# Patient Record
Sex: FEMALE | Race: WHITE | NOT HISPANIC OR LATINO | Employment: FULL TIME | URBAN - METROPOLITAN AREA
[De-identification: names, ages, dates, MRNs, and addresses within clinical notes are randomized per-mention and may not be internally consistent; named-entity substitution may affect disease eponyms.]

---

## 2017-01-04 ENCOUNTER — OFFICE VISIT (OUTPATIENT)
Dept: SPORTS MEDICINE | Facility: CLINIC | Age: 47
End: 2017-01-04
Payer: COMMERCIAL

## 2017-01-04 ENCOUNTER — HOSPITAL ENCOUNTER (OUTPATIENT)
Dept: RADIOLOGY | Facility: HOSPITAL | Age: 47
Discharge: HOME | End: 2017-01-04
Attending: ORTHOPAEDIC SURGERY
Payer: COMMERCIAL

## 2017-01-04 VITALS
WEIGHT: 178 LBS | BODY MASS INDEX: 29.66 KG/M2 | DIASTOLIC BLOOD PRESSURE: 95 MMHG | SYSTOLIC BLOOD PRESSURE: 141 MMHG | HEIGHT: 65 IN | HEART RATE: 77 BPM

## 2017-01-04 DIAGNOSIS — Z98.890 S/P RIGHT KNEE ARTHROSCOPY: ICD-10-CM

## 2017-01-04 DIAGNOSIS — M25.561 RIGHT KNEE PAIN, UNSPECIFIED CHRONICITY: Primary | ICD-10-CM

## 2017-01-04 DIAGNOSIS — M25.561 RIGHT KNEE PAIN, UNSPECIFIED CHRONICITY: ICD-10-CM

## 2017-01-04 PROCEDURE — 99024 POSTOP FOLLOW-UP VISIT: CPT | Mod: S$GLB,,, | Performed by: PHYSICIAN ASSISTANT

## 2017-01-04 PROCEDURE — 73560 X-RAY EXAM OF KNEE 1 OR 2: CPT | Mod: TC,PO,RT

## 2017-01-04 PROCEDURE — 99999 PR PBB SHADOW E&M-EST. PATIENT-LVL III: CPT | Mod: PBBFAC,,, | Performed by: PHYSICIAN ASSISTANT

## 2017-01-04 PROCEDURE — 73560 X-RAY EXAM OF KNEE 1 OR 2: CPT | Mod: 26,RT,, | Performed by: RADIOLOGY

## 2017-01-04 NOTE — PROGRESS NOTES
Subjective:          Chief Complaint: Carmen Morrow is a 46 y.o. female who had concerns including Post-op Evaluation of the Right Knee and Post-op Evaluation.    HPI   Patient presents to clinic for post op evaluation of right knee. Pain today is 1/10. Denies nausea, vomiting, fever, chills. She is no longer taking pain medication. Doing well. Ambulating full weight bearing. She has been going to PT at [facility].    DATE OF PROCEDURE: 12/22/2016     ATTENDING SURGEON: Surgeon(s) and Role:  * Bess Diaz MD - Primary     Assistants:  MD Shauna Roth PA-C      PREOPERATIVE DIAGNOSIS:  Right  Chondromalacia, (excludes patella) M94.29, Internal derangement knee M23.90 and Synovitis M65.9     POSTOPERATIVE DIAGNOSIS:   Right  Chondromalacia, (excludes patella) M94.29, Internal derangement knee M23.90 and Synovitis M65.9     PROCEDURES(S) PERFORMED:   1. Right  Femoral Subchondroplasty 99562  2. Right  Arthroscopy, debridement/shaving of articular cartilage (chondroplasty) 71415  3. Right  Arthroscopy, knee, synovectomy, limited 70001  4. Right  Amniox Arthrocentesis, 69615    Review of Systems   Constitution: Negative. Negative for chills, fever, weight gain and weight loss.   HENT: Negative for congestion, headaches and sore throat.    Eyes: Negative for blurred vision and double vision.   Cardiovascular: Negative for chest pain, leg swelling and palpitations.   Respiratory: Negative for cough and shortness of breath.    Hematologic/Lymphatic: Does not bruise/bleed easily.   Skin: Negative for itching, poor wound healing and rash.   Musculoskeletal: Positive for joint pain, joint swelling and stiffness. Negative for back pain, muscle weakness and myalgias.   Gastrointestinal: Negative for abdominal pain, constipation, diarrhea, nausea and vomiting.   Genitourinary: Negative.  Negative for frequency and hematuria.   Neurological: Negative for dizziness, numbness,  paresthesias and sensory change.   Psychiatric/Behavioral: Negative for altered mental status and depression. The patient is not nervous/anxious.    Allergic/Immunologic: Negative for hives.       Pain Related Questions  Over the past 3 days, what was your average pain during activity? (I.e. running, jogging, walking, climbing stairs, getting dressed, ect.): 3  Over the past 3 days, what was your highest pain level?: 3  Over the past 3 days, what was your lowest pain level? : 0    Other  How many nights a week are you awakened by your affected body part?: 0  Was the patient's HEIGHT measured or patient reported?: Patient Reported  Was the patient's WEIGHT measured or patient reported?: Patient Reported      Objective:        General: Carmen is well-developed, well-nourished, appears stated age, in no acute distress, alert and oriented to time, place and person.     General    Vitals reviewed.  Constitutional: She is oriented to person, place, and time. She appears well-developed and well-nourished. No distress.   Neck: Normal range of motion.   Cardiovascular: Normal rate and regular rhythm.    Pulmonary/Chest: Effort normal. No respiratory distress.   Neurological: She is alert and oriented to person, place, and time.   Psychiatric: She has a normal mood and affect. Her behavior is normal. Thought content normal.           Right Knee Exam     Inspection   Erythema: absent  Scars: present  Swelling: present  Effusion: present  Deformity: deformity    Tenderness   Right knee tenderness location: over incisions.    Range of Motion   Extension: 0   Right knee flexion: 135.     Other   Sensation: normal    Comments:  3 sutures removed. No signs of infection or necrosis. No purulent drainage. NVI    Muscle Strength   Right Lower Extremity   Hip Abduction: 5/5   Quadriceps:  4/5   Hamstrin/5     RADIOGRAPHS TODAY          Assessment:       Encounter Diagnoses   Name Primary?    Right knee pain, unspecified chronicity  Yes    S/P right knee arthroscopy           Plan:       1. Provided patient with operative note.  2. Removed  3 portal sutures. No signs of infection or necrosis.  3. May shower now without covering incisions.  4. Continue  mg once a day.  5. Continue PT per protocol.  6. RTC in 4 weeks with Dr. Bess Diaz for 6 week post op appt.                      Patient questionnaires may have been collected.

## 2017-01-23 ENCOUNTER — TELEPHONE (OUTPATIENT)
Dept: INFECTIOUS DISEASES | Facility: CLINIC | Age: 47
End: 2017-01-23

## 2017-01-23 DIAGNOSIS — R76.12 POSITIVE QUANTIFERON-TB GOLD TEST: Primary | ICD-10-CM

## 2017-01-25 ENCOUNTER — PATIENT MESSAGE (OUTPATIENT)
Dept: INFECTIOUS DISEASES | Facility: CLINIC | Age: 47
End: 2017-01-25

## 2017-01-25 ENCOUNTER — HOSPITAL ENCOUNTER (OUTPATIENT)
Dept: RADIOLOGY | Facility: HOSPITAL | Age: 47
Discharge: HOME | End: 2017-01-25
Attending: INTERNAL MEDICINE
Payer: COMMERCIAL

## 2017-01-25 DIAGNOSIS — R76.12 POSITIVE QUANTIFERON-TB GOLD TEST: ICD-10-CM

## 2017-01-25 PROCEDURE — 71020 XR CHEST PA AND LATERAL: CPT | Mod: TC

## 2017-01-25 PROCEDURE — 71020 XR CHEST PA AND LATERAL: CPT | Mod: 26,,, | Performed by: RADIOLOGY

## 2017-02-08 ENCOUNTER — OFFICE VISIT (OUTPATIENT)
Dept: INFECTIOUS DISEASES | Facility: CLINIC | Age: 47
End: 2017-02-08
Payer: COMMERCIAL

## 2017-02-08 ENCOUNTER — OFFICE VISIT (OUTPATIENT)
Dept: SPORTS MEDICINE | Facility: CLINIC | Age: 47
End: 2017-02-08
Payer: COMMERCIAL

## 2017-02-08 ENCOUNTER — HOSPITAL ENCOUNTER (OUTPATIENT)
Dept: RADIOLOGY | Facility: HOSPITAL | Age: 47
Discharge: HOME | End: 2017-02-08
Attending: ORTHOPAEDIC SURGERY
Payer: COMMERCIAL

## 2017-02-08 VITALS
WEIGHT: 177.5 LBS | HEIGHT: 65 IN | TEMPERATURE: 98 F | SYSTOLIC BLOOD PRESSURE: 132 MMHG | HEART RATE: 95 BPM | BODY MASS INDEX: 29.57 KG/M2 | DIASTOLIC BLOOD PRESSURE: 88 MMHG

## 2017-02-08 VITALS
DIASTOLIC BLOOD PRESSURE: 85 MMHG | WEIGHT: 177 LBS | SYSTOLIC BLOOD PRESSURE: 136 MMHG | BODY MASS INDEX: 29.49 KG/M2 | HEIGHT: 65 IN | HEART RATE: 82 BPM

## 2017-02-08 DIAGNOSIS — M25.561 RIGHT KNEE PAIN, UNSPECIFIED CHRONICITY: Primary | ICD-10-CM

## 2017-02-08 DIAGNOSIS — M25.561 RIGHT KNEE PAIN, UNSPECIFIED CHRONICITY: ICD-10-CM

## 2017-02-08 DIAGNOSIS — Z22.7 LATENT TUBERCULOSIS BY SKIN TEST: Primary | ICD-10-CM

## 2017-02-08 PROCEDURE — 73560 X-RAY EXAM OF KNEE 1 OR 2: CPT | Mod: 26,RT,, | Performed by: RADIOLOGY

## 2017-02-08 PROCEDURE — 99024 POSTOP FOLLOW-UP VISIT: CPT | Mod: S$GLB,,, | Performed by: ORTHOPAEDIC SURGERY

## 2017-02-08 PROCEDURE — 99999 PR PBB SHADOW E&M-EST. PATIENT-LVL III: CPT | Mod: PBBFAC,,, | Performed by: INTERNAL MEDICINE

## 2017-02-08 PROCEDURE — 73560 X-RAY EXAM OF KNEE 1 OR 2: CPT | Mod: TC,PO,RT

## 2017-02-08 PROCEDURE — 99999 PR PBB SHADOW E&M-EST. PATIENT-LVL III: CPT | Mod: PBBFAC,,, | Performed by: ORTHOPAEDIC SURGERY

## 2017-02-08 PROCEDURE — 99203 OFFICE O/P NEW LOW 30 MIN: CPT | Mod: S$GLB,,, | Performed by: INTERNAL MEDICINE

## 2017-02-08 RX ORDER — ESTAZOLAM 2 MG/1
TABLET ORAL
Refills: 8 | COMMUNITY
Start: 2017-01-24 | End: 2018-07-18

## 2017-02-08 NOTE — PROGRESS NOTES
Subjective:      Patient ID: Carmen Morrow is a 46 y.o. female.    Chief Complaint:Employee Health      History of Present Illness    Positive PPD, Initial Visit  Patient is here for evaluation of positive PPD. 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  There is no history of BCG. She took INH for 6 months and completed it as directed. Current symptoms: none. Patient denies cough, diarrhea, fatigue, fever, night sweats and weight loss. Chest x-ray was done 1/25/17 and result was normal.     Review of Systems   Constitution: Negative for chills, decreased appetite, fever, weakness, malaise/fatigue, night sweats, weight gain and weight loss.   HENT: Positive for headaches. Negative for congestion, ear pain, hearing loss, hoarse voice, sore throat and tinnitus.    Eyes: Negative for blurred vision, redness and visual disturbance.   Cardiovascular: Negative for chest pain, leg swelling and palpitations.   Respiratory: Negative for cough, hemoptysis, shortness of breath and sputum production.    Hematologic/Lymphatic: Negative for adenopathy. Does not bruise/bleed easily.   Skin: Negative for dry skin, itching, rash and suspicious lesions.   Musculoskeletal: Negative for back pain, joint pain, myalgias and neck pain.   Gastrointestinal: Positive for constipation and heartburn. Negative for abdominal pain, diarrhea, nausea and vomiting.   Genitourinary: Negative for dysuria, flank pain, frequency, hematuria, hesitancy and urgency.   Neurological: Negative for dizziness, numbness and paresthesias.   Psychiatric/Behavioral: Negative for depression and memory loss. The patient does not have insomnia and is not nervous/anxious.      Objective:   Physical Exam   Constitutional: She is oriented to person, place, and time. She appears well-developed and well-nourished. No distress.   HENT:   Head: Normocephalic and atraumatic.   Right Ear: External ear normal.   Left Ear:  External ear normal.   Nose: Nose normal.   Mouth/Throat: Oropharynx is clear and moist.   Eyes: Conjunctivae and EOM are normal. Pupils are equal, round, and reactive to light. Right eye exhibits no discharge. Left eye exhibits no discharge. No scleral icterus.   Neck: Normal range of motion. No tracheal deviation present.   Cardiovascular: Normal rate.    Pulmonary/Chest: Effort normal. No stridor. No respiratory distress. She has no wheezes.   Abdominal: Soft. She exhibits no distension.   Musculoskeletal: Normal range of motion. She exhibits no edema.   Neurological: She is alert and oriented to person, place, and time. Coordination normal.   Skin: Skin is warm and dry. No rash noted. She is not diaphoretic. No erythema.   Psychiatric: She has a normal mood and affect. Her behavior is normal. Judgment and thought content normal.   Nursing note and vitals reviewed.    Assessment:       1. 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 She has not had close contact with active TB since. No follow up PPD or quantiferon necessary. For , she should complete questionnaire yearly and has been counseled that if she were to develop signs or symptoms of TB, she would need a CXR.

## 2017-02-08 NOTE — PROGRESS NOTES
Subjective:          Chief Complaint: Carmen Morrow is a 46 y.o. female who had concerns including Post-op Evaluation of the Right Knee.    HPI Comments: Patient is here for a follow up for her right knee. She was doing PT at [facility] but finished. She is doing her home exercise program. She is not currently taking anything for her knee.     DATE OF PROCEDURE: 12/22/2016     ATTENDING SURGEON: Surgeon(s) and Role:  * Bess Diaz MD - Primary     Assistants:  MD Shauna Roth PA-C      PREOPERATIVE DIAGNOSIS:  Right  Chondromalacia, (excludes patella) M94.29, Internal derangement knee M23.90 and Synovitis M65.9     POSTOPERATIVE DIAGNOSIS:   Right  Chondromalacia, (excludes patella) M94.29, Internal derangement knee M23.90 and Synovitis M65.9     PROCEDURES(S) PERFORMED:   1. Right  Femoral Subchondroplasty 01050  2. Right  Arthroscopy, debridement/shaving of articular cartilage (chondroplasty) 37343  3. Right  Arthroscopy, knee, synovectomy, limited 43234  4. Right  Amniox Arthrocentesis, 91319         Review of Systems   Constitution: Negative. Negative for chills, fever, weight gain and weight loss.   HENT: Negative for congestion, headaches and sore throat.    Eyes: Negative for blurred vision and double vision.   Cardiovascular: Negative for chest pain, leg swelling and palpitations.   Respiratory: Negative for cough and shortness of breath.    Hematologic/Lymphatic: Does not bruise/bleed easily.   Skin: Negative for itching, poor wound healing and rash.   Musculoskeletal: Positive for joint pain, joint swelling and stiffness. Negative for back pain, muscle weakness and myalgias.   Gastrointestinal: Negative for abdominal pain, constipation, diarrhea, nausea and vomiting.   Genitourinary: Negative.  Negative for frequency and hematuria.   Neurological: Negative for dizziness, numbness, paresthesias and sensory change.   Psychiatric/Behavioral: Negative for  altered mental status and depression. The patient is not nervous/anxious.    Allergic/Immunologic: Negative for hives.       Pain Related Questions  Over the past 3 days, what was your average pain during activity? (I.e. running, jogging, walking, climbing stairs, getting dressed, ect.): 0  Over the past 3 days, what was your highest pain level?: 0  Over the past 3 days, what was your lowest pain level? : 0    Other  How many nights a week are you awakened by your affected body part?: 0  Was the patient's HEIGHT measured or patient reported?: Patient Reported  Was the patient's WEIGHT measured or patient reported?: Patient Reported      Objective:        General: Carmen is well-developed, well-nourished, appears stated age, in no acute distress, alert and oriented to time, place and person.     General    Vitals reviewed.  Constitutional: She is oriented to person, place, and time. She appears well-developed and well-nourished. No distress.   HENT:   Mouth/Throat: No oropharyngeal exudate.   Eyes: Right eye exhibits no discharge. Left eye exhibits no discharge.   Neck: Normal range of motion.   Cardiovascular: Normal rate and regular rhythm.    Pulmonary/Chest: Effort normal and breath sounds normal. No respiratory distress.   Neurological: She is alert and oriented to person, place, and time. She has normal reflexes. No cranial nerve deficit. Coordination normal.   Psychiatric: She has a normal mood and affect. Her behavior is normal. Judgment and thought content normal.     General Musculoskeletal Exam   Gait: normal       Right Knee Exam     Inspection   Erythema: absent  Scars: present  Swelling: absent  Effusion: effusion  Deformity: deformity  Bruising: absent    Tenderness   Right knee tenderness location: over incisions.    Range of Motion   Extension: 0   Flexion: abnormal Right knee flexion: 135.     Tests   Meniscus   Carina:  Medial - negative Lateral - negative  Ligament Examination Lachman: normal (-1  to 2mm) PCL-Posterior Drawer: normal (0 to 2mm)     MCL - Valgus: normal (0 to 2mm)  LCL - Varus: normalPivot Shift: normal (Equal)Reverse Pivot Shift: normal (Equal)Dial Test at 30 degrees: normal (< 5 degrees)Dial Test at 90 degrees: normal (< 5 degrees)  Posterior Sag Test: negative  Posterolateral Corner: unstable (>15 degrees difference)  Patella   Patellar Apprehension: negative  Passive Patellar Tilt: neutral  Patellar Tracking: normal  Patellar Glide (quadrants): Lateral - 1   Medial - 2  Q-Angle at 90 degrees: normal  Patellar Grind: negative  J-Sign: none    Other   Meniscal Cyst: absent  Popliteal (Baker's) Cyst: absent  Sensation: normal    Left Knee Exam     Inspection   Erythema: absent  Scars: absent  Swelling: absent  Effusion: absent  Deformity: deformity  Bruising: absent    Tenderness   The patient is experiencing no tenderness.         Range of Motion   Extension: 0   Flexion: 140     Tests   Meniscus   Carina:  Medial - negative Lateral - negative  Stability Lachman: normal (-1 to 2mm) PCL-Posterior Drawer: normal (0 to 2mm)  MCL - Valgus: normal (0 to 2mm)  LCL - Varus: normal (0 to 2mm)Pivot Shift: normal (Equal)Reverse Pivot Shift: normal (Equal)Dial Test at 30 degrees: normal (< 5 degrees)Dial Test at 90 degrees: normal (< 5 degrees)  Posterior Sag Test: negative  Posterolateral Corner: unstable (>15 degrees difference)  Patella   Patellar Apprehension: negative  Passive Patellar Tilt: neutral  Patellar Tracking: normal  Patellar Glide (Quadrants): Lateral - 1 Medial - 2  Q-Angle at 90 degrees: normal  Patellar Grind: negative  J-Sign: J sign absent    Other   Meniscal Cyst: absent  Popliteal (Baker's) Cyst: absent  Sensation: normal    Right Hip Exam     Tests   Meek: negative  Left Hip Exam     Tests   Meek: negative          Muscle Strength   Right Lower Extremity   Hip Abduction: 5/5   Quadriceps:  4/5   Hamstrin/5     Reflexes     Left Side  Quadriceps:  2+  Achilles:   2+    Right Side   Quadriceps:  2+  Achilles:  2+    Vascular Exam     Right Pulses  Dorsalis Pedis:      2+  Posterior Tibial:      2+        Left Pulses  Dorsalis Pedis:      2+  Posterior Tibial:      2+          RADIOGRAPHS TODAY  Radiographs ordered and reviewed today in clinic of the right knee demonstrates no fracture, dislocation, swelling or degenerative changes noted with post-operative changes from subchondroplasty.               Assessment:       Encounter Diagnosis   Name Primary?    Right knee pain, unspecified chronicity Yes          Plan:       1. IKDC, SF-12 and KOOS was not filled out today in clinic.     RTC in 6 weeks with Dr. Bess Diaz Patient will fill out IKDC, SF-12 and KOOS and bilateral knee series on return.    2. Continue HEP and progress as tolerated                    Patient questionnaires may have been collected.

## 2017-02-08 NOTE — MR AVS SNAPSHOT
[facility]  [address]  [facility] [address]  Phone: [phone]                  Carmen Morrow   2017 3:45 PM   Office Visit    Description:  Female : 1970   Provider:  Bess Diaz MD   Department:  [facility]           Reason for Visit     Right Knee - Post-op Evaluation           Diagnoses this Visit        Comments    Right knee pain, unspecified chronicity    -  Primary            To Do List           Goals (5 Years of Data)     None      Follow-Up and Disposition     Return in about 6 weeks (around 3/22/2017) for RTC in 6 weeks with Dr. Bess Diaz Patient will fill out IKDC, SF-12 and KOOS and bilateral knee se.      [facility] On [facility]     [facility] On Call Nurse Care Line -  Assistance  Registered nurses in the [facility] On Call Center provide clinical advisement, health education, appointment booking, and other advisory services.  Call for this free service at [phone].             Medications           Message regarding Medications     Verify the changes and/or additions to your medication regime listed below are the same as discussed with your clinician today.  If any of these changes or additions are incorrect, please notify your healthcare provider.             Verify that the below list of medications is an accurate representation of the medications you are currently taking.  If none reported, the list may be blank. If incorrect, please contact your healthcare provider. Carry this list with you in case of emergency.           Current Medications     amitriptyline (ELAVIL) 10 MG tablet 2 pills at dinner time every night    cetirizine (ZYRTEC) 10 MG tablet Take 10 mg by mouth Daily. 1 Tablet Oral Every day    fluticasone (FLONASE) 50 mcg/actuation nasal spray 2 sprays by Each Nare route every evening. 2 Spray, Suspension Nasal Every day    acyclovir (ZOVIRAX) 200 MG capsule Take 1 capsule (200 mg total) by mouth 2 (two) times  "daily.    linaclotide 290 mcg Cap Take 290 mcg by mouth once daily.    MICROGESTIN 1/20, 21, 1-20 mg-mcg per tablet TK 1 T PO QD           Clinical Reference Information           Your Vitals Were     BP Pulse Height Weight Last Period BMI    136/85 82 5' 5" (1.651 m) 80.3 kg (177 lb) 01/19/2017 29.45 kg/m2      Blood Pressure          Most Recent Value    BP  136/85      Allergies as of 2/8/2017     Oxycodone      Immunizations Administered on Date of Encounter - 2/8/2017     None      Orders Placed During Today's Visit     Future Labs/Procedures Expected by Expires    X-Ray Knee 1 or 2 View Right  2/8/2017 2/8/2018      Language Assistance Services     ATTENTION: Language assistance services are available, free of charge. Please call [phone].      ATENCIÓN: Si joanne marshall, tiene a nair disposición servicios gratuitos de asistencia lingüística. Llame al [phone].     CHÚ Ý: N?u b?n nói Ti?ng Vi?t, có các d?ch v? h? tr? ngôn ng? mi?n phí dành cho b?n. G?i s? [phone].         [facility] complies with applicable Federal civil rights laws and does not discriminate on the basis of race, color, national origin, age, disability, or sex.        "

## 2017-03-22 ENCOUNTER — PATIENT MESSAGE (OUTPATIENT)
Dept: SPORTS MEDICINE | Facility: CLINIC | Age: 47
End: 2017-03-22

## 2017-03-29 ENCOUNTER — OFFICE VISIT (OUTPATIENT)
Dept: SPORTS MEDICINE | Facility: CLINIC | Age: 47
End: 2017-03-29
Payer: COMMERCIAL

## 2017-03-29 ENCOUNTER — HOSPITAL ENCOUNTER (OUTPATIENT)
Dept: RADIOLOGY | Facility: HOSPITAL | Age: 47
Discharge: HOME | End: 2017-03-29
Attending: ORTHOPAEDIC SURGERY
Payer: COMMERCIAL

## 2017-03-29 VITALS
HEART RATE: 77 BPM | BODY MASS INDEX: 29.49 KG/M2 | DIASTOLIC BLOOD PRESSURE: 95 MMHG | WEIGHT: 177 LBS | HEIGHT: 65 IN | SYSTOLIC BLOOD PRESSURE: 149 MMHG

## 2017-03-29 DIAGNOSIS — M70.51 PES ANSERINUS BURSITIS OF RIGHT KNEE: ICD-10-CM

## 2017-03-29 DIAGNOSIS — M25.569 KNEE PAIN, UNSPECIFIED CHRONICITY, UNSPECIFIED LATERALITY: Primary | ICD-10-CM

## 2017-03-29 DIAGNOSIS — M25.569 KNEE PAIN, UNSPECIFIED CHRONICITY, UNSPECIFIED LATERALITY: ICD-10-CM

## 2017-03-29 DIAGNOSIS — M22.41 CHONDROMALACIA OF RIGHT PATELLA: ICD-10-CM

## 2017-03-29 DIAGNOSIS — M23.91 INTERNAL DERANGEMENT OF RIGHT KNEE: ICD-10-CM

## 2017-03-29 PROCEDURE — 99999 PR PBB SHADOW E&M-EST. PATIENT-LVL III: CPT | Mod: PBBFAC,,, | Performed by: ORTHOPAEDIC SURGERY

## 2017-03-29 PROCEDURE — 73564 X-RAY EXAM KNEE 4 OR MORE: CPT | Mod: TC,50,PO

## 2017-03-29 PROCEDURE — 73564 X-RAY EXAM KNEE 4 OR MORE: CPT | Mod: 26,50,, | Performed by: RADIOLOGY

## 2017-03-29 PROCEDURE — 99024 POSTOP FOLLOW-UP VISIT: CPT | Mod: S$GLB,,, | Performed by: ORTHOPAEDIC SURGERY

## 2017-03-29 NOTE — MR AVS SNAPSHOT
[facility]  [address]  [facility] [address]  Phone: [phone]                  Carmen Morrow   3/29/2017 8:15 AM   Office Visit    Description:  Female : 1970   Provider:  Bess Diaz MD   Department:  [facility]           Reason for Visit     Right Knee - Post-op Evaluation           Diagnoses this Visit        Comments    Knee pain, unspecified chronicity, unspecified laterality    -  Primary     Chondromalacia of right patella         Internal derangement of right knee         Pes anserinus bursitis of right knee                To Do List           Goals (5 Years of Data)     None      Follow-Up and Disposition     Return in about 3 months (around 2017) for RTC in 3 months with Dr. Bess Diaz Patient will fill out IKDC, SF-12 and KOOS and bilateral knee s.    Follow-up and Disposition History      [facility] Care Line - / Assistance  Registered nurses in the [facility] On Call Center provide clinical advisement, health education, appointment booking, and other advisory services.  Call for this free service at [phone].             Medications           Message regarding Medications     Verify the changes and/or additions to your medication regime listed below are the same as discussed with your clinician today.  If any of these changes or additions are incorrect, please notify your healthcare provider.             Verify that the below list of medications is an accurate representation of the medications you are currently taking.  If none reported, the list may be blank. If incorrect, please contact your healthcare provider. Carry this list with you in case of emergency.           Current Medications     amitriptyline (ELAVIL) 10 MG tablet 2 pills at dinner time every night    cetirizine (ZYRTEC) 10 MG tablet Take 10 mg by mouth Daily. 1 Tablet Oral Every day    fluticasone (FLONASE) 50 mcg/actuation  "nasal spray 2 sprays by Each Nare route every evening. 2 Spray, Suspension Nasal Every day    linaclotide 290 mcg Cap Take 290 mcg by mouth once daily.    MICROGESTIN 1/20, 21, 1-20 mg-mcg per tablet TK 1 T PO QD    acyclovir (ZOVIRAX) 200 MG capsule Take 1 capsule (200 mg total) by mouth 2 (two) times daily.           Clinical Reference Information           Your Vitals Were     BP Pulse Height Weight Last Period BMI    149/95 77 5' 5" (1.651 m) 80.3 kg (177 lb) 03/27/2017 29.45 kg/m2      Blood Pressure          Most Recent Value    BP  (!)  149/95      Allergies as of 3/29/2017     Oxycodone      Immunizations Administered on Date of Encounter - 3/29/2017     None      Orders Placed During Today's Visit     Future Labs/Procedures Expected by Expires    X-ray Knee Ortho Bilateral with Flexion  3/29/2017 3/29/2018      Instructions                   Language Assistance Services     ATTENTION: Language assistance services are available, free of charge. Please call [phone].      ATENCIÓN: Si joanne marshall, tiene a nair disposición servicios gratuitos de asistencia lingüística. Llame al [phone].     ROBERTO Ý: N?u b?n nói Ti?ng Vi?t, có các d?ch v? h? tr? ngôn ng? mi?n phí dành cho b?n. G?i s? [phone].         [facility] complies with applicable Federal civil rights laws and does not discriminate on the basis of race, color, national origin, age, disability, or sex.        "

## 2017-03-29 NOTE — PROGRESS NOTES
Subjective:          Chief Complaint: Carmen Morrow is a 46 y.o. female who had concerns including Post-op Evaluation of the Right Knee.    HPI Comments: Patient is here for a follow up for her right knee.     DATE OF PROCEDURE: 12/22/2016     ATTENDING SURGEON: Surgeon(s) and Role:  * Bess Diaz MD - Primary     Assistants:  MD Shauna Roth PA-C      PREOPERATIVE DIAGNOSIS:  Right  Chondromalacia, (excludes patella) M94.29, Internal derangement knee M23.90 and Synovitis M65.9     POSTOPERATIVE DIAGNOSIS:   Right  Chondromalacia, (excludes patella) M94.29, Internal derangement knee M23.90 and Synovitis M65.9     PROCEDURES(S) PERFORMED:   1. Right  Femoral Subchondroplasty 59141  2. Right  Arthroscopy, debridement/shaving of articular cartilage (chondroplasty) 09195  3. Right  Arthroscopy, knee, synovectomy, limited 25337  4. Right  Amniox Arthrocentesis, 57263         Review of Systems   Constitution: Negative. Negative for chills, fever, weight gain and weight loss.   HENT: Negative for congestion, headaches and sore throat.    Eyes: Negative for blurred vision and double vision.   Cardiovascular: Negative for chest pain, leg swelling and palpitations.   Respiratory: Negative for cough and shortness of breath.    Hematologic/Lymphatic: Does not bruise/bleed easily.   Skin: Negative for itching, poor wound healing and rash.   Musculoskeletal: Positive for joint pain, joint swelling and stiffness. Negative for back pain, muscle weakness and myalgias.   Gastrointestinal: Negative for abdominal pain, constipation, diarrhea, nausea and vomiting.   Genitourinary: Negative.  Negative for frequency and hematuria.   Neurological: Negative for dizziness, numbness, paresthesias and sensory change.   Psychiatric/Behavioral: Negative for altered mental status and depression. The patient is not nervous/anxious.    Allergic/Immunologic: Negative for hives.       Pain Related Questions  Over the  past 3 days, what was your average pain during activity? (I.e. running, jogging, walking, climbing stairs, getting dressed, ect.): 0  Over the past 3 days, what was your highest pain level?: 0  Over the past 3 days, what was your lowest pain level? : 0    Other  How many nights a week are you awakened by your affected body part?: 0  Was the patient's HEIGHT measured or patient reported?: Patient Reported  Was the patient's WEIGHT measured or patient reported?: Measured      Objective:        General: Carmen is well-developed, well-nourished, appears stated age, in no acute distress, alert and oriented to time, place and person.     General    Vitals reviewed.  Constitutional: She is oriented to person, place, and time. She appears well-developed and well-nourished. No distress.   HENT:   Mouth/Throat: No oropharyngeal exudate.   Eyes: Right eye exhibits no discharge. Left eye exhibits no discharge.   Neck: Normal range of motion.   Cardiovascular: Normal rate and regular rhythm.    Pulmonary/Chest: Effort normal and breath sounds normal. No respiratory distress.   Neurological: She is alert and oriented to person, place, and time. She has normal reflexes. No cranial nerve deficit. Coordination normal.   Psychiatric: She has a normal mood and affect. Her behavior is normal. Judgment and thought content normal.     General Musculoskeletal Exam   Gait: normal       Right Knee Exam     Inspection   Erythema: absent  Scars: present  Swelling: absent  Effusion: effusion  Deformity: deformity  Bruising: absent    Tenderness   Right knee tenderness location: over incisions.    Range of Motion   Extension: 0   Flexion: normal Right knee flexion: 135.     Tests   Meniscus   Carina:  Medial - negative Lateral - negative  Ligament Examination Lachman: normal (-1 to 2mm) PCL-Posterior Drawer: normal (0 to 2mm)     MCL - Valgus: normal (0 to 2mm)  LCL - Varus: normalPivot Shift: normal (Equal)Reverse Pivot Shift: normal  (Equal)Dial Test at 30 degrees: normal (< 5 degrees)Dial Test at 90 degrees: normal (< 5 degrees)  Posterior Sag Test: negative  Posterolateral Corner: unstable (>15 degrees difference)  Patella   Patellar Apprehension: negative  Passive Patellar Tilt: neutral  Patellar Tracking: normal  Patellar Glide (quadrants): Lateral - 1   Medial - 2  Q-Angle at 90 degrees: normal  Patellar Grind: negative  J-Sign: none    Other   Meniscal Cyst: absent  Popliteal (Baker's) Cyst: absent  Sensation: normal    Comments:  Mild quadriceps atrophy     Left Knee Exam     Inspection   Erythema: absent  Scars: absent  Swelling: absent  Effusion: absent  Deformity: deformity  Bruising: absent    Tenderness   The patient is experiencing no tenderness.         Range of Motion   Extension: 0   Left knee flexion: 135.     Tests   Meniscus   Carina:  Medial - negative Lateral - negative  Stability Lachman: normal (-1 to 2mm) PCL-Posterior Drawer: normal (0 to 2mm)  MCL - Valgus: normal (0 to 2mm)  LCL - Varus: normal (0 to 2mm)Pivot Shift: normal (Equal)Reverse Pivot Shift: normal (Equal)Dial Test at 30 degrees: normal (< 5 degrees)Dial Test at 90 degrees: normal (< 5 degrees)  Posterior Sag Test: negative  Posterolateral Corner: unstable (>15 degrees difference)  Patella   Patellar Apprehension: negative  Passive Patellar Tilt: neutral  Patellar Tracking: normal  Patellar Glide (Quadrants): Lateral - 1 Medial - 2  Q-Angle at 90 degrees: normal  Patellar Grind: negative  J-Sign: J sign absent    Other   Meniscal Cyst: absent  Popliteal (Baker's) Cyst: absent  Sensation: normal    Right Hip Exam     Tests   Meek: negative  Left Hip Exam     Tests   Meek: negative          Muscle Strength   Right Lower Extremity   Hip Abduction: 5/5   Quadriceps:  4/5   Hamstrin/5 and 5/5     Reflexes     Left Side  Quadriceps:  2+  Achilles:  2+    Right Side   Quadriceps:  2+  Achilles:  2+    Vascular Exam     Right Pulses  Dorsalis Pedis:       2+  Posterior Tibial:      2+        Left Pulses  Dorsalis Pedis:      2+  Posterior Tibial:      2+          RADIOGRAPHS TODAY  Right: No fracture or dislocation.  No joint effusion.  Mild narrowing of medial tibiofemoral cartilage space noted.    Left: No fracture or dislocation.  No joint effusion.  Mild narrowing of medial tibiofemoral cartilage space noted.  Sclerosis seen within the medial femoral condyle consistent with subchondroplasty.  Several mineralized foci noted at the posterior aspect of the joint.              Assessment:       Encounter Diagnoses   Name Primary?    Knee pain, unspecified chronicity, unspecified laterality Yes    Chondromalacia of right patella     Internal derangement of right knee     Pes anserinus bursitis of right knee           Plan:       1. IKDC, SF-12 and KOOS was filled out today in clinic.     RTC in 3 months with Dr. Bess Diaz Patient will fill out IKDC, SF-12 and KOOS and bilateral knee series on return.    2. Continue HEP and progress as tolerated    3. HEP 63374 - Dr. Diaz and Raya Alicea, instructed and demonstrated a Core HEP. The patient then demonstrated understanding of exercises and proper technique. This program was performed for 15 minutes.                   Patient questionnaires may have been collected.

## 2017-06-13 ENCOUNTER — OFFICE VISIT (OUTPATIENT)
Dept: OTOLARYNGOLOGY | Facility: CLINIC | Age: 47
End: 2017-06-13
Payer: COMMERCIAL

## 2017-06-13 VITALS
BODY MASS INDEX: 24.4 KG/M2 | HEART RATE: 94 BPM | WEIGHT: 146.63 LBS | DIASTOLIC BLOOD PRESSURE: 81 MMHG | TEMPERATURE: 99 F | SYSTOLIC BLOOD PRESSURE: 133 MMHG

## 2017-06-13 DIAGNOSIS — J34.89 NASAL OBSTRUCTION: ICD-10-CM

## 2017-06-13 DIAGNOSIS — J32.4 CHRONIC PANSINUSITIS: ICD-10-CM

## 2017-06-13 DIAGNOSIS — R13.10 DYSPHAGIA, UNSPECIFIED TYPE: Primary | ICD-10-CM

## 2017-06-13 DIAGNOSIS — J34.3 HYPERTROPHY OF INFERIOR NASAL TURBINATE: ICD-10-CM

## 2017-06-13 DIAGNOSIS — R09.A2 GLOBUS SENSATION: ICD-10-CM

## 2017-06-13 DIAGNOSIS — J34.2 NASAL SEPTAL DEVIATION: ICD-10-CM

## 2017-06-13 PROCEDURE — 31575 DIAGNOSTIC LARYNGOSCOPY: CPT | Mod: S$GLB,,, | Performed by: OTOLARYNGOLOGY

## 2017-06-13 PROCEDURE — 99999 PR PBB SHADOW E&M-EST. PATIENT-LVL III: CPT | Mod: PBBFAC,,, | Performed by: OTOLARYNGOLOGY

## 2017-06-13 PROCEDURE — 99204 OFFICE O/P NEW MOD 45 MIN: CPT | Mod: 25,S$GLB,, | Performed by: OTOLARYNGOLOGY

## 2017-06-13 RX ORDER — METHYLPREDNISOLONE 4 MG/1
TABLET ORAL
Qty: 1 PACKAGE | Refills: 0 | Status: SHIPPED | OUTPATIENT
Start: 2017-06-13 | End: 2017-07-17 | Stop reason: ALTCHOICE

## 2017-06-13 RX ORDER — AMOXICILLIN AND CLAVULANATE POTASSIUM 875; 125 MG/1; MG/1
1 TABLET, FILM COATED ORAL 2 TIMES DAILY
Qty: 42 TABLET | Refills: 0 | Status: SHIPPED | OUTPATIENT
Start: 2017-06-13 | End: 2017-07-04

## 2017-06-13 RX ORDER — FLUTICASONE PROPIONATE 50 MCG
2 SPRAY, SUSPENSION (ML) NASAL NIGHTLY
Qty: 1 BOTTLE | Refills: 12 | Status: SHIPPED | OUTPATIENT
Start: 2017-06-13 | End: 2019-11-21 | Stop reason: SDUPTHER

## 2017-06-13 RX ORDER — AZELASTINE 1 MG/ML
1 SPRAY, METERED NASAL 2 TIMES DAILY
Qty: 30 ML | Refills: 0 | Status: SHIPPED | OUTPATIENT
Start: 2017-06-13 | End: 2017-07-17 | Stop reason: SDUPTHER

## 2017-06-13 RX ORDER — BENZPHETAMINE HYDROCHLORIDE 50 MG/1
50 TABLET ORAL 2 TIMES DAILY
COMMUNITY
Start: 2017-05-19 | End: 2017-08-03

## 2017-06-13 NOTE — PROGRESS NOTES
CC: trouble swallowing and nasal congestion.     HPI: 46 year old female here for evaluation of dysphagia and nasal congestion/nasal obstruction.  She reports that she has a globus sensation and difficulty with swallowing. She reports all that her swallowing is effortful.  She reports occasional food sticking of solids like crackers/bread. She notes these symptoms started 1 year ago after ACDF levels C4-6 at [place] with Dr. Noguera.  She denies weight loss or fatigue. She denies throat clearing, coughing with swallows, hoarseness or heartburn symptoms.      She reports that she has nasal obstruction with right greater than left for 1.5 years. This has been associated with post nasal drip and intermittent facial pain and pressure. She was recently seen at an Urgent Care clinic and was  prescribed celestone shot and antibiotics of which she does not recall the name. She has been on Flonase intermittently but does not feel like she can get the medication into her right nostril.  She also does not think that the Flonase has helped. She does note that her symptoms where more prominent in the springtime years ago but now seem to be present year round.       Past Medical History:   Diagnosis Date    Brachial neuritis or radiculitis NOS 11/14/2012    Degeneration of cervical intervertebral disc 11/14/2012    GERD (gastroesophageal reflux disease)     SVT (supraventricular tachycardia)      Social History     Social History    Marital status:      Spouse name: N/A    Number of children: N/A    Years of education: N/A     Occupational History    Not on file.     Social History Main Topics    Smoking status: Former Smoker     Quit date: 1/1/2000    Smokeless tobacco: Not on file    Alcohol use No    Drug use: No    Sexual activity: Yes     Partners: Male     Birth control/ protection: None     Other Topics Concern    Not on file     Social History Narrative    No narrative on file     Past  Surgical History:   Procedure Laterality Date    anterior diskectomy fusion  05/20/2016    BREAST RECONSTRUCTION      CSF leak  05/22/2016    Replace and repair fat graft from surgery on 5/20/2016    KNEE ARTHROSCOPY      LAMINECTOMY      SPINAL FUSION       Family History   Problem Relation Age of Onset    Heart disease Mother     Hypertension Mother     Asthma Mother     Heart disease Father     COPD Father     Diabetes Father     Ovarian cancer Neg Hx     Colon cancer Neg Hx     Breast cancer Neg Hx     Cancer Neg Hx            Review of Systems  General: negative for chills, fever or weight loss  Psychological: negative for mood changes or depression  Ophthalmic: negative for blurry vision, photophobia or eye pain  ENT: see HPI  Respiratory: no cough, shortness of breath, or wheezing  Cardiovascular: no chest pain or dyspnea on exertion  Gastrointestinal: no abdominal pain, change in bowel habits, or black/ bloody stools  Musculoskeletal: negative for gait disturbance or muscular weakness  Neurological: no syncope or seizures; no ataxia  Dermatological: negative for puritis,  rash and jaundice  Hematologic/lymphatic: no easy bruising, no new lumps or bumps      Physical Exam:    Vitals:    06/13/17 1543   BP: 133/81   Pulse: 94   Temp: 98.9 °F (37.2 °C)       Constitutional: Well appearing / communicating without difficutly.  NAD.  Eyes: EOM I Bilaterally  Head/Face: Normocephalic.  Negative paranasal sinus pressure/tenderness.  Salivary glands WNL.  House Brackmann I Bilaterally.    Right Ear: Auricle normal appearance. External Auditory Canal within normal limits no lesions or masses,TM w/o masses/lesions/perforations. TM mobility noted.   Left Ear: Auricle normal appearance. External Auditory Canal within normal limits no lesions or masses,TM w/o masses/lesions/perforations. TM mobility noted.  Nose: Nasal septal deviation to the right. Inferior Turbinates edematous and boggy 3+ bilaterally.  No septal perforation. No masses/lesions. External nasal skin appears normal without masses/lesions.  Oral Cavity: Gingiva/lips within normal limits.  Dentition/gingiva healthy appearing. Mucus membranes moist. Floor of mouth soft, no masses palpated. Oral Tongue mobile. Hard Palate appears normal.    Oropharynx: Base of tongue appears normal. No masses/lesions noted. Tonsillar fossa/pharyngeal wall without lesions. Posterior oropharynx WNL.  Soft palate without masses. Midline uvula.   Neck/Lymphatic: No LAD I-VI bilaterally.  No thyromegaly.  No masses noted on exam.    Mirror laryngoscopy/nasopharyngoscopy: Active gag reflex.  Unable to perform.    Neuro/Psychiatric: AOx3.  Normal mood and affect.   Cardiovascular: Normal carotid pulses bilaterally, no increasing jugular venous distention noted at cervical region bilaterally.    Respiratory: Normal respiratory effort, no stridor, no retractions noted.      See separate procedure note for FFL.       Assessment:    ICD-10-CM ICD-9-CM    1. Dysphagia, unspecified type R13.10 787.20 FL Esophagram Complete   2. Chronic pansinusitis J32.4 473.8 CT Medtronic Sinuses without     The primary encounter diagnosis was Dysphagia, unspecified type. A diagnosis of Chronic pansinusitis was also pertinent to this visit.      Plan:  Orders Placed This Encounter   Procedures    FL Esophagram Complete    CT Medtronic Sinuses without     Dysphagia: Normal FFL today.  I will obtain an esophagram to further evaluate.  It is possible that the cervical hardware could be asserting mass effect on the esophagus? Consider GI referral pending results.    Chronic pansinusitis/Nasal obstruction/Nasal septal deviation/inferior turbinate hypertrophy: I have ordered a 3 week course of Augmentin, Astelin, Flonase, and a medrol dose pack. I have recommended a CT scan of the sinuses to assess for intraluminal patency.      Follow up in 3 weeks to assess.     Breana Abbott MD

## 2017-06-19 ENCOUNTER — HOSPITAL ENCOUNTER (OUTPATIENT)
Dept: RADIOLOGY | Facility: HOSPITAL | Age: 47
Discharge: HOME | End: 2017-06-19
Attending: OTOLARYNGOLOGY
Payer: COMMERCIAL

## 2017-06-19 ENCOUNTER — PATIENT MESSAGE (OUTPATIENT)
Dept: OTOLARYNGOLOGY | Facility: CLINIC | Age: 47
End: 2017-06-19

## 2017-06-19 DIAGNOSIS — R13.10 DYSPHAGIA, UNSPECIFIED TYPE: ICD-10-CM

## 2017-06-19 DIAGNOSIS — J32.4 CHRONIC PANSINUSITIS: ICD-10-CM

## 2017-06-19 PROCEDURE — 74220 X-RAY XM ESOPHAGUS 1CNTRST: CPT | Mod: TC

## 2017-06-19 PROCEDURE — 70486 CT MAXILLOFACIAL W/O DYE: CPT | Mod: TC

## 2017-06-19 PROCEDURE — 70486 CT MAXILLOFACIAL W/O DYE: CPT | Mod: 26,,, | Performed by: RADIOLOGY

## 2017-06-19 PROCEDURE — 74220 X-RAY XM ESOPHAGUS 1CNTRST: CPT | Mod: 26,,, | Performed by: RADIOLOGY

## 2017-06-27 ENCOUNTER — PATIENT MESSAGE (OUTPATIENT)
Dept: OTOLARYNGOLOGY | Facility: CLINIC | Age: 47
End: 2017-06-27

## 2017-06-27 RX ORDER — FLUCONAZOLE 150 MG/1
150 TABLET ORAL DAILY
Qty: 1 TABLET | Refills: 0 | Status: SHIPPED | OUTPATIENT
Start: 2017-06-27 | End: 2017-06-28

## 2017-07-17 ENCOUNTER — OFFICE VISIT (OUTPATIENT)
Dept: OTOLARYNGOLOGY | Facility: CLINIC | Age: 47
End: 2017-07-17
Payer: COMMERCIAL

## 2017-07-17 ENCOUNTER — PATIENT MESSAGE (OUTPATIENT)
Dept: OTOLARYNGOLOGY | Facility: CLINIC | Age: 47
End: 2017-07-17

## 2017-07-17 VITALS
HEART RATE: 80 BPM | WEIGHT: 164.88 LBS | BODY MASS INDEX: 27.44 KG/M2 | TEMPERATURE: 99 F | SYSTOLIC BLOOD PRESSURE: 133 MMHG | DIASTOLIC BLOOD PRESSURE: 88 MMHG

## 2017-07-17 DIAGNOSIS — K22.4 ESOPHAGEAL DYSMOTILITY: ICD-10-CM

## 2017-07-17 DIAGNOSIS — J32.4 CHRONIC PANSINUSITIS: Primary | ICD-10-CM

## 2017-07-17 DIAGNOSIS — J34.3 HYPERTROPHY OF INFERIOR NASAL TURBINATE: ICD-10-CM

## 2017-07-17 DIAGNOSIS — R13.10 DYSPHAGIA, UNSPECIFIED TYPE: ICD-10-CM

## 2017-07-17 DIAGNOSIS — J34.2 NASAL SEPTAL DEVIATION: ICD-10-CM

## 2017-07-17 PROCEDURE — 99214 OFFICE O/P EST MOD 30 MIN: CPT | Mod: S$GLB,,, | Performed by: OTOLARYNGOLOGY

## 2017-07-17 PROCEDURE — 99999 PR PBB SHADOW E&M-EST. PATIENT-LVL III: CPT | Mod: PBBFAC,,, | Performed by: OTOLARYNGOLOGY

## 2017-07-17 RX ORDER — AZELASTINE 1 MG/ML
1 SPRAY, METERED NASAL 2 TIMES DAILY
Qty: 30 ML | Refills: 11 | Status: SHIPPED | OUTPATIENT
Start: 2017-07-17 | End: 2019-11-21

## 2017-07-17 RX ORDER — MONTELUKAST SODIUM 10 MG/1
10 TABLET ORAL NIGHTLY
Qty: 30 TABLET | Refills: 12 | Status: SHIPPED | OUTPATIENT
Start: 2017-07-17 | End: 2017-08-16

## 2017-07-17 NOTE — PROGRESS NOTES
CC: trouble swallowing and nasal congestion.     HPI: 46 year old female here for evaluation of dysphagia and nasal congestion/nasal obstruction.  She reports that she has a globus sensation and difficulty with swallowing. She reports all that her swallowing is effortful.  She reports occasional food sticking of solids like crackers/bread. She notes these symptoms started 1 year ago after ACDF levels C4-6 at [place] with Dr. Noguera.  She denies weight loss or fatigue. She denies throat clearing, coughing with swallows, hoarseness or heartburn symptoms.      She reports that she has nasal obstruction with right greater than left for 1.5 years. This has been associated with post nasal drip and intermittent facial pain and pressure. She was recently seen at an Urgent Care clinic and was  prescribed celestone shot and antibiotics of which she does not recall the name. She has been on Flonase intermittently but does not feel like she can get the medication into her right nostril.  She also does not think that the Flonase has helped. She does note that her symptoms where more prominent in the springtime years ago but now seem to be present year round.     Interval HPI: She feels that she has continued nasal congestion and obstruction.  She has completed her Augmentin, and is using Flonase, Astelin, and xyzal.  She states her symptoms are not improved with the medical management.  She denies pressure in the maxillary sinus region. She report that she has post nasal drip.   She reports that the dysphagia is the same and unchanged.       Past Medical History:   Diagnosis Date    Brachial neuritis or radiculitis NOS 11/14/2012    Degeneration of cervical intervertebral disc 11/14/2012    GERD (gastroesophageal reflux disease)     SVT (supraventricular tachycardia)      Social History     Social History    Marital status:      Spouse name: N/A    Number of children: N/A    Years of education: N/A      Occupational History    Not on file.     Social History Main Topics    Smoking status: Former Smoker     Quit date: 1/1/2000    Smokeless tobacco: Not on file    Alcohol use No    Drug use: No    Sexual activity: Yes     Partners: Male     Birth control/ protection: None     Other Topics Concern    Not on file     Social History Narrative    No narrative on file     Past Surgical History:   Procedure Laterality Date    anterior diskectomy fusion  05/20/2016    BREAST RECONSTRUCTION      CSF leak  05/22/2016    Replace and repair fat graft from surgery on 5/20/2016    KNEE ARTHROSCOPY      LAMINECTOMY      SPINAL FUSION       Family History   Problem Relation Age of Onset    Heart disease Mother     Hypertension Mother     Asthma Mother     Heart disease Father     COPD Father     Diabetes Father     Ovarian cancer Neg Hx     Colon cancer Neg Hx     Breast cancer Neg Hx     Cancer Neg Hx            Review of Systems  General: negative for chills, fever or weight loss  Psychological: negative for mood changes or depression  Ophthalmic: negative for blurry vision, photophobia or eye pain  ENT: see HPI  Respiratory: no cough, shortness of breath, or wheezing  Cardiovascular: no chest pain or dyspnea on exertion  Gastrointestinal: no abdominal pain, change in bowel habits, or black/ bloody stools  Musculoskeletal: negative for gait disturbance or muscular weakness  Neurological: no syncope or seizures; no ataxia  Dermatological: negative for puritis,  rash and jaundice  Hematologic/lymphatic: no easy bruising, no new lumps or bumps      Physical Exam:    Vitals:    07/17/17 0805   BP: 133/88   Pulse: 80   Temp: 99.1 °F (37.3 °C)       Constitutional: Well appearing / communicating without difficutly.  NAD.  Eyes: EOM I Bilaterally  Head/Face: Normocephalic.  Negative paranasal sinus pressure/tenderness.  Salivary glands WNL.  House Brackmann I Bilaterally.    Right Ear: Auricle normal  appearance. External Auditory Canal within normal limits no lesions or masses,TM w/o masses/lesions/perforations. TM mobility noted.   Left Ear: Auricle normal appearance. External Auditory Canal within normal limits no lesions or masses,TM w/o masses/lesions/perforations. TM mobility noted.  Nose: Nasal septal deviation to the right. Inferior Turbinates edematous and boggy 3+ bilaterally. No septal perforation. No masses/lesions. External nasal skin appears normal without masses/lesions.  Oral Cavity: Gingiva/lips within normal limits.  Dentition/gingiva healthy appearing. Mucus membranes moist. Floor of mouth soft, no masses palpated. Oral Tongue mobile. Hard Palate appears normal.    Oropharynx: Base of tongue appears normal. No masses/lesions noted. Tonsillar fossa/pharyngeal wall without lesions. Posterior oropharynx WNL.  Soft palate without masses. Midline uvula.   Neck/Lymphatic: No LAD I-VI bilaterally.  No thyromegaly.  No masses noted on exam.    Mirror laryngoscopy/nasopharyngoscopy: Active gag reflex.  Unable to perform.    Neuro/Psychiatric: AOx3.  Normal mood and affect.   Cardiovascular: Normal carotid pulses bilaterally, no increasing jugular venous distention noted at cervical region bilaterally.    Respiratory: Normal respiratory effort, no stridor, no retractions noted.      Barium esophagram 6/19/17: Postsurgical changes of prior C4-C6 ACDF. The esophagus is normal in caliber and course.  Esophageal primary and secondary contractions failed to completely clear the esophagus and tertiary contractions were observed.  Gastroesophageal reflux was not elicited with cough or valsalva.    CT sinus 6/19/17: There is mild patchy mucosal thickening throughout the paranasal sinuses, particularly in the ethmoid air cells bilaterally. This is thin smooth, measures less than 3 mm in thickness. Small amount of layering fluid in the left maxillary sinus as well as the left sphenoid sinus suggesting the  possibility of acute sinusitis. No osseous thickening or sclerosis to specifically indicate chronic sinus disease. Ostiomeatal units are patent. Left-sided tiana bullosa. Minimal leftward nasal septal deviation. Otherwise nasal cavity is unremarkable.      Assessment:    ICD-10-CM ICD-9-CM    1. Chronic pansinusitis J32.4 473.8    2. Nasal septal deviation J34.2 470    3. Hypertrophy of inferior nasal turbinate J34.3 478.0    4. Dysphagia, unspecified type R13.10 787.20    5. Esophageal dysmotility K22.4 530.5      The primary encounter diagnosis was Chronic pansinusitis. Diagnoses of Nasal septal deviation, Hypertrophy of inferior nasal turbinate, Dysphagia, unspecified type, and Esophageal dysmotility were also pertinent to this visit.      Plan:  No orders of the defined types were placed in this encounter.    Dysphagia/Esophageal dysmotility: Referral to GI.    Chronic pansinusitis/Nasal obstruction/inferior turbinate hypertrophy: She would benefit from endoscopic sinus surgery for the treatment of her condition.  This would include the ethmoid and maxillary sinuses and would be bilateral.  Inferior turbinate reduction would be included.  I discussed the risks, benefits and alternatives to surgery with the patient, as well as the expected postoperative course.  I gave her the opportunity to ask questions and I answered all of them.  I provided relevant printed information on her condition for her to review at home.  Same-day discharge is anticipated.  She may have anesthesia triage by telephone.   The surgery will be scheduled in the near future.  She will need to return for a postoperative visit 1 week after surgery.    Breana Abbott MD

## 2017-07-18 NOTE — TELEPHONE ENCOUNTER
Spoke with patient, notified Dr Greene will be out on vacation the week of the 10th. Told patient I will be calling [facility] with in the next few days and try to work on a surgery date and will call her back with the dates. Patient verbalized understanding.

## 2017-07-27 ENCOUNTER — TELEPHONE (OUTPATIENT)
Dept: OTOLARYNGOLOGY | Facility: CLINIC | Age: 47
End: 2017-07-27

## 2017-07-27 DIAGNOSIS — J32.8 OTHER CHRONIC SINUSITIS: Primary | ICD-10-CM

## 2017-07-27 NOTE — TELEPHONE ENCOUNTER
Spoke with patient, discussed scheduling her surgery for 9/1/17 at 7:00 and to be there at 5:00. Also notified patient her pre-admit appointment will be on 8/16/17 at 8:30. Told patient if any more questions or concerns to please call our office. Patient verbalized understanding.

## 2017-08-03 ENCOUNTER — INITIAL CONSULT (OUTPATIENT)
Dept: GASTROENTEROLOGY | Facility: CLINIC | Age: 47
End: 2017-08-03
Payer: COMMERCIAL

## 2017-08-03 VITALS
DIASTOLIC BLOOD PRESSURE: 86 MMHG | SYSTOLIC BLOOD PRESSURE: 152 MMHG | WEIGHT: 163.81 LBS | BODY MASS INDEX: 27.26 KG/M2 | HEART RATE: 77 BPM

## 2017-08-03 DIAGNOSIS — R09.A2 GLOBUS SENSATION: ICD-10-CM

## 2017-08-03 DIAGNOSIS — K22.4 ESOPHAGEAL MOTILITY DISORDER: ICD-10-CM

## 2017-08-03 DIAGNOSIS — K21.9 GASTROESOPHAGEAL REFLUX DISEASE, ESOPHAGITIS PRESENCE NOT SPECIFIED: Primary | ICD-10-CM

## 2017-08-03 PROCEDURE — 3008F BODY MASS INDEX DOCD: CPT | Mod: S$GLB,,, | Performed by: INTERNAL MEDICINE

## 2017-08-03 PROCEDURE — 99204 OFFICE O/P NEW MOD 45 MIN: CPT | Mod: S$GLB,,, | Performed by: INTERNAL MEDICINE

## 2017-08-03 PROCEDURE — 99999 PR PBB SHADOW E&M-EST. PATIENT-LVL II: CPT | Mod: PBBFAC,,, | Performed by: INTERNAL MEDICINE

## 2017-08-03 RX ORDER — ESOMEPRAZOLE MAGNESIUM 40 MG/1
40 CAPSULE, DELAYED RELEASE ORAL
Qty: 30 CAPSULE | Refills: 1 | Status: SHIPPED | OUTPATIENT
Start: 2017-08-03 | End: 2018-09-30 | Stop reason: SDUPTHER

## 2017-08-03 RX ORDER — PHENTERMINE HYDROCHLORIDE 37.5 MG/1
TABLET ORAL
COMMUNITY
Start: 2017-08-02 | End: 2017-10-12

## 2017-08-03 NOTE — PROGRESS NOTES
Subjective:       Patient ID: Carmen Morrow is a 46 y.o. female.    Chief Complaint: Dysphagia    This is a 46-year-old female who presents for evaluation of abnormal imaging and globus sensation.  Her difficulty began about 14 months ago when she had a cervical fusion with anterior approach.  Postoperatively she noted drainage from her anterior incisions and states she was readmitted for repeat surgery for a CSF leak.  Postoperatively she noted some globus sensation described as a discomfort in the sensation of something in the upper throat region.  No particular exacerbating or relieving factors.  Occasionally she will have some time without this sensation, but it is usually present and moderate intensity.  She does find it to be particularly disturbing.  No hematemesis.  On one occasion she did notice some bread gets stuck in the suprasternal region and she had to vomit out.  No history of food impactions.  A remote upper endoscopy was done for reflux disease and Sophia which she states revealed reflux esophagitis.  He was temporarily on Nexium with good relief of these symptoms at that point.  She does have intermittent heartburn is well.  No unintentional weight loss, night sweats.    The following portions of the patient's history were reviewed and updated as appropriate: allergies, current medications, past family history, past medical history, past social history, past surgical history and problem list.    (Portions of this note were dictated using voice recognition software and may contain dictation related errors in spelling/grammar/syntax not found on text review)    HPI  Review of Systems   Constitutional: Negative for chills and fever.   HENT: Positive for trouble swallowing. Negative for tinnitus.    Eyes: Negative for pain and redness.   Respiratory: Negative for cough, choking, chest tightness and shortness of breath.    Cardiovascular: Negative for chest pain and leg swelling.    Gastrointestinal: Negative for abdominal distention, blood in stool, diarrhea and vomiting.   Endocrine: Negative for cold intolerance and heat intolerance.   Genitourinary: Negative for difficulty urinating and hematuria.   Musculoskeletal: Negative for gait problem and joint swelling.   Skin: Negative for color change and pallor.   Allergic/Immunologic: Negative for environmental allergies and food allergies.   Neurological: Negative for dizziness and light-headedness.   Hematological: Negative for adenopathy. Does not bruise/bleed easily.   Psychiatric/Behavioral: Negative for agitation and behavioral problems.       Objective:      Physical Exam   Constitutional: She is oriented to person, place, and time. She appears well-developed and well-nourished. No distress.   HENT:   Head: Normocephalic and atraumatic.   Eyes: Conjunctivae are normal. No scleral icterus.   Neck: Normal range of motion. Neck supple. No tracheal deviation present. No thyromegaly present.   Cardiovascular: Normal rate and regular rhythm.  Exam reveals no gallop and no friction rub.    No murmur heard.  Pulmonary/Chest: Effort normal and breath sounds normal. No respiratory distress. She has no wheezes.   Abdominal: Soft. Bowel sounds are normal. She exhibits no distension. There is no tenderness.   Musculoskeletal:        Right wrist: She exhibits normal range of motion and no tenderness.        Left wrist: She exhibits normal range of motion and no tenderness.   Lymphadenopathy:        Head (right side): No submental and no submandibular adenopathy present.        Head (left side): No submental and no submandibular adenopathy present.   Neurological: She is alert and oriented to person, place, and time.   Skin: Skin is warm and dry. No rash noted. She is not diaphoretic. No erythema.   Psychiatric: She has a normal mood and affect. Her behavior is normal.   Nursing note and vitals reviewed.      Labs: reviewed  Imaging: esophagram  reviewed, dysmotility  .  Assessment:       1. Gastroesophageal reflux disease, esophagitis presence not specified    2. Globus sensation    3. Esophageal motility disorder        Plan:   1. Trial of PPI  2. EGD  3. Discussed possible future manometry

## 2017-08-03 NOTE — PATIENT INSTRUCTIONS
Patient scheduled for  ENDOSCOPY with Dr. Jacques on  August 9. NPO instructions discussed and given to patient.  Lab will call two days prior to procedure date with an arrival time.  May not drive for 24 hours post procedure. Patient verbalized understanding.

## 2017-08-03 NOTE — LETTER
August 3, 2017      Breana Abbott MD  [address]  [address]  [place] [address]           [facility] Gastroenterology  [address] [address]  Phone: [phone]          Patient: Carmen Morrow   MR Number: [id number]   YOB: 1970   Date of Visit: 8/3/2017       Dear Dr. Breana Abbott:    Thank you for referring Carmen Morrow to me for evaluation. Attached you will find relevant portions of my assessment and plan of care.    If you have questions, please do not hesitate to call me. I look forward to following Carmen Morrow along with you.    Sincerely,    Shaneka Jacques MD    Enclosure  CC:  No Recipients    If you would like to receive this communication electronically, please contact [email or web address] or [phone] to request more information on [employer] access.    For providers and/or their staff who would like to refer a patient to [facility], please contact us through our one-stop-shop provider referral line, [facility], at [phone].    If you feel you have received this communication in error or would no longer like to receive these types of communications, please e-mail [email or web address]

## 2017-08-09 ENCOUNTER — HOSPITAL ENCOUNTER (OUTPATIENT)
Facility: HOSPITAL | Age: 47
Discharge: HOME | End: 2017-08-09
Attending: INTERNAL MEDICINE | Admitting: INTERNAL MEDICINE
Payer: COMMERCIAL

## 2017-08-09 ENCOUNTER — ANESTHESIA EVENT (OUTPATIENT)
Dept: ENDOSCOPY | Facility: HOSPITAL | Age: 47
End: 2017-08-09
Payer: COMMERCIAL

## 2017-08-09 ENCOUNTER — SURGERY (OUTPATIENT)
Age: 47
End: 2017-08-09

## 2017-08-09 ENCOUNTER — ANESTHESIA (OUTPATIENT)
Dept: ENDOSCOPY | Facility: HOSPITAL | Age: 47
End: 2017-08-09
Payer: COMMERCIAL

## 2017-08-09 VITALS
SYSTOLIC BLOOD PRESSURE: 124 MMHG | HEIGHT: 65 IN | BODY MASS INDEX: 26.82 KG/M2 | HEART RATE: 70 BPM | RESPIRATION RATE: 16 BRPM | TEMPERATURE: 98 F | DIASTOLIC BLOOD PRESSURE: 99 MMHG | WEIGHT: 161 LBS | OXYGEN SATURATION: 100 %

## 2017-08-09 DIAGNOSIS — K21.9 GERD (GASTROESOPHAGEAL REFLUX DISEASE): Primary | ICD-10-CM

## 2017-08-09 LAB
B-HCG UR QL: NEGATIVE
CTP QC/QA: YES

## 2017-08-09 PROCEDURE — 37000009 HC ANESTHESIA EA ADD 15 MINS: Performed by: INTERNAL MEDICINE

## 2017-08-09 PROCEDURE — 81025 URINE PREGNANCY TEST: CPT | Performed by: INTERNAL MEDICINE

## 2017-08-09 PROCEDURE — 37000008 HC ANESTHESIA 1ST 15 MINUTES: Performed by: INTERNAL MEDICINE

## 2017-08-09 PROCEDURE — 27201012 HC FORCEPS, HOT/COLD, DISP: Performed by: INTERNAL MEDICINE

## 2017-08-09 PROCEDURE — 25000003 PHARM REV CODE 250: Performed by: NURSE ANESTHETIST, CERTIFIED REGISTERED

## 2017-08-09 PROCEDURE — 43239 EGD BIOPSY SINGLE/MULTIPLE: CPT | Performed by: INTERNAL MEDICINE

## 2017-08-09 PROCEDURE — 88305 TISSUE EXAM BY PATHOLOGIST: CPT | Performed by: PATHOLOGY

## 2017-08-09 PROCEDURE — 63600175 PHARM REV CODE 636 W HCPCS: Performed by: NURSE ANESTHETIST, CERTIFIED REGISTERED

## 2017-08-09 PROCEDURE — 88305 TISSUE EXAM BY PATHOLOGIST: CPT | Mod: 26,,, | Performed by: PATHOLOGY

## 2017-08-09 PROCEDURE — 25000003 PHARM REV CODE 250: Performed by: INTERNAL MEDICINE

## 2017-08-09 PROCEDURE — 43239 EGD BIOPSY SINGLE/MULTIPLE: CPT | Mod: ,,, | Performed by: INTERNAL MEDICINE

## 2017-08-09 RX ORDER — PROPOFOL 10 MG/ML
VIAL (ML) INTRAVENOUS
Status: DISCONTINUED | OUTPATIENT
Start: 2017-08-09 | End: 2017-08-09

## 2017-08-09 RX ORDER — LIDOCAINE HCL/PF 100 MG/5ML
SYRINGE (ML) INTRAVENOUS
Status: DISCONTINUED | OUTPATIENT
Start: 2017-08-09 | End: 2017-08-09

## 2017-08-09 RX ORDER — SODIUM CHLORIDE 9 MG/ML
INJECTION, SOLUTION INTRAVENOUS CONTINUOUS
Status: DISCONTINUED | OUTPATIENT
Start: 2017-08-09 | End: 2017-08-09 | Stop reason: HOSPADM

## 2017-08-09 RX ADMIN — TOPICAL ANESTHETIC 1 EACH: 200 SPRAY DENTAL; PERIODONTAL at 11:08

## 2017-08-09 RX ADMIN — LIDOCAINE HYDROCHLORIDE 100 MG: 20 INJECTION, SOLUTION INTRAVENOUS at 11:08

## 2017-08-09 RX ADMIN — SODIUM CHLORIDE: 0.9 INJECTION, SOLUTION INTRAVENOUS at 10:08

## 2017-08-09 RX ADMIN — PROPOFOL 50 MG: 10 INJECTION, EMULSION INTRAVENOUS at 11:08

## 2017-08-09 RX ADMIN — PROPOFOL 80 MG: 10 INJECTION, EMULSION INTRAVENOUS at 11:08

## 2017-08-09 NOTE — DISCHARGE INSTRUCTIONS
Post EGD Discharge Instruction    Carmen Morrow  8/9/2017  Shaneka Jacques MD    RESTRICTIONS ON ACTIVITY:    -DO NOT drive a car, operate machinery or make critical decisions, or do activities that require coordination or balance for 24 hours.  -Following Day: Return to full activities including work.  -Diet: Eat and drink normally unless instructed otherwise.    TREATMENT FOR COMMON SIDE EFFECTS:  *Sore Throat - treat with throat lozenges, gargle with warm salt water.  *Mild abdominal pain & bloating- rest and take liquids only.    SYMPTOMS TO WATCH FOR AND REPORT TO YOUR PHYSICIAN:  1. Chills or fever occurring 24 hours after procedure.  2. Pain in chest.  3. SEVERE abdominal pain or bloating.  4. Rectal bleeding which could be maroon or black.    If you have any questions or problems, please call your Physician:    Shaneka Jacques MD Phone: ***    Lab Results: [phone]    If a complication or emergency situation arises and you are unable to reach your Physician - GO TO THE EMERGENCY ROOM.

## 2017-08-09 NOTE — ANESTHESIA PREPROCEDURE EVALUATION
08/09/2017  Carmen Morrow is a 46 y.o., female for EGD under MAC    Anesthesia Evaluation     I have reviewed the Nursing Notes.   I have reviewed the Medications.     Review of Systems  Anesthesia Hx:   Denies Personal Hx of Anesthesia complications.   Social:  Former Smoker, No Alcohol Use   Cardiovascular:   Exercise tolerance: good Dysrhythmias (PSVT) Hx palpitations   Hepatic/GI:   GERD    Musculoskeletal:   Arthritis   Spine Disorders: cervical    Neurological:   Headaches        Physical Exam  General:  Well nourished       Chest/Lungs:  Chest/Lungs Clear    Heart/Vascular:  Heart Findings: Normal            Anesthesia Plan  Type of Anesthesia, risks & benefits discussed:  Anesthesia Type:  MAC  Patient's Preference:   Intra-op Monitoring Plan:   Intra-op Monitoring Plan Comments:   Post Op Pain Control Plan:   Post Op Pain Control Plan Comments:   Induction:    Beta Blocker:  Patient is not currently on a Beta-Blocker (No further documentation required).       Informed Consent: Patient understands risks and agrees with Anesthesia plan.  Questions answered. Anesthesia consent signed with patient.  ASA Score: 2     Day of Surgery Review of History & Physical:            Ready For Surgery From Anesthesia Perspective.

## 2017-08-09 NOTE — H&P (VIEW-ONLY)
Subjective:       Patient ID: Carmen Morrow is a 46 y.o. female.    Chief Complaint: Dysphagia    This is a 46-year-old female who presents for evaluation of abnormal imaging and globus sensation.  Her difficulty began about 14 months ago when she had a cervical fusion with anterior approach.  Postoperatively she noted drainage from her anterior incisions and states she was readmitted for repeat surgery for a CSF leak.  Postoperatively she noted some globus sensation described as a discomfort in the sensation of something in the upper throat region.  No particular exacerbating or relieving factors.  Occasionally she will have some time without this sensation, but it is usually present and moderate intensity.  She does find it to be particularly disturbing.  No hematemesis.  On one occasion she did notice some bread gets stuck in the suprasternal region and she had to vomit out.  No history of food impactions.  A remote upper endoscopy was done for reflux disease and Henderson which she states revealed reflux esophagitis.  He was temporarily on Nexium with good relief of these symptoms at that point.  She does have intermittent heartburn is well.  No unintentional weight loss, night sweats.    The following portions of the patient's history were reviewed and updated as appropriate: allergies, current medications, past family history, past medical history, past social history, past surgical history and problem list.    (Portions of this note were dictated using voice recognition software and may contain dictation related errors in spelling/grammar/syntax not found on text review)    HPI  Review of Systems   Constitutional: Negative for chills and fever.   HENT: Positive for trouble swallowing. Negative for tinnitus.    Eyes: Negative for pain and redness.   Respiratory: Negative for cough, choking, chest tightness and shortness of breath.    Cardiovascular: Negative for chest pain and leg swelling.    Gastrointestinal: Negative for abdominal distention, blood in stool, diarrhea and vomiting.   Endocrine: Negative for cold intolerance and heat intolerance.   Genitourinary: Negative for difficulty urinating and hematuria.   Musculoskeletal: Negative for gait problem and joint swelling.   Skin: Negative for color change and pallor.   Allergic/Immunologic: Negative for environmental allergies and food allergies.   Neurological: Negative for dizziness and light-headedness.   Hematological: Negative for adenopathy. Does not bruise/bleed easily.   Psychiatric/Behavioral: Negative for agitation and behavioral problems.       Objective:      Physical Exam   Constitutional: She is oriented to person, place, and time. She appears well-developed and well-nourished. No distress.   HENT:   Head: Normocephalic and atraumatic.   Eyes: Conjunctivae are normal. No scleral icterus.   Neck: Normal range of motion. Neck supple. No tracheal deviation present. No thyromegaly present.   Cardiovascular: Normal rate and regular rhythm.  Exam reveals no gallop and no friction rub.    No murmur heard.  Pulmonary/Chest: Effort normal and breath sounds normal. No respiratory distress. She has no wheezes.   Abdominal: Soft. Bowel sounds are normal. She exhibits no distension. There is no tenderness.   Musculoskeletal:        Right wrist: She exhibits normal range of motion and no tenderness.        Left wrist: She exhibits normal range of motion and no tenderness.   Lymphadenopathy:        Head (right side): No submental and no submandibular adenopathy present.        Head (left side): No submental and no submandibular adenopathy present.   Neurological: She is alert and oriented to person, place, and time.   Skin: Skin is warm and dry. No rash noted. She is not diaphoretic. No erythema.   Psychiatric: She has a normal mood and affect. Her behavior is normal.   Nursing note and vitals reviewed.      Labs: reviewed  Imaging: esophagram  reviewed, dysmotility  .  Assessment:       1. Gastroesophageal reflux disease, esophagitis presence not specified    2. Globus sensation    3. Esophageal motility disorder        Plan:   1. Trial of PPI  2. EGD  3. Discussed possible future manometry

## 2017-08-09 NOTE — TRANSFER OF CARE
"Anesthesia Transfer of Care Note    Patient: Carmen Morrow    Procedure(s) Performed: Procedure(s) (LRB):  ESOPHAGOGASTRODUODENOSCOPY (EGD) (N/A)    Patient location: GI    Anesthesia Type: MAC    Transport from OR: Transported from OR on room air with adequate spontaneous ventilation    Post pain: adequate analgesia    Post assessment: no apparent anesthetic complications and tolerated procedure well    Post vital signs: stable    Level of consciousness: awake, alert and oriented    Nausea/Vomiting: no nausea/vomiting    Complications: none          Last vitals:   Visit Vitals  BP (!) 142/89 (BP Location: Right arm, Patient Position: Lying, BP Method: Automatic)   Pulse 70   Temp 36.4 °C (97.6 °F) (Oral)   Resp 14   Ht 5' 5" (1.651 m)   Wt 73 kg (161 lb)   LMP 07/10/2017 (Exact Date)   SpO2 100%   Breastfeeding? No   BMI 26.79 kg/m²     "

## 2017-08-09 NOTE — ANESTHESIA POSTPROCEDURE EVALUATION
"Anesthesia Post Evaluation    Patient: Carmen Morrow    Procedure(s) Performed: Procedure(s) (LRB):  ESOPHAGOGASTRODUODENOSCOPY (EGD) (N/A)    Final Anesthesia Type: MAC  Patient location during evaluation: GI PACU  Patient participation: Yes- Able to Participate  Level of consciousness: awake and alert and oriented  Post-procedure vital signs: reviewed and stable  Pain management: adequate  Airway patency: patent  PONV status at discharge: No PONV  Anesthetic complications: no      Cardiovascular status: blood pressure returned to baseline, hemodynamically stable and stable  Respiratory status: unassisted, spontaneous ventilation and room air  Hydration status: euvolemic  Follow-up not needed.        Visit Vitals  BP (!) 142/89 (BP Location: Right arm, Patient Position: Lying, BP Method: Automatic)   Pulse 70   Temp 36.4 °C (97.6 °F) (Oral)   Resp 14   Ht 5' 5" (1.651 m)   Wt 73 kg (161 lb)   LMP 07/10/2017 (Exact Date)   SpO2 100%   Breastfeeding? No   BMI 26.79 kg/m²       Pain/Janey Score: Pain Assessment Performed: Yes (8/9/2017  9:45 AM)  Presence of Pain: complains of pain/discomfort (8/9/2017  9:45 AM)  Pain Rating Prior to Med Admin: 8 (8/9/2017  9:45 AM)      "

## 2017-08-16 ENCOUNTER — ANESTHESIA EVENT (OUTPATIENT)
Dept: SURGERY | Facility: OTHER | Age: 47
End: 2017-08-16
Payer: COMMERCIAL

## 2017-08-16 ENCOUNTER — HOSPITAL ENCOUNTER (OUTPATIENT)
Dept: PREADMISSION TESTING | Facility: OTHER | Age: 47
Discharge: HOME | End: 2017-08-16
Attending: OTOLARYNGOLOGY
Payer: COMMERCIAL

## 2017-08-16 VITALS
BODY MASS INDEX: 26.82 KG/M2 | SYSTOLIC BLOOD PRESSURE: 125 MMHG | OXYGEN SATURATION: 100 % | HEART RATE: 71 BPM | TEMPERATURE: 99 F | HEIGHT: 65 IN | WEIGHT: 161 LBS | DIASTOLIC BLOOD PRESSURE: 82 MMHG

## 2017-08-16 RX ORDER — MIDAZOLAM HYDROCHLORIDE 5 MG/ML
4 INJECTION INTRAMUSCULAR; INTRAVENOUS ONCE AS NEEDED
Status: CANCELLED | OUTPATIENT
Start: 2017-08-16 | End: 2017-08-16

## 2017-08-16 RX ORDER — LIDOCAINE HYDROCHLORIDE 10 MG/ML
1 INJECTION, SOLUTION EPIDURAL; INFILTRATION; INTRACAUDAL; PERINEURAL ONCE
Status: CANCELLED | OUTPATIENT
Start: 2017-08-16 | End: 2017-08-16

## 2017-08-16 RX ORDER — SODIUM CHLORIDE, SODIUM LACTATE, POTASSIUM CHLORIDE, CALCIUM CHLORIDE 600; 310; 30; 20 MG/100ML; MG/100ML; MG/100ML; MG/100ML
INJECTION, SOLUTION INTRAVENOUS CONTINUOUS
Status: CANCELLED | OUTPATIENT
Start: 2017-08-16

## 2017-08-16 NOTE — ANESTHESIA PREPROCEDURE EVALUATION
08/16/2017  Carmen Morrow is a 46 y.o., female.    Anesthesia Evaluation    I have reviewed the Patient Summary Reports.    I have reviewed the Nursing Notes.   I have reviewed the Medications.     Review of Systems  Anesthesia Hx:  History of prior surgery of interest to airway management or planning: Previous anesthesia: General 12/16 knee with general anesthesia.  Denies Family Hx of Anesthesia complications.   Denies Personal Hx of Anesthesia complications.   Social:  Non-Smoker    Hematology/Oncology:  Hematology Normal   Oncology Normal     EENT/Dental:EENT/Dental Normal   Cardiovascular:   Dysrhythmias (PSVT, tx initially with Bblocker, now no meds no sx's)    Pulmonary:  Pulmonary Normal    Renal/:  Renal/ Normal     Hepatic/GI:   Hiatal Hernia, GERD Recent EGD results pending, suspect Ortiz's esophagus   Musculoskeletal:  Spine Disorders: (s/p ACF, cx by post op CSF leak) cervical    Neurological:   Denies Neuromuscular Disease. Headaches (on [place] for BENTLEY)    Endocrine:  Endocrine Normal    Dermatological:  Skin Normal    Psych:  Psychiatric Normal           Physical Exam  General:  Well nourished    Airway/Jaw/Neck:  Airway Findings: Mouth Opening: Normal Tongue: Normal  General Airway Assessment: Adult  Mallampati: I  TM Distance: Normal, at least 6 cm  Jaw/Neck Findings:  Neck ROM: Extension Decreased, Mild      Dental:  Dental Findings: In tact        Mental Status:  Mental Status Findings:  Cooperative, Alert and Oriented         Anesthesia Plan  Type of Anesthesia, risks & benefits discussed:  Anesthesia Type:  general  Patient's Preference:   Intra-op Monitoring Plan: standard ASA monitors  Intra-op Monitoring Plan Comments:   Post Op Pain Control Plan:   Post Op Pain Control Plan Comments:   Induction:    Beta Blocker:         Informed Consent: Patient understands risks and  agrees with Anesthesia plan.  Questions answered. Anesthesia consent signed with patient.  ASA Score: 2     Day of Surgery Review of History & Physical:    H&P update referred to the surgeon.     Anesthesia Plan Notes: No labs    Pt to stop Adipex 4 days preop        Ready For Surgery From Anesthesia Perspective.

## 2017-08-16 NOTE — DISCHARGE INSTRUCTIONS
PRE-ADMIT TESTING -  [phone]    [address]  [facility]        OUTPATIENT SURGERY UNIT - [phone]    Your surgery has been scheduled at [facility]. We are pleased to have the opportunity to serve you. For Further Information please call [phone].    On the day of surgery please report to the Information Desk on the 1st floor.    · CONTACT YOUR PHYSICIAN'S OFFICE THE DAY PRIOR TO YOUR SURGERY TO OBTAIN YOUR ARRIVAL TIME.     · The evening before surgery do not eat anything after 9 p.m. ( this includes hard candy, chewing gum and mints).  You may only have GATORADE, POWERADE AND WATER  from 9 p.m. until you leave your home.   DO NOT DRINK ANY LIQUIDS ON THE WAY TO THE HOSPITAL.      SPECIAL MEDICATION INSTRUCTIONS: TAKE medications checked off by the Anesthesiologist on your Medication List.    Angiogram Patients: Take medications as instructed by your physician, including aspirin.     Surgery Patients:    If you take ASPIRIN - Your PHYSICIAN/SURGEON will need to inform you IF/OR when you need to stop taking aspirin prior to your surgery.     Do Not take any medications containing IBUPROFEN.  Do Not Wear any make-up or dark nail polish   (especially eye make-up) to surgery. If you come to surgery with makeup on you will be required to remove the makeup or nail polish.    Do not shave your surgical area at least 5 days prior to your surgery. The surgical prep will be performed at the hospital according to Infection Control regulations.    Leave all valuables at home.   Do Not wear any jewelry or watches, including any metal in body piercings.  Contact Lens must be removed before surgery. Either do not wear the contact lens or bring a case and solution for storage.  Please bring a container for eyeglasses or dentures as required.  Bring any paperwork your physician has provided, such as consent forms,  history and physicals, doctor's orders, etc.   Bring comfortable clothes  that are loose fitting to wear upon discharge. Take into consideration the type of surgery being performed.  Maintain your diet as advised per your physician the day prior to surgery.      Adequate rest the night before surgery is advised.   Park in the Parking lot behind the hospital or in the [facility] across the street from the parking lot. Parking is complimentary.  If you will be discharged the same day as your procedure, please arrange for a responsible adult to drive you home or to accompany you if traveling by taxi.   YOU WILL NOT BE PERMITTED TO DRIVE OR TO LEAVE THE HOSPITAL ALONE AFTER SURGERY.   It is strongly recommended that you arrange for someone to remain with you for the first 24 hrs following your surgery.       Thank you for your cooperation.  The Staff of [facility].        Bathing Instructions                                                                 Please shower the evening before and morning of your procedure with    ANTIBACTERIAL SOAP. ( DIAL, etc )  Concentrate on the surgical area   for at least 3 minutes and rinse completely. Dry off as usual.   Do not use any deodorant, powder, body lotions, perfume, after shave or    cologne.

## 2017-08-18 ENCOUNTER — TELEPHONE (OUTPATIENT)
Dept: GASTROENTEROLOGY | Facility: CLINIC | Age: 47
End: 2017-08-18

## 2017-08-18 ENCOUNTER — PATIENT MESSAGE (OUTPATIENT)
Dept: GASTROENTEROLOGY | Facility: CLINIC | Age: 47
End: 2017-08-18

## 2017-08-18 NOTE — TELEPHONE ENCOUNTER
----- Message from Shaneka Jacques MD sent at 8/18/2017 12:26 PM CDT -----  Esophageal bx with chronic inflammation, no barretts. I would continue her ppi for now. I'm happy to f/u with her in clinic

## 2017-08-23 NOTE — TELEPHONE ENCOUNTER
Left message for the patient to call, concerning scheduling an appointment with Dr. Gautam or Dr. Bell.  Informed Angus Kadeem is no longer with [facility].

## 2017-08-25 RX ORDER — AMITRIPTYLINE HYDROCHLORIDE 10 MG/1
TABLET, FILM COATED ORAL
Qty: 60 TABLET | Refills: 3 | Status: SHIPPED | OUTPATIENT
Start: 2017-08-25 | End: 2017-12-31 | Stop reason: SDUPTHER

## 2017-08-31 ENCOUNTER — TELEPHONE (OUTPATIENT)
Dept: OTOLARYNGOLOGY | Facility: CLINIC | Age: 47
End: 2017-08-31

## 2017-08-31 NOTE — TELEPHONE ENCOUNTER
Spoke with patient, confirmed surgery for tomorrow for 7:00 and to be there two hours prior for 5:00 am. Patient verbalized understanding.

## 2017-09-01 ENCOUNTER — SURGERY (OUTPATIENT)
Age: 47
End: 2017-09-01

## 2017-09-01 ENCOUNTER — PATIENT MESSAGE (OUTPATIENT)
Dept: SURGERY | Facility: OTHER | Age: 47
End: 2017-09-01

## 2017-09-01 ENCOUNTER — ANESTHESIA (OUTPATIENT)
Dept: SURGERY | Facility: OTHER | Age: 47
End: 2017-09-01
Payer: COMMERCIAL

## 2017-09-01 ENCOUNTER — HOSPITAL ENCOUNTER (OUTPATIENT)
Facility: OTHER | Age: 47
Discharge: HOME | End: 2017-09-01
Attending: OTOLARYNGOLOGY | Admitting: OTOLARYNGOLOGY
Payer: COMMERCIAL

## 2017-09-01 VITALS
WEIGHT: 160.94 LBS | TEMPERATURE: 98 F | DIASTOLIC BLOOD PRESSURE: 85 MMHG | HEIGHT: 65 IN | OXYGEN SATURATION: 97 % | BODY MASS INDEX: 26.81 KG/M2 | RESPIRATION RATE: 16 BRPM | HEART RATE: 74 BPM | SYSTOLIC BLOOD PRESSURE: 119 MMHG

## 2017-09-01 DIAGNOSIS — J32.8 OTHER CHRONIC SINUSITIS: Primary | ICD-10-CM

## 2017-09-01 DIAGNOSIS — J32.9 CHRONIC SINUSITIS: ICD-10-CM

## 2017-09-01 LAB
B-HCG UR QL: NEGATIVE
CTP QC/QA: YES

## 2017-09-01 PROCEDURE — 88311 DECALCIFY TISSUE: CPT | Mod: 26,,, | Performed by: PATHOLOGY

## 2017-09-01 PROCEDURE — 36000711: Performed by: OTOLARYNGOLOGY

## 2017-09-01 PROCEDURE — 71000039 HC RECOVERY, EACH ADD'L HOUR: Performed by: OTOLARYNGOLOGY

## 2017-09-01 PROCEDURE — 61782 SCAN PROC CRANIAL EXTRA: CPT | Mod: ,,, | Performed by: OTOLARYNGOLOGY

## 2017-09-01 PROCEDURE — 31240 NSL/SNS NDSC CNCH BULL RESCJ: CPT | Mod: 51,LT,, | Performed by: OTOLARYNGOLOGY

## 2017-09-01 PROCEDURE — 25000003 PHARM REV CODE 250: Performed by: ANESTHESIOLOGY

## 2017-09-01 PROCEDURE — 36000710: Performed by: OTOLARYNGOLOGY

## 2017-09-01 PROCEDURE — 27201423 OPTIME MED/SURG SUP & DEVICES STERILE SUPPLY: Performed by: OTOLARYNGOLOGY

## 2017-09-01 PROCEDURE — C1763 CONN TISS, NON-HUMAN: HCPCS | Performed by: OTOLARYNGOLOGY

## 2017-09-01 PROCEDURE — 30140 RESECT INFERIOR TURBINATE: CPT | Mod: 50,51,, | Performed by: OTOLARYNGOLOGY

## 2017-09-01 PROCEDURE — 71000016 HC POSTOP RECOV ADDL HR: Performed by: OTOLARYNGOLOGY

## 2017-09-01 PROCEDURE — 31255 NSL/SINS NDSC W/TOT ETHMDCT: CPT | Mod: 50,51,, | Performed by: OTOLARYNGOLOGY

## 2017-09-01 PROCEDURE — 63600175 PHARM REV CODE 636 W HCPCS: Performed by: NURSE ANESTHETIST, CERTIFIED REGISTERED

## 2017-09-01 PROCEDURE — 37000009 HC ANESTHESIA EA ADD 15 MINS: Performed by: OTOLARYNGOLOGY

## 2017-09-01 PROCEDURE — 25000003 PHARM REV CODE 250: Performed by: OTOLARYNGOLOGY

## 2017-09-01 PROCEDURE — 25000003 PHARM REV CODE 250: Performed by: NURSE ANESTHETIST, CERTIFIED REGISTERED

## 2017-09-01 PROCEDURE — 88304 TISSUE EXAM BY PATHOLOGIST: CPT | Mod: 26,,, | Performed by: PATHOLOGY

## 2017-09-01 PROCEDURE — 31267 ENDOSCOPY MAXILLARY SINUS: CPT | Mod: 50,51,, | Performed by: OTOLARYNGOLOGY

## 2017-09-01 PROCEDURE — 71000015 HC POSTOP RECOV 1ST HR: Performed by: OTOLARYNGOLOGY

## 2017-09-01 PROCEDURE — 71000033 HC RECOVERY, INTIAL HOUR: Performed by: OTOLARYNGOLOGY

## 2017-09-01 PROCEDURE — 63600175 PHARM REV CODE 636 W HCPCS: Performed by: ANESTHESIOLOGY

## 2017-09-01 PROCEDURE — 30520 REPAIR OF NASAL SEPTUM: CPT | Mod: ,,, | Performed by: OTOLARYNGOLOGY

## 2017-09-01 PROCEDURE — 88305 TISSUE EXAM BY PATHOLOGIST: CPT | Performed by: PATHOLOGY

## 2017-09-01 PROCEDURE — 81025 URINE PREGNANCY TEST: CPT | Performed by: ANESTHESIOLOGY

## 2017-09-01 PROCEDURE — 37000008 HC ANESTHESIA 1ST 15 MINUTES: Performed by: OTOLARYNGOLOGY

## 2017-09-01 PROCEDURE — 88305 TISSUE EXAM BY PATHOLOGIST: CPT | Mod: 26,,, | Performed by: PATHOLOGY

## 2017-09-01 RX ORDER — LIDOCAINE HCL/PF 100 MG/5ML
SYRINGE (ML) INTRAVENOUS
Status: DISCONTINUED | OUTPATIENT
Start: 2017-09-01 | End: 2017-09-01

## 2017-09-01 RX ORDER — LIDOCAINE HYDROCHLORIDE AND EPINEPHRINE 10; 10 MG/ML; UG/ML
INJECTION, SOLUTION INFILTRATION; PERINEURAL
Status: DISCONTINUED | OUTPATIENT
Start: 2017-09-01 | End: 2017-09-01 | Stop reason: HOSPADM

## 2017-09-01 RX ORDER — CEPHALEXIN 500 MG/1
500 CAPSULE ORAL EVERY 12 HOURS
Qty: 20 CAPSULE | Refills: 0 | Status: SHIPPED | OUTPATIENT
Start: 2017-09-01 | End: 2017-09-11

## 2017-09-01 RX ORDER — PROPOFOL 10 MG/ML
VIAL (ML) INTRAVENOUS
Status: DISCONTINUED | OUTPATIENT
Start: 2017-09-01 | End: 2017-09-01

## 2017-09-01 RX ORDER — DEXAMETHASONE SODIUM PHOSPHATE 4 MG/ML
INJECTION, SOLUTION INTRA-ARTICULAR; INTRALESIONAL; INTRAMUSCULAR; INTRAVENOUS; SOFT TISSUE
Status: DISCONTINUED | OUTPATIENT
Start: 2017-09-01 | End: 2017-09-01

## 2017-09-01 RX ORDER — ONDANSETRON HYDROCHLORIDE 2 MG/ML
INJECTION, SOLUTION INTRAMUSCULAR; INTRAVENOUS
Status: DISCONTINUED | OUTPATIENT
Start: 2017-09-01 | End: 2017-09-01

## 2017-09-01 RX ORDER — HYDROCODONE BITARTRATE AND ACETAMINOPHEN 7.5; 325 MG/1; MG/1
1 TABLET ORAL EVERY 6 HOURS PRN
Qty: 12 TABLET | Refills: 0 | Status: SHIPPED | OUTPATIENT
Start: 2017-09-01 | End: 2017-09-11

## 2017-09-01 RX ORDER — FENTANYL CITRATE 50 UG/ML
INJECTION, SOLUTION INTRAMUSCULAR; INTRAVENOUS
Status: DISCONTINUED | OUTPATIENT
Start: 2017-09-01 | End: 2017-09-01

## 2017-09-01 RX ORDER — OXYMETAZOLINE HCL 0.05 %
2 SPRAY, NON-AEROSOL (ML) NASAL ONCE
Status: DISCONTINUED | OUTPATIENT
Start: 2017-09-01 | End: 2017-09-01 | Stop reason: HOSPADM

## 2017-09-01 RX ORDER — HYDROCODONE BITARTRATE AND ACETAMINOPHEN 5; 325 MG/1; MG/1
1 TABLET ORAL EVERY 4 HOURS PRN
Status: DISCONTINUED | OUTPATIENT
Start: 2017-09-01 | End: 2017-09-01 | Stop reason: HOSPADM

## 2017-09-01 RX ORDER — NEOSTIGMINE METHYLSULFATE 1 MG/ML
INJECTION, SOLUTION INTRAVENOUS
Status: DISCONTINUED | OUTPATIENT
Start: 2017-09-01 | End: 2017-09-01

## 2017-09-01 RX ORDER — HYDROMORPHONE HYDROCHLORIDE 2 MG/ML
0.4 INJECTION, SOLUTION INTRAMUSCULAR; INTRAVENOUS; SUBCUTANEOUS EVERY 5 MIN PRN
Status: DISCONTINUED | OUTPATIENT
Start: 2017-09-01 | End: 2017-09-01 | Stop reason: HOSPADM

## 2017-09-01 RX ORDER — MIDAZOLAM HYDROCHLORIDE 1 MG/ML
INJECTION INTRAMUSCULAR; INTRAVENOUS
Status: DISCONTINUED | OUTPATIENT
Start: 2017-09-01 | End: 2017-09-01

## 2017-09-01 RX ORDER — PHENYLEPHRINE HYDROCHLORIDE 10 MG/ML
INJECTION INTRAVENOUS
Status: DISCONTINUED | OUTPATIENT
Start: 2017-09-01 | End: 2017-09-01

## 2017-09-01 RX ORDER — LIDOCAINE HYDROCHLORIDE 10 MG/ML
1 INJECTION, SOLUTION EPIDURAL; INFILTRATION; INTRACAUDAL; PERINEURAL ONCE
Status: DISCONTINUED | OUTPATIENT
Start: 2017-09-01 | End: 2017-09-01 | Stop reason: HOSPADM

## 2017-09-01 RX ORDER — FENTANYL CITRATE 50 UG/ML
25 INJECTION, SOLUTION INTRAMUSCULAR; INTRAVENOUS EVERY 5 MIN PRN
Status: DISCONTINUED | OUTPATIENT
Start: 2017-09-01 | End: 2017-09-01 | Stop reason: HOSPADM

## 2017-09-01 RX ORDER — HYDROCODONE BITARTRATE AND ACETAMINOPHEN 5; 325 MG/1; MG/1
1 TABLET ORAL
Status: DISCONTINUED | OUTPATIENT
Start: 2017-09-01 | End: 2017-09-01 | Stop reason: HOSPADM

## 2017-09-01 RX ORDER — ONDANSETRON 4 MG/1
4 TABLET, FILM COATED ORAL EVERY 8 HOURS PRN
Qty: 12 TABLET | Refills: 1 | Status: SHIPPED | OUTPATIENT
Start: 2017-09-01 | End: 2017-10-12

## 2017-09-01 RX ORDER — CEFAZOLIN SODIUM 2 G/50ML
2 SOLUTION INTRAVENOUS
Status: DISCONTINUED | OUTPATIENT
Start: 2017-09-01 | End: 2017-09-01 | Stop reason: HOSPADM

## 2017-09-01 RX ORDER — MIDAZOLAM HYDROCHLORIDE 5 MG/ML
4 INJECTION INTRAMUSCULAR; INTRAVENOUS ONCE AS NEEDED
Status: DISCONTINUED | OUTPATIENT
Start: 2017-09-01 | End: 2017-09-01 | Stop reason: HOSPADM

## 2017-09-01 RX ORDER — OXYMETAZOLINE HCL 0.05 %
SPRAY, NON-AEROSOL (ML) NASAL
Status: DISCONTINUED | OUTPATIENT
Start: 2017-09-01 | End: 2017-09-01 | Stop reason: HOSPADM

## 2017-09-01 RX ORDER — MEPERIDINE HYDROCHLORIDE 50 MG/ML
12.5 INJECTION INTRAMUSCULAR; INTRAVENOUS; SUBCUTANEOUS ONCE AS NEEDED
Status: DISCONTINUED | OUTPATIENT
Start: 2017-09-01 | End: 2017-09-01 | Stop reason: HOSPADM

## 2017-09-01 RX ORDER — SODIUM CHLORIDE, SODIUM LACTATE, POTASSIUM CHLORIDE, CALCIUM CHLORIDE 600; 310; 30; 20 MG/100ML; MG/100ML; MG/100ML; MG/100ML
INJECTION, SOLUTION INTRAVENOUS CONTINUOUS
Status: DISCONTINUED | OUTPATIENT
Start: 2017-09-01 | End: 2017-09-01 | Stop reason: HOSPADM

## 2017-09-01 RX ORDER — DIPHENHYDRAMINE HYDROCHLORIDE 50 MG/ML
12.5 INJECTION INTRAMUSCULAR; INTRAVENOUS EVERY 30 MIN PRN
Status: DISCONTINUED | OUTPATIENT
Start: 2017-09-01 | End: 2017-09-01 | Stop reason: HOSPADM

## 2017-09-01 RX ORDER — HYDROCODONE BITARTRATE AND ACETAMINOPHEN 10; 325 MG/1; MG/1
1 TABLET ORAL EVERY 4 HOURS PRN
Status: DISCONTINUED | OUTPATIENT
Start: 2017-09-01 | End: 2017-09-01 | Stop reason: HOSPADM

## 2017-09-01 RX ORDER — GLYCOPYRROLATE 0.2 MG/ML
INJECTION INTRAMUSCULAR; INTRAVENOUS
Status: DISCONTINUED | OUTPATIENT
Start: 2017-09-01 | End: 2017-09-01

## 2017-09-01 RX ORDER — SODIUM CHLORIDE 0.9 % (FLUSH) 0.9 %
3 SYRINGE (ML) INJECTION
Status: DISCONTINUED | OUTPATIENT
Start: 2017-09-01 | End: 2017-09-01 | Stop reason: HOSPADM

## 2017-09-01 RX ORDER — ROCURONIUM BROMIDE 10 MG/ML
INJECTION, SOLUTION INTRAVENOUS
Status: DISCONTINUED | OUTPATIENT
Start: 2017-09-01 | End: 2017-09-01

## 2017-09-01 RX ORDER — ACETAMINOPHEN 10 MG/ML
INJECTION, SOLUTION INTRAVENOUS
Status: DISCONTINUED | OUTPATIENT
Start: 2017-09-01 | End: 2017-09-01

## 2017-09-01 RX ORDER — ONDANSETRON 8 MG/1
8 TABLET, ORALLY DISINTEGRATING ORAL ONCE
Status: DISCONTINUED | OUTPATIENT
Start: 2017-09-01 | End: 2017-09-01 | Stop reason: HOSPADM

## 2017-09-01 RX ADMIN — ACETAMINOPHEN 1000 MG: 10 INJECTION, SOLUTION INTRAVENOUS at 07:09

## 2017-09-01 RX ADMIN — FENTANYL CITRATE 25 MCG: 50 INJECTION INTRAMUSCULAR; INTRAVENOUS at 10:09

## 2017-09-01 RX ADMIN — FENTANYL CITRATE 25 MCG: 50 INJECTION, SOLUTION INTRAMUSCULAR; INTRAVENOUS at 08:09

## 2017-09-01 RX ADMIN — SODIUM CHLORIDE, SODIUM LACTATE, POTASSIUM CHLORIDE, AND CALCIUM CHLORIDE: 600; 310; 30; 20 INJECTION, SOLUTION INTRAVENOUS at 06:09

## 2017-09-01 RX ADMIN — DEXTROSE 2 G: 50 INJECTION, SOLUTION INTRAVENOUS at 07:09

## 2017-09-01 RX ADMIN — PHENYLEPHRINE HYDROCHLORIDE 100 MCG: 10 INJECTION INTRAVENOUS at 07:09

## 2017-09-01 RX ADMIN — HYDROCODONE BITARTATE AND ACETAMINOPHEN 1 TABLET: 5; 325 TABLET ORAL at 10:09

## 2017-09-01 RX ADMIN — ROCURONIUM BROMIDE 40 MG: 10 INJECTION, SOLUTION INTRAVENOUS at 07:09

## 2017-09-01 RX ADMIN — PROPOFOL 200 MG: 10 INJECTION, EMULSION INTRAVENOUS at 07:09

## 2017-09-01 RX ADMIN — FENTANYL CITRATE 100 MCG: 50 INJECTION, SOLUTION INTRAMUSCULAR; INTRAVENOUS at 07:09

## 2017-09-01 RX ADMIN — LIDOCAINE HYDROCHLORIDE 30 MG: 20 INJECTION, SOLUTION INTRAVENOUS at 07:09

## 2017-09-01 RX ADMIN — SODIUM CHLORIDE, SODIUM LACTATE, POTASSIUM CHLORIDE, AND CALCIUM CHLORIDE: 600; 310; 30; 20 INJECTION, SOLUTION INTRAVENOUS at 08:09

## 2017-09-01 RX ADMIN — ROCURONIUM BROMIDE 10 MG: 10 INJECTION, SOLUTION INTRAVENOUS at 08:09

## 2017-09-01 RX ADMIN — MIDAZOLAM HYDROCHLORIDE 2 MG: 1 INJECTION, SOLUTION INTRAMUSCULAR; INTRAVENOUS at 06:09

## 2017-09-01 RX ADMIN — GLYCOPYRROLATE 0.2 MG: 0.2 INJECTION, SOLUTION INTRAMUSCULAR; INTRAVENOUS at 07:09

## 2017-09-01 RX ADMIN — CARBOXYMETHYLCELLULOSE SODIUM 2 DROP: 2.5 SOLUTION/ DROPS OPHTHALMIC at 07:09

## 2017-09-01 RX ADMIN — ONDANSETRON 4 MG: 2 INJECTION, SOLUTION INTRAMUSCULAR; INTRAVENOUS at 08:09

## 2017-09-01 RX ADMIN — NEOSTIGMINE METHYLSULFATE 4 MG: 1 INJECTION INTRAVENOUS at 09:09

## 2017-09-01 RX ADMIN — LIDOCAINE HYDROCHLORIDE AND EPINEPHRINE 20 ML: 10; 10 INJECTION, SOLUTION INFILTRATION; PERINEURAL at 07:09

## 2017-09-01 RX ADMIN — DEXAMETHASONE SODIUM PHOSPHATE 8 MG: 4 INJECTION, SOLUTION INTRAMUSCULAR; INTRAVENOUS at 07:09

## 2017-09-01 RX ADMIN — OXYMETAZOLINE HYDROCHLORIDE 3 SPRAY: 0.05 SPRAY NASAL at 07:09

## 2017-09-01 RX ADMIN — FENTANYL CITRATE 50 MCG: 50 INJECTION, SOLUTION INTRAMUSCULAR; INTRAVENOUS at 09:09

## 2017-09-01 RX ADMIN — GLYCOPYRROLATE 0.6 MG: 0.2 INJECTION, SOLUTION INTRAMUSCULAR; INTRAVENOUS at 09:09

## 2017-09-01 NOTE — OP NOTE
DATE OF OPERATION: 9/1/2017    SURGEON:  Breana Greene MD     ASSISTANT SURGEON:  none     OPERATION:     1. Bilateral image-guided endoscopic anterior ethmoidectomy.  2. Bilateral image-guided endoscopic maxillary antrostomy.  3. Septoplasty  4.. Bilateral inferior turbinate reduction with submucosal resection.     PREOPERATIVE DIAGNOSIS:      1. Chronic rhinosinusitis.  2. Hypertrophic turbinates.  3. Nasal septal deviation     POSTOPERATIVE DIAGNOSIS:      1. Chronic rhinosinusitis.  2. Hypertrophic turbinates.  3. Nasal septal deviation     ANESTHESIA: General.     COMPLICATIONS: None.     ESTIMATED BLOOD LOSS: 25 mL     SPECIMEN: Bilateral sinonasal contents     WOUND EXPECTANCY: Clean-contaminated.    DRESSING: merogel packing  in bilateral middle meatus.  Merocel pack bilaterally.     FINDINGS: Bilateral polypoid and erythematous middle turbinates; polypoid mucosa in bilateral maxillary sinuses, NSD to the left with bony spur; bilateral inferior tubinate hypertrophy.      INDICATIONS: Chronic rhinosinusitis, not controlled with maximal medical therapy, nasal septal deviation, inferior turbinate hypertrophy.     I discussed the risks, benefits and alternatives of surgical correction of the chronically obstructed sinuses and associated turbinate hypertrophy with the patient as well as the expected postoperative course. I gave her the opportunity to ask questions and I answered all of them. On the morning of surgery I again met with the patient and reviewed the indications for surgery and she consented to proceed.     DESCRIPTION OF PROCEDURE: The patient was brought to the operating room and placed supine on the operating table. The patient was placed under general anesthesia and intubated. The patient was positioned with a donut under the head and the image-guidance headset for the [employer] Fusion system was applied.  Image-guided navigation was indicated to facilitate exenteration of all ethmoid cells and  the extent of sinusotomy.  The CT scan disc was loaded into the image-guidance system and registered with the patient tracking system according to the 's instructions. The pointer was calibrated and registration was verified using predefined landmarks.  Cottonoid pledgets soaked with neosynepherine  was placed into the nasal cavity bilaterally for mucosal decongestion.  Prophylactic cefazolin was given prior to the surgery start. A time-out was performed to confirm the proper patient, site and procedure.  The CT images were again reviewed prior to surgical start.  The patient was prepped and draped in the usual fashion.  The bed was placed in 20-degree reverse Trendelenberg position.     A 0-degree endoscope was used to examine the nasal cavity.  Local injections of 1% lidocaine with 1:100,000 parts epinephrine were then performed around the middle turbinates bilaterally as well as the inferior turbinate and the sphenopalatine ganglion region bilaterally.     Attention was then turned to the sinuses with the right sinonasal cavity addressed first.  The middle turbinate was medialized to with a jonna elevator to gain access into the middle meatus.   The uncinate process was then visualized and a sickle knife was used to incise the uncinate process. The uncinate was dissected to its superior attachment and removed using cutting forceps.  A tiana bullosa was not present.       After removal of the bone, the natural ostium of the maxillary sinus was visible. The lumen was visualized with a 30-degree scope and entered using a curved suction to confirm the dimension. The antrostomy was enlarged with cutting instruments to include the natural sinus ostium, taking care not to move anterior to the maxillary line so as to avoid injury to the nasolacrimal duct.  Additional bone was removed using forceps.  Polypoid tissue  was present within the maxillary sinus.  This was thoroughly removed and sent for pathologic  evaluation.     The ethmoid bulla was entered using a microdebrider and anterior ethmoidectomy was performed.  The roof of the bulla was removed with forceps and the suprabullar recess was exposed. The grand lamella of the middle turbinate was then traversed and posterior ethmoidectomy was performed.  An Onodi cell was not present.  The mucosa was hypertrophic throughout the sinuses, indicative of chronic inflammation.  Topical hemostatic agents on pledgets were then placed into the ethmoid cavity for hemostasis.     Attention was then turned to the left side of the sinonasal tract.  A similar procedure was performed on this side, including uncinectomy, maxillary antrostomy, removal of left tiana bullosa, and  anterior ethmoidectomy.      At the conclusion of these procedures, the image-guidance probe was used to verify that all anterior ethmoid cells had been properly opened.   All sinuses were copiously irrigated with warm normal saline solution.       At this point, approximately 2 cc of 1% lidocaine with epinephrine was injected into the septum bilaterally in the submucoperichondrial plane.  A 15 blade was then used to make a hemitransfixion incision on the left.  The mucoperichondrial flap was then elevated first on the left, and then the incision was carried through the septum to the right and the right flap was elevated in a similar fashion.  Using a swivel knife as well as double action forceps, the deviated portion of the septum was removed.    Next, the head of each inferior turbinate was injected with 0.5 cc of 1% lidocaine with epinephrine and a 15 blade was used to make a small incision at the head of the turbinate.  A Arj elevator was used to elevate the erectile tissue of the inferior turbinate.  The micodebrider with the turbinate blade was then used to remove the erectile tissue in the submucous plane.      The anterior septal incision was then closed using a chromic suture, and the bilateral  mucoperichondrial flaps were re approximated using a quilting caopting suture technique with a 4-0 plain gut suture.   Connelly splints were then placed along the septum and secured anteriorly using a 2-0 silk suture.      At this point, the pledgets were all removed.   Merogel packing  was placed into the bilateral ethmoid cavities to stent open the surgical site.   Mupirocin ointment was applied to the vestibule bilaterally.  At this point, the headset was removed and the drapes were taken down The patient was turned back toward the anesthesiologist and awakened from anesthesia, extubated and transferred to the recovery room in stable condition.

## 2017-09-01 NOTE — H&P
CC: trouble swallowing and nasal congestion.      HPI: 46 year old female here for ESS/SMRT/Septoplasty for a history nasal obstruction with right greater than left for 1.5 years. This has been associated with post nasal drip and intermittent facial pain and pressure. She was recently seen at an Urgent Care clinic and was  prescribed celestone shot and antibiotics of which she does not recall the name. She has been on Flonase intermittently but does not feel like she can get the medication into her right nostril.  She also does not think that the Flonase has helped. She does note that her symptoms where more prominent in the springtime years ago but now seem to be present year round. She has been on  Flonase, Astelin, and xyzal and multiple rounds of antibiotics without relief. .  She states her symptoms are not improved with the medical management.  She denies pressure in the maxillary sinus region. She report that she has post nasal drip.   She reports that the dysphagia is the same and unchanged.              Past Medical History:   Diagnosis Date    Brachial neuritis or radiculitis NOS 11/14/2012    Degeneration of cervical intervertebral disc 11/14/2012    GERD (gastroesophageal reflux disease)      SVT (supraventricular tachycardia)        Social History            Social History    Marital status:        Spouse name: N/A    Number of children: N/A    Years of education: N/A          Occupational History    Not on file.            Social History Main Topics    Smoking status: Former Smoker       Quit date: 1/1/2000    Smokeless tobacco: Not on file    Alcohol use No    Drug use: No    Sexual activity: Yes       Partners: Male       Birth control/ protection: None           Other Topics Concern    Not on file          Social History Narrative    No narrative on file            Past Surgical History:   Procedure Laterality Date    anterior diskectomy fusion   05/20/2016    BREAST  RECONSTRUCTION        CSF leak   05/22/2016     Replace and repair fat graft from surgery on 5/20/2016    KNEE ARTHROSCOPY        LAMINECTOMY        SPINAL FUSION                Family History   Problem Relation Age of Onset    Heart disease Mother      Hypertension Mother      Asthma Mother      Heart disease Father      COPD Father      Diabetes Father      Ovarian cancer Neg Hx      Colon cancer Neg Hx      Breast cancer Neg Hx      Cancer Neg Hx                 Review of Systems  General: negative for chills, fever or weight loss  Psychological: negative for mood changes or depression  Ophthalmic: negative for blurry vision, photophobia or eye pain  ENT: see HPI  Respiratory: no cough, shortness of breath, or wheezing  Cardiovascular: no chest pain or dyspnea on exertion  Gastrointestinal: no abdominal pain, change in bowel habits, or black/ bloody stools  Musculoskeletal: negative for gait disturbance or muscular weakness  Neurological: no syncope or seizures; no ataxia  Dermatological: negative for puritis,  rash and jaundice  Hematologic/lymphatic: no easy bruising, no new lumps or bumps        Physical Exam:         Vitals:     07/17/17 0805   BP: 133/88   Pulse: 80   Temp: 99.1 °F (37.3 °C)         Constitutional: Well appearing / communicating without difficutly.  NAD.  Eyes: EOM I Bilaterally  Head/Face: Normocephalic.  Negative paranasal sinus pressure/tenderness.  Salivary glands WNL.  House Brackmann I Bilaterally.     Right Ear: Auricle normal appearance. External Auditory Canal within normal limits no lesions or masses,TM w/o masses/lesions/perforations. TM mobility noted.   Left Ear: Auricle normal appearance. External Auditory Canal within normal limits no lesions or masses,TM w/o masses/lesions/perforations. TM mobility noted.  Nose: Nasal septal deviation to the right. Inferior Turbinates edematous and boggy 3+ bilaterally. No septal perforation. No masses/lesions. External nasal  skin appears normal without masses/lesions.  Oral Cavity: Gingiva/lips within normal limits.  Dentition/gingiva healthy appearing. Mucus membranes moist. Floor of mouth soft, no masses palpated. Oral Tongue mobile. Hard Palate appears normal.    Oropharynx: Base of tongue appears normal. No masses/lesions noted. Tonsillar fossa/pharyngeal wall without lesions. Posterior oropharynx WNL.  Soft palate without masses. Midline uvula.   Neck/Lymphatic: No LAD I-VI bilaterally.  No thyromegaly.  No masses noted on exam.     Mirror laryngoscopy/nasopharyngoscopy: Active gag reflex.  Unable to perform.     Neuro/Psychiatric: AOx3.  Normal mood and affect.   Cardiovascular: Normal carotid pulses bilaterally, no increasing jugular venous distention noted at cervical region bilaterally.    Respiratory: Normal respiratory effort, no stridor, no retractions noted.        Barium esophagram 6/19/17: Postsurgical changes of prior C4-C6 ACDF. The esophagus is normal in caliber and course.  Esophageal primary and secondary contractions failed to completely clear the esophagus and tertiary contractions were observed.  Gastroesophageal reflux was not elicited with cough or valsalva.     CT sinus 6/19/17: There is mild patchy mucosal thickening throughout the paranasal sinuses, particularly in the ethmoid air cells bilaterally. This is thin smooth, measures less than 3 mm in thickness. Small amount of layering fluid in the left maxillary sinus as well as the left sphenoid sinus suggesting the possibility of acute sinusitis. No osseous thickening or sclerosis to specifically indicate chronic sinus disease. Ostiomeatal units are patent. Left-sided tiana bullosa. Minimal leftward nasal septal deviation. Otherwise nasal cavity is unremarkable.        Assessment:      ICD-10-CM ICD-9-CM     1. Chronic pansinusitis J32.4 473.8     2. Nasal septal deviation J34.2 470     3. Hypertrophy of inferior nasal turbinate J34.3 478.0     4. Dysphagia,  unspecified type R13.10 787.20     5. Esophageal dysmotility K22.4 530.5        The primary encounter diagnosis was Chronic pansinusitis. Diagnoses of Nasal septal deviation, Hypertrophy of inferior nasal turbinate, Dysphagia, unspecified type, and Esophageal dysmotility were also pertinent to this visit.        Plan:  No orders of the defined types were placed in this encounter.        Chronic pansinusitis/Nasal obstruction/inferior turbinate hypertrophy: She would benefit from endoscopic sinus surgery for the treatment of her condition.  This would include the ethmoid and maxillary sinuses and would be bilateral.  Inferior turbinate reduction and septoplasty would be included.  I discussed the risks, benefits and alternatives to surgery with the patient, as well as the expected postoperative course.  I gave her the opportunity to ask questions and I answered all of them.  I provided relevant printed information on her condition for her to review at home.  Same-day discharge is anticipated.  She may have anesthesia triage by telephone.   The surgery will be scheduled in the near future.  She will need to return for a postoperative visit 1 week after surgery.     Breana Abbott MD

## 2017-09-01 NOTE — DISCHARGE INSTRUCTIONS
INSTRUCTIONS TO FOLLOW AFTER SINUS SURGERY  DR. Tauzin - [facility]      Your first office visit with Dr. Greene after surgery should have been already scheduled.  If you dont know when it is, call Dr. Greene's nurse Cora at [phone].    ACTIVITY:    Sleep on your back with the head of the bead elevated, up on 2-3 pillows, or in a recliner for the first 3 to 5 days. This will help with swelling.     After surgery you may have a lot of nasal drainage. This is normal. Do not wipe, touch, or dab your nose. You may breathe through your nose if youre able but avoid inhaling forcibly. Let all drainage fall on your mustache dressing and change it as needed.    You may shower 24 hours after surgery.    If you use CPAP or BiPAP to sleep at night, you should wait at least 48 hours before resuming use.  Dr. Greene will advise you when it is safe to do this.    DRESSINGS:    Change the mustache dressing under your nose as needed. (If unsure what this dressing is or how to do this, ask your doctor or nurse before you leave the clinic/hospital.) You may have pinkish-red drainage for 2-3 days.    In certain cases you may have gauze packing inside your nostrils.  If so, these will turn from white to red from the drainage. This is normal so do not be alarmed. Do not touch or pull at the packing. The packing will be removed by your doctor at your first post-op visit.     You may also have a dissolvable stent or dissolvable sponge placed into the sinuses during surgery.  These usually do not need to be removed unless you are told otherwise by Dr. Greene.  You may notice small fragments of these items come out of your nose in the weeks following surgery.    MEDICATIONS:  After surgery, you are generally sent home with prescription for an antibiotic, a pain medication, and an anti-nausea medication.     Start your antibiotics when you get home from surgery and take them until they are all gone.  It is best to take them with  food to prevent an upset stomach.    Most people need prescription pain medication for the first few days after surgery.  If you find that this is not necessary, you may use over-the-counter Tylenol (Acetaminophen) instead.    Avoid Aspirin, Motrin (Ibuprofen), Advil, Aleve and Vitamin E for 1 week before surgery and 1 week after surgery. There are many other medications to avoid as well due to the fact they act as blood thinners.      Some people have problems with bowel movements after surgery. If you have NOT had a bowel movement 3-5 days after surgery, go to your local pharmacy and purchase an over the counter stool softener such as COLACE. You can also ask the pharmacist for his or her recommendation. If you still do not have a bowel movement after starting the softener, please call Dr. Short office.    You will need these over-the-counter medications after surgery:    Saline Sinus Rinse (Hal Med brand):  You will use this to rinse out your nose and sinuses after surgery.  Begin doing this three days after surgery, unless instructed otherwise by Dr. Greene.  You should do this 2 times a day, following the instructions on the box.    Afrin (regular strength): Only use if you have nasal congestion or bleeding. Use 2 times per day for 3 days, stop for 1 day, continue 2 times per day for 3 days, then stop completely.  NOTE:  You may not need to do this at all.      RESTRICTED ACTIVITIES AFTER SURGERY:    Do NOT blow your nose for 2 weeks. If you have to sneeze or cough, do so with your mouth open.   AVOID all heavy lifting, straining or bending for 2 weeks.   AVOID any sexual activity for 2 weeks after surgery.  AVOID semi-contact sports or vigorous exercising for 3-4 weeks. Dr. Hu will let you know when you are cleared to resume exercise.  No Swimming for 3-4 weeks.  No Flying for 2 weeks.    Do NOT operate a motor vehicle or any type of heavy machinery within 24 hours of taking pain medication.  DO NOT  smoke or be around smokers.  AVOID irritating substances such as dust, chalk, harsh chemicals, and allergic triggers.    DIET:    Avoid hot and spicy foods, or salty soups for 1 week after surgery.  Begin with bland foods the day after surgery and advance to your regular diet as tolerated.  It is not necessary to take only soft food unless you are recovering from tonsil surgery.  Drink plenty of fluids (water is best).   Avoid alcoholic and caffeinated beverages for 1 week after surgery.        Discharge Instructions: After Your Surgery  Youve just had surgery. During surgery you were given medicine called anesthesia to keep you relaxed and free of pain. After surgery you may have some pain or nausea. This is common. Here are some tips for feeling better and getting well after surgery.     Stay on schedule with your medication.     Going home  Your doctor or nurse will show you how to take care of yourself when you go home. He or she will also answer your questions. Have an adult family member or friend drive you home. For the first 24 hours after your surgery:    · Do not drive or use heavy equipment.  · Do not make important decisions or sign legal papers.  · Do not drink alcohol.  · Have someone stay with you, if needed. He or she can watch for problems and help keep you safe.    Be sure to go to all follow-up visits with your doctor. And rest after your surgery for as long as your doctor tells you to.    Coping with pain  If you have pain after surgery, pain medicine will help you feel better. Take it as told, before pain becomes severe. Also, ask your doctor or pharmacist about other ways to control pain. This might be with heat, ice, or relaxation. And follow any other instructions your surgeon or nurse gives you.    Tips for taking pain medicine  To get the best relief possible, remember these points:    · Pain medicines can upset your stomach. Taking them with a little food may help.  · Most pain relievers  taken by mouth need at least 20 to 30 minutes to start to work.  · Taking medicine on a schedule can help you remember to take it. Try to time your medicine so that you can take it before starting an activity. This might be before you get dressed, go for a walk, or sit down for dinner.  · Constipation is a common side effect of pain medicines. Call your doctor before taking any medicines such as laxatives or stool softeners to help ease constipation. Also ask if you should skip any foods. Drinking lots of fluids and eating foods such as fruits and vegetables that are high in fiber can also help. Remember, do not take laxatives unless your surgeon has prescribed them.  · Drinking alcohol and taking pain medicine can cause dizziness and slow your breathing. It can even be deadly. Do not drink alcohol while taking pain medicine.  · Pain medicine can make you react more slowly to things. Do not drive or run machinery while taking pain medicine.    Your health care provider may tell you to take acetaminophen to help ease your pain. Ask him or her how much you are supposed to take each day. Acetaminophen or other pain relievers may interact with your prescription medicines or other over-the-counter (OTC) drugs. Some prescription medicines have acetaminophen and other ingredients. Using both prescription and OTC acetaminophen for pain can cause you to overdose. Read the labels on your OTC medicines with care. This will help you to clearly know the list of ingredients, how much to take, and any warnings. It may also help you not take too much acetaminophen. If you have questions or do not understand the information, ask your pharmacist or health care provider to explain it to you before you take the OTC medicine.    Managing nausea  Some people have an upset stomach after surgery. This is often because of anesthesia, pain, or pain medicine, or the stress of surgery. These tips will help you handle nausea and eat healthy  foods as you get better. If you were on a special food plan before surgery, ask your doctor if you should follow it while you get better. These tips may help:    · Do not push yourself to eat. Your body will tell you when to eat and how much.  · Start off with clear liquids and soup. They are easier to digest.  · Next try semi-solid foods, such as mashed potatoes, applesauce, and gelatin, as you feel ready.  · Slowly move to solid foods. Dont eat fatty, rich, or spicy foods at first.  · Do not force yourself to have 3 large meals a day. Instead eat smaller amounts more often.  · Take pain medicines with a small amount of solid food, such as crackers or toast, to avoid nausea.     Call your surgeon if  · You still have pain an hour after taking medicine. The medicine may not be strong enough.  · You feel too sleepy, dizzy, or groggy. The medicine may be too strong.  · You have side effects like nausea, vomiting, or skin changes, such as rash, itching, or hives.       If you have obstructive sleep apnea  You were given anesthesia medicine during surgery to keep you comfortable and free of pain. After surgery, you may have more apnea spells because of this medicine and other medicines you were given. The spells may last longer than usual.   At home:    · Keep using the continuous positive airway pressure (CPAP) device when you sleep. Unless your health care provider tells you not to, use it when you sleep, day or night. CPAP is a common device used to treat obstructive sleep apnea.  · Talk with your provider before taking any pain medicine, muscle relaxants, or sedatives. Your provider will tell you about the possible dangers of taking these medicines.    © 4059-8263 [employer]. [address] [place] [address], [place] [address]. All rights reserved. This information is not intended as a substitute for professional medical care. Always follow your healthcare professional's instructions.    PLEASE FOLLOW ANY  OTHER INSTRUCTIONS PROVIDED TO YOU BY DR. ROBBIN SIFUENTES!

## 2017-09-01 NOTE — DISCHARGE SUMMARY
Discharge Note    SUMMARY     Admit Date: 9/1/2017    Discharge Date and Time: 9/1/17  Attending Physician: Breana Abbott,*     Discharge Provider: Breana Abbott    Final Diagnosis: Post-Op Diagnosis Codes:     * Other chronic sinusitis [J32.8]    Disposition: Home or Self Care    Follow Up/Patient Instructions: Dr. Greene 1 week.    Medications:  Reconciled Home Medications:   Current Discharge Medication List      START taking these medications    Details   cephALEXin (KEFLEX) 500 MG capsule Take 1 capsule (500 mg total) by mouth every 12 (twelve) hours.  Qty: 20 capsule, Refills: 0      !! hydrocodone-acetaminophen 7.5-325mg (NORCO) 7.5-325 mg per tablet Take 1 tablet by mouth every 6 (six) hours as needed for Pain.  Qty: 12 tablet, Refills: 0      !! hydrocodone-acetaminophen 7.5-325mg (NORCO) 7.5-325 mg per tablet Take 1 tablet by mouth every 6 (six) hours as needed for Pain.  Qty: 12 tablet, Refills: 0      ondansetron (ZOFRAN) 4 MG tablet Take 1 tablet (4 mg total) by mouth every 8 (eight) hours as needed for Nausea.  Qty: 12 tablet, Refills: 1       !! - Potential duplicate medications found. Please discuss with provider.      CONTINUE these medications which have NOT CHANGED    Details   amitriptyline (ELAVIL) 10 MG tablet 2 pills at dinner time every night  Qty: 60 tablet, Refills: 3      azelastine (ASTELIN) 137 mcg (0.1 %) nasal spray 1 spray (137 mcg total) by Nasal route 2 (two) times daily.  Qty: 30 mL, Refills: 11      cetirizine (ZYRTEC) 10 MG tablet Take 10 mg by mouth Daily. 1 Tablet Oral Every day      esomeprazole (NEXIUM) 40 MG capsule Take 1 capsule (40 mg total) by mouth before breakfast.  Qty: 30 capsule, Refills: 1      fluticasone (FLONASE) 50 mcg/actuation nasal spray 2 sprays by Each Nare route every evening. 2 Spray, Suspension Nasal Every day  Qty: 1 Bottle, Refills: 12      MICROGESTIN 1/20, 21, 1-20 mg-mcg per tablet TK 1 T PO QD  Refills: 8      acyclovir  (ZOVIRAX) 200 MG capsule Take 1 capsule (200 mg total) by mouth 2 (two) times daily.  Qty: 10 capsule, Refills: 5      linaclotide 290 mcg Cap Take 290 mcg by mouth once daily.  Qty: 30 capsule, Refills: 11    Associated Diagnoses: CN (constipation)      phentermine (ADIPEX-P) 37.5 mg tablet              Discharge Procedure Orders  Diet general     Call MD for:  temperature >100.4     Call MD for:  persistent nausea and vomiting     Call MD for:  severe uncontrolled pain     Call MD for:  redness, tenderness, or signs of infection (pain, swelling, redness, odor or green/yellow discharge around incision site)       Follow-up Information     Breana Abbott MD.    Specialty:  Otolaryngology  Contact information:  [address]  [address]  [facility] [id number] [phone]

## 2017-09-01 NOTE — TRANSFER OF CARE
"Anesthesia Transfer of Care Note    Patient: Carmen Morrow    Procedure(s) Performed: Procedure(s) (LRB):  SEPTOPLASTY (N/A)  SINUS SURGERY FUNCTIONAL ENDOSCOPIC WITH NAVIGATION (N/A)  RESECTION-TURBINATES (SMR) (Bilateral)    Patient location: PACU    Anesthesia Type: general    Transport from OR: Transported from OR on room air with adequate spontaneous ventilation    Post pain: adequate analgesia    Post assessment: no apparent anesthetic complications    Post vital signs: stable    Level of consciousness: awake and alert    Nausea/Vomiting: no nausea/vomiting    Complications: none    Transfer of care protocol was followed      Last vitals:   Visit Vitals  /89 (BP Location: Left arm, Patient Position: Sitting)   Pulse 63   Temp 36.6 °C (97.8 °F) (Oral)   Resp 16   Ht 5' 5" (1.651 m)   Wt 73 kg (160 lb 15 oz)   LMP 08/10/2017   SpO2 96%   BMI 26.78 kg/m²     "

## 2017-09-01 NOTE — ANESTHESIA POSTPROCEDURE EVALUATION
"Anesthesia Post Evaluation    Patient: Carmen Morrow    Procedure(s) Performed: Procedure(s) (LRB):  SEPTOPLASTY (N/A)  SINUS SURGERY FUNCTIONAL ENDOSCOPIC WITH NAVIGATION (N/A)  RESECTION-TURBINATES (SMR) (Bilateral)    Final Anesthesia Type: general  Patient location during evaluation: PACU  Patient participation: Yes- Able to Participate  Level of consciousness: awake and alert  Post-procedure vital signs: reviewed and stable  Pain management: adequate  Airway patency: patent  PONV status at discharge: No PONV  Anesthetic complications: no      Cardiovascular status: blood pressure returned to baseline  Respiratory status: unassisted, spontaneous ventilation and room air  Hydration status: euvolemic  Follow-up not needed.        Visit Vitals  /82   Pulse 75   Temp 36.4 °C (97.6 °F) (Axillary)   Resp 18   Ht 5' 5" (1.651 m)   Wt 73 kg (160 lb 15 oz)   LMP 08/10/2017   SpO2 100%   BMI 26.78 kg/m²       Pain/Janey Score: Pain Assessment Performed: Yes (9/1/2017  9:44 AM)  Presence of Pain: denies (9/1/2017  9:44 AM)  Pain Rating Prior to Med Admin: 7 (9/1/2017 10:22 AM)  Janey Score: 9 (9/1/2017  9:44 AM)      "

## 2017-09-01 NOTE — PLAN OF CARE
Carmen Morrow has met all discharge criteria from Phase II. Vital Signs are stable, ambulating  without difficulty. Discharge instructions given, patient verbalized understanding. Discharged from facility via wheelchair in stable condition.     Patient waiting for prescriptions to be delivered

## 2017-09-07 ENCOUNTER — OFFICE VISIT (OUTPATIENT)
Dept: OTOLARYNGOLOGY | Facility: CLINIC | Age: 47
End: 2017-09-07
Payer: COMMERCIAL

## 2017-09-07 VITALS
WEIGHT: 166.69 LBS | DIASTOLIC BLOOD PRESSURE: 92 MMHG | HEART RATE: 75 BPM | BODY MASS INDEX: 27.77 KG/M2 | TEMPERATURE: 97 F | HEIGHT: 65 IN | SYSTOLIC BLOOD PRESSURE: 145 MMHG

## 2017-09-07 DIAGNOSIS — J32.8 OTHER CHRONIC SINUSITIS: Primary | ICD-10-CM

## 2017-09-07 DIAGNOSIS — J34.2 NASAL SEPTAL DEVIATION: ICD-10-CM

## 2017-09-07 DIAGNOSIS — J34.3 HYPERTROPHY OF INFERIOR NASAL TURBINATE: ICD-10-CM

## 2017-09-07 PROCEDURE — 99999 PR PBB SHADOW E&M-EST. PATIENT-LVL III: CPT | Mod: PBBFAC,,, | Performed by: OTOLARYNGOLOGY

## 2017-09-07 PROCEDURE — 99024 POSTOP FOLLOW-UP VISIT: CPT | Mod: S$GLB,,, | Performed by: OTOLARYNGOLOGY

## 2017-09-07 PROCEDURE — 31237 NSL/SINS NDSC SURG BX POLYPC: CPT | Mod: 50,79,S$GLB, | Performed by: OTOLARYNGOLOGY

## 2017-09-07 NOTE — PROGRESS NOTES
"  Subjective:      Carmen Morrow is a 47 y.o. female who comes for follow-up 1 week status-post endoscopic sinus surgery/septoplasty/SMRT. She states she is doing well. She is no longer taking the pain medication. She feels nasal obstruction with the nasal splints in place. No epistaxis.           Objective:     BP (!) 145/92 (BP Location: Left arm, Patient Position: Sitting, BP Method: Large (Automatic))   Pulse 75   Temp 97.3 °F (36.3 °C) (Oral)   Ht 5' 5" (1.651 m)   Wt 75.6 kg (166 lb 10.7 oz)   LMP 08/10/2017   BMI 27.73 kg/m²      General:   not in distress   Nasal:  edematous mucosa   no septal hematoma   no bleeding  Bilateral nasal splints removed without difficulty.    Oral Cavity:   clear   Oropharynx:   no bleeding   Neck:   nontender       Procedure    Endoscopic debridement performed.  See procedure note.        Data Reviewed    Pathology report indicated consistent with chronic sinusitis. .     Assessment:     Doing well following bilateral endoscopic sinus surgery/SMRT/septoplasty.     1. Other chronic sinusitis    2. Nasal septal deviation    3. Hypertrophy of inferior nasal turbinate         Plan:     Continue nasal saline irrigations TID.   Return in about 1 week (around 9/14/2017).    "

## 2017-09-08 NOTE — PROCEDURES
Nasal/sinus endoscopy  Date/Time: 9/8/2017 8:52 AM  Performed by: DHIRAJ SANTANA  Authorized by: DHIRAJ SANTANA     Consent Done?:  Yes (Verbal)      Endoscopic Sinonasal Debridement    Indication: Wound debridement following surgery for chronic sinusitis    Fiberoptic nasal endoscopy was used to assist with debridement of the sinonasal cavities as part of necessary postoperative care.  Informed consent was obtained prior to proceeding.  The nasal cavity was decongested with topical 0.25% phenylephrine and anesthetized with 4% lidocaine.  A rigid 0-degree endoscope was introduced into the nasal cavity.    Findings:  Nasal cavity:  inferior turbinates and septum intact   no synechiae   Ethmoid cavities:  dense crusted debris present bilaterally   debrided with Judy forceps and suction   widely patent following debridement   Maxillary sinus:  widely patent antrostomy bilaterally   dense crusted debris present bilaterally   Debrided with suction and Judy forceps             The patient tolerated the procedure well.

## 2017-09-18 NOTE — PROGRESS NOTES
"  Subjective:      Carmen Morrow is a 47 y.o. female who comes for follow-up 3 weeks status-post endoscopic sinus surgery.  She is doing well and states that she can breath mch better through bilateral nostrils for the first time in her whole life. She denies facial pain and pressure or epistaxis.           Objective:     BP (!) 137/96 (BP Location: Right arm, Patient Position: Sitting, BP Method: Large (Automatic))   Pulse 79   Temp 97.4 °F (36.3 °C) (Oral)   Ht 5' 5" (1.651 m)   Wt 75.6 kg (166 lb 10.7 oz)   BMI 27.73 kg/m²      General:   not in distress   Nasal:  edematous mucosa   no septal hematoma   no bleeding   Oral Cavity:   clear   Oropharynx:   no bleeding   Neck:   nontender       Procedure    Endoscopic debridement performed.  See procedure note.    Path: consistent with chronic sinusitis. Discussed with patient.     Assessment:     Doing well following bilateral endoscopic sinus surgery.  She also underwent septum/turbinate surgery which is unrelated to the reason for today's visit.    1. Other chronic sinusitis    2. Nasal septal deviation    3. Hypertrophy of inferior nasal turbinate         Plan:     Continue nasal saline irrigations BID.  Resume Flonase.   Return in about 3 years (around 9/19/2020).    "

## 2017-09-19 ENCOUNTER — OFFICE VISIT (OUTPATIENT)
Dept: OTOLARYNGOLOGY | Facility: CLINIC | Age: 47
End: 2017-09-19
Payer: COMMERCIAL

## 2017-09-19 VITALS
DIASTOLIC BLOOD PRESSURE: 96 MMHG | HEIGHT: 65 IN | HEART RATE: 79 BPM | WEIGHT: 166.69 LBS | TEMPERATURE: 97 F | BODY MASS INDEX: 27.77 KG/M2 | SYSTOLIC BLOOD PRESSURE: 137 MMHG

## 2017-09-19 DIAGNOSIS — J34.2 NASAL SEPTAL DEVIATION: ICD-10-CM

## 2017-09-19 DIAGNOSIS — J34.3 HYPERTROPHY OF INFERIOR NASAL TURBINATE: ICD-10-CM

## 2017-09-19 DIAGNOSIS — J32.8 OTHER CHRONIC SINUSITIS: Primary | ICD-10-CM

## 2017-09-19 PROCEDURE — 31237 NSL/SINS NDSC SURG BX POLYPC: CPT | Mod: 50,79,S$GLB, | Performed by: OTOLARYNGOLOGY

## 2017-09-19 PROCEDURE — 99024 POSTOP FOLLOW-UP VISIT: CPT | Mod: S$GLB,,, | Performed by: OTOLARYNGOLOGY

## 2017-09-19 PROCEDURE — 99999 PR PBB SHADOW E&M-EST. PATIENT-LVL III: CPT | Mod: PBBFAC,,, | Performed by: OTOLARYNGOLOGY

## 2017-09-19 NOTE — PROCEDURES
Nasal/sinus endoscopy  Date/Time: 9/19/2017 11:05 AM  Performed by: DHIRAJ SANTANA  Authorized by: DHIRAJ SANTANA     Consent Done?:  Yes (Verbal)  Endoscopic Sinonasal Debridement    Indication: Wound debridement following surgery for chronic sinusitis    Fiberoptic nasal endoscopy was used to assist with debridement of the sinonasal cavities as part of necessary postoperative care.  Informed consent was obtained prior to proceeding.  The nasal cavity was decongested with topical 0.25% phenylephrine and anesthetized with 4% lidocaine.  A rigid 0-degree endoscope was introduced into the nasal cavity.    Findings:  Nasal cavity:  inferior turbinates and septum intact   no synechiae   Ethmoid cavities:  minimal debris bilaterally   debrided with Judy forceps and suction   widely patent following debridement   Maxillary sinus:  widely patent antrostomy bilaterally   minimal debris bilaterally   Debrided with suction and Judy forceps             The patient tolerated the procedure well.

## 2017-09-26 ENCOUNTER — TELEPHONE (OUTPATIENT)
Dept: GASTROENTEROLOGY | Facility: CLINIC | Age: 47
End: 2017-09-26

## 2017-10-12 ENCOUNTER — OFFICE VISIT (OUTPATIENT)
Dept: OTOLARYNGOLOGY | Facility: CLINIC | Age: 47
End: 2017-10-12
Payer: COMMERCIAL

## 2017-10-12 VITALS
HEART RATE: 68 BPM | WEIGHT: 171.63 LBS | HEIGHT: 65 IN | TEMPERATURE: 98 F | SYSTOLIC BLOOD PRESSURE: 121 MMHG | BODY MASS INDEX: 28.6 KG/M2 | DIASTOLIC BLOOD PRESSURE: 78 MMHG

## 2017-10-12 DIAGNOSIS — J34.3 HYPERTROPHY OF INFERIOR NASAL TURBINATE: ICD-10-CM

## 2017-10-12 DIAGNOSIS — J32.8 OTHER CHRONIC SINUSITIS: Primary | ICD-10-CM

## 2017-10-12 DIAGNOSIS — J34.2 NASAL SEPTAL DEVIATION: ICD-10-CM

## 2017-10-12 PROCEDURE — 99024 POSTOP FOLLOW-UP VISIT: CPT | Mod: S$GLB,,, | Performed by: OTOLARYNGOLOGY

## 2017-10-12 PROCEDURE — 99999 PR PBB SHADOW E&M-EST. PATIENT-LVL III: CPT | Mod: PBBFAC,,, | Performed by: OTOLARYNGOLOGY

## 2017-10-12 RX ORDER — MONTELUKAST SODIUM 10 MG/1
10 TABLET ORAL DAILY
COMMUNITY
Start: 2017-10-08 | End: 2019-03-13 | Stop reason: SDUPTHER

## 2017-10-12 NOTE — PROGRESS NOTES
"  Subjective:      Carmen Morrow is a 47 y.o. female who comes for follow-up 6 weeks status-post endoscopic sinus surgery.  She is doing well and states that she can breath normally through her nose and that it is much improved.She denies facial pain and pressure or epistaxis.           Objective:     /78 (BP Location: Left arm, Patient Position: Sitting, BP Method: Large (Automatic))   Pulse 68   Temp 98.4 °F (36.9 °C) (Oral)   Ht 5' 5" (1.651 m)   Wt 77.9 kg (171 lb 10.1 oz)   BMI 28.56 kg/m²      General:   not in distress   Nasal:  edematous mucosa   no septal hematoma   no bleeding   Oral Cavity:   clear   Oropharynx:   no bleeding   Neck:   nontender       Procedure    Nasal endoscopy performed.  See procedure note.    Path: consistent with chronic sinusitis. Discussed with patient.     Assessment:     Doing well following bilateral endoscopic sinus surgery.  She also underwent septum/turbinate surgery which is unrelated to the reason for today's visit.    1. Other chronic sinusitis    2. Nasal septal deviation    3. Hypertrophy of inferior nasal turbinate         Plan:     Continue nasal saline irrigations BID.  Resume Flonase and Astelin.   Return in about 4 months (around 2/12/2018).  "

## 2017-10-12 NOTE — PROCEDURES
Nasal/sinus endoscopy  Date/Time: 10/12/2017 8:44 AM  Performed by: DHIRAJ SANTANA  Authorized by: DHIRAJ SANTANA     Consent Done?:  Yes (Verbal)    PROCEDURE NOTE:  Nasal endoscopy   Preprocedure diagnosis:  Chronic sinusitis  Postprocedure diangosis:  Same  Complications:  None  Blood Loss:  None    Procedure in detail:  After verbal consent was obtained, the patient's nasal cavity was anesthesized using topical 1%lidocaine and Neosynepherine.  A rigid 0 degree endoscope was placed in first the right, then the left nasal cavity.  The inferior and middle turbinates were examined, and found to be well healed bilaterally with minimal edema.  The middle meatus and maxillary antrum was also examined, and found to be patent bilaterally.  The ethmoid cavities are patent without crusting. No purulent drainage or masses seen.  The patient tolerated the procedure well and there were no complications.

## 2017-11-30 ENCOUNTER — LAB VISIT (OUTPATIENT)
Dept: LAB | Facility: HOSPITAL | Age: 47
End: 2017-11-30
Attending: OBSTETRICS & GYNECOLOGY
Payer: COMMERCIAL

## 2017-11-30 ENCOUNTER — OFFICE VISIT (OUTPATIENT)
Dept: OBSTETRICS AND GYNECOLOGY | Facility: CLINIC | Age: 47
End: 2017-11-30
Payer: COMMERCIAL

## 2017-11-30 VITALS
DIASTOLIC BLOOD PRESSURE: 82 MMHG | WEIGHT: 177.5 LBS | HEIGHT: 65 IN | SYSTOLIC BLOOD PRESSURE: 126 MMHG | BODY MASS INDEX: 29.57 KG/M2

## 2017-11-30 DIAGNOSIS — N95.1 MENOPAUSAL SYNDROME: Primary | ICD-10-CM

## 2017-11-30 DIAGNOSIS — Z12.4 ROUTINE PAPANICOLAOU SMEAR: ICD-10-CM

## 2017-11-30 DIAGNOSIS — Z12.31 VISIT FOR SCREENING MAMMOGRAM: ICD-10-CM

## 2017-11-30 DIAGNOSIS — Z01.419 WELL WOMAN EXAM WITH ROUTINE GYNECOLOGICAL EXAM: ICD-10-CM

## 2017-11-30 DIAGNOSIS — N95.1 MENOPAUSAL SYNDROME: ICD-10-CM

## 2017-11-30 LAB
ALBUMIN SERPL BCP-MCNC: 3.6 G/DL
ALP SERPL-CCNC: 69 U/L
ALT SERPL W/O P-5'-P-CCNC: 24 U/L
ANION GAP SERPL CALC-SCNC: 9 MMOL/L
AST SERPL-CCNC: 26 U/L
BASOPHILS # BLD AUTO: 0.04 K/UL
BASOPHILS NFR BLD: 0.4 %
BILIRUB SERPL-MCNC: 0.7 MG/DL
BUN SERPL-MCNC: 8 MG/DL
CALCIUM SERPL-MCNC: 8.9 MG/DL
CHLORIDE SERPL-SCNC: 106 MMOL/L
CO2 SERPL-SCNC: 20 MMOL/L
CREAT SERPL-MCNC: 0.7 MG/DL
DIFFERENTIAL METHOD: ABNORMAL
EOSINOPHIL # BLD AUTO: 0.2 K/UL
EOSINOPHIL NFR BLD: 2 %
ERYTHROCYTE [DISTWIDTH] IN BLOOD BY AUTOMATED COUNT: 15.1 %
EST. GFR  (AFRICAN AMERICAN): >60 ML/MIN/1.73 M^2
EST. GFR  (NON AFRICAN AMERICAN): >60 ML/MIN/1.73 M^2
FSH SERPL-ACNC: 6.1 MIU/ML
GLUCOSE SERPL-MCNC: 72 MG/DL
HCT VFR BLD AUTO: 38.8 %
HGB BLD-MCNC: 12.5 G/DL
LYMPHOCYTES # BLD AUTO: 3.8 K/UL
LYMPHOCYTES NFR BLD: 37.8 %
MCH RBC QN AUTO: 28 PG
MCHC RBC AUTO-ENTMCNC: 32.2 G/DL
MCV RBC AUTO: 87 FL
MONOCYTES # BLD AUTO: 0.7 K/UL
MONOCYTES NFR BLD: 6.4 %
NEUTROPHILS # BLD AUTO: 5.4 K/UL
NEUTROPHILS NFR BLD: 53.1 %
PLATELET # BLD AUTO: 306 K/UL
PMV BLD AUTO: 10.5 FL
POTASSIUM SERPL-SCNC: 3.7 MMOL/L
PROT SERPL-MCNC: 7.8 G/DL
RBC # BLD AUTO: 4.46 M/UL
SODIUM SERPL-SCNC: 135 MMOL/L
TSH SERPL DL<=0.005 MIU/L-ACNC: 0.46 UIU/ML
WBC # BLD AUTO: 10.17 K/UL

## 2017-11-30 PROCEDURE — 99999 PR PBB SHADOW E&M-EST. PATIENT-LVL III: CPT | Mod: PBBFAC,,, | Performed by: OBSTETRICS & GYNECOLOGY

## 2017-11-30 PROCEDURE — 36415 COLL VENOUS BLD VENIPUNCTURE: CPT

## 2017-11-30 PROCEDURE — 99396 PREV VISIT EST AGE 40-64: CPT | Mod: S$GLB,,, | Performed by: OBSTETRICS & GYNECOLOGY

## 2017-11-30 PROCEDURE — 80053 COMPREHEN METABOLIC PANEL: CPT

## 2017-11-30 PROCEDURE — 85025 COMPLETE CBC W/AUTO DIFF WBC: CPT

## 2017-11-30 PROCEDURE — 84443 ASSAY THYROID STIM HORMONE: CPT

## 2017-11-30 PROCEDURE — 83001 ASSAY OF GONADOTROPIN (FSH): CPT

## 2017-11-30 PROCEDURE — 88175 CYTOPATH C/V AUTO FLUID REDO: CPT

## 2017-12-02 ENCOUNTER — TELEPHONE (OUTPATIENT)
Dept: OBSTETRICS AND GYNECOLOGY | Facility: CLINIC | Age: 47
End: 2017-12-02

## 2017-12-03 NOTE — TELEPHONE ENCOUNTER
Labs were all good, not in full menopause, if she wants to try low dose hormone for the hot flashe we can or just observe for now and use OTC black chohosh, ??  thanks

## 2017-12-04 NOTE — TELEPHONE ENCOUNTER
Returned patients call.   Informed patient of results, lab results. Patient voiced understanding. Patient states she would like to try OTC remedies for her symptoms first. Verbalized understanding  Notified patient to call office if there were any further questions.

## 2017-12-04 NOTE — TELEPHONE ENCOUNTER
----- Message from Missy Kelly sent at 12/4/2017  1:45 PM CST -----  Contact: [phone]/self  Patient called in returning your call. Please advise.

## 2017-12-18 ENCOUNTER — HOSPITAL ENCOUNTER (OUTPATIENT)
Dept: RADIOLOGY | Facility: HOSPITAL | Age: 47
Discharge: HOME | End: 2017-12-18
Attending: ORTHOPAEDIC SURGERY
Payer: COMMERCIAL

## 2017-12-18 ENCOUNTER — OFFICE VISIT (OUTPATIENT)
Dept: SPORTS MEDICINE | Facility: CLINIC | Age: 47
End: 2017-12-18
Payer: COMMERCIAL

## 2017-12-18 VITALS
HEIGHT: 65 IN | SYSTOLIC BLOOD PRESSURE: 130 MMHG | DIASTOLIC BLOOD PRESSURE: 74 MMHG | BODY MASS INDEX: 29.49 KG/M2 | HEART RATE: 90 BPM | WEIGHT: 177 LBS

## 2017-12-18 DIAGNOSIS — M71.21 BAKER'S CYST OF KNEE, RIGHT: ICD-10-CM

## 2017-12-18 DIAGNOSIS — G89.29 CHRONIC PAIN OF RIGHT KNEE: ICD-10-CM

## 2017-12-18 DIAGNOSIS — M25.561 CHRONIC PAIN OF RIGHT KNEE: ICD-10-CM

## 2017-12-18 DIAGNOSIS — M22.41 CHONDROMALACIA OF RIGHT PATELLA: ICD-10-CM

## 2017-12-18 DIAGNOSIS — M94.261 CHONDROMALACIA OF RIGHT KNEE: ICD-10-CM

## 2017-12-18 DIAGNOSIS — M23.91 INTERNAL DERANGEMENT OF RIGHT KNEE: ICD-10-CM

## 2017-12-18 DIAGNOSIS — M25.569 KNEE PAIN, UNSPECIFIED CHRONICITY, UNSPECIFIED LATERALITY: ICD-10-CM

## 2017-12-18 DIAGNOSIS — M25.569 KNEE PAIN, UNSPECIFIED CHRONICITY, UNSPECIFIED LATERALITY: Primary | ICD-10-CM

## 2017-12-18 PROCEDURE — 73564 X-RAY EXAM KNEE 4 OR MORE: CPT | Mod: TC,50,PO

## 2017-12-18 PROCEDURE — 73564 X-RAY EXAM KNEE 4 OR MORE: CPT | Mod: 26,50,, | Performed by: RADIOLOGY

## 2017-12-18 PROCEDURE — 99999 PR PBB SHADOW E&M-EST. PATIENT-LVL IV: CPT | Mod: PBBFAC,,, | Performed by: ORTHOPAEDIC SURGERY

## 2017-12-18 PROCEDURE — 99214 OFFICE O/P EST MOD 30 MIN: CPT | Mod: S$GLB,,, | Performed by: ORTHOPAEDIC SURGERY

## 2017-12-18 NOTE — PROGRESS NOTES
Subjective:          Chief Complaint: Carmen Morrow is a 47 y.o. female who had concerns including Pain of the Right Knee.    Patient is here for a follow up for her right knee. She is complaining of pain and a knot in the back of her knee that comes and goes. She has issues with squatting and getting off the floor.     DATE OF PROCEDURE: 12/22/2016     ATTENDING SURGEON: Surgeon(s) and Role:  * Bess Diaz MD - Primary     Assistants:  MD Shauna Roth PA-C      PREOPERATIVE DIAGNOSIS:  Right  Chondromalacia, (excludes patella) M94.29, Internal derangement knee M23.90 and Synovitis M65.9     POSTOPERATIVE DIAGNOSIS:   Right  Chondromalacia, (excludes patella) M94.29, Internal derangement knee M23.90 and Synovitis M65.9     PROCEDURES(S) PERFORMED:   1. Right  Femoral Subchondroplasty 04310  2. Right  Arthroscopy, debridement/shaving of articular cartilage (chondroplasty) 16028  3. Right  Arthroscopy, knee, synovectomy, limited 63839  4. Right  Amniox Arthrocentesis, 45640        Pain   Associated symptoms include joint swelling. Pertinent negatives include no abdominal pain, chest pain, chills, congestion, coughing, fever, headaches, myalgias, nausea, numbness, rash, sore throat or vomiting.          Review of Systems   Constitution: Negative. Negative for chills, fever, weight gain and weight loss.   HENT: Negative for congestion and sore throat.    Eyes: Negative for blurred vision and double vision.   Cardiovascular: Negative for chest pain, leg swelling and palpitations.   Respiratory: Negative for cough and shortness of breath.    Hematologic/Lymphatic: Does not bruise/bleed easily.   Skin: Negative for itching, poor wound healing and rash.   Musculoskeletal: Positive for joint pain, joint swelling and stiffness. Negative for back pain, muscle weakness and myalgias.   Gastrointestinal: Negative for abdominal pain, constipation, diarrhea, nausea and vomiting.   Genitourinary:  Negative.  Negative for frequency and hematuria.   Neurological: Negative for dizziness, headaches, numbness, paresthesias and sensory change.   Psychiatric/Behavioral: Negative for altered mental status and depression. The patient is not nervous/anxious.    Allergic/Immunologic: Negative for hives.       Pain Related Questions  Over the past 3 days, what was your highest pain level?: 5  Over the past 3 days, what was your lowest pain level? : 0    Other  How many nights a week are you awakened by your affected body part?: 1  Was the patient's HEIGHT measured or patient reported?: Patient Reported  Was the patient's WEIGHT measured or patient reported?: Patient Reported      Objective:        General: Carmen is well-developed, well-nourished, appears stated age, in no acute distress, alert and oriented to time, place and person.     General    Vitals reviewed.  Constitutional: She is oriented to person, place, and time. She appears well-developed and well-nourished. No distress.   HENT:   Mouth/Throat: No oropharyngeal exudate.   Eyes: Right eye exhibits no discharge. Left eye exhibits no discharge.   Neck: Normal range of motion.   Cardiovascular: Normal rate and regular rhythm.    Pulmonary/Chest: Effort normal and breath sounds normal. No respiratory distress.   Neurological: She is alert and oriented to person, place, and time. She has normal reflexes. No cranial nerve deficit. Coordination normal.   Psychiatric: She has a normal mood and affect. Her behavior is normal. Judgment and thought content normal.     General Musculoskeletal Exam   Gait: normal       Right Knee Exam     Inspection   Erythema: absent  Scars: present  Swelling: absent  Effusion: effusion  Deformity: deformity  Bruising: absent    Tenderness   Right knee tenderness location: over incisions.    Range of Motion   Extension: 0   Flexion: normal Right knee flexion: 135.     Tests   Meniscus   Carina:  Medial - negative Lateral -  negative  Ligament Examination Lachman: normal (-1 to 2mm) PCL-Posterior Drawer: normal (0 to 2mm)     MCL - Valgus: normal (0 to 2mm)  LCL - Varus: normalPivot Shift: normal (Equal)Reverse Pivot Shift: normal (Equal)Dial Test at 30 degrees: normal (< 5 degrees)Dial Test at 90 degrees: normal (< 5 degrees)  Posterior Sag Test: negative  Posterolateral Corner: unstable (>15 degrees difference)  Patella   Patellar Apprehension: negative  Passive Patellar Tilt: neutral  Patellar Tracking: normal  Patellar Glide (quadrants): Lateral - 1   Medial - 2  Q-Angle at 90 degrees: normal  Patellar Grind: negative  J-Sign: none    Other   Meniscal Cyst: absent  Popliteal (Baker's) Cyst: absent  Sensation: normal    Comments:  Mild quadriceps atrophy     Left Knee Exam     Inspection   Erythema: absent  Scars: absent  Swelling: absent  Effusion: absent  Deformity: deformity  Bruising: absent    Tenderness   The patient is experiencing no tenderness.         Range of Motion   Extension: 0   Left knee flexion: 135.     Tests   Meniscus   Carina:  Medial - negative Lateral - negative  Stability Lachman: normal (-1 to 2mm) PCL-Posterior Drawer: normal (0 to 2mm)  MCL - Valgus: normal (0 to 2mm)  LCL - Varus: normal (0 to 2mm)Pivot Shift: normal (Equal)Reverse Pivot Shift: normal (Equal)Dial Test at 30 degrees: normal (< 5 degrees)Dial Test at 90 degrees: normal (< 5 degrees)  Posterior Sag Test: negative  Posterolateral Corner: unstable (>15 degrees difference)  Patella   Patellar Apprehension: negative  Passive Patellar Tilt: neutral  Patellar Tracking: normal  Patellar Glide (Quadrants): Lateral - 1 Medial - 2  Q-Angle at 90 degrees: normal  Patellar Grind: negative  J-Sign: J sign absent    Other   Meniscal Cyst: absent  Popliteal (Baker's) Cyst: absent  Sensation: normal    Right Hip Exam     Tests   Meek: negative  Left Hip Exam     Tests   Meek: negative          Muscle Strength   Right Lower Extremity   Hip Abduction: 5/5    Quadriceps:  4/5   Hamstrin/5 and 5/5     Reflexes     Left Side  Quadriceps:  2+  Achilles:  2+    Right Side   Quadriceps:  2+  Achilles:  2+    Vascular Exam     Right Pulses  Dorsalis Pedis:      2+  Posterior Tibial:      2+        Left Pulses  Dorsalis Pedis:      2+  Posterior Tibial:      2+          RADIOGRAPHS TODAY    Right: No fracture dislocation bone destruction seen. There is a sclerotic lesion developing in the medial femoral condyle. There is DJD. MRI could be helpful.    Left: There is mild DJD.          Assessment:       Encounter Diagnoses   Name Primary?    Knee pain, unspecified chronicity, unspecified laterality Yes    Chronic pain of right knee     Chondromalacia of right knee     Chondromalacia of right patella     Internal derangement of right knee     Baker's cyst of knee, right           Plan:       1. IKDC, SF-12 and KOOS was filled out today in clinic.     RTC in 2 weeks with Dr. Bess Diaz for MRI results Patient will not fill out IKDC, SF-12 and KOOS on return.    2. MRI right knee to assess meniscus tear and bakers cyst    3. We reviewed with Carmen today, the pathology and natural history of her diagnosis. We have discussed a variety of treatment options including medications, physical therapy and other alternative treatments. I also explained the indications, risks and benefits of surgery. After discussion, Carmen decided to proceed with surgery. The decision was made to go forward with   1. Right knee arthroscopic popliteal cyst decompression  2. Right knee arthroscopic menisectomy  3. Right knee arthroscopic chondroplasty  4. Right knee Amniox arthrocentesis    The details of the surgical procedure were explained, including the location of probable incisions and a description of likely hardware and/or grafts to be used.  The patient understands the likely convalescence after surgery.  Also, we have thoroughly discussed the risks, benefits and alternatives to surgery,  including, but not limited to, the risk of infection, joint stiffness, blood clot (including DVT and/or pulmonary embolus), neurologic and vascular injury.  It was explained that, if tissue has been repaired or reconstructed, there is a chance of failure, which may require further management.      All of the patient's questions were answered and informed consent was obtained. The patient will contact us if they have any questions or concerns in the interim.                    Patient questionnaires may have been collected.

## 2017-12-20 ENCOUNTER — HOSPITAL ENCOUNTER (OUTPATIENT)
Dept: RADIOLOGY | Facility: HOSPITAL | Age: 47
Discharge: HOME | End: 2017-12-20
Attending: OBSTETRICS & GYNECOLOGY
Payer: COMMERCIAL

## 2017-12-20 DIAGNOSIS — Z12.31 VISIT FOR SCREENING MAMMOGRAM: ICD-10-CM

## 2017-12-20 PROCEDURE — 77067 SCR MAMMO BI INCL CAD: CPT | Mod: TC

## 2017-12-20 PROCEDURE — 77067 SCR MAMMO BI INCL CAD: CPT | Mod: 26,,, | Performed by: RADIOLOGY

## 2017-12-20 PROCEDURE — 77063 BREAST TOMOSYNTHESIS BI: CPT | Mod: 26,,, | Performed by: RADIOLOGY

## 2017-12-27 ENCOUNTER — PATIENT MESSAGE (OUTPATIENT)
Dept: SPORTS MEDICINE | Facility: CLINIC | Age: 47
End: 2017-12-27

## 2017-12-27 PROBLEM — M62.81 QUADRICEPS WEAKNESS: Status: ACTIVE | Noted: 2017-12-27

## 2017-12-27 PROBLEM — M22.42 CHONDROMALACIA PATELLAE OF LEFT KNEE: Status: ACTIVE | Noted: 2017-12-27

## 2018-01-02 RX ORDER — AMITRIPTYLINE HYDROCHLORIDE 10 MG/1
TABLET, FILM COATED ORAL
Qty: 60 TABLET | Refills: 3 | Status: SHIPPED | OUTPATIENT
Start: 2018-01-02 | End: 2018-06-12 | Stop reason: SDUPTHER

## 2018-01-19 ENCOUNTER — OFFICE VISIT (OUTPATIENT)
Dept: SPORTS MEDICINE | Facility: CLINIC | Age: 48
End: 2018-01-19
Payer: COMMERCIAL

## 2018-01-19 VITALS
WEIGHT: 178 LBS | BODY MASS INDEX: 29.66 KG/M2 | HEIGHT: 65 IN | SYSTOLIC BLOOD PRESSURE: 130 MMHG | HEART RATE: 88 BPM | DIASTOLIC BLOOD PRESSURE: 93 MMHG

## 2018-01-19 DIAGNOSIS — M22.41 CHONDROMALACIA OF RIGHT PATELLA: ICD-10-CM

## 2018-01-19 DIAGNOSIS — M71.21 BAKER'S CYST OF KNEE, RIGHT: ICD-10-CM

## 2018-01-19 DIAGNOSIS — M94.261 CHONDROMALACIA OF RIGHT KNEE: ICD-10-CM

## 2018-01-19 DIAGNOSIS — M23.91 INTERNAL DERANGEMENT OF RIGHT KNEE: ICD-10-CM

## 2018-01-19 DIAGNOSIS — M25.561 ACUTE PAIN OF RIGHT KNEE: Primary | ICD-10-CM

## 2018-01-19 DIAGNOSIS — M22.42 CHONDROMALACIA PATELLAE OF LEFT KNEE: ICD-10-CM

## 2018-01-19 DIAGNOSIS — G89.29 CHRONIC PAIN OF LEFT KNEE: ICD-10-CM

## 2018-01-19 DIAGNOSIS — M25.562 CHRONIC PAIN OF LEFT KNEE: ICD-10-CM

## 2018-01-19 PROCEDURE — 99214 OFFICE O/P EST MOD 30 MIN: CPT | Mod: S$GLB,,, | Performed by: ORTHOPAEDIC SURGERY

## 2018-01-19 PROCEDURE — 99999 PR PBB SHADOW E&M-EST. PATIENT-LVL III: CPT | Mod: PBBFAC,,, | Performed by: ORTHOPAEDIC SURGERY

## 2018-01-19 NOTE — PROGRESS NOTES
Subjective:          Chief Complaint: Carmen Morrow is a 47 y.o. female who had concerns including Follow-up of the Right Knee and Pain of the Left Knee.    Patient is here for a follow up for her right knee to review her MRI. She would also like an evaluation of her left knee.      DATE OF PROCEDURE: 12/22/2016     ATTENDING SURGEON: Surgeon(s) and Role:  * Bess Diaz MD - Primary     Assistants:  MD Shauna Roth PA-C      PREOPERATIVE DIAGNOSIS:  Right  Chondromalacia, (excludes patella) M94.29, Internal derangement knee M23.90 and Synovitis M65.9     POSTOPERATIVE DIAGNOSIS:   Right  Chondromalacia, (excludes patella) M94.29, Internal derangement knee M23.90 and Synovitis M65.9     PROCEDURES(S) PERFORMED:   1. Right  Femoral Subchondroplasty 44420  2. Right  Arthroscopy, debridement/shaving of articular cartilage (chondroplasty) 27521  3. Right  Arthroscopy, knee, synovectomy, limited 13712  4. Right  Amniox Arthrocentesis, 69218        Pain   Associated symptoms include joint swelling. Pertinent negatives include no abdominal pain, chest pain, chills, congestion, coughing, fever, headaches, myalgias, nausea, numbness, rash, sore throat or vomiting.          Review of Systems   Constitution: Negative. Negative for chills, fever, weight gain and weight loss.   HENT: Negative for congestion and sore throat.    Eyes: Negative for blurred vision and double vision.   Cardiovascular: Negative for chest pain, leg swelling and palpitations.   Respiratory: Negative for cough and shortness of breath.    Hematologic/Lymphatic: Does not bruise/bleed easily.   Skin: Negative for itching, poor wound healing and rash.   Musculoskeletal: Positive for joint pain, joint swelling and stiffness. Negative for back pain, muscle weakness and myalgias.   Gastrointestinal: Negative for abdominal pain, constipation, diarrhea, nausea and vomiting.   Genitourinary: Negative.  Negative for frequency and  hematuria.   Neurological: Negative for dizziness, headaches, numbness, paresthesias and sensory change.   Psychiatric/Behavioral: Negative for altered mental status and depression. The patient is not nervous/anxious.    Allergic/Immunologic: Negative for hives.       Pain Related Questions  Over the past 3 days, what was your average pain during activity? (I.e. running, jogging, walking, climbing stairs, getting dressed, ect.): 2  Over the past 3 days, what was your highest pain level?: 4  Over the past 3 days, what was your lowest pain level? : 0    Other  How many nights a week are you awakened by your affected body part?: 0  Was the patient's HEIGHT measured or patient reported?: Patient Reported  Was the patient's WEIGHT measured or patient reported?: Measured      Objective:        General: Carmen is well-developed, well-nourished, appears stated age, in no acute distress, alert and oriented to time, place and person.     General    Vitals reviewed.  Constitutional: She is oriented to person, place, and time. She appears well-developed and well-nourished. No distress.   HENT:   Mouth/Throat: No oropharyngeal exudate.   Eyes: Right eye exhibits no discharge. Left eye exhibits no discharge.   Neck: Normal range of motion.   Cardiovascular: Normal rate and regular rhythm.    Pulmonary/Chest: Effort normal and breath sounds normal. No respiratory distress.   Neurological: She is alert and oriented to person, place, and time. She has normal reflexes. No cranial nerve deficit. Coordination normal.   Psychiatric: She has a normal mood and affect. Her behavior is normal. Judgment and thought content normal.     General Musculoskeletal Exam   Gait: normal       Right Knee Exam     Inspection   Erythema: absent  Scars: present  Swelling: absent  Effusion: effusion  Deformity: deformity  Bruising: absent    Tenderness   The patient is tender to palpation of the medial joint line (over incisions).    Crepitus   The patient  has crepitus of the patella (moderate).    Range of Motion   Extension: 0   Flexion:  140 normal     Tests   Meniscus   Carina:  Medial - negative Lateral - negative  Ligament Examination Lachman: normal (-1 to 2mm) PCL-Posterior Drawer: normal (0 to 2mm)     MCL - Valgus: normal (0 to 2mm)  LCL - Varus: normalPivot Shift: normal (Equal)Reverse Pivot Shift: normal (Equal)Dial Test at 30 degrees: normal (< 5 degrees)Dial Test at 90 degrees: normal (< 5 degrees)  Posterior Sag Test: negative  Posterolateral Corner: unstable (>15 degrees difference)  Patella   Patellar Apprehension: negative  Passive Patellar Tilt: neutral  Patellar Tracking: normal  Patellar Glide (quadrants): Lateral - 1   Medial - 2  Q-Angle at 90 degrees: normal  Patellar Grind: negative  J-Sign: none    Other   Meniscal Cyst: absent  Popliteal (Baker's) Cyst: absent  Sensation: normal    Comments:  Mild quadriceps atrophy     Left Knee Exam     Inspection   Erythema: absent  Scars: absent  Swelling: absent  Effusion: absent  Deformity: deformity  Bruising: absent    Tenderness   The patient is experiencing no tenderness.         Crepitus   The patient has crepitus of the patella (moderate).    Range of Motion   Extension: 0   Flexion: 140     Tests   Meniscus   Carina:  Medial - negative Lateral - negative  Stability Lachman: normal (-1 to 2mm) PCL-Posterior Drawer: normal (0 to 2mm)  MCL - Valgus: normal (0 to 2mm)  LCL - Varus: normal (0 to 2mm)Pivot Shift: normal (Equal)Reverse Pivot Shift: normal (Equal)Dial Test at 30 degrees: normal (< 5 degrees)Dial Test at 90 degrees: normal (< 5 degrees)  Posterior Sag Test: negative  Posterolateral Corner: unstable (>15 degrees difference)  Patella   Patellar Apprehension: negative  Passive Patellar Tilt: neutral  Patellar Tracking: normal  Patellar Glide (Quadrants): Lateral - 1 Medial - 2  Q-Angle at 90 degrees: normal  Patellar Grind: positive  J-Sign: J sign absent    Other   Meniscal Cyst:  absent  Popliteal (Baker's) Cyst: absent  Sensation: normal    Right Hip Exam     Tests   Meek: negative  Left Hip Exam     Tests   Meek: negative          Muscle Strength   Right Lower Extremity   Hip Abduction: 5/5   Quadriceps:  4/5   Hamstrin/5   Left Lower Extremity   Hip Abduction: 5/5   Quadriceps:  4/5   Hamstrin/5     Reflexes     Left Side  Quadriceps:  2+  Achilles:  2+    Right Side   Quadriceps:  2+  Achilles:  2+    Vascular Exam     Right Pulses  Dorsalis Pedis:      2+  Posterior Tibial:      2+        Left Pulses  Dorsalis Pedis:      2+  Posterior Tibial:      2+          MRI RESULTS   High grade articular cartilage loss involving the medial femoral condyle.    Punctate focus of abnormal increased T2 signal involving the anterior horn of the medial meniscus only visualized on a single slice last appreciated on the sagittal proton density fat-sat sequence cannot exclude meniscal tear.    Small Baker's cyst.          Assessment:       Encounter Diagnoses   Name Primary?    Acute pain of right knee Yes    Baker's cyst of knee, right     Chondromalacia of right knee     Chondromalacia of right patella     Chondromalacia patellae of left knee     Internal derangement of right knee           Plan:       1. IKDC, SF-12 and KOOS was not filled out today in clinic.     RTC in 2 weeks with Dr. Bess Diaz Patient will not fill out IKDC, SF-12 and KOOS on return. MRI results    2. MRI L knee T2 mapping and cartilage specific sequences                        Patient questionnaires may have been collected.

## 2018-01-26 ENCOUNTER — HOSPITAL ENCOUNTER (OUTPATIENT)
Dept: RADIOLOGY | Facility: HOSPITAL | Age: 48
Discharge: HOME | End: 2018-01-26
Attending: ORTHOPAEDIC SURGERY
Payer: COMMERCIAL

## 2018-01-26 DIAGNOSIS — G89.29 CHRONIC PAIN OF LEFT KNEE: ICD-10-CM

## 2018-01-26 DIAGNOSIS — M25.562 CHRONIC PAIN OF LEFT KNEE: ICD-10-CM

## 2018-01-26 PROCEDURE — 73721 MRI JNT OF LWR EXTRE W/O DYE: CPT | Mod: TC,LT

## 2018-01-26 PROCEDURE — 73721 MRI JNT OF LWR EXTRE W/O DYE: CPT | Mod: 26,LT,, | Performed by: RADIOLOGY

## 2018-02-02 ENCOUNTER — OFFICE VISIT (OUTPATIENT)
Dept: SPORTS MEDICINE | Facility: CLINIC | Age: 48
End: 2018-02-02
Payer: COMMERCIAL

## 2018-02-02 VITALS
SYSTOLIC BLOOD PRESSURE: 128 MMHG | DIASTOLIC BLOOD PRESSURE: 83 MMHG | WEIGHT: 178 LBS | HEIGHT: 65 IN | HEART RATE: 62 BPM | BODY MASS INDEX: 29.66 KG/M2

## 2018-02-02 DIAGNOSIS — M17.11 PRIMARY OSTEOARTHRITIS OF RIGHT KNEE: Primary | ICD-10-CM

## 2018-02-02 DIAGNOSIS — M94.262 CHONDROMALACIA OF KNEE, LEFT: ICD-10-CM

## 2018-02-02 DIAGNOSIS — M17.12 PRIMARY OSTEOARTHRITIS OF LEFT KNEE: ICD-10-CM

## 2018-02-02 PROCEDURE — 99214 OFFICE O/P EST MOD 30 MIN: CPT | Mod: S$GLB,,, | Performed by: PHYSICIAN ASSISTANT

## 2018-02-02 PROCEDURE — 3008F BODY MASS INDEX DOCD: CPT | Mod: S$GLB,,, | Performed by: PHYSICIAN ASSISTANT

## 2018-02-02 PROCEDURE — 99999 PR PBB SHADOW E&M-EST. PATIENT-LVL III: CPT | Mod: PBBFAC,,, | Performed by: PHYSICIAN ASSISTANT

## 2018-02-02 NOTE — PROGRESS NOTES
Subjective:          Chief Complaint: Carmen Morrow is a 47 y.o. female who had concerns including Follow-up of the Left Knee.    Patient is here for a follow up for her left knee to review her MRI.       DATE OF PROCEDURE: 12/22/2016     ATTENDING SURGEON: Surgeon(s) and Role:  * Bess Diaz MD - Primary     Assistants:  MD Shauna Roth PA-C      PREOPERATIVE DIAGNOSIS:  Right  Chondromalacia, (excludes patella) M94.29, Internal derangement knee M23.90 and Synovitis M65.9     POSTOPERATIVE DIAGNOSIS:   Right  Chondromalacia, (excludes patella) M94.29, Internal derangement knee M23.90 and Synovitis M65.9     PROCEDURES(S) PERFORMED:   1. Right  Femoral Subchondroplasty 35218  2. Right  Arthroscopy, debridement/shaving of articular cartilage (chondroplasty) 85392  3. Right  Arthroscopy, knee, synovectomy, limited 61676  4. Right  Amniox Arthrocentesis, 71199        Pain   Associated symptoms include joint swelling. Pertinent negatives include no abdominal pain, chest pain, chills, congestion, coughing, fever, headaches, myalgias, nausea, numbness, rash, sore throat or vomiting.          Review of Systems   Constitution: Negative. Negative for chills, fever, weight gain and weight loss.   HENT: Negative for congestion and sore throat.    Eyes: Negative for blurred vision and double vision.   Cardiovascular: Negative for chest pain, leg swelling and palpitations.   Respiratory: Negative for cough and shortness of breath.    Hematologic/Lymphatic: Does not bruise/bleed easily.   Skin: Negative for itching, poor wound healing and rash.   Musculoskeletal: Positive for joint pain, joint swelling and stiffness. Negative for back pain, muscle weakness and myalgias.   Gastrointestinal: Negative for abdominal pain, constipation, diarrhea, nausea and vomiting.   Genitourinary: Negative.  Negative for frequency and hematuria.   Neurological: Negative for dizziness, headaches, numbness,  paresthesias and sensory change.   Psychiatric/Behavioral: Negative for altered mental status and depression. The patient is not nervous/anxious.    Allergic/Immunologic: Negative for hives.       Pain Related Questions  Over the past 3 days, what was your average pain during activity? (I.e. running, jogging, walking, climbing stairs, getting dressed, ect.): 7  Over the past 3 days, what was your highest pain level?: 7  Over the past 3 days, what was your lowest pain level? : 0    Other  How many nights a week are you awakened by your affected body part?: 0  Was the patient's HEIGHT measured or patient reported?: Patient Reported  Was the patient's WEIGHT measured or patient reported?: Measured      Objective:        General: Carmen is well-developed, well-nourished, appears stated age, in no acute distress, alert and oriented to time, place and person.     General    Vitals reviewed.  Constitutional: She is oriented to person, place, and time. She appears well-developed and well-nourished. No distress.   HENT:   Mouth/Throat: No oropharyngeal exudate.   Eyes: Right eye exhibits no discharge. Left eye exhibits no discharge.   Neck: Normal range of motion.   Cardiovascular: Normal rate and regular rhythm.    Pulmonary/Chest: Effort normal and breath sounds normal. No respiratory distress.   Neurological: She is alert and oriented to person, place, and time. She has normal reflexes. No cranial nerve deficit. Coordination normal.   Psychiatric: She has a normal mood and affect. Her behavior is normal. Judgment and thought content normal.     General Musculoskeletal Exam   Gait: normal       Right Knee Exam     Inspection   Erythema: absent  Scars: present  Swelling: absent  Effusion: effusion  Deformity: deformity  Bruising: absent    Tenderness   The patient is tender to palpation of the medial joint line (over incisions).    Crepitus   The patient has crepitus of the patella (moderate).    Range of Motion   Extension:  0   Flexion:  140 normal     Tests   Meniscus   Carina:  Medial - negative Lateral - negative  Ligament Examination Lachman: normal (-1 to 2mm) PCL-Posterior Drawer: normal (0 to 2mm)     MCL - Valgus: normal (0 to 2mm)  LCL - Varus: normalPivot Shift: normal (Equal)Reverse Pivot Shift: normal (Equal)Dial Test at 30 degrees: normal (< 5 degrees)Dial Test at 90 degrees: normal (< 5 degrees)  Posterior Sag Test: negative  Posterolateral Corner: unstable (>15 degrees difference)  Patella   Patellar Apprehension: negative  Passive Patellar Tilt: neutral  Patellar Tracking: normal  Patellar Glide (quadrants): Lateral - 1   Medial - 2  Q-Angle at 90 degrees: normal  Patellar Grind: negative  J-Sign: none    Other   Meniscal Cyst: absent  Popliteal (Baker's) Cyst: absent  Sensation: normal    Comments:  Mild quadriceps atrophy     Left Knee Exam     Inspection   Erythema: absent  Scars: absent  Swelling: absent  Effusion: absent  Deformity: deformity  Bruising: absent    Tenderness   The patient is experiencing no tenderness.         Crepitus   The patient has crepitus of the patella (moderate).    Range of Motion   Extension: 0   Flexion: 140     Tests   Meniscus   Carina:  Medial - negative Lateral - negative  Stability Lachman: normal (-1 to 2mm) PCL-Posterior Drawer: normal (0 to 2mm)  MCL - Valgus: normal (0 to 2mm)  LCL - Varus: normal (0 to 2mm)Pivot Shift: normal (Equal)Reverse Pivot Shift: normal (Equal)Dial Test at 30 degrees: normal (< 5 degrees)Dial Test at 90 degrees: normal (< 5 degrees)  Posterior Sag Test: negative  Posterolateral Corner: unstable (>15 degrees difference)  Patella   Patellar Apprehension: negative  Passive Patellar Tilt: neutral  Patellar Tracking: normal  Patellar Glide (Quadrants): Lateral - 1 Medial - 2  Q-Angle at 90 degrees: normal  Patellar Grind: positive  J-Sign: J sign absent    Other   Meniscal Cyst: absent  Popliteal (Baker's) Cyst: absent  Sensation: normal    Right Hip Exam      Tests   Meek: negative  Left Hip Exam     Tests   Meek: negative          Muscle Strength   Right Lower Extremity   Hip Abduction: 5/5   Quadriceps:  4/5   Hamstrin/5   Left Lower Extremity   Hip Abduction: 5/5   Quadriceps:  4/5   Hamstrin/5     Reflexes     Left Side  Quadriceps:  2+  Achilles:  2+    Right Side   Quadriceps:  2+  Achilles:  2+    Vascular Exam     Right Pulses  Dorsalis Pedis:      2+  Posterior Tibial:      2+        Left Pulses  Dorsalis Pedis:      2+  Posterior Tibial:      2+          MRI RESULTS Right knee:   High grade articular cartilage loss involving the medial femoral condyle.    Punctate focus of abnormal increased T2 signal involving the anterior horn of the medial meniscus only visualized on a single slice last appreciated on the sagittal proton density fat-sat sequence cannot exclude meniscal tear.    Small Baker's cyst.    MRI results left knee:    Findings:     Menisci:  There is no tear of the medial or lateral meniscus.     Ligaments:  ACL, PCL, MCL, and LCL complex are intact.    Tendons:  Extensor mechanism is maintained.    Cartilage:   Patellofemoral: High-grade chondromalacia along the majority of the inferolateral patellar facet with mild subchondral edema, progressed since the prior study dated 10/2/13.  Medial tibiofemoral: 1.9 cm area of high-grade chondromalacia along the posterior weightbearing surface of the medial femoral condyle without evidence of subchondral edema.  Lateral tibiofemoral: Articular cartilage is maintained.    Bone: No fracture or marrow replacing process.    Miscellaneous: There is no joint effusion.        Assessment:       Encounter Diagnoses   Name Primary?    Primary osteoarthritis of right knee Yes    Primary osteoarthritis of left knee     Chondromalacia of knee, left           Plan:       1. IKDC, SF-12 and KOOS was not filled out today in clinic.     RTC in 2 weeks with Shauna Fernández PA-C for bilateral Euflexxa  "injections. Patient will not fill out IKDC, SF-12 and KOOS on return. MRI results    2. I made the decision to obtain old records of the patient including previous notes and imaging. I independently reviewed and interpreted the radiographs and/or MRIs today as well as prior imaging. Reviewed MRI with patient in great detail. Discussed possible treatment options including surgical and conservative. Surgical options include "osteochondral allografts to patella and medial femoral regions vs. IDUO. " Conservative options include HEP and visco-supplementation. Patient would like to move forward with Euflexxa injections because she has had them in the past with relief of pain.    3.  placed for bilateral Euflexxa                            Patient questionnaires may have been collected.  "

## 2018-02-22 ENCOUNTER — OFFICE VISIT (OUTPATIENT)
Dept: SPORTS MEDICINE | Facility: CLINIC | Age: 48
End: 2018-02-22
Payer: COMMERCIAL

## 2018-02-22 VITALS — BODY MASS INDEX: 29.66 KG/M2 | WEIGHT: 178 LBS | HEIGHT: 65 IN

## 2018-02-22 DIAGNOSIS — M17.12 PRIMARY OSTEOARTHRITIS OF LEFT KNEE: ICD-10-CM

## 2018-02-22 DIAGNOSIS — M17.11 PRIMARY OSTEOARTHRITIS OF RIGHT KNEE: Primary | ICD-10-CM

## 2018-02-22 PROCEDURE — 99499 UNLISTED E&M SERVICE: CPT | Mod: S$GLB,,, | Performed by: PHYSICIAN ASSISTANT

## 2018-02-22 PROCEDURE — 20610 DRAIN/INJ JOINT/BURSA W/O US: CPT | Mod: 50,S$GLB,, | Performed by: PHYSICIAN ASSISTANT

## 2018-02-22 PROCEDURE — 99999 PR PBB SHADOW E&M-EST. PATIENT-LVL III: CPT | Mod: PBBFAC,,, | Performed by: PHYSICIAN ASSISTANT

## 2018-02-22 RX ORDER — HYALURONATE SODIUM 20 MG/2 ML
2 SYRINGE (ML) INTRAARTICULAR
Status: COMPLETED | OUTPATIENT
Start: 2018-02-22 | End: 2018-02-22

## 2018-02-22 RX ADMIN — Medication 20 MG: at 08:02

## 2018-02-22 NOTE — PROGRESS NOTES
Subjective:          Chief Complaint: Carmen Morrow is a 47 y.o. female who had concerns including Pain of the Left Knee and Pain of the Right Knee.    Patient is here for a follow up for her left knee. She is here today for bilateral Euflexxa #1 .      DATE OF PROCEDURE: 12/22/2016     ATTENDING SURGEON: Surgeon(s) and Role:  * Bess Diaz MD - Primary     Assistants:  MD Shauna Roth PA-C      PREOPERATIVE DIAGNOSIS:  Right  Chondromalacia, (excludes patella) M94.29, Internal derangement knee M23.90 and Synovitis M65.9     POSTOPERATIVE DIAGNOSIS:   Right  Chondromalacia, (excludes patella) M94.29, Internal derangement knee M23.90 and Synovitis M65.9     PROCEDURES(S) PERFORMED:   1. Right  Femoral Subchondroplasty 18468  2. Right  Arthroscopy, debridement/shaving of articular cartilage (chondroplasty) 79142  3. Right  Arthroscopy, knee, synovectomy, limited 49366  4. Right  Amniox Arthrocentesis, 88091        Pain   Associated symptoms include joint swelling. Pertinent negatives include no abdominal pain, chest pain, chills, congestion, coughing, fever, headaches, myalgias, nausea, numbness, rash, sore throat or vomiting.          Review of Systems   Constitution: Negative. Negative for chills, fever, weight gain and weight loss.   HENT: Negative for congestion and sore throat.    Eyes: Negative for blurred vision and double vision.   Cardiovascular: Negative for chest pain, leg swelling and palpitations.   Respiratory: Negative for cough and shortness of breath.    Hematologic/Lymphatic: Does not bruise/bleed easily.   Skin: Negative for itching, poor wound healing and rash.   Musculoskeletal: Positive for joint pain, joint swelling and stiffness. Negative for back pain, muscle weakness and myalgias.   Gastrointestinal: Negative for abdominal pain, constipation, diarrhea, nausea and vomiting.   Genitourinary: Negative.  Negative for frequency and hematuria.   Neurological:  Negative for dizziness, headaches, numbness, paresthesias and sensory change.   Psychiatric/Behavioral: Negative for altered mental status and depression. The patient is not nervous/anxious.    Allergic/Immunologic: Negative for hives.       Pain Related Questions  Over the past 3 days, what was your average pain during activity? (I.e. running, jogging, walking, climbing stairs, getting dressed, ect.): 5  Over the past 3 days, what was your highest pain level?: 5  Over the past 3 days, what was your lowest pain level? : 0    Other  How many nights a week are you awakened by your affected body part?: 1  Was the patient's HEIGHT measured or patient reported?: Patient Reported  Was the patient's WEIGHT measured or patient reported?: Patient Reported      Objective:        General: Carmen is well-developed, well-nourished, appears stated age, in no acute distress, alert and oriented to time, place and person.     General    Vitals reviewed.  Constitutional: She is oriented to person, place, and time. She appears well-developed and well-nourished. No distress.   HENT:   Mouth/Throat: No oropharyngeal exudate.   Eyes: Right eye exhibits no discharge. Left eye exhibits no discharge.   Neck: Normal range of motion.   Cardiovascular: Normal rate and regular rhythm.    Pulmonary/Chest: Effort normal and breath sounds normal. No respiratory distress.   Neurological: She is alert and oriented to person, place, and time. She has normal reflexes. No cranial nerve deficit. Coordination normal.   Psychiatric: She has a normal mood and affect. Her behavior is normal. Judgment and thought content normal.     General Musculoskeletal Exam   Gait: normal       Right Knee Exam     Inspection   Erythema: absent  Scars: present  Swelling: absent  Effusion: effusion  Deformity: deformity  Bruising: absent    Tenderness   The patient is tender to palpation of the medial joint line (over incisions).    Crepitus   The patient has crepitus of the  patella (moderate).    Range of Motion   Extension: 0   Flexion:  140 normal     Tests   Meniscus   Carina:  Medial - negative Lateral - negative  Ligament Examination Lachman: normal (-1 to 2mm) PCL-Posterior Drawer: normal (0 to 2mm)     MCL - Valgus: normal (0 to 2mm)  LCL - Varus: normalPivot Shift: normal (Equal)Reverse Pivot Shift: normal (Equal)Dial Test at 30 degrees: normal (< 5 degrees)Dial Test at 90 degrees: normal (< 5 degrees)  Posterior Sag Test: negative  Posterolateral Corner: unstable (>15 degrees difference)  Patella   Patellar Apprehension: negative  Passive Patellar Tilt: neutral  Patellar Tracking: normal  Patellar Glide (quadrants): Lateral - 1   Medial - 2  Q-Angle at 90 degrees: normal  Patellar Grind: negative  J-Sign: none    Other   Meniscal Cyst: absent  Popliteal (Baker's) Cyst: absent  Sensation: normal    Comments:  Mild quadriceps atrophy     Left Knee Exam     Inspection   Erythema: absent  Scars: absent  Swelling: absent  Effusion: absent  Deformity: deformity  Bruising: absent    Tenderness   The patient is experiencing no tenderness.         Crepitus   The patient has crepitus of the patella (moderate).    Range of Motion   Extension: 0   Flexion: 140     Tests   Meniscus   Carina:  Medial - negative Lateral - negative  Stability Lachman: normal (-1 to 2mm) PCL-Posterior Drawer: normal (0 to 2mm)  MCL - Valgus: normal (0 to 2mm)  LCL - Varus: normal (0 to 2mm)Pivot Shift: normal (Equal)Reverse Pivot Shift: normal (Equal)Dial Test at 30 degrees: normal (< 5 degrees)Dial Test at 90 degrees: normal (< 5 degrees)  Posterior Sag Test: negative  Posterolateral Corner: unstable (>15 degrees difference)  Patella   Patellar Apprehension: negative  Passive Patellar Tilt: neutral  Patellar Tracking: normal  Patellar Glide (Quadrants): Lateral - 1 Medial - 2  Q-Angle at 90 degrees: normal  Patellar Grind: positive  J-Sign: J sign absent    Other   Meniscal Cyst: absent  Popliteal  (Baker's) Cyst: absent  Sensation: normal    Right Hip Exam     Tests   Meek: negative  Left Hip Exam     Tests   Meek: negative          Muscle Strength   Right Lower Extremity   Hip Abduction: 5/5   Quadriceps:  4/5   Hamstrin/5   Left Lower Extremity   Hip Abduction: 5/5   Quadriceps:  4/5   Hamstrin/5     Reflexes     Left Side  Quadriceps:  2+  Achilles:  2+    Right Side   Quadriceps:  2+  Achilles:  2+    Vascular Exam     Right Pulses  Dorsalis Pedis:      2+  Posterior Tibial:      2+        Left Pulses  Dorsalis Pedis:      2+  Posterior Tibial:      2+          MRI RESULTS Right knee:   High grade articular cartilage loss involving the medial femoral condyle.    Punctate focus of abnormal increased T2 signal involving the anterior horn of the medial meniscus only visualized on a single slice last appreciated on the sagittal proton density fat-sat sequence cannot exclude meniscal tear.    Small Baker's cyst.    MRI results left knee:    Findings:     Menisci:  There is no tear of the medial or lateral meniscus.     Ligaments:  ACL, PCL, MCL, and LCL complex are intact.    Tendons:  Extensor mechanism is maintained.    Cartilage:   Patellofemoral: High-grade chondromalacia along the majority of the inferolateral patellar facet with mild subchondral edema, progressed since the prior study dated 10/2/13.  Medial tibiofemoral: 1.9 cm area of high-grade chondromalacia along the posterior weightbearing surface of the medial femoral condyle without evidence of subchondral edema.  Lateral tibiofemoral: Articular cartilage is maintained.    Bone: No fracture or marrow replacing process.    Miscellaneous: There is no joint effusion.        Assessment:       Encounter Diagnoses   Name Primary?    Primary osteoarthritis of right knee Yes    Primary osteoarthritis of left knee           Plan:       1. IKDC, SF-12 and KOOS was not filled out today in clinic.     RTC in 1 weeks with Shauna Fernández PA-C for  bilateral Euflexxa injections #2. Patient will not fill out IKDC, SF-12 and KOOS on return.     2. Injection Procedure  A time out was performed, including verification of patient ID, procedure, site and side, availability of information and equipment, review of safety issues, and agreement with consent, the procedure site was marked.    After time out was performed, the patient was prepped aseptically with povidone-iodine swabsticks. A diagnostic and therapeutic injection of 2cc Euflexxa was given under sterile technique using a 22g x 1.5 needle from the Superolateral  aspect of the bilateral Knee Joint in the supine position.      Cramen Morrow had no adverse reactions to the medication. Pain decreased. She was instructed to apply ice to the joint for 20 minutes and avoid strenuous activities for 24-36 hours following the injection. She was warned of possible blood sugar and/or blood pressure changes during that time. Following that time, she can resume regular activities.    She was reminded to call the clinic immediately for any adverse side effects as explained in clinic today.                                Patient questionnaires may have been collected.

## 2018-03-01 ENCOUNTER — OFFICE VISIT (OUTPATIENT)
Dept: SPORTS MEDICINE | Facility: CLINIC | Age: 48
End: 2018-03-01
Payer: COMMERCIAL

## 2018-03-01 VITALS
WEIGHT: 178 LBS | HEIGHT: 65 IN | DIASTOLIC BLOOD PRESSURE: 79 MMHG | HEART RATE: 73 BPM | SYSTOLIC BLOOD PRESSURE: 117 MMHG | BODY MASS INDEX: 29.66 KG/M2

## 2018-03-01 DIAGNOSIS — M17.11 PRIMARY OSTEOARTHRITIS OF RIGHT KNEE: Primary | ICD-10-CM

## 2018-03-01 DIAGNOSIS — M17.12 PRIMARY OSTEOARTHRITIS OF LEFT KNEE: ICD-10-CM

## 2018-03-01 PROCEDURE — 99499 UNLISTED E&M SERVICE: CPT | Mod: S$GLB,,, | Performed by: PHYSICIAN ASSISTANT

## 2018-03-01 PROCEDURE — 99999 PR PBB SHADOW E&M-EST. PATIENT-LVL III: CPT | Mod: PBBFAC,,, | Performed by: PHYSICIAN ASSISTANT

## 2018-03-01 PROCEDURE — 20610 DRAIN/INJ JOINT/BURSA W/O US: CPT | Mod: 50,S$GLB,, | Performed by: PHYSICIAN ASSISTANT

## 2018-03-01 RX ORDER — HYALURONATE SODIUM 20 MG/2 ML
2 SYRINGE (ML) INTRAARTICULAR
Status: COMPLETED | OUTPATIENT
Start: 2018-03-01 | End: 2018-03-01

## 2018-03-01 RX ADMIN — Medication 20 MG: at 08:03

## 2018-03-08 ENCOUNTER — OFFICE VISIT (OUTPATIENT)
Dept: SPORTS MEDICINE | Facility: CLINIC | Age: 48
End: 2018-03-08
Payer: COMMERCIAL

## 2018-03-08 VITALS
WEIGHT: 178 LBS | BODY MASS INDEX: 29.66 KG/M2 | HEART RATE: 72 BPM | DIASTOLIC BLOOD PRESSURE: 77 MMHG | SYSTOLIC BLOOD PRESSURE: 118 MMHG | HEIGHT: 65 IN

## 2018-03-08 DIAGNOSIS — M17.12 PRIMARY OSTEOARTHRITIS OF LEFT KNEE: ICD-10-CM

## 2018-03-08 DIAGNOSIS — M17.11 PRIMARY OSTEOARTHRITIS OF RIGHT KNEE: Primary | ICD-10-CM

## 2018-03-08 PROCEDURE — 99999 PR PBB SHADOW E&M-EST. PATIENT-LVL III: CPT | Mod: PBBFAC,,, | Performed by: PHYSICIAN ASSISTANT

## 2018-03-08 PROCEDURE — 20610 DRAIN/INJ JOINT/BURSA W/O US: CPT | Mod: 50,S$GLB,, | Performed by: PHYSICIAN ASSISTANT

## 2018-03-08 PROCEDURE — 99499 UNLISTED E&M SERVICE: CPT | Mod: 50,S$GLB,, | Performed by: PHYSICIAN ASSISTANT

## 2018-03-08 RX ORDER — HYALURONATE SODIUM 20 MG/2 ML
2 SYRINGE (ML) INTRAARTICULAR
Status: COMPLETED | OUTPATIENT
Start: 2018-03-08 | End: 2018-03-08

## 2018-03-08 RX ADMIN — Medication 20 MG: at 09:03

## 2018-03-08 NOTE — PROGRESS NOTES
Subjective:          Chief Complaint: Carmen Morrow is a 47 y.o. female who had concerns including Injections of the Left Knee and Injections of the Right Knee.    Patient is here for a follow up for her left knee. She is here today for bilateral Euflexxa #3.      DATE OF PROCEDURE: 12/22/2016     ATTENDING SURGEON: Surgeon(s) and Role:  * Bess Diaz MD - Primary     Assistants:  MD Shauna Roth PA-C      PREOPERATIVE DIAGNOSIS:  Right  Chondromalacia, (excludes patella) M94.29, Internal derangement knee M23.90 and Synovitis M65.9     POSTOPERATIVE DIAGNOSIS:   Right  Chondromalacia, (excludes patella) M94.29, Internal derangement knee M23.90 and Synovitis M65.9     PROCEDURES(S) PERFORMED:   1. Right  Femoral Subchondroplasty 05710  2. Right  Arthroscopy, debridement/shaving of articular cartilage (chondroplasty) 33463  3. Right  Arthroscopy, knee, synovectomy, limited 64748  4. Right  Amniox Arthrocentesis, 45674        Pain   Associated symptoms include joint swelling. Pertinent negatives include no abdominal pain, chest pain, chills, congestion, coughing, fever, headaches, myalgias, nausea, numbness, rash, sore throat or vomiting.          Review of Systems   Constitution: Negative. Negative for chills, fever, weight gain and weight loss.   HENT: Negative for congestion and sore throat.    Eyes: Negative for blurred vision and double vision.   Cardiovascular: Negative for chest pain, leg swelling and palpitations.   Respiratory: Negative for cough and shortness of breath.    Hematologic/Lymphatic: Does not bruise/bleed easily.   Skin: Negative for itching, poor wound healing and rash.   Musculoskeletal: Positive for joint pain, joint swelling and stiffness. Negative for back pain, muscle weakness and myalgias.   Gastrointestinal: Negative for abdominal pain, constipation, diarrhea, nausea and vomiting.   Genitourinary: Negative.  Negative for frequency and hematuria.    Neurological: Negative for dizziness, headaches, numbness, paresthesias and sensory change.   Psychiatric/Behavioral: Negative for altered mental status and depression. The patient is not nervous/anxious.    Allergic/Immunologic: Negative for hives.       Pain Related Questions  Over the past 3 days, what was your average pain during activity? (I.e. running, jogging, walking, climbing stairs, getting dressed, ect.): 0  Over the past 3 days, what was your highest pain level?: 0  Over the past 3 days, what was your lowest pain level? : 0    Other  How many nights a week are you awakened by your affected body part?: 0  Was the patient's HEIGHT measured or patient reported?: Patient Reported  Was the patient's WEIGHT measured or patient reported?: Measured      Objective:        General: Carmen is well-developed, well-nourished, appears stated age, in no acute distress, alert and oriented to time, place and person.     General    Vitals reviewed.  Constitutional: She is oriented to person, place, and time. She appears well-developed and well-nourished. No distress.   HENT:   Mouth/Throat: No oropharyngeal exudate.   Eyes: Right eye exhibits no discharge. Left eye exhibits no discharge.   Neck: Normal range of motion.   Cardiovascular: Normal rate and regular rhythm.    Pulmonary/Chest: Effort normal and breath sounds normal. No respiratory distress.   Neurological: She is alert and oriented to person, place, and time. She has normal reflexes. No cranial nerve deficit. Coordination normal.   Psychiatric: She has a normal mood and affect. Her behavior is normal. Judgment and thought content normal.     General Musculoskeletal Exam   Gait: normal       Right Knee Exam     Inspection   Erythema: absent  Scars: present  Swelling: absent  Effusion: effusion  Deformity: deformity  Bruising: absent    Tenderness   The patient is tender to palpation of the medial joint line (over incisions).    Crepitus   The patient has  crepitus of the patella (moderate).    Range of Motion   Extension: 0   Flexion:  140 normal     Tests   Meniscus   Carina:  Medial - negative Lateral - negative  Ligament Examination Lachman: normal (-1 to 2mm) PCL-Posterior Drawer: normal (0 to 2mm)     MCL - Valgus: normal (0 to 2mm)  LCL - Varus: normalPivot Shift: normal (Equal)Reverse Pivot Shift: normal (Equal)Dial Test at 30 degrees: normal (< 5 degrees)Dial Test at 90 degrees: normal (< 5 degrees)  Posterior Sag Test: negative  Posterolateral Corner: unstable (>15 degrees difference)  Patella   Patellar Apprehension: negative  Passive Patellar Tilt: neutral  Patellar Tracking: normal  Patellar Glide (quadrants): Lateral - 1   Medial - 2  Q-Angle at 90 degrees: normal  Patellar Grind: negative  J-Sign: none    Other   Meniscal Cyst: absent  Popliteal (Baker's) Cyst: absent  Sensation: normal    Comments:  Mild quadriceps atrophy     Left Knee Exam     Inspection   Erythema: absent  Scars: absent  Swelling: absent  Effusion: absent  Deformity: deformity  Bruising: absent    Tenderness   The patient is experiencing no tenderness.         Crepitus   The patient has crepitus of the patella (moderate).    Range of Motion   Extension: 0   Flexion: 140     Tests   Meniscus   Carina:  Medial - negative Lateral - negative  Stability Lachman: normal (-1 to 2mm) PCL-Posterior Drawer: normal (0 to 2mm)  MCL - Valgus: normal (0 to 2mm)  LCL - Varus: normal (0 to 2mm)Pivot Shift: normal (Equal)Reverse Pivot Shift: normal (Equal)Dial Test at 30 degrees: normal (< 5 degrees)Dial Test at 90 degrees: normal (< 5 degrees)  Posterior Sag Test: negative  Posterolateral Corner: unstable (>15 degrees difference)  Patella   Patellar Apprehension: negative  Passive Patellar Tilt: neutral  Patellar Tracking: normal  Patellar Glide (Quadrants): Lateral - 1 Medial - 2  Q-Angle at 90 degrees: normal  Patellar Grind: positive  J-Sign: J sign absent    Other   Meniscal Cyst:  absent  Popliteal (Baker's) Cyst: absent  Sensation: normal    Right Hip Exam     Tests   Meek: negative  Left Hip Exam     Tests   Meek: negative          Muscle Strength   Right Lower Extremity   Hip Abduction: 5/5   Quadriceps:  4/5   Hamstrin/5   Left Lower Extremity   Hip Abduction: 5/5   Quadriceps:  4/5   Hamstrin/5     Reflexes     Left Side  Quadriceps:  2+  Achilles:  2+    Right Side   Quadriceps:  2+  Achilles:  2+    Vascular Exam     Right Pulses  Dorsalis Pedis:      2+  Posterior Tibial:      2+        Left Pulses  Dorsalis Pedis:      2+  Posterior Tibial:      2+          MRI RESULTS Right knee:   High grade articular cartilage loss involving the medial femoral condyle.    Punctate focus of abnormal increased T2 signal involving the anterior horn of the medial meniscus only visualized on a single slice last appreciated on the sagittal proton density fat-sat sequence cannot exclude meniscal tear.    Small Baker's cyst.    MRI results left knee:    Findings:     Menisci:  There is no tear of the medial or lateral meniscus.     Ligaments:  ACL, PCL, MCL, and LCL complex are intact.    Tendons:  Extensor mechanism is maintained.    Cartilage:   Patellofemoral: High-grade chondromalacia along the majority of the inferolateral patellar facet with mild subchondral edema, progressed since the prior study dated 10/2/13.  Medial tibiofemoral: 1.9 cm area of high-grade chondromalacia along the posterior weightbearing surface of the medial femoral condyle without evidence of subchondral edema.  Lateral tibiofemoral: Articular cartilage is maintained.    Bone: No fracture or marrow replacing process.    Miscellaneous: There is no joint effusion.        Assessment:       Encounter Diagnoses   Name Primary?    Primary osteoarthritis of right knee Yes    Primary osteoarthritis of left knee           Plan:       1. IKDC, SF-12 and KOOS was not filled out today in clinic.     RTC in 6 months with Shauna  Jolene MOSHER for bilateral knee follow up.Patient will not fill out IKDC, SF-12 and KOOS on return.     2. Injection Procedure  A time out was performed, including verification of patient ID, procedure, site and side, availability of information and equipment, review of safety issues, and agreement with consent, the procedure site was marked.    After time out was performed, the patient was prepped aseptically with povidone-iodine swabsticks. A diagnostic and therapeutic injection of 2cc Euflexxa was given under sterile technique using a 22g x 1.5 needle from the Superolateral  aspect of the bilateral Knee Joint in the supine position.      Carmen Morrow had no adverse reactions to the medication. Pain decreased. She was instructed to apply ice to the joint for 20 minutes and avoid strenuous activities for 24-36 hours following the injection. She was warned of possible blood sugar and/or blood pressure changes during that time. Following that time, she can resume regular activities.    She was reminded to call the clinic immediately for any adverse side effects as explained in clinic today.                                Patient questionnaires may have been collected.

## 2018-04-09 ENCOUNTER — OFFICE VISIT (OUTPATIENT)
Dept: URGENT CARE | Facility: CLINIC | Age: 48
End: 2018-04-09
Payer: COMMERCIAL

## 2018-04-09 VITALS
SYSTOLIC BLOOD PRESSURE: 159 MMHG | DIASTOLIC BLOOD PRESSURE: 91 MMHG | HEIGHT: 65 IN | BODY MASS INDEX: 29.82 KG/M2 | TEMPERATURE: 98 F | HEART RATE: 78 BPM | OXYGEN SATURATION: 99 % | WEIGHT: 179 LBS | RESPIRATION RATE: 18 BRPM

## 2018-04-09 DIAGNOSIS — J01.90 ACUTE SINUSITIS, RECURRENCE NOT SPECIFIED, UNSPECIFIED LOCATION: ICD-10-CM

## 2018-04-09 DIAGNOSIS — J02.9 SORE THROAT: Primary | ICD-10-CM

## 2018-04-09 LAB
CTP QC/QA: YES
S PYO RRNA THROAT QL PROBE: NEGATIVE

## 2018-04-09 PROCEDURE — 99214 OFFICE O/P EST MOD 30 MIN: CPT | Mod: 25,S$GLB,, | Performed by: EMERGENCY MEDICINE

## 2018-04-09 PROCEDURE — 87880 STREP A ASSAY W/OPTIC: CPT | Mod: QW,S$GLB,, | Performed by: EMERGENCY MEDICINE

## 2018-04-09 PROCEDURE — 96372 THER/PROPH/DIAG INJ SC/IM: CPT | Mod: S$GLB,,, | Performed by: EMERGENCY MEDICINE

## 2018-04-09 RX ORDER — BETAMETHASONE SODIUM PHOSPHATE AND BETAMETHASONE ACETATE 3; 3 MG/ML; MG/ML
6 INJECTION, SUSPENSION INTRA-ARTICULAR; INTRALESIONAL; INTRAMUSCULAR; SOFT TISSUE
Status: COMPLETED | OUTPATIENT
Start: 2018-04-09 | End: 2018-04-09

## 2018-04-09 RX ORDER — AZITHROMYCIN 250 MG/1
TABLET, FILM COATED ORAL
Qty: 6 TABLET | Refills: 0 | Status: SHIPPED | OUTPATIENT
Start: 2018-04-09 | End: 2018-07-18

## 2018-04-09 RX ADMIN — BETAMETHASONE SODIUM PHOSPHATE AND BETAMETHASONE ACETATE 6 MG: 3; 3 INJECTION, SUSPENSION INTRA-ARTICULAR; INTRALESIONAL; INTRAMUSCULAR; SOFT TISSUE at 09:04

## 2018-04-09 NOTE — PATIENT INSTRUCTIONS
Jose Mix MD  Go to the Emergency Department for any problems  Call your PCP for follow up next available.    Sinusitis (No Antibiotics)    The sinuses are air-filled spaces within the bones of the face. They connect to the inside of the nose. Sinusitis is an inflammation of the tissue lining the sinus cavity. Sinus inflammation can occur during a cold. It can also be due to allergies to pollens and other particles in the air. It can cause symptoms such as sinus congestion, headache, sore throat, facial swelling and fullness. It may also cause a low-grade fever. No infection is present, and no antibiotic treatment is needed.  Home care  · Drink plenty of water, hot tea, and other liquids. This may help thin mucus. It also may promote sinus drainage.  · Heat may help soothe painful areas of the face. Use a towel soaked in hot water. Or,  the shower and direct the hot spray onto your face. Using a vaporizer along with a menthol rub at night may also help.   · An expectorant containing guaifenesin may help thin the mucus and promote drainage from the sinuses.  · Over-the-counter decongestants may be used unless a similar medicine was prescribed. Nasal sprays work the fastest. Use one that contains phenylephrine or oxymetazoline. First blow the nose gently. Then use the spray. Do not use these medicines more often than directed on the label or symptoms may get worse. You may also use tablets containing pseudoephedrine. Avoid products that combine ingredients, because side effects may be increased. Read labels. You can also ask the pharmacist for help. (NOTE: Persons with high blood pressure should not use decongestants. They can raise blood pressure.)  · Over-the-counter antihistamines may help if allergies contributed to your sinusitis.    · Use acetaminophen or ibuprofen to control pain, unless another pain medicine was prescribed. (If you have chronic liver or kidney disease or ever had a stomach ulcer,  talk with your doctor before using these medicines. Aspirin should never be used in anyone under 18 years of age who is ill with a fever. It may cause severe liver damage.)  · Use nasal rinses or irrigation as instructed by your health care provider.  · Don't smoke. This can worsen symptoms.  Follow-up care  Follow up with your healthcare provider or our staff if you are not improving within the next week.  When to seek medical advice  Call your healthcare provider if any of these occur:  · Green or yellow discharge from the nose or into the throat  · Facial pain or headache becoming more severe  · Stiff neck  · Unusual drowsiness or confusion  · Swelling of the forehead or eyelids  · Vision problems, including blurred or double vision  · Fever of 100.4ºF (38ºC) or higher, or as directed by your healthcare provider  · Seizure  · Breathing problems  · Symptoms not resolving within 10 days  Date Last Reviewed: 4/13/2015  © 3325-0448 [employer]. [address] [place] [address]. All rights reserved. This information is not intended as a substitute for professional medical care. Always follow your healthcare professional's instructions.        Allergic Rhinitis  Allergic rhinitis is an allergic reaction that affects the nose, and often the eyes. Its often known as nasal allergies. Nasal allergies are often due to things in the environment that are breathed in. Depending what you are sensitive to, nasal allergies may occur only during certain seasons. Or they may occur year round. Common indoor allergens include house dust mites, mold, cockroaches, and pet dander. Outdoor allergens include pollen from trees, grasses, and weeds.   Symptoms include a drippy, stuffy, and itchy nose. They also include sneezing and red and itchy eyes. You may feel tired more often. Severe allergies may also affect your breathing and trigger a condition called asthma.   Tests can be done to see what allergens are  affecting you. You may be referred to an allergy specialist for testing and further evaluation.  Home care  Your healthcare provider may prescribe medicines to help relieve allergy symptoms. These may include oral medicines, nasal sprays, or eye drops.  Ask your provider for advice on how to avoid substances that you are allergic to. Below are a few tips for each type of allergen.  Pet dander:  · Do not have pets with fur and feathers.  · If you can't avoid having a pet, keep it out of your bedroom and off upholstered furniture.  Pollen:  · When pollen counts are high, keep windows of your car and home closed. If possible, use an air conditioner instead.  · Wear a filter mask when mowing or doing yard work.  House dust mites:  · Wash bedding every week in warm water and detergent and dry on a hot setting.  · Cover the mattress, box spring, and pillows with allergy covers.   · If possible, sleep in a room with no carpet, curtains, or upholstered furniture.  Cockroaches:  · Store food in sealed containers.  · Remove garbage from the home promptly.  · Fix water leaks  Mold:  · Keep humidity low by using a dehumidifier or air conditioner. Keep the dehumidifier and air conditioner clean and free of mold.  · Clean moldy areas with bleach and water.  In general:  · Vacuum once or twice a week. If possible, use a vacuum with a high-efficiency particulate air (HEPA) filter.  · Do not smoke. Avoid cigarette smoke. Cigarette smoke is an irritant that can make symptoms worse.  Follow-up care  Follow up as advised by the healthcare provider or our staff. If you were referred to an allergy specialist, make this appointment promptly.  When to seek medical advice  Call your healthcare provider right away if the following occur:  · Coughing or wheezing  · Fever greater than 100.4°F (38°C)  · Hives (raised red bumps)  · Continuing symptoms, new symptoms, or worsening symptoms  Call 911 right away if you have:  · Trouble  breathing  · Severe swelling of the face or severe itching of the eyes or mouth  Date Last Reviewed: 3/1/2017  © 0626-6945 [employer]. [address] [place] [address], [place] [address]. All rights reserved. This information is not intended as a substitute for professional medical care. Always follow your healthcare professional's instructions.        Self-Care for Sore Throats    Sore throats happen for many reasons, such as colds, allergies, and infections caused by viruses or bacteria. In any case, your throat becomes red and sore. Your goal for self-care is to reduce your discomfort while giving your throat a chance to heal.  Moisten and soothe your throat  Tips include the following:  · Try a sip of water first thing after waking up.  · Keep your throat moist by drinking 6 or more glasses of clear liquids every day.  · Run a cool-air humidifier in your room overnight.  · Avoid cigarette smoke.   · Suck on throat lozenges, cough drops, hard candy, ice chips, or frozen fruit-juice bars. Use the sugar-free versions if your diet or medical condition requires them.  Gargle to ease irritation  Gargling every hour or 2 can ease irritation. Try gargling with 1 of these solutions:  · 1/4 teaspoon of salt in 1/2 cup of warm water  · An over-the-counter anesthetic gargle  Use medicine for more relief  Over-the-counter medicine can reduce sore throat symptoms. Ask your pharmacist if you have questions about which medicine to use:  · Ease pain with anesthetic sprays. Aspirin or an aspirin substitute also helps. Remember, never give aspirin to anyone 18 or younger, or if you are already taking blood thinners.   · For sore throats caused by allergies, try antihistamines to block the allergic reaction.  · Remember: unless a sore throat is caused by a bacterial infection, antibiotics wont help you.  Prevent future sore throats  Prevention tips include the following:  · Stop smoking or reduce contact with secondhand  smoke. Smoke irritates the tender throat lining.  · Limit contact with pets and with allergy-causing substances, such as pollen and mold.  · When youre around someone with a sore throat or cold, wash your hands often to keep viruses or bacteria from spreading.  · Dont strain your vocal cords.  Call your healthcare provider  Contact your healthcare provider if you have:  · A temperature over 101°F (38.3°C)  · White spots on the throat  · Great difficulty swallowing  · Trouble breathing  · A skin rash  · Recent exposure to someone else with strep bacteria  · Severe hoarseness and swollen glands in the neck or jaw   Date Last Reviewed: 8/1/2016  © 4396-3327 [employer]. [address] [place] [address], [place] [address]. All rights reserved. This information is not intended as a substitute for professional medical care. Always follow your healthcare professional's instructions.

## 2018-04-09 NOTE — PROGRESS NOTES
"Subjective:       Patient ID: Carmen Morrow is a 47 y.o. female.    Vitals:  height is 5' 5" (1.651 m) and weight is 81.2 kg (179 lb). Her oral temperature is 97.9 °F (36.6 °C). Her blood pressure is 159/91 (abnormal) and her pulse is 78. Her respiration is 18 and oxygen saturation is 99%.     Chief Complaint: Sinus Problem    2 d hx sinus drainage/congestion/post nasal drip/sore throat, hx of same      Sinus Problem   This is a new problem. The current episode started in the past 7 days. The problem is unchanged. There has been no fever. Her pain is at a severity of 0/10. She is experiencing no pain. Associated symptoms include congestion, coughing and a sore throat. Pertinent negatives include no chills, ear pain, headaches, hoarse voice or shortness of breath. The treatment provided no relief.     Review of Systems   Constitution: Negative for chills, fever and malaise/fatigue.   HENT: Positive for congestion and sore throat. Negative for ear pain and hoarse voice.         Drainage and pressure   Eyes: Negative for discharge and redness.   Cardiovascular: Negative for chest pain, dyspnea on exertion and leg swelling.   Respiratory: Positive for cough. Negative for shortness of breath, sputum production and wheezing.    Musculoskeletal: Negative for myalgias.   Gastrointestinal: Negative for abdominal pain and nausea.   Neurological: Negative for headaches.       Objective:      Physical Exam   Constitutional: She is oriented to person, place, and time. She appears well-developed and well-nourished.   HENT:   Head: Normocephalic and atraumatic.   Right Ear: Tympanic membrane, external ear and ear canal normal.   Left Ear: Tympanic membrane, external ear and ear canal normal.   Nose: Mucosal edema and rhinorrhea present. Right sinus exhibits no maxillary sinus tenderness and no frontal sinus tenderness. Left sinus exhibits no maxillary sinus tenderness and no frontal sinus tenderness.   Mouth/Throat: Uvula " is midline and mucous membranes are normal. Posterior oropharyngeal erythema present. No oropharyngeal exudate or posterior oropharyngeal edema.   Eyes: Pupils are equal, round, and reactive to light.   Neck: Normal range of motion. Neck supple.   Cardiovascular: Normal rate, regular rhythm and normal heart sounds.    Pulmonary/Chest: Breath sounds normal.   Musculoskeletal: Normal range of motion.   Lymphadenopathy:     She has no cervical adenopathy.   Neurological: She is alert and oriented to person, place, and time.   Skin: Skin is warm and dry.   Psychiatric: She has a normal mood and affect. Her behavior is normal.       Assessment:       1. Sore throat    2. Acute sinusitis, recurrence not specified, unspecified location        Plan:         Sore throat  -     POCT rapid strep A  -     betamethasone acetate-betamethasone sodium phosphate injection 6 mg; Inject 1 mL (6 mg total) into the muscle one time.  -     azithromycin (ZITHROMAX Z-TAYLOR) 250 MG tablet; Take 2 tablets (500 mg) on  Day 1,  followed by 1 tablet (250 mg) once daily on Days 2 through 5.  Start in 1-2 days if not improved.  Dispense: 6 tablet; Refill: 0    Acute sinusitis, recurrence not specified, unspecified location  -     betamethasone acetate-betamethasone sodium phosphate injection 6 mg; Inject 1 mL (6 mg total) into the muscle one time.  -     azithromycin (ZITHROMAX Z-TAYLOR) 250 MG tablet; Take 2 tablets (500 mg) on  Day 1,  followed by 1 tablet (250 mg) once daily on Days 2 through 5.  Start in 1-2 days if not improved.  Dispense: 6 tablet; Refill: 0      Jose Mix MD  Go to the Emergency Department for any problems  Call your PCP for follow up next available.

## 2018-04-12 ENCOUNTER — TELEPHONE (OUTPATIENT)
Dept: URGENT CARE | Facility: CLINIC | Age: 48
End: 2018-04-12

## 2018-05-18 ENCOUNTER — TELEPHONE (OUTPATIENT)
Dept: SURGERY | Facility: CLINIC | Age: 48
End: 2018-05-18

## 2018-05-18 NOTE — TELEPHONE ENCOUNTER
----- Message from Geovany Acosta sent at 5/18/2018  2:20 PM CDT -----  Contact: [phone] self  Patient requested to speak with the nurse about the FMLA form and advised she will fax the forms to the office. Please call and advise.      5/18/18  3:01pm  Returned call to patient's wife informing her that I received paperwork and will complete as soon as possible. Verbalized understanding.

## 2018-06-12 RX ORDER — AMITRIPTYLINE HYDROCHLORIDE 10 MG/1
TABLET, FILM COATED ORAL
Qty: 60 TABLET | Refills: 3 | Status: SHIPPED | OUTPATIENT
Start: 2018-06-12 | End: 2018-09-11

## 2018-07-18 ENCOUNTER — HOSPITAL ENCOUNTER (OUTPATIENT)
Dept: RADIOLOGY | Facility: HOSPITAL | Age: 48
Discharge: HOME | End: 2018-07-18
Attending: INTERNAL MEDICINE
Payer: COMMERCIAL

## 2018-07-18 ENCOUNTER — OFFICE VISIT (OUTPATIENT)
Dept: INTERNAL MEDICINE | Facility: CLINIC | Age: 48
End: 2018-07-18
Payer: COMMERCIAL

## 2018-07-18 VITALS
WEIGHT: 165.56 LBS | HEIGHT: 65 IN | OXYGEN SATURATION: 99 % | HEART RATE: 73 BPM | SYSTOLIC BLOOD PRESSURE: 118 MMHG | DIASTOLIC BLOOD PRESSURE: 82 MMHG | BODY MASS INDEX: 27.58 KG/M2

## 2018-07-18 DIAGNOSIS — Z00.00 PHYSICAL EXAM: Primary | ICD-10-CM

## 2018-07-18 DIAGNOSIS — M25.552 LEFT HIP PAIN: ICD-10-CM

## 2018-07-18 DIAGNOSIS — R51.9 HEADACHE DISORDER: ICD-10-CM

## 2018-07-18 DIAGNOSIS — Z01.00 ENCOUNTER FOR VISION SCREENING: ICD-10-CM

## 2018-07-18 DIAGNOSIS — K21.9 GASTROESOPHAGEAL REFLUX DISEASE WITHOUT ESOPHAGITIS: ICD-10-CM

## 2018-07-18 PROCEDURE — 73502 X-RAY EXAM HIP UNI 2-3 VIEWS: CPT | Mod: TC,LT

## 2018-07-18 PROCEDURE — 99999 PR PBB SHADOW E&M-EST. PATIENT-LVL V: CPT | Mod: PBBFAC,,, | Performed by: INTERNAL MEDICINE

## 2018-07-18 PROCEDURE — 99396 PREV VISIT EST AGE 40-64: CPT | Mod: S$GLB,,, | Performed by: INTERNAL MEDICINE

## 2018-07-18 PROCEDURE — 73502 X-RAY EXAM HIP UNI 2-3 VIEWS: CPT | Mod: 26,LT,, | Performed by: RADIOLOGY

## 2018-07-18 NOTE — PROGRESS NOTES
Subjective:      Patient ID: Carmen Morrow is a 47 y.o. female.    Chief Complaint: Annual Exam    HPI:  HPI   Patient is here for her annual exam:     Problems: left hip pain. She sees Dr. Diaz for her knees. She says it feels like a deep pain.    Annual exam: 7/18/2018  Colonoscopy:Mother and sister have had polyps 1/9/2015: repeat 5 years  Endoscopy 8/2017: on Nexium  Dr. 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    GERD (gastroesophageal reflux disease)    Chronic sinusitis    Baker's cyst of knee, right    Chondromalacia patellae of left knee    Quadriceps weakness     Past Medical History:   Diagnosis Date    Brachial neuritis or radiculitis NOS 11/14/2012    Degeneration of cervical intervertebral disc 11/14/2012    GERD (gastroesophageal reflux disease)     SVT (supraventricular tachycardia)      Past Surgical History:   Procedure Laterality Date    anterior diskectomy fusion  05/20/2016    BREAST RECONSTRUCTION      augmentation    BREAST SURGERY Bilateral 2006    CARPAL TUNNEL RELEASE Left 01/30/2018    CSF leak  05/22/2016    Replace and repair fat graft from surgery on 5/20/2016    KNEE ARTHROSCOPY      LAMINECTOMY      SPINAL FUSION       Family History   Problem Relation Age of Onset    Heart disease Mother     Hypertension Mother     Asthma Mother     Heart disease Father     COPD Father     Diabetes Father     Ovarian cancer Neg Hx     Colon cancer Neg Hx     Breast cancer Neg Hx     Cancer Neg Hx      Review of Systems  "  Constitutional: Negative for chills, fever and unexpected weight change.   HENT: Negative for trouble swallowing.    Respiratory: Negative for cough, shortness of breath and wheezing.    Cardiovascular: Negative for chest pain and palpitations.   Gastrointestinal: Negative for abdominal distention, abdominal pain, blood in stool and vomiting.   Musculoskeletal: Negative for back pain.     Objective:     Vitals:    07/18/18 1246   BP: 118/82   Pulse: 73   SpO2: 99%   Weight: 75.1 kg (165 lb 9.1 oz)   Height: 5' 5" (1.651 m)   PainSc: 0-No pain     Body mass index is 27.55 kg/m².  Physical Exam   Constitutional: She is oriented to person, place, and time. She appears well-developed and well-nourished. No distress.   Neck: Carotid bruit is not present. No thyromegaly present.   Cardiovascular: Normal rate, regular rhythm and normal heart sounds.  PMI is not displaced.    Pulmonary/Chest: Effort normal and breath sounds normal. No respiratory distress.   Abdominal: Soft. Bowel sounds are normal. She exhibits no distension. There is no tenderness.   Musculoskeletal: She exhibits no edema.   Neurological: She is alert and oriented to person, place, and time.     Assessment:     1. Physical exam    2. Left hip pain    3. Encounter for vision screening    4. Headache disorder    5. Gastroesophageal reflux disease without esophagitis      Plan:   Carmen was seen today for annual exam.    Diagnoses and all orders for this visit:    Physical exam    Left hip pain  -     Ambulatory consult to Orthopedics  -     X-Ray Hip 2 or 3 views Left; Future    Encounter for vision screening  -     Ambulatory consult to Optometry    Headache disorder  -     Ambulatory consult to Neurology    Gastroesophageal reflux disease without esophagitis      Follow-up in about 1 year (around 7/18/2019) for Annuale exam.     Medication List          Accurate as of 7/18/18  1:11 PM. If you have any questions, ask your nurse or doctor.             "   CHANGE how you take these medications    acyclovir 200 MG capsule  Commonly known as:  ZOVIRAX  Take 1 capsule (200 mg total) by mouth 2 (two) times daily.  What changed:  · when to take this  · reasons to take this     amitriptyline 10 MG tablet  Commonly known as:  ELAVIL  TAKE 2 TABLETS BY MOUTH AT DINNER TIME EVERY NIGHT  What changed:  See the new instructions.        CONTINUE taking these medications    azelastine 137 mcg (0.1 %) nasal spray  Commonly known as:  ASTELIN  1 spray (137 mcg total) by Nasal route 2 (two) times daily.     esomeprazole 40 MG capsule  Commonly known as:  NEXIUM  Take 1 capsule (40 mg total) by mouth before breakfast.     fluticasone 50 mcg/actuation nasal spray  Commonly known as:  FLONASE  2 sprays by Each Nare route every evening. 2 Spray, Suspension Nasal Every day     montelukast 10 mg tablet  Commonly known as:  SINGULAIR     ZyrTEC 10 MG tablet  Generic drug:  cetirizine        STOP taking these medications    azithromycin 250 MG tablet  Commonly known as:  ZITHROMAX Z-TAYLOR  Stopped by:  Tori Bailon MD     linaclotide 290 mcg Cap  Stopped by:  Tori Bailon MD     MICROGESTIN 1/20 (21) 1-20 mg-mcg per tablet  Generic drug:  norethindrone-ethinyl estradiol  Stopped by:  Tori Bailon MD

## 2018-08-02 ENCOUNTER — PATIENT MESSAGE (OUTPATIENT)
Dept: INTERNAL MEDICINE | Facility: CLINIC | Age: 48
End: 2018-08-02

## 2018-08-03 RX ORDER — ACYCLOVIR 200 MG/1
200 CAPSULE ORAL 2 TIMES DAILY
Qty: 10 CAPSULE | Refills: 5 | Status: SHIPPED | OUTPATIENT
Start: 2018-08-03 | End: 2020-03-11 | Stop reason: SDUPTHER

## 2018-08-17 ENCOUNTER — OFFICE VISIT (OUTPATIENT)
Dept: SPORTS MEDICINE | Facility: CLINIC | Age: 48
End: 2018-08-17
Payer: COMMERCIAL

## 2018-08-17 VITALS
WEIGHT: 165 LBS | HEIGHT: 65 IN | HEART RATE: 73 BPM | DIASTOLIC BLOOD PRESSURE: 76 MMHG | BODY MASS INDEX: 27.49 KG/M2 | SYSTOLIC BLOOD PRESSURE: 122 MMHG

## 2018-08-17 DIAGNOSIS — R29.3 ABNORMAL POSTURE: ICD-10-CM

## 2018-08-17 DIAGNOSIS — M25.552 LEFT HIP PAIN: ICD-10-CM

## 2018-08-17 DIAGNOSIS — M54.42 CHRONIC LEFT-SIDED LOW BACK PAIN WITH LEFT-SIDED SCIATICA: Primary | ICD-10-CM

## 2018-08-17 DIAGNOSIS — G89.29 CHRONIC LEFT-SIDED LOW BACK PAIN WITH LEFT-SIDED SCIATICA: Primary | ICD-10-CM

## 2018-08-17 PROCEDURE — 99999 PR PBB SHADOW E&M-EST. PATIENT-LVL III: CPT | Mod: PBBFAC,,, | Performed by: PHYSICIAN ASSISTANT

## 2018-08-17 PROCEDURE — 3008F BODY MASS INDEX DOCD: CPT | Mod: CPTII,S$GLB,, | Performed by: PHYSICIAN ASSISTANT

## 2018-08-17 PROCEDURE — 99214 OFFICE O/P EST MOD 30 MIN: CPT | Mod: S$GLB,,, | Performed by: PHYSICIAN ASSISTANT

## 2018-08-17 NOTE — LETTER
August 17, 2018      Tori Bailon MD  [address]  [facility] [address]           [facility]  [address]  [facility] [address]  Phone: [phone]          Patient: Carmen Morrow   MR Number: [id number]   YOB: 1970   Date of Visit: 8/17/2018       Dear Dr. Tori Bailon:    Thank you for referring Carmen Morrow to me for evaluation. Attached you will find relevant portions of my assessment and plan of care.    If you have questions, please do not hesitate to call me. I look forward to following Carmen Morrow along with you.    Sincerely,    Shauna Fernández PA-C    Enclosure  CC:  No Recipients    If you would like to receive this communication electronically, please contact [email or web address] or [phone] to request more information on [email or web address] access.    For providers and/or their staff who would like to refer a patient to [facility], please contact us through our one-stop-shop provider referral line, [facility], at [phone].    If you feel you have received this communication in error or would no longer like to receive these types of communications, please e-mail [email or web address]

## 2018-08-17 NOTE — PROGRESS NOTES
Subjective:          Chief Complaint: Carmen Morrow is a 47 y.o. female who had concerns including Pain of the Left Hip.    HPI  Patient presents to clinic with left hip and low back pain x ~8 months. Denies MAIKOL. She states that pain is located along the anterior thigh,  Left lateral hip, and left low back. Pain comes and goes once every few weeks. Pain today is 2-3/10 and at its worst is 8/10. She has tried Motrin with no relief of pain. She reports a previous history of sciatica , where she will get a shooting pain from her left buttocks down the back of the left leg. Pain is not associated with any specific activity. She has had an MRI of her lumbar spine 3 years ago which showed mild disc desication with mild bulging.    Review of Systems   Constitution: Negative. Negative for chills, fever, weight gain and weight loss.   HENT: Negative for congestion and sore throat.    Eyes: Negative for blurred vision and double vision.   Cardiovascular: Negative for chest pain, leg swelling and palpitations.   Respiratory: Negative for cough and shortness of breath.    Hematologic/Lymphatic: Does not bruise/bleed easily.   Skin: Negative for itching, poor wound healing and rash.   Musculoskeletal: Positive for back pain and joint pain. Negative for joint swelling, muscle weakness, myalgias and stiffness.   Gastrointestinal: Negative for abdominal pain, constipation, diarrhea, nausea and vomiting.   Genitourinary: Negative.  Negative for frequency and hematuria.   Neurological: Negative for dizziness, headaches, numbness, paresthesias and sensory change.   Psychiatric/Behavioral: Negative for altered mental status and depression. The patient is not nervous/anxious.    Allergic/Immunologic: Negative for hives.       Pain Related Questions  Over the past 3 days, what was your average pain during activity? (I.e. running, jogging, walking, climbing stairs, getting dressed, ect.): 3  Over the past 3 days, what was your  highest pain level?: 3  Over the past 3 days, what was your lowest pain level? : 3    Other  How many nights a week are you awakened by your affected body part?: 0      Objective:        General: Carmen is well-developed, well-nourished, appears stated age, in no acute distress, alert and oriented to time, place and person.     General    Vitals reviewed.  Constitutional: She is oriented to person, place, and time. She appears well-developed and well-nourished. No distress.   HENT:   Head: Normocephalic and atraumatic.   Eyes: EOM are normal.   Neck: Normal range of motion.   Cardiovascular: Normal rate and regular rhythm.    Pulmonary/Chest: Effort normal. No respiratory distress.   Neurological: She is alert and oriented to person, place, and time.   Psychiatric: She has a normal mood and affect. Her behavior is normal. Thought content normal.     General Musculoskeletal Exam   Gait: normal   Pelvic Obliquity: none    Right Ankle/Foot Exam     Tests   Heel Walk: able to perform  Tiptoe Walk: able to perform    Left Ankle/Foot Exam     Tests   Heel Walk: able to perform  Tiptoe Walk: able to perform    Right Knee Exam     Inspection   Alignment:  normal  Effusion: effusion    Left Knee Exam     Inspection   Alignment:  normal  Effusion: absent    Right Hip Exam     Inspection   Scars: absent  Swelling: absent  Bruising: absent  No deformity of hip.  Quadriceps Atrophy:  Negative  Erythema: absent    Range of Motion   The patient has normal right hip ROM.  Abduction:  45 normal   Adduction:  20 normal   Extension:  0 normal   Flexion:  120 normal   External rotation:  70 normal   Internal rotation:  30 normal     Muscle Strength   The patient has normal right hip strength.    Tests   Pain w/ forced internal rotation (AVERY): absent  Pain w/ forced external rotation (FADIR): absent  Meek: negative  Trendelenburg Test: negative  Circumduction test: negative  Stinchfield test: negative  Log Roll: negative  Step-down  test: negative  Resisted sit-up pain: negative    Other   Sensation: normal  Left Hip Exam     Inspection   Scars: absent  Swelling: absent  No deformity of hip.  Quadriceps Atrophy:  negative  Erythema: absent  Bruising: absent    Tenderness   The patient tender to palpation of the SI joint.    Range of Motion   The patient has normal left hip ROM.  Abduction:  45 normal   Adduction:  20 normal   Extension:  0 normal   Flexion:  120 normal   External rotation:  70 normal   Internal rotation: 30 normal     Muscle Strength   The patient has normal left hip strength.     Tests   Pain w/ forced internal rotation (AVERY): absent  Pain w/ forced external rotation (FADIR): absent  Meek: negative  Trendelenburg Test: negative  Circumduction test: negative  Log Roll: negative  Step-down test: negative  Resisted sit-up pain: negative    Other   Sensation: decreased (along left thigh)      Back (L-Spine & T-Spine) / Neck (C-Spine) Exam   Back exam is normal.    Back (L-Spine & T-Spine) Range of Motion   Extension: normal Back extension: pain.   Flexion: normal   Lateral bend right: normal   Lateral bend left: normal   Rotation right: normal   Rotation left: normal     Spinal Sensation   Right Side Sensation  C-Spine Level: normal   L-Spine Level: normal  S-Spine Level: normal  T-Spine Level: normal  Left Side Sensation  C-Spine Level: normal  L-Spine Level: decreased  S-Spine Level: normal  T-Spine Level: normal    Other   Spinal Kyphosis:  Absent  Spinal Lordosis:  Absent      Muscle Strength   Right Lower Extremity   Hip Abduction: 5/5   Hip Adduction: 5/5   Hip Flexion: 5/5   Quadriceps:  5/5   Hamstrin/5   Anterior tibial:  5/5/5  Gastrocsoleus:  5/5/5  EHL:  5/5  Left Lower Extremity   Hip Abduction: 5/5   Hip Adduction: 5/5   Hip Flexion: 5/5   Quadriceps:  5/5   Hamstrin/5   Anterior tibial:  5/5/5   Gastrocsoleus:  5/5/5  EHL:  5/5    Reflexes     Left Side  Quadriceps:  2+  Achilles:  2+    Right Side    Quadriceps:  2+  Achilles:  2+    RADIOGRAPHS:  Left hip:  FINDINGS:  Hip joint spaces appear normally maintained on both sides, without a significant degree of narrowing seen on either side.  No conventional radiographic evidence to specifically indicate avascular necrosis of either femoral head.  Osseous structures demonstrate no evidence of recent or healing fracture, lytic destructive process, femoroacetabular impingement, or other significant abnormality.  SI joints appear unremarkable.          Assessment:       Encounter Diagnoses   Name Primary?    Abnormal posture     Chronic left-sided low back pain with left-sided sciatica Yes    Left hip pain           Plan:       1. MRI of lumbar spine to evaluate for lumbar radiculopathy    2. NSAIDS prn pain    3. I made the decision to obtain old records of the patient including previous notes and imaging. New imaging was ordered today of the extremity or extremities evaluated. I independently reviewed and interpreted the radiographs today as well as prior imaging. Reviewed Radiographs with patient in detail.    4. The total face-to-face encounter time with this patient was 30 minutes and greater than 50% of of the encounter time was spent counseling the patient and coordinating care. Significant time was also spent educating the patient, giving detail post-visit instructions, and discussing risks and benefits of treatment. Patient also had several specific questions that were answered during the visit today.     5. RTC in 1 week with Shauna Fernández PA-C for MRI results review.                        Patient questionnaires may have been collected.

## 2018-08-23 ENCOUNTER — PATIENT MESSAGE (OUTPATIENT)
Dept: SPORTS MEDICINE | Facility: CLINIC | Age: 48
End: 2018-08-23

## 2018-08-27 ENCOUNTER — OFFICE VISIT (OUTPATIENT)
Dept: SPORTS MEDICINE | Facility: CLINIC | Age: 48
End: 2018-08-27
Payer: COMMERCIAL

## 2018-08-27 VITALS
DIASTOLIC BLOOD PRESSURE: 76 MMHG | HEART RATE: 77 BPM | WEIGHT: 160 LBS | HEIGHT: 65 IN | SYSTOLIC BLOOD PRESSURE: 127 MMHG | BODY MASS INDEX: 26.66 KG/M2

## 2018-08-27 DIAGNOSIS — M47.816 LUMBAR SPONDYLOSIS: ICD-10-CM

## 2018-08-27 DIAGNOSIS — M54.16 LUMBAR RADICULOPATHY, CHRONIC: Primary | ICD-10-CM

## 2018-08-27 DIAGNOSIS — M54.32 SCIATICA OF LEFT SIDE: ICD-10-CM

## 2018-08-27 PROCEDURE — 99999 PR PBB SHADOW E&M-EST. PATIENT-LVL IV: CPT | Mod: PBBFAC,,, | Performed by: PHYSICIAN ASSISTANT

## 2018-08-27 PROCEDURE — 3008F BODY MASS INDEX DOCD: CPT | Mod: CPTII,S$GLB,, | Performed by: PHYSICIAN ASSISTANT

## 2018-08-27 PROCEDURE — 99213 OFFICE O/P EST LOW 20 MIN: CPT | Mod: S$GLB,,, | Performed by: PHYSICIAN ASSISTANT

## 2018-08-27 NOTE — PROGRESS NOTES
Subjective:          Chief Complaint: Carmen Morrow is a 47 y.o. female who had concerns including Follow-up of the Lower Back.    HPI  Patient presents to clinic for MRI results review of lumbar spine.  She has had left hip and low back pain x ~8 months. Denies MAIKOL. She states that pain is located along the anterior thigh, left lateral hip, and left low back. Pain comes and goes once every few weeks. Pain today is 2-3/10 and at its worst is 8/10. She has tried Motrin with no relief of pain. She reports a previous history of sciatica , where she will get a shooting pain from her left buttocks down the back of the left leg. Pain is not associated with any specific activity. She has had an MRI of her lumbar spine 3 years ago which showed mild disc desication with mild bulging.    Review of Systems   Constitution: Negative. Negative for chills, fever, weight gain and weight loss.   HENT: Negative for congestion and sore throat.    Eyes: Negative for blurred vision and double vision.   Cardiovascular: Negative for chest pain, leg swelling and palpitations.   Respiratory: Negative for cough and shortness of breath.    Hematologic/Lymphatic: Does not bruise/bleed easily.   Skin: Negative for itching, poor wound healing and rash.   Musculoskeletal: Positive for back pain and joint pain. Negative for joint swelling, muscle weakness, myalgias and stiffness.   Gastrointestinal: Negative for abdominal pain, constipation, diarrhea, nausea and vomiting.   Genitourinary: Negative.  Negative for frequency and hematuria.   Neurological: Negative for dizziness, headaches, numbness, paresthesias and sensory change.   Psychiatric/Behavioral: Negative for altered mental status and depression. The patient is not nervous/anxious.    Allergic/Immunologic: Negative for hives.       Pain Related Questions  Over the past 3 days, what was your average pain during activity? (I.e. running, jogging, walking, climbing stairs, getting  dressed, ect.): 7  Over the past 3 days, what was your highest pain level?: 7  Over the past 3 days, what was your lowest pain level? : 0    Other  How many nights a week are you awakened by your affected body part?: 4  Was the patient's HEIGHT measured or patient reported?: Patient Reported  Was the patient's WEIGHT measured or patient reported?: Measured      Objective:        General: Carmen is well-developed, well-nourished, appears stated age, in no acute distress, alert and oriented to time, place and person.     General    Vitals reviewed.  Constitutional: She is oriented to person, place, and time. She appears well-developed and well-nourished. No distress.   HENT:   Head: Normocephalic and atraumatic.   Eyes: EOM are normal.   Neck: Normal range of motion.   Cardiovascular: Normal rate and regular rhythm.    Pulmonary/Chest: Effort normal. No respiratory distress.   Neurological: She is alert and oriented to person, place, and time.   Psychiatric: She has a normal mood and affect. Her behavior is normal. Thought content normal.     General Musculoskeletal Exam   Gait: normal   Pelvic Obliquity: none    Right Ankle/Foot Exam     Tests   Heel Walk: able to perform  Tiptoe Walk: able to perform    Left Ankle/Foot Exam     Tests   Heel Walk: able to perform  Tiptoe Walk: able to perform    Right Knee Exam     Inspection   Alignment:  normal  Effusion: effusion    Left Knee Exam     Inspection   Alignment:  normal  Effusion: absent    Right Hip Exam     Inspection   Scars: absent  Swelling: absent  Bruising: absent  No deformity of hip.  Quadriceps Atrophy:  Negative  Erythema: absent    Range of Motion   The patient has normal right hip ROM.  Abduction:  45 normal   Adduction:  20 normal   Extension:  0 normal   Flexion:  120 normal   External rotation:  70 normal   Internal rotation:  30 normal     Muscle Strength   The patient has normal right hip strength.    Tests   Pain w/ forced internal rotation (AVERY):  absent  Pain w/ forced external rotation (FADIR): absent  Meek: negative  Trendelenburg Test: negative  Circumduction test: negative  Stinchfield test: negative  Log Roll: negative  Step-down test: negative  Resisted sit-up pain: negative    Other   Sensation: normal  Left Hip Exam     Inspection   Scars: absent  Swelling: absent  No deformity of hip.  Quadriceps Atrophy:  negative  Erythema: absent  Bruising: absent    Tenderness   The patient tender to palpation of the SI joint.    Range of Motion   The patient has normal left hip ROM.  Abduction:  45 normal   Adduction:  20 normal   Extension:  0 normal   Flexion:  120 normal   External rotation:  70 normal   Internal rotation: 30 normal     Muscle Strength   The patient has normal left hip strength.     Tests   Pain w/ forced internal rotation (AVERY): absent  Pain w/ forced external rotation (FADIR): absent  Meek: negative  Trendelenburg Test: negative  Circumduction test: negative  Log Roll: negative  Step-down test: negative  Resisted sit-up pain: negative    Other   Sensation: decreased (along left thigh)      Back (L-Spine & T-Spine) / Neck (C-Spine) Exam   Back exam is normal.    Back (L-Spine & T-Spine) Range of Motion   Extension: normal Back extension: pain.   Flexion: normal   Lateral bend right: normal   Lateral bend left: normal   Rotation right: normal   Rotation left: normal     Spinal Sensation   Right Side Sensation  C-Spine Level: normal   L-Spine Level: normal  S-Spine Level: normal  T-Spine Level: normal  Left Side Sensation  C-Spine Level: normal  L-Spine Level: decreased  S-Spine Level: normal  T-Spine Level: normal    Other   Spinal Kyphosis:  Absent  Spinal Lordosis:  Absent      Muscle Strength   Right Lower Extremity   Hip Abduction: 5/5   Hip Adduction: 5   Hip Flexion:    Quadriceps:     Hamstrin/5   Anterior tibial:    Gastrocsoleus:    EHL:    Left Lower Extremity   Hip Abduction:    Hip Adduction:     Hip Flexion: 5/5   Quadriceps:  5/5   Hamstrin/5   Anterior tibial:  5/5/5   Gastrocsoleus:  5/5/5  EHL:  5/5    Reflexes     Left Side  Quadriceps:  2+  Achilles:  2+    Right Side   Quadriceps:  2+  Achilles:  2+    RADIOGRAPHS:  Left hip:  FINDINGS:  Hip joint spaces appear normally maintained on both sides, without a significant degree of narrowing seen on either side.  No conventional radiographic evidence to specifically indicate avascular necrosis of either femoral head.  Osseous structures demonstrate no evidence of recent or healing fracture, lytic destructive process, femoroacetabular impingement, or other significant abnormality.  SI joints appear unremarkable.    MRI results lumbar spine:  1. Mild lumbar spondylosis L4-L5 and L2-L3 levels.    L4-L5: Posterior disc bulging with broad-based left paracentral disc protrusion projects 3mm posterior to the endplate margins indenting the thecal sac mildly narrowing the left lateral recess. This questionable mild impingement on the left L4 nerve root within the thecal sac just proximal to the exit of the nerve root. Only mild narrowing of the neuroforamina.         Assessment:       Encounter Diagnoses   Name Primary?    Lumbar radiculopathy, chronic Yes    Sciatica of left side     Lumbar spondylosis           Plan:         1. I made the decision to obtain old records of the patient including previous notes and imaging. New imaging was ordered today of the extremity or extremities evaluated. I independently reviewed and interpreted the radiographs today as well as prior imaging. Reviewed Radiographs and lumbar MRI with patient in detail.    2. NSAIDS prn pain    3. Referral to back and spine placed today    4. RTC prn pain                        Patient questionnaires may have been collected.

## 2018-08-30 DIAGNOSIS — M51.36 DDD (DEGENERATIVE DISC DISEASE), LUMBAR: Primary | ICD-10-CM

## 2018-09-07 ENCOUNTER — HOSPITAL ENCOUNTER (OUTPATIENT)
Dept: RADIOLOGY | Facility: HOSPITAL | Age: 48
Discharge: HOME | End: 2018-09-07
Attending: PHYSICIAN ASSISTANT
Payer: COMMERCIAL

## 2018-09-07 DIAGNOSIS — M51.36 DDD (DEGENERATIVE DISC DISEASE), LUMBAR: ICD-10-CM

## 2018-09-07 PROCEDURE — 72120 X-RAY BEND ONLY L-S SPINE: CPT | Mod: TC

## 2018-09-07 PROCEDURE — 72100 X-RAY EXAM L-S SPINE 2/3 VWS: CPT | Mod: 26,,, | Performed by: RADIOLOGY

## 2018-09-07 PROCEDURE — 72120 X-RAY BEND ONLY L-S SPINE: CPT | Mod: 26,,, | Performed by: RADIOLOGY

## 2018-09-11 ENCOUNTER — OFFICE VISIT (OUTPATIENT)
Dept: ORTHOPEDICS | Facility: CLINIC | Age: 48
End: 2018-09-11
Payer: COMMERCIAL

## 2018-09-11 VITALS
HEIGHT: 65 IN | SYSTOLIC BLOOD PRESSURE: 117 MMHG | TEMPERATURE: 98 F | BODY MASS INDEX: 26.67 KG/M2 | HEART RATE: 72 BPM | WEIGHT: 160.06 LBS | DIASTOLIC BLOOD PRESSURE: 77 MMHG

## 2018-09-11 DIAGNOSIS — M54.16 LUMBAR RADICULOPATHY: Primary | ICD-10-CM

## 2018-09-11 PROCEDURE — 99999 PR PBB SHADOW E&M-EST. PATIENT-LVL III: CPT | Mod: PBBFAC,,, | Performed by: PHYSICIAN ASSISTANT

## 2018-09-11 PROCEDURE — 99204 OFFICE O/P NEW MOD 45 MIN: CPT | Mod: S$GLB,,, | Performed by: PHYSICIAN ASSISTANT

## 2018-09-11 PROCEDURE — 3008F BODY MASS INDEX DOCD: CPT | Mod: CPTII,S$GLB,, | Performed by: PHYSICIAN ASSISTANT

## 2018-09-11 RX ORDER — METHYLPREDNISOLONE 4 MG/1
TABLET ORAL
Qty: 1 PACKAGE | Refills: 0 | Status: SHIPPED | OUTPATIENT
Start: 2018-09-11 | End: 2018-09-19

## 2018-09-11 RX ORDER — METHOCARBAMOL 750 MG/1
750 TABLET, FILM COATED ORAL 3 TIMES DAILY
Qty: 60 TABLET | Refills: 0 | Status: SHIPPED | OUTPATIENT
Start: 2018-09-11 | End: 2018-10-01

## 2018-09-11 RX ORDER — MELOXICAM 15 MG/1
15 TABLET ORAL DAILY
Qty: 30 TABLET | Refills: 0 | Status: SHIPPED | OUTPATIENT
Start: 2018-09-11 | End: 2018-10-11

## 2018-09-11 NOTE — PROGRESS NOTES
DATE: 9/11/2018  PATIENT: Carmen Morrow    Supervising Physician: Rad Jolley M.D.    CHIEF COMPLAINT: low back and left leg pain    HISTORY:  Carmen Morrow is a 48 y.o. female here for initial evaluation of low back and left anterior and posterior leg pain (Back - 9, Leg - 6).  The pain has been present for several months. The patient describes the pain as aching.  She reports a history of low back and intermittent left posterior leg pain but says the pain in the anterior leg is new.  The pain is worse with sitting in a recliner and at night and improved by activity. There is no associated numbness and tingling. There is no subjective weakness. Prior treatments have included ibuprofen, but no physical therapy, ESIs or surgery.    The patient denies myelopathic symptoms such as handwriting changes or difficulty with buttons/coins/keys. Denies perineal paresthesias, bowel/bladder dysfunction.    PAST MEDICAL/SURGICAL HISTORY:  Past Medical History:   Diagnosis Date    Brachial neuritis or radiculitis NOS 11/14/2012    Degeneration of cervical intervertebral disc 11/14/2012    GERD (gastroesophageal reflux disease)     SVT (supraventricular tachycardia)      Past Surgical History:   Procedure Laterality Date    anterior diskectomy fusion  05/20/2016    ARTHROCENTESIS;AMNIOX Right 12/22/2016    [facility]    BREAST RECONSTRUCTION      augmentation    BREAST SURGERY Bilateral 2006    CARPAL TUNNEL RELEASE Left 01/30/2018    CHONDROPLASTY-KNEE Right 12/22/2016    [facility] OR    COLONOSCOPY N/A 1/9/2015    [facility] ([facility]    CSF leak  05/22/2016    Replace and repair fat graft from surgery on 5/20/2016    ESOPHAGOGASTRODUODENOSCOPY (EGD) N/A 8/9/2017    [facility]    KNEE ARTHROSCOPY      LAMINECTOMY      MANOMETRY-ANORECTAL N/A 1/12/2015    Performed by  Gama East MD at [facility] ENDO (4TH FLR)    RESECTION-TURBINATES (SMR) Bilateral 9/1/2017    [facility] at [facility] OR    SEPTOPLASTY N/A 9/1/2017    Per[facility] by [facility] at [facility] OR    SINUS SURGERY FUNCTIONAL ENDOSCOPIC WITH NAVIGATION N/A 9/1/2017    Performed by [facility] at [facility] OR    SPINAL FUSION      SUBCHONDROPLASTY; C-ARM Right 12/22/2016    [facility] Bess [facility] at [facility] OR    SYNOVECTOMY-KNEE Right 12/22/2016    Performed by Bess [facility] at [facility] OR       Medications:   Current Outpatient Medications on File Prior to Visit   Medication Sig Dispense Refill    acyclovir (ZOVIRAX) 200 MG capsule Take 1 capsule (200 mg total) by mouth 2 (two) times daily. 10 capsule 5    azelastine (ASTELIN) 137 mcg (0.1 %) nasal spray 1 spray (137 mcg total) by Nasal route 2 (two) times daily. 30 mL 11    cetirizine (ZYRTEC) 10 MG tablet Take 10 mg by mouth Daily. 1 Tablet Oral Every day      diphth,pertus,acell,,tetanus (BOOSTRIX) 2.5-8-5 Lf-mcg-Lf/0.5mL Syrg injection Inject 0.5 mLs into the muscle. 0.5 mL 0    esomeprazole (NEXIUM) 40 MG capsule Take 1 capsule (40 mg total) by mouth before breakfast. 30 capsule 1    fluticasone (FLONASE) 50 mcg/actuation nasal spray 2 sprays by Each Nare route every evening. 2 Spray, Suspension Nasal Every day 1 Bottle 12    montelukast (SINGULAIR) 10 mg tablet Take 10 mg by mouth once daily.       [DISCONTINUED] amitriptyline (ELAVIL) 10 MG tablet TAKE 2 TABLETS BY MOUTH AT DINNER TIME EVERY NIGHT (Patient taking differently: TAKE 2 TABLETS BY MOUTH AT DINNER TIME EVERY NIGHT: not taking) 60 tablet 3     No current facility-administered medications on file prior to visit.        Social History:   Social History     Socioeconomic History    Marital status:      Spouse name: Not on file    Number of children: Not on file    Years of education: Not on file    Highest education level:  "Not on file   Social Needs    Financial resource strain: Not on file    Food insecurity - worry: Not on file    Food insecurity - inability: Not on file    Transportation needs - medical: Not on file    Transportation needs - non-medical: Not on file   Occupational History    Not on file   Tobacco Use    Smoking status: Former Smoker     Last attempt to quit: 2000     Years since quittin.7    Smokeless tobacco: Never Used   Substance and Sexual Activity    Alcohol use: No    Drug use: No    Sexual activity: Yes     Partners: Male     Birth control/protection: None   Other Topics Concern    Not on file   Social History Narrative    Not on file       REVIEW OF SYSTEMS:  Constitution: Negative. Negative for chills, fever and night sweats.   Cardiovascular: Negative for chest pain and syncope.   Respiratory: Negative for cough and shortness of breath.   Gastrointestinal: See HPI. Negative for nausea/vomiting. Negative for abdominal pain.  Genitourinary: See HPI. Negative for discoloration or dysuria.  Skin: Negative for dry skin, itching and rash.   Hematologic/Lymphatic: Negative for bleeding problem. Does not bruise/bleed easily.   Musculoskeletal: Negative for falls and muscle weakness.   Neurological: See HPI. No seizures.   Endocrine: Negative for polydipsia, polyphagia and polyuria.   Allergic/Immunologic: Negative for hives and persistent infections.     EXAM:  /77 (BP Location: Left arm, Patient Position: Sitting, BP Method: Medium (Automatic))   Pulse 72   Temp 97.6 °F (36.4 °C) (Oral)   Ht 5' 5" (1.651 m)   Wt 72.6 kg (160 lb 0.9 oz)   BMI 26.63 kg/m²     General: The patient is a very pleasant 48 y.o. female in no apparent distress, the patient is oriented to person, place and time.  Psych: Normal mood and affect  HEENT: Vision grossly intact, hearing intact to the spoken word.  Lungs: Respirations unlabored.  Gait: Normal station and gait, no difficulty with toe or heel walk. "   Skin: Dorsal lumbar skin negative for rashes, lesions, hairy patches and surgical scars. There is mild lumbar tenderness to palpation.  Range of motion: Lumbar range of motion is acceptable.  Spinal Balance: Global saggital and coronal spinal balance acceptable, not significant for scoliosis and kyphosis.  Musculoskeletal: No pain with the range of motion of the bilateral hips. No trochanteric tenderness to palpation.  Vascular: Bilateral lower extremities warm and well perfused, dorsalis pedis pulses 2+ bilaterally.  Neurological: Normal strength and tone in all major motor groups in the bilateral lower extremities. Normal sensation to light touch in the L2-S1 dermatomes bilaterally.  Deep tendon reflexes symmetric 2+ in the bilateral lower extremities.  Negative Babinski bilaterally. Straight leg raise negative bilaterally.    IMAGING:      Today I personally reviewed AP, Lat and Flex/Ex  upright L-spine films that demonstrate mild degenerative changes with L2/3 disc space narrowing.     MRI lumbar spine from an outside facility shows mild bulging at L4/5 resulting in mild impingement of the left L4 nerve root.         Body mass index is 26.63 kg/m².    No results found for: HGBA1C        ASSESSMENT/PLAN:    Diagnoses and all orders for this visit:    Lumbar radiculopathy    Other orders  -     methylPREDNISolone (MEDROL DOSEPACK) 4 mg tablet; use as directed  -     methocarbamol (ROBAXIN) 750 MG Tab; Take 1 tablet (750 mg total) by mouth 3 (three) times daily. As needed for muscle spasms for 60 doses  -     meloxicam (MOBIC) 15 MG tablet; Take 1 tablet (15 mg total) by mouth once daily. Finish steroid pack before taking mobic    The patient would like to try conservative treatment first.  She is scheduled to start PT on Thursday.  Prescriptions sent to the pharmacy.  She will complete the steroid pack before taking mobic.  Follow up in 3-4 weeks if symptoms persist.  We may consider an ELAINE at that time.        Follow-up if symptoms worsen or fail to improve.

## 2018-09-19 ENCOUNTER — OFFICE VISIT (OUTPATIENT)
Dept: NEUROLOGY | Facility: CLINIC | Age: 48
End: 2018-09-19
Payer: COMMERCIAL

## 2018-09-19 VITALS
SYSTOLIC BLOOD PRESSURE: 132 MMHG | BODY MASS INDEX: 26.67 KG/M2 | WEIGHT: 160.06 LBS | DIASTOLIC BLOOD PRESSURE: 72 MMHG | HEIGHT: 65 IN | HEART RATE: 72 BPM

## 2018-09-19 DIAGNOSIS — Z82.0 FAMILY HISTORY OF MIGRAINE: ICD-10-CM

## 2018-09-19 DIAGNOSIS — M54.81 BILATERAL OCCIPITAL NEURALGIA: Primary | ICD-10-CM

## 2018-09-19 PROBLEM — G43.709 CHRONIC MIGRAINE WITHOUT AURA WITHOUT STATUS MIGRAINOSUS, NOT INTRACTABLE: Status: ACTIVE | Noted: 2018-09-19

## 2018-09-19 PROCEDURE — 3008F BODY MASS INDEX DOCD: CPT | Mod: CPTII,S$GLB,, | Performed by: PSYCHIATRY & NEUROLOGY

## 2018-09-19 PROCEDURE — 99999 PR PBB SHADOW E&M-EST. PATIENT-LVL III: CPT | Mod: PBBFAC,,, | Performed by: PSYCHIATRY & NEUROLOGY

## 2018-09-19 PROCEDURE — 99215 OFFICE O/P EST HI 40 MIN: CPT | Mod: S$GLB,,, | Performed by: PSYCHIATRY & NEUROLOGY

## 2018-09-19 RX ORDER — AMITRIPTYLINE HYDROCHLORIDE 25 MG/1
25 TABLET, FILM COATED ORAL NIGHTLY
Qty: 30 TABLET | Refills: 2 | Status: SHIPPED | OUTPATIENT
Start: 2018-09-19 | End: 2019-01-28 | Stop reason: SDUPTHER

## 2018-09-19 NOTE — LETTER
September 19, 2018      Tori Bailon MD  [address]  [facility] [address]           [facility] - Neurology  [address]  [facility] [address]  Phone: [phone]  Fax: [phone]          Patient: Carmen Morrow   MR Number: [id number]   YOB: 1970   Date of Visit: 9/19/2018       Dear Dr. Tori Bailon:    Thank you for referring Carmen Morrow to me for evaluation. Attached you will find relevant portions of my assessment and plan of care.    If you have questions, please do not hesitate to call me. I look forward to following Carmen Morrow along with you.    Sincerely,    Usman Galindo III, MD    Enclosure  CC:  No Recipients    If you would like to receive this communication electronically, please contact [email or web address] or [phone] to request more information on [employer] access.    For providers and/or their staff who would like to refer a patient to [facility], please contact us through our one-stop-shop provider referral line, [facility], at [phone].    If you feel you have received this communication in error or would no longer like to receive these types of communications, please e-mail [email or web address]
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
## 2018-09-30 RX ORDER — ESOMEPRAZOLE MAGNESIUM 40 MG/1
40 CAPSULE, DELAYED RELEASE ORAL
Qty: 90 CAPSULE | Refills: 3 | Status: SHIPPED | OUTPATIENT
Start: 2018-09-30 | End: 2019-02-15 | Stop reason: SDUPTHER

## 2018-10-22 ENCOUNTER — OFFICE VISIT (OUTPATIENT)
Dept: INTERNAL MEDICINE | Facility: CLINIC | Age: 48
End: 2018-10-22
Payer: COMMERCIAL

## 2018-10-22 ENCOUNTER — PATIENT MESSAGE (OUTPATIENT)
Dept: INTERNAL MEDICINE | Facility: CLINIC | Age: 48
End: 2018-10-22

## 2018-10-22 VITALS
SYSTOLIC BLOOD PRESSURE: 140 MMHG | DIASTOLIC BLOOD PRESSURE: 88 MMHG | HEART RATE: 80 BPM | OXYGEN SATURATION: 99 % | HEIGHT: 65 IN | WEIGHT: 161.38 LBS | BODY MASS INDEX: 26.89 KG/M2

## 2018-10-22 DIAGNOSIS — R22.0 CHEEK SWELLING: Primary | ICD-10-CM

## 2018-10-22 PROCEDURE — 99999 PR PBB SHADOW E&M-EST. PATIENT-LVL III: CPT | Mod: PBBFAC,,, | Performed by: INTERNAL MEDICINE

## 2018-10-22 PROCEDURE — 99213 OFFICE O/P EST LOW 20 MIN: CPT | Mod: S$GLB,,, | Performed by: INTERNAL MEDICINE

## 2018-10-22 PROCEDURE — 3008F BODY MASS INDEX DOCD: CPT | Mod: CPTII,S$GLB,, | Performed by: INTERNAL MEDICINE

## 2018-10-22 NOTE — PROGRESS NOTES
Subjective:       Patient ID: Carmen Morrow is a 48 y.o. female.    Chief Complaint: Cyst (right side of cheek )    Patient here for urgent care swelling in right cheek.  She thought yesterday she might have noticed some swelling while looking in mirror but this morning she awoke and a small round elongated.  She could feel it the inside cheek pressing with her tongue and on the outside.  Has actually gotten smaller as the day has gone on today.  She denies any redness or drainage externally.  She so she the inside of her mouth.  Does have a history of smoking.  She was somewhat anxious because her  has sarcoma and she says she is sensitive to new lumps or changes.  She denies any fevers or night sweats.      Review of Systems   Constitutional: Negative for appetite change, chills, diaphoresis and fever.   HENT: Positive for facial swelling (Small swelling right cheek). Negative for nosebleeds, sinus pain and sore throat.    Eyes: Negative for visual disturbance.   Respiratory: Negative for cough, shortness of breath and wheezing.    Cardiovascular: Negative for chest pain and leg swelling.   Gastrointestinal: Negative for abdominal pain, constipation and diarrhea.   Genitourinary: Negative for difficulty urinating and hematuria.   Musculoskeletal: Negative for neck pain and neck stiffness.   Skin: Negative for pallor and rash.   Neurological: Negative for light-headedness and headaches.   Psychiatric/Behavioral: Negative for dysphoric mood and suicidal ideas. The patient is not nervous/anxious.        Objective:      Physical Exam   Constitutional: She is oriented to person, place, and time. She appears well-developed and well-nourished. No distress.   HENT:   Head: Normocephalic and atraumatic.   Right Ear: External ear normal.   Left Ear: External ear normal.   Mouth/Throat: Oropharynx is clear and moist. No oropharyngeal exudate.   Small round slightly elongated mobile soft tissue mass in buccal  "mucosa near the area that has visible trauma such as a bite the inside of the right cheek.  No tenderness.  No other surrounding masses swollen lymph nodes or glands.  No drainage.   Eyes: Conjunctivae are normal. Pupils are equal, round, and reactive to light. No scleral icterus.   Neck: Normal range of motion. Neck supple. No thyromegaly present.   Cardiovascular: Normal rate and regular rhythm.   No murmur heard.  Pulmonary/Chest: Effort normal and breath sounds normal. She has no wheezes.   Musculoskeletal: She exhibits no tenderness.   Lymphadenopathy:     She has no cervical adenopathy.   Neurological: She is alert and oriented to person, place, and time.   Skin: No rash noted.   Psychiatric: She has a normal mood and affect.       Assessment:       1. Cheek swelling        Plan:       Carmen was seen today for cyst.    Diagnoses and all orders for this visit:    Cheek swelling  Comments:  Right side, small elongated. Has reduced in size since earlier this AM. Corresponding "bite" inside cheek.           Patient reassure that I think this may be related to trauma.  I suggested warm salt water gargle, warm compress.  Avoid squeezing or feeling the area for 24 hr and I suspect will continue to shrink and/or disappear.  Call me if this does not occur or if she would like to see PCP.  "

## 2018-11-12 ENCOUNTER — OFFICE VISIT (OUTPATIENT)
Dept: URGENT CARE | Facility: CLINIC | Age: 48
End: 2018-11-12
Payer: COMMERCIAL

## 2018-11-12 VITALS
SYSTOLIC BLOOD PRESSURE: 132 MMHG | OXYGEN SATURATION: 99 % | HEART RATE: 80 BPM | DIASTOLIC BLOOD PRESSURE: 86 MMHG | RESPIRATION RATE: 16 BRPM | WEIGHT: 160 LBS | TEMPERATURE: 99 F | BODY MASS INDEX: 26.66 KG/M2 | HEIGHT: 65 IN

## 2018-11-12 DIAGNOSIS — R05.9 COUGH: Primary | ICD-10-CM

## 2018-11-12 LAB
CTP QC/QA: YES
FLUAV AG NPH QL: NEGATIVE
FLUBV AG NPH QL: NEGATIVE

## 2018-11-12 PROCEDURE — 96372 THER/PROPH/DIAG INJ SC/IM: CPT | Mod: S$GLB,,, | Performed by: NURSE PRACTITIONER

## 2018-11-12 PROCEDURE — 3008F BODY MASS INDEX DOCD: CPT | Mod: CPTII,S$GLB,, | Performed by: NURSE PRACTITIONER

## 2018-11-12 PROCEDURE — 87804 INFLUENZA ASSAY W/OPTIC: CPT | Mod: QW,S$GLB,, | Performed by: NURSE PRACTITIONER

## 2018-11-12 PROCEDURE — 99214 OFFICE O/P EST MOD 30 MIN: CPT | Mod: 25,S$GLB,, | Performed by: NURSE PRACTITIONER

## 2018-11-12 RX ORDER — DEXAMETHASONE SODIUM PHOSPHATE 100 MG/10ML
6 INJECTION INTRAMUSCULAR; INTRAVENOUS ONCE
Status: COMPLETED | OUTPATIENT
Start: 2018-11-12 | End: 2018-11-12

## 2018-11-12 RX ORDER — AZITHROMYCIN 250 MG/1
TABLET, FILM COATED ORAL
Qty: 6 TABLET | Refills: 0 | Status: SHIPPED | OUTPATIENT
Start: 2018-11-12 | End: 2018-12-03

## 2018-11-12 RX ORDER — BENZONATATE 100 MG/1
100 CAPSULE ORAL EVERY 6 HOURS PRN
Qty: 30 CAPSULE | Refills: 1 | Status: SHIPPED | OUTPATIENT
Start: 2018-11-12 | End: 2019-02-01

## 2018-11-12 RX ADMIN — DEXAMETHASONE SODIUM PHOSPHATE 6 MG: 100 INJECTION INTRAMUSCULAR; INTRAVENOUS at 12:11

## 2018-11-12 NOTE — PROGRESS NOTES
"Subjective:       Patient ID: Carmen Morrow is a 48 y.o. female.    Vitals:  height is 5' 5" (1.651 m) and weight is 72.6 kg (160 lb). Her oral temperature is 98.5 °F (36.9 °C). Her blood pressure is 132/86 and her pulse is 80. Her respiration is 16 and oxygen saturation is 99%.     Chief Complaint: Cough    Cough   This is a new problem. The current episode started today. The problem has been gradually worsening. The problem occurs constantly. The cough is non-productive. Associated symptoms include headaches and nasal congestion. Pertinent negatives include no chest pain, chills, ear pain, eye redness, fever, myalgias, sore throat, shortness of breath or wheezing. Associated symptoms comments: Positive for cough, positive for myalgias    . The symptoms are aggravated by cold air. She has tried nothing for the symptoms. There is no history of asthma.     Review of Systems   Constitution: Negative for chills, fever and malaise/fatigue.   HENT: Positive for congestion. Negative for ear pain, hoarse voice and sore throat.    Eyes: Negative for discharge and redness.   Cardiovascular: Negative for chest pain, dyspnea on exertion and leg swelling.   Respiratory: Negative for cough, shortness of breath, sputum production and wheezing.    Musculoskeletal: Negative for myalgias.   Gastrointestinal: Negative for abdominal pain and nausea.   Neurological: Positive for headaches.       Objective:      Physical Exam   Constitutional: She is oriented to person, place, and time. She appears well-developed and well-nourished. She is cooperative.  Non-toxic appearance. She does not appear ill. No distress.   HENT:   Head: Normocephalic and atraumatic.   Right Ear: Hearing, tympanic membrane, external ear and ear canal normal.   Left Ear: Hearing, tympanic membrane, external ear and ear canal normal.   Nose: Mucosal edema and rhinorrhea present. No nasal deformity. No epistaxis. Right sinus exhibits frontal sinus tenderness. " Right sinus exhibits no maxillary sinus tenderness. Left sinus exhibits frontal sinus tenderness. Left sinus exhibits no maxillary sinus tenderness.   Mouth/Throat: Uvula is midline, oropharynx is clear and moist and mucous membranes are normal. No trismus in the jaw. Normal dentition. No uvula swelling. No oropharyngeal exudate, posterior oropharyngeal edema or posterior oropharyngeal erythema.   Eyes: Conjunctivae and lids are normal. No scleral icterus.   Sclera clear bilat   Neck: Trachea normal, full passive range of motion without pain and phonation normal. Neck supple.   Cardiovascular: Normal rate, regular rhythm, normal heart sounds, intact distal pulses and normal pulses.   Pulmonary/Chest: Effort normal and breath sounds normal. No respiratory distress.   Abdominal: Soft. Normal appearance and bowel sounds are normal. She exhibits no distension. There is no tenderness.   Musculoskeletal: Normal range of motion. She exhibits no edema or deformity.   Lymphadenopathy:     She has no cervical adenopathy.        Right cervical: No superficial cervical, no deep cervical and no posterior cervical adenopathy present.       Left cervical: No superficial cervical, no deep cervical and no posterior cervical adenopathy present.   Neurological: She is alert and oriented to person, place, and time. She exhibits normal muscle tone. Coordination normal.   Skin: Skin is warm, dry and intact. She is not diaphoretic. No pallor.   Psychiatric: She has a normal mood and affect. Her speech is normal and behavior is normal. Judgment and thought content normal. Cognition and memory are normal.   Nursing note and vitals reviewed.      Assessment:       1. Cough        Plan:       Results for orders placed or performed in visit on 11/12/18   POCT Influenza A/B   Result Value Ref Range    Rapid Influenza A Ag Negative Negative    Rapid Influenza B Ag Negative Negative     Acceptable Yes        Cough  -     POCT  "Influenza A/B    Other orders  -     azithromycin (ZITHROMAX Z-TAYLOR) 250 MG tablet; Take 2 tablets (500 mg) on  Day 1,  followed by 1 tablet (250 mg) once daily on Days 2 through 5.  Dispense: 6 tablet; Refill: 0  -     benzonatate (TESSALON PERLES) 100 MG capsule; Take 1 capsule (100 mg total) by mouth every 6 (six) hours as needed for Cough.  Dispense: 30 capsule; Refill: 1  -     dexamethasone injection 6 mg      Patient Instructions   Please follow up with your Primary care provider within 2-5 days if your signs and symptoms have not resolved or worsen.  The usual course of cold symptoms are 10-14 days.     If your condition worsens or fails to improve we recommend that you receive another evaluation at the emergency room immediately or contact your primary medical clinic to discuss your concerns.     You must understand that you have received an Urgent Care treatment only and that you may be released before all of your medical problems are known or treated.   You, the patient, will arrange for follow up care as instructed.     Tylenol or Ibuprofen can also be used as directed for pain/fever unless you have an allergy to them or medical condition such as stomach ulcers, kidney or liver disease or blood thinners etc for which you should not be taking these type of medications.     Take over the counter cough medication as directed as needed for cough.  You should avoid medications with pseudoephedrine or phenylephrine (any medication with "D") if you have high blood pressure as this can cause an elevation in your blood pressure. Instead consider Corcidin HBP as needed to prevent an elevated blood pressure.     Natural remedies of symptoms (as needed) include humidification, saline nasal sprays, and/or steamy showers.  Increase fluids, warm tea with honey, cough drops as needed.  You may also use salt water gargles for sore throat.    IF you received a steroid shot today - As discussed, this can elevate your blood " pressure, elevate your blood sugar, water weight gain, nervous energy, redness to the face and dimpling of the skin at the injection site.   You have been given an antibiotic to treat your condition today.  Please complete the antibiotic as directed on the bottle.     As with any antibiotics, use probiotics and/or high culture yogurt about 2 hours apart from the antibiotic and about 1 week after the antibiotic to replace the gut edwin lost with antibiotic use.      If you are female and on BCP use additional methods to prevent pregnancy while on antibiotics and for one cycle after.       Sinusitis (Antibiotic Treatment)    The sinuses are air-filled spaces within the bones of the face. They connect to the inside of the nose. Sinusitis is an inflammation of the tissue lining the sinus cavity. Sinus inflammation can occur during a cold. It can also be due to allergies to pollens and other particles in the air. Sinusitis can cause symptoms of sinus congestion and fullness. A sinus infection causes fever, headache and facial pain. There is often green or yellow drainage from the nose or into the back of the throat (post-nasal drip). You have been given antibiotics to treat this condition.  Home care:  · Take the full course of antibiotics as instructed. Do not stop taking them, even if you feel better.  · Drink plenty of water, hot tea, and other liquids. This may help thin mucus. It also may promote sinus drainage.  · Heat may help soothe painful areas of the face. Use a towel soaked in hot water. Or,  the shower and direct the hot spray onto your face. Using a vaporizer along with a menthol rub at night may also help.   · An expectorant containing guaifenesin may help thin the mucus and promote drainage from the sinuses.  · Over-the-counter decongestants may be used unless a similar medicine was prescribed. Nasal sprays work the fastest. Use one that contains phenylephrine or oxymetazoline. First blow the nose  gently. Then use the spray. Do not use these medicines more often than directed on the label or symptoms may get worse. You may also use tablets containing pseudoephedrine. Avoid products that combine ingredients, because side effects may be increased. Read labels. You can also ask the pharmacist for help. (NOTE: Persons with high blood pressure should not use decongestants. They can raise blood pressure.)  · Over-the-counter antihistamines may help if allergies contributed to your sinusitis.    · Do not use nasal rinses or irrigation during an acute sinus infection, unless told to by your health care provider. Rinsing may spread the infection to other sinuses.  · Use acetaminophen or ibuprofen to control pain, unless another pain medicine was prescribed. (If you have chronic liver or kidney disease or ever had a stomach ulcer, talk with your doctor before using these medicines. Aspirin should never be used in anyone under 18 years of age who is ill with a fever. It may cause severe liver damage.)  · Don't smoke. This can worsen symptoms.  Follow-up care  Follow up with your healthcare provider or our staff if you are not improving within the next week.  When to seek medical advice  Call your healthcare provider if any of these occur:  · Facial pain or headache becoming more severe  · Stiff neck  · Unusual drowsiness or confusion  · Swelling of the forehead or eyelids  · Vision problems, including blurred or double vision  · Fever of 100.4ºF (38ºC) or higher, or as directed by your healthcare provider  · Seizure  · Breathing problems  · Symptoms not resolving within 10 days  Date Last Reviewed: 4/13/2015  © 5273-0699 [employer]. [address] [place] [address], [place] [address]. All rights reserved. This information is not intended as a substitute for professional medical care. Always follow your healthcare professional's instructions.

## 2018-11-12 NOTE — PATIENT INSTRUCTIONS
"Please follow up with your Primary care provider within 2-5 days if your signs and symptoms have not resolved or worsen.  The usual course of cold symptoms are 10-14 days.     If your condition worsens or fails to improve we recommend that you receive another evaluation at the emergency room immediately or contact your primary medical clinic to discuss your concerns.     You must understand that you have received an Urgent Care treatment only and that you may be released before all of your medical problems are known or treated.   You, the patient, will arrange for follow up care as instructed.     Tylenol or Ibuprofen can also be used as directed for pain/fever unless you have an allergy to them or medical condition such as stomach ulcers, kidney or liver disease or blood thinners etc for which you should not be taking these type of medications.     Take over the counter cough medication as directed as needed for cough.  You should avoid medications with pseudoephedrine or phenylephrine (any medication with "D") if you have high blood pressure as this can cause an elevation in your blood pressure. Instead consider Corcidin HBP as needed to prevent an elevated blood pressure.     Natural remedies of symptoms (as needed) include humidification, saline nasal sprays, and/or steamy showers.  Increase fluids, warm tea with honey, cough drops as needed.  You may also use salt water gargles for sore throat.    IF you received a steroid shot today - As discussed, this can elevate your blood pressure, elevate your blood sugar, water weight gain, nervous energy, redness to the face and dimpling of the skin at the injection site.   You have been given an antibiotic to treat your condition today.  Please complete the antibiotic as directed on the bottle.     As with any antibiotics, use probiotics and/or high culture yogurt about 2 hours apart from the antibiotic and about 1 week after the antibiotic to replace the gut edwin " lost with antibiotic use.      If you are female and on BCP use additional methods to prevent pregnancy while on antibiotics and for one cycle after.       Sinusitis (Antibiotic Treatment)    The sinuses are air-filled spaces within the bones of the face. They connect to the inside of the nose. Sinusitis is an inflammation of the tissue lining the sinus cavity. Sinus inflammation can occur during a cold. It can also be due to allergies to pollens and other particles in the air. Sinusitis can cause symptoms of sinus congestion and fullness. A sinus infection causes fever, headache and facial pain. There is often green or yellow drainage from the nose or into the back of the throat (post-nasal drip). You have been given antibiotics to treat this condition.  Home care:  · Take the full course of antibiotics as instructed. Do not stop taking them, even if you feel better.  · Drink plenty of water, hot tea, and other liquids. This may help thin mucus. It also may promote sinus drainage.  · Heat may help soothe painful areas of the face. Use a towel soaked in hot water. Or,  the shower and direct the hot spray onto your face. Using a vaporizer along with a menthol rub at night may also help.   · An expectorant containing guaifenesin may help thin the mucus and promote drainage from the sinuses.  · Over-the-counter decongestants may be used unless a similar medicine was prescribed. Nasal sprays work the fastest. Use one that contains phenylephrine or oxymetazoline. First blow the nose gently. Then use the spray. Do not use these medicines more often than directed on the label or symptoms may get worse. You may also use tablets containing pseudoephedrine. Avoid products that combine ingredients, because side effects may be increased. Read labels. You can also ask the pharmacist for help. (NOTE: Persons with high blood pressure should not use decongestants. They can raise blood  pressure.)  · Over-the-counter antihistamines may help if allergies contributed to your sinusitis.    · Do not use nasal rinses or irrigation during an acute sinus infection, unless told to by your health care provider. Rinsing may spread the infection to other sinuses.  · Use acetaminophen or ibuprofen to control pain, unless another pain medicine was prescribed. (If you have chronic liver or kidney disease or ever had a stomach ulcer, talk with your doctor before using these medicines. Aspirin should never be used in anyone under 18 years of age who is ill with a fever. It may cause severe liver damage.)  · Don't smoke. This can worsen symptoms.  Follow-up care  Follow up with your healthcare provider or our staff if you are not improving within the next week.  When to seek medical advice  Call your healthcare provider if any of these occur:  · Facial pain or headache becoming more severe  · Stiff neck  · Unusual drowsiness or confusion  · Swelling of the forehead or eyelids  · Vision problems, including blurred or double vision  · Fever of 100.4ºF (38ºC) or higher, or as directed by your healthcare provider  · Seizure  · Breathing problems  · Symptoms not resolving within 10 days  Date Last Reviewed: 4/13/2015  © 8861-3035 [employer]. [address] [place] [address], [place] [address]. All rights reserved. This information is not intended as a substitute for professional medical care. Always follow your healthcare professional's instructions.

## 2018-11-15 ENCOUNTER — TELEPHONE (OUTPATIENT)
Dept: URGENT CARE | Facility: CLINIC | Age: 48
End: 2018-11-15

## 2018-11-15 ENCOUNTER — OFFICE VISIT (OUTPATIENT)
Dept: INTERNAL MEDICINE | Facility: CLINIC | Age: 48
End: 2018-11-15
Payer: COMMERCIAL

## 2018-11-15 VITALS
OXYGEN SATURATION: 99 % | WEIGHT: 163.13 LBS | DIASTOLIC BLOOD PRESSURE: 86 MMHG | SYSTOLIC BLOOD PRESSURE: 120 MMHG | TEMPERATURE: 98 F | HEART RATE: 77 BPM | HEIGHT: 65 IN | BODY MASS INDEX: 27.18 KG/M2

## 2018-11-15 DIAGNOSIS — J30.9 ALLERGIC RHINITIS, UNSPECIFIED SEASONALITY, UNSPECIFIED TRIGGER: ICD-10-CM

## 2018-11-15 DIAGNOSIS — J20.9 ACUTE BRONCHITIS WITH BRONCHOSPASM: ICD-10-CM

## 2018-11-15 DIAGNOSIS — H65.92 OTHER NONSUPPURATIVE OTITIS MEDIA OF LEFT EAR, UNSPECIFIED CHRONICITY: Primary | ICD-10-CM

## 2018-11-15 PROCEDURE — 3008F BODY MASS INDEX DOCD: CPT | Mod: CPTII,S$GLB,, | Performed by: PHYSICIAN ASSISTANT

## 2018-11-15 PROCEDURE — 99214 OFFICE O/P EST MOD 30 MIN: CPT | Mod: S$GLB,,, | Performed by: PHYSICIAN ASSISTANT

## 2018-11-15 PROCEDURE — 99999 PR PBB SHADOW E&M-EST. PATIENT-LVL IV: CPT | Mod: PBBFAC,,, | Performed by: PHYSICIAN ASSISTANT

## 2018-11-15 RX ORDER — CODEINE PHOSPHATE AND GUAIFENESIN 10; 100 MG/5ML; MG/5ML
5-10 SOLUTION ORAL NIGHTLY
Qty: 120 ML | Refills: 0 | Status: SHIPPED | OUTPATIENT
Start: 2018-11-15 | End: 2018-12-03

## 2018-11-15 RX ORDER — AMOXICILLIN AND CLAVULANATE POTASSIUM 875; 125 MG/1; MG/1
1 TABLET, FILM COATED ORAL EVERY 12 HOURS
Qty: 20 TABLET | Refills: 0 | Status: SHIPPED | OUTPATIENT
Start: 2018-11-15 | End: 2018-12-03

## 2018-11-15 RX ORDER — ALBUTEROL SULFATE 90 UG/1
2 AEROSOL, METERED RESPIRATORY (INHALATION) EVERY 4 HOURS PRN
Qty: 1 INHALER | Refills: 0 | Status: SHIPPED | OUTPATIENT
Start: 2018-11-15 | End: 2019-03-13 | Stop reason: SDUPTHER

## 2018-11-15 NOTE — LETTER
November 15, 2018    Carmen Morrow  [address] [facility] [address]         [facility]  [address]  [place] [address]  Phone: [phone]  Fax: [phone] November 15, 2018     Patient: Carmen Morrow   YOB: 1970   Date of Visit: 11/15/2018       To Whom It May Concern:    It is my medical opinion that Carmen Morrow should remain out of work until November 19, 2018.    If you have any questions or concerns, please don't hesitate to call.    Sincerely,        Daxa Damian PA-C

## 2018-11-15 NOTE — PATIENT INSTRUCTIONS
Bronchitis with Wheezing (Viral or Bacterial: Adult)    Bronchitis is an infection of the air passages. It often occurs during a cold and is usually caused by a virus. Symptoms include cough with mucus (phlegm) and low-grade fever. This illness is contagious during the first few days and is spread through the air by coughing and sneezing, or by direct contact (touching the sick person and then touching your own eyes, nose, or mouth).  If there is a lot of inflammation, air flow is restricted. The air passages may also go into spasm, especially if you have asthma. This causes wheezing and difficulty breathing even in people who do not have asthma.  Bronchitis usually lasts 7 to 14 days. The wheezing should improve with treatment during the first week. An inhaler is often prescribed to relax the air passages and stop wheezing. Antibiotics will be prescribed if your doctor thinks there is also a secondary bacterial infection.  Home care  · If symptoms are severe, rest at home for the first 2 to 3 days. When you go back to your usual activities, don't let yourself get too tired.  · Do not smoke. Also avoid being exposed to secondhand smoke.  · You may use over-the-counter medicine to control fever or pain, unless another medicine was prescribed. Note: If you have chronic liver or kidney disease or have ever had a stomach ulcer or gastrointestinal bleeding, talk with your healthcare provider before using these medicines. Also talk to your provider if you are taking medicine to prevent blood clots.) Aspirin should never be given to anyone younger than 18 years of age who is ill with a viral infection or fever. It may cause severe liver or brain damage.  · Your appetite may be poor, so a light diet is fine. Avoid dehydration by drinking 6 to 8 glasses of fluids per day (such as water, soft drinks, sports drinks, juices, tea, or soup). Extra fluids will help loosen secretions in the nose and lungs.  · Over-the-counter  cough, cold, and sore-throat medicines will not shorten the length of the illness, but they may be helpful to reduce symptoms. (Note: Do not use decongestants if you have high blood pressure.)  · If you were given an inhaler, use it exactly as directed. If you need to use it more often than prescribed, your condition may be worsening. If this happens, contact your healthcare provider.  · If prescribed, finish all antibiotic medicine, even if you are feeling better after only a few days.  Follow-up care  Follow up with your healthcare provider, or as advised. If you had an X-ray or ECG (electrocardiogram), a specialist will review it. You will be notified of any new findings that may affect your care.  Note: If you are age 65 or older, or if you have a chronic lung disease or condition that affects your immune system, or you smoke, talk to your healthcare provider about having a pneumococcal vaccinations and a yearly influenza vaccination (flu shot).  When to seek medical advice  Call your healthcare provider right away if any of these occur:  · Fever of 100.4°F (38°C) or higher  · Coughing up increasing amounts of colored sputum  · Weakness, drowsiness, headache, facial pain, ear pain, or a stiff neck  Call 911, or get immediate medical care  Contact emergency services right away if any of these occur.  · Coughing up blood  · Worsening weakness, drowsiness, headache, or stiff neck  · Increased wheezing not helped with medication, shortness of breath, or pain with breathing  Date Last Reviewed: 9/13/2015  © 3760-9493 [employer]. [address] [place] [address], [place] [address]. All rights reserved. This information is not intended as a substitute for professional medical care. Always follow your healthcare professional's instructions.        Middle Ear Infection (Adult)  You have an infection of the middle ear, the space behind the eardrum. This is also called acute otitis media (AOM). Sometimes it is caused  by the common cold. This is because congestion can block the internal passage (eustachian tube) that drains fluid from the middle ear. When the middle ear fills with fluid, bacteria can grow there and cause an infection. Oral antibiotics are used to treat this illness, not ear drops. Symptoms usually start to improve within 1 to 2 days of treatment.    Home care  The following are general care guidelines:  · Finish all of the antibiotic medicine given, even though you may feel better after the first few days.  · You may use over-the-counter medicine, such as acetaminophen or ibuprofen, to control pain and fever, unless something else was prescribed. If you have chronic liver or kidney disease or have ever had a stomach ulcer or gastrointestinal bleeding, talk with your healthcare provider before using these medicines. Do not give aspirin to anyone under 18 years of age who has a fever. It may cause severe illness or death.  Follow-up care  Follow up with your healthcare provider, or as advised, in 2 weeks if all symptoms have not gotten better, or if hearing doesn't go back to normal within 1 month.  When to seek medical advice  Call your healthcare provider right away if any of these occur:  · Ear pain gets worse or does not improve after 3 days of treatment  · Unusual drowsiness or confusion  · Neck pain, stiff neck, or headache  · Fluid or blood draining from the ear canal  · Fever of 100.4°F (38°C) or as advised   · Seizure  Date Last Reviewed: 6/1/2016  © 3411-7301 [employer]. [address] [place] [address], [place] [address]. All rights reserved. This information is not intended as a substitute for professional medical care. Always follow your healthcare professional's instructions.

## 2018-11-15 NOTE — PROGRESS NOTES
Subjective:       Patient ID: Carmen Morrow is a 48 y.o. female.        Chief Complaint: URI; Cough; and Headache    Carmen Morrow is an established patient of Tori Bailon MD here today for urgent care visit.    Sx started Monday morning with cough and headache.  Then she developed body aches and nasal congestion.  Cough is gradually worsening.  Having chills.  Feeling of lots of pressure in sinuses.  S/p sinus surgery 9/2017.  Started zpak 4 days ago.  Also has tessalon perles to use prn cough but not helping.  No chest pain, shortness of breath, wheezing.  She does not have a thermometer to check temperature.      Using astelin/flonase/zyrtec/singulair.  Doing sinus rinses BID.      She previously had a bump in her cheek that has resolved.           Review of Systems   Constitutional: Positive for chills. Negative for diaphoresis, fatigue and fever.   HENT: Positive for congestion, sinus pressure and sinus pain. Negative for sore throat.    Eyes: Negative for visual disturbance.   Respiratory: Positive for cough. Negative for chest tightness and shortness of breath.    Cardiovascular: Negative for chest pain, palpitations and leg swelling.   Gastrointestinal: Negative for abdominal pain, blood in stool, constipation, diarrhea, nausea and vomiting.   Genitourinary: Negative for dysuria, frequency, hematuria and urgency.   Musculoskeletal: Negative for arthralgias and back pain.   Skin: Negative for rash.   Neurological: Negative for dizziness, syncope, weakness and headaches.   Psychiatric/Behavioral: Negative for dysphoric mood and sleep disturbance. The patient is not nervous/anxious.        Objective:      Physical Exam   Constitutional: She is oriented to person, place, and time. She appears well-developed and well-nourished.   HENT:   Head: Normocephalic.   Right Ear: External ear normal. Tympanic membrane is erythematous (mildly pink). A middle ear effusion is present.   Left Ear: External  ear normal. Tympanic membrane is erythematous (red). A middle ear effusion (opaque yellowish) is present.   Nose: Mucosal edema and rhinorrhea present. Right sinus exhibits no maxillary sinus tenderness and no frontal sinus tenderness. Left sinus exhibits no maxillary sinus tenderness and no frontal sinus tenderness.   Mouth/Throat: Oropharynx is clear and moist.   Eyes: Pupils are equal, round, and reactive to light.   Cardiovascular: Normal rate, regular rhythm and normal heart sounds. Exam reveals no gallop and no friction rub.   No murmur heard.  Pulmonary/Chest: Effort normal and breath sounds normal. No respiratory distress.   Occasional wheeze   Abdominal: Soft. Normal appearance. There is no tenderness.   Musculoskeletal: She exhibits no edema.   Neurological: She is alert and oriented to person, place, and time. She has normal strength. No cranial nerve deficit or sensory deficit. She displays a negative Romberg sign.   Normal finger to thumb opposition, rapid hand movements, finger to nose   Skin: Skin is warm and dry.   Psychiatric: She has a normal mood and affect.   Nursing note and vitals reviewed.      Assessment:       1. Other nonsuppurative otitis media of left ear, unspecified chronicity    2. Acute bronchitis with bronchospasm    3. Allergic rhinitis, unspecified seasonality, unspecified trigger        Plan:       Carmen was seen today for uri, cough and headache.    Diagnoses and all orders for this visit:    Other nonsuppurative otitis media of left ear, unspecified chronicity  -     amoxicillin-clavulanate 875-125mg (AUGMENTIN) 875-125 mg per tablet; Take 1 tablet by mouth every 12 (twelve) hours.    Acute bronchitis with bronchospasm  -     albuterol (PROVENTIL/VENTOLIN HFA) 90 mcg/actuation inhaler; Inhale 2 puffs into the lungs every 4 (four) hours as needed for Wheezing.  -     guaifenesin-codeine 100-10 mg/5 ml (TUSSI-ORGANIDIN NR)  mg/5 mL syrup; Take 5-10 mLs by mouth every  "evening.    Allergic rhinitis, unspecified seasonality, unspecified trigger    Change from zpak --> augmentin secondary to otitis media as above, only has one more pill left in zpak for today.  Albuterol prn wheezing.      History of itching with oxycodone but has tolerated but codeine and hydrocodone in the past with no adverse reactions.      Drink plenty of fluids, get lots of rest, and follow-up poor results.      Pt has been given instructions populated from [employer] database and has verbalized understanding of the after visit summary and information contained wherein.    Follow up with a primary care provider. May go to ER for acute shortness of breath, lightheadedness, fever, or any other emergent complaints or changes in condition.    "This note will be shared with the patient"    Future Appointments   Date Time Provider Department Center   1/17/2019  3:00 PM Usman Galindo III, MD [facility]               "

## 2018-11-28 ENCOUNTER — PATIENT MESSAGE (OUTPATIENT)
Dept: INTERNAL MEDICINE | Facility: CLINIC | Age: 48
End: 2018-11-28

## 2018-11-29 ENCOUNTER — OFFICE VISIT (OUTPATIENT)
Dept: INTERNAL MEDICINE | Facility: CLINIC | Age: 48
End: 2018-11-29
Payer: COMMERCIAL

## 2018-11-29 VITALS
BODY MASS INDEX: 27.47 KG/M2 | DIASTOLIC BLOOD PRESSURE: 74 MMHG | WEIGHT: 164.88 LBS | HEIGHT: 65 IN | SYSTOLIC BLOOD PRESSURE: 122 MMHG | HEART RATE: 86 BPM | TEMPERATURE: 98 F | OXYGEN SATURATION: 99 %

## 2018-11-29 DIAGNOSIS — J40 BRONCHITIS: Primary | ICD-10-CM

## 2018-11-29 PROCEDURE — 3008F BODY MASS INDEX DOCD: CPT | Mod: CPTII,S$GLB,, | Performed by: INTERNAL MEDICINE

## 2018-11-29 PROCEDURE — 99213 OFFICE O/P EST LOW 20 MIN: CPT | Mod: S$GLB,,, | Performed by: INTERNAL MEDICINE

## 2018-11-29 PROCEDURE — 99999 PR PBB SHADOW E&M-EST. PATIENT-LVL III: CPT | Mod: PBBFAC,,, | Performed by: INTERNAL MEDICINE

## 2018-11-29 RX ORDER — PROMETHAZINE HYDROCHLORIDE AND CODEINE PHOSPHATE 6.25; 1 MG/5ML; MG/5ML
5 SOLUTION ORAL NIGHTLY PRN
Qty: 120 ML | Refills: 0 | Status: SHIPPED | OUTPATIENT
Start: 2018-11-29 | End: 2018-12-09

## 2018-11-29 RX ORDER — METHYLPREDNISOLONE 4 MG/1
TABLET ORAL
Qty: 1 PACKAGE | Refills: 0 | Status: SHIPPED | OUTPATIENT
Start: 2018-11-29 | End: 2018-12-20

## 2018-11-29 NOTE — PROGRESS NOTES
Subjective:       Patient ID: Carmen Morrow is a 48 y.o. female.    Chief Complaint: Cough    HAs had bilateral otitis and bronchitis treated with Zpack and augmentin.  Most symptoms gone but cough continues.  It is non productive but comes in paroxysms       Review of Systems   Constitutional: Negative for activity change, chills, fatigue and fever.   HENT: Negative for congestion, ear pain, nosebleeds, postnasal drip, sinus pressure and sore throat.    Eyes: Negative.  Negative for visual disturbance.   Respiratory: Negative for cough, chest tightness, shortness of breath and wheezing.    Cardiovascular: Negative for chest pain.   Gastrointestinal: Negative for abdominal pain, diarrhea, nausea and vomiting.   Genitourinary: Negative for difficulty urinating, dysuria, frequency and urgency.   Musculoskeletal: Negative for arthralgias and neck stiffness.   Skin: Negative for rash.   Neurological: Negative for dizziness, weakness and headaches.   Psychiatric/Behavioral: Negative for sleep disturbance. The patient is not nervous/anxious.        Objective:      Physical Exam   Constitutional: She is oriented to person, place, and time. She appears well-developed and well-nourished.  Non-toxic appearance. No distress.   HENT:   Head: Normocephalic and atraumatic.   Right Ear: Tympanic membrane, external ear and ear canal normal.   Left Ear: Tympanic membrane, external ear and ear canal normal.   Eyes: EOM are normal. Pupils are equal, round, and reactive to light. No scleral icterus.   Neck: Normal range of motion. Neck supple. No thyromegaly present.   Cardiovascular: Normal rate, regular rhythm and normal heart sounds.   Pulmonary/Chest: Effort normal and breath sounds normal.   Abdominal: Soft. Bowel sounds are normal. She exhibits no mass. There is no tenderness. There is no rebound.   Musculoskeletal: Normal range of motion.   Lymphadenopathy:     She has no cervical adenopathy.   Neurological: She is alert  and oriented to person, place, and time. She has normal reflexes. She displays normal reflexes. No cranial nerve deficit. She exhibits normal muscle tone. Coordination normal.   Skin: Skin is warm and dry.   Psychiatric: She has a normal mood and affect. Her behavior is normal.       Assessment:       1. Bronchitis        Plan:   Carmen was seen today for cough.    Diagnoses and all orders for this visit:    Bronchitis    Other orders  -     methylPREDNISolone (MEDROL DOSEPACK) 4 mg tablet; use as directed  -     promethazine-codeine 6.25-10 mg/5 ml (PHENERGAN WITH CODEINE) 6.25-10 mg/5 mL syrup; Take 5 mLs by mouth nightly as needed for Cough.

## 2018-12-03 ENCOUNTER — PATIENT MESSAGE (OUTPATIENT)
Dept: INTERNAL MEDICINE | Facility: CLINIC | Age: 48
End: 2018-12-03

## 2018-12-03 ENCOUNTER — TELEPHONE (OUTPATIENT)
Dept: INTERNAL MEDICINE | Facility: CLINIC | Age: 48
End: 2018-12-03

## 2018-12-03 ENCOUNTER — HOSPITAL ENCOUNTER (OUTPATIENT)
Dept: RADIOLOGY | Facility: HOSPITAL | Age: 48
Discharge: HOME | End: 2018-12-03
Attending: INTERNAL MEDICINE
Payer: COMMERCIAL

## 2018-12-03 ENCOUNTER — OFFICE VISIT (OUTPATIENT)
Dept: INTERNAL MEDICINE | Facility: CLINIC | Age: 48
End: 2018-12-03
Payer: COMMERCIAL

## 2018-12-03 VITALS
BODY MASS INDEX: 27.15 KG/M2 | WEIGHT: 163.13 LBS | DIASTOLIC BLOOD PRESSURE: 80 MMHG | SYSTOLIC BLOOD PRESSURE: 135 MMHG

## 2018-12-03 DIAGNOSIS — R31.9 HEMATURIA, UNSPECIFIED TYPE: Primary | ICD-10-CM

## 2018-12-03 DIAGNOSIS — M54.9 BILATERAL BACK PAIN, UNSPECIFIED BACK LOCATION, UNSPECIFIED CHRONICITY: Primary | ICD-10-CM

## 2018-12-03 DIAGNOSIS — R10.9 FLANK PAIN: ICD-10-CM

## 2018-12-03 PROBLEM — J32.9 CHRONIC SINUSITIS: Status: RESOLVED | Noted: 2017-09-01 | Resolved: 2018-12-03

## 2018-12-03 LAB
BACTERIA #/AREA URNS AUTO: ABNORMAL /HPF
BILIRUB UR QL STRIP: NEGATIVE
CLARITY UR REFRACT.AUTO: ABNORMAL
COLOR UR AUTO: YELLOW
GLUCOSE UR QL STRIP: NEGATIVE
HGB UR QL STRIP: ABNORMAL
KETONES UR QL STRIP: NEGATIVE
LEUKOCYTE ESTERASE UR QL STRIP: NEGATIVE
MICROSCOPIC COMMENT: ABNORMAL
NITRITE UR QL STRIP: NEGATIVE
PH UR STRIP: 6 [PH] (ref 5–8)
PROT UR QL STRIP: NEGATIVE
RBC #/AREA URNS AUTO: 18 /HPF (ref 0–4)
SP GR UR STRIP: 1.02 (ref 1–1.03)
SQUAMOUS #/AREA URNS AUTO: 0 /HPF
URN SPEC COLLECT METH UR: ABNORMAL
WBC #/AREA URNS AUTO: 2 /HPF (ref 0–5)

## 2018-12-03 PROCEDURE — 99214 OFFICE O/P EST MOD 30 MIN: CPT | Mod: 25,S$GLB,, | Performed by: INTERNAL MEDICINE

## 2018-12-03 PROCEDURE — 87086 URINE CULTURE/COLONY COUNT: CPT

## 2018-12-03 PROCEDURE — 99999 PR PBB SHADOW E&M-EST. PATIENT-LVL III: CPT | Mod: PBBFAC,,, | Performed by: INTERNAL MEDICINE

## 2018-12-03 PROCEDURE — 3008F BODY MASS INDEX DOCD: CPT | Mod: CPTII,S$GLB,, | Performed by: INTERNAL MEDICINE

## 2018-12-03 PROCEDURE — 81001 URINALYSIS AUTO W/SCOPE: CPT

## 2018-12-03 PROCEDURE — 74176 CT ABD & PELVIS W/O CONTRAST: CPT | Mod: TC

## 2018-12-03 PROCEDURE — 96372 THER/PROPH/DIAG INJ SC/IM: CPT | Mod: S$GLB,,, | Performed by: INTERNAL MEDICINE

## 2018-12-03 PROCEDURE — 87088 URINE BACTERIA CULTURE: CPT

## 2018-12-03 PROCEDURE — 74176 CT ABD & PELVIS W/O CONTRAST: CPT | Mod: 26,,, | Performed by: INTERNAL MEDICINE

## 2018-12-03 PROCEDURE — 87147 CULTURE TYPE IMMUNOLOGIC: CPT

## 2018-12-03 RX ORDER — KETOROLAC TROMETHAMINE 30 MG/ML
60 INJECTION, SOLUTION INTRAMUSCULAR; INTRAVENOUS
Status: COMPLETED | OUTPATIENT
Start: 2018-12-03 | End: 2018-12-03

## 2018-12-03 RX ADMIN — KETOROLAC TROMETHAMINE 60 MG: 30 INJECTION, SOLUTION INTRAMUSCULAR; INTRAVENOUS at 11:12

## 2018-12-03 NOTE — PROGRESS NOTES
Two pt identifiers verified (name & ). Pt rated back pain 9/10. Pt tolerated well.  Pt advised to remain in clinic for 15 minutes and report any difficulties. Pt to call office in 30 minutes with pain re-assessment.

## 2018-12-03 NOTE — PATIENT INSTRUCTIONS
Back Pain (Acute or Chronic)    Back pain is one of the most common problems. The good news is that most people feel better in 1 to 2 weeks, and most of the rest in 1 to 2 months. Most people can remain active.  People experience and describe pain differently; not everyone is the same.  · The pain can be sharp, stabbing, shooting, aching, cramping or burning.  · Movement, standing, bending, lifting, sitting, or walking may worsen pain.  · It can be localized to one spot or area, or it can be more generalized.  · It can spread or radiate upwards, to the front, or go down your arms or legs (sciatica).  · It can cause muscle spasm.  Most of the time, mechanical problems with the muscles or spine cause the pain. Mechanical problems are usually caused by an injury to the muscles or ligaments. While illness can cause back pain, it is usually not caused by a serious illness. Mechanical problems include:   · Physical activity such as sports, exercise, work, or normal activity  · Overexertion, lifting, pushing, pulling incorrectly or too aggressively  · Sudden twisting, bending, or stretching from an accident, or accidental movement  · Poor posture  · Stretching or moving wrong, without noticing pain at the time  · Poor coordination, lack of regular exercise (check with your doctor about this)  · Spinal disc disease or arthritis  · Stress  Pain can also be related to pregnancy, or illness like appendicitis, bladder or kidney infections, pelvic infections, and many other things.  Acute back pain usually gets better in 1 to 2 weeks. Back pain related to disk disease, arthritis in the spinal joints or spinal stenosis (narrowing of the spinal canal) can become chronic and last for months or years.  Unless you had a physical injury (for example, a car accident or fall) X-rays are usually not needed for the initial evaluation of back pain. If pain continues and does not respond to medical treatment, X-rays and other tests may be  needed.  Home care  Try these home care recommendations:  · When in bed, try to find a position of comfort. A firm mattress is best. Try lying flat on your back with pillows under your knees. You can also try lying on your side with your knees bent up towards your chest and a pillow between your knees.  · At first, do not try to stretch out the sore spots. If there is a strain, it is not like the good soreness you get after exercising without an injury. In this case, stretching may make it worse.  · Avoid prolong sitting, long car rides, or travel. This puts more stress on the lower back than standing or walking.  · During the first 24 to 72 hours after an acute injury or flare up of chronic back pain, apply an ice pack to the painful area for 20 minutes and then remove it for 20 minutes. Do this over a period of 60 to 90 minutes or several times a day. This will reduce swelling and pain. Wrap the ice pack in a thin towel or plastic to protect your skin.  · You can start with ice, then switch to heat. Heat (hot shower, hot bath, or heating pad) reduces pain and works well for muscle spasms. Heat can be applied to the painful area for 20 minutes then remove it for 20 minutes. Do this over a period of 60 to 90 minutes or several times a day. Do not sleep on a heating pad. It can lead to skin burns or tissue damage.  · You can alternate ice and heat therapy. Talk with your doctor about the best treatment for your back pain.  · Therapeutic massage can help relax the back muscles without stretching them.  · Be aware of safe lifting methods and do not lift anything without stretching first.  Medicines  Talk to your doctor before using medicine, especially if you have other medical problems or are taking other medicines.  · You may use over-the-counter medicine as directed on the bottle to control pain, unless another pain medicine was prescribed. If you have chronic conditions like diabetes, liver or kidney disease,  stomach ulcers, or gastrointestinal bleeding, or are taking blood thinners, talk to your doctor before taking any medicine.  · Be careful if you are given a prescription medicines, narcotics, or medicine for muscle spasms. They can cause drowsiness, affect your coordination, reflexes, and judgement. Do not drive or operate heavy machinery.  Follow-up care  Follow up with your healthcare provider, or as advised.   A radiologist will review any X-rays that were taken. Your provide will notify you of any new findings that may affect your care.  Call 911  Call emergency services if any of the following occur:  · Trouble breathing  · Confusion  · Very drowsy or trouble awakening  · Fainting or loss of consciousness  · Rapid or very slow heart rate  · Loss of bowel or bladder control  When to seek medical advice  Call your healthcare provider right away if any of these occur:   · Pain becomes worse or spreads to your legs  · Weakness or numbness in one or both legs  · Numbness in the groin or genital area  Date Last Reviewed: 7/1/2016  © 5858-0903 [employer]. [address] [place] [address], [place] [address]. All rights reserved. This information is not intended as a substitute for professional medical care. Always follow your healthcare professional's instructions.

## 2018-12-03 NOTE — PROGRESS NOTES
Subjective:       Patient ID: Carmen Morrow is a 48 y.o. female.    Chief Complaint: Back Pain    [facility] appt    Dr Bailon patient    Severe back pain/flank area on the right side started today.  Pain shoots into R side of groin.  Thought she had a renal stone in 2016 but CT did not confirm.    No fever, chills or sweats. Some URI issues in past 2 weeks.  Seen twice in given various courses of antibiotics, Tessalon, cough suppressants and recently, steroids.    Ongoing back issues and many orthopedic issues.  No recent trauma or injury.  No falls.    No obvious hematuria.  Some slight frequency.  Urinalysis today dipstick is acceptable.    Patient Active Problem List:     Cervicalgia     CN (constipation)     Chronic midline low back pain with left-sided sciatica     Worsening headaches     Chondromalacia of right patella     Pes anserinus bursitis of right knee     Internal derangement of right knee     Latent tuberculosis by skin test     GERD (gastroesophageal reflux disease)     Baker's cyst of knee, right     Chondromalacia patellae of left knee     Quadriceps weakness     Bilateral occipital neuralgia            Review of Systems   Constitutional: Negative for fatigue and fever.   Gastrointestinal: Negative for abdominal pain.   Genitourinary: Positive for frequency. Negative for difficulty urinating and hematuria.   Musculoskeletal: Positive for back pain.   Skin: Negative for rash.   Neurological: Negative for weakness and numbness.       Objective:      Physical Exam   Constitutional: She is oriented to person, place, and time. She appears well-developed and well-nourished.   HENT:   Head: Normocephalic and atraumatic.   Neck: Normal range of motion. Neck supple.   Cardiovascular: Normal rate and regular rhythm.   No murmur heard.  Pulmonary/Chest: Effort normal and breath sounds normal. No respiratory distress. She has no wheezes.   Abdominal: Soft. She exhibits no distension.   Musculoskeletal:   No CVA  pain.  Negative SLR.  Strength UE and LE wnl.  No pain over spine   Neurological: She is oriented to person, place, and time. She displays normal reflexes. No cranial nerve deficit.   Skin: Skin is warm and dry.   Psychiatric: She has a normal mood and affect. Her behavior is normal.       Assessment:       1. Bilateral back pain, unspecified back location, unspecified chronicity    2. Flank pain        Plan:         Bilateral back pain, unspecified back location, unspecified chronicity:  She has had for some time and is familiar with the natural history.  Ice, anti-inflammatories, muscle relaxants which she has at home.  Red flag/alarm symptoms reviewed.  X-rays reviewed    Flank pain:  Kidney stone alarm symptoms reviewed, to ER should symptoms worsen; she has not had a formal diagnosis of kidney stones in the past  -     CT Abdomen Pelvis  Without Contrast  -     Urinalysis  -     Urine culture    Other orders  -     ketorolac injection 60 mg    Hydration    I will review all studies and determine further tx depending on findings

## 2018-12-03 NOTE — TELEPHONE ENCOUNTER
Spoke to pt and advised. Urology phone number even to pt to get appt scheduled (she needed to check her schedule). Pt advised that injection did help with the pain.

## 2018-12-03 NOTE — TELEPHONE ENCOUNTER
Please call, I sent her a [employer] message.  She has blood in the urine.  Otherwise, CT scan is acceptable.  She needs to see Urology, referral is in, please assist with appointment.  I will review the results of the urine when it is completed (culture)

## 2018-12-04 LAB — BACTERIA UR CULT: NORMAL

## 2018-12-05 ENCOUNTER — TELEPHONE (OUTPATIENT)
Dept: INTERNAL MEDICINE | Facility: CLINIC | Age: 48
End: 2018-12-05

## 2018-12-05 RX ORDER — AMPICILLIN 250 MG/1
250 CAPSULE ORAL EVERY 6 HOURS
Qty: 20 CAPSULE | Refills: 0 | Status: SHIPPED | OUTPATIENT
Start: 2018-12-05 | End: 2018-12-06 | Stop reason: ALTCHOICE

## 2018-12-05 NOTE — TELEPHONE ENCOUNTER
Spoke with pt advised of results/ medication. Pt verbalized understanding. Transferred pt to Urology department to get scheduled. Also advised pt to f/u with PCP after urology appointment. Pt verbalized understanding. No further questions or concerns

## 2018-12-05 NOTE — TELEPHONE ENCOUNTER
She has a very small amount of bacteria in the urine    Am not sure that it significant but given the findings, I would like to treat with some antibiotics.    This was sent to her pharmacy.  Please schedule Urology appointment and follow up with her PCP as well in the next several weeks thanks

## 2018-12-06 ENCOUNTER — OFFICE VISIT (OUTPATIENT)
Dept: UROLOGY | Facility: CLINIC | Age: 48
End: 2018-12-06
Payer: COMMERCIAL

## 2018-12-06 VITALS
SYSTOLIC BLOOD PRESSURE: 160 MMHG | WEIGHT: 163 LBS | DIASTOLIC BLOOD PRESSURE: 88 MMHG | HEART RATE: 92 BPM | TEMPERATURE: 99 F | HEIGHT: 65 IN | BODY MASS INDEX: 27.16 KG/M2

## 2018-12-06 DIAGNOSIS — R82.90 ABNORMAL RESULT ON SCREENING URINE TEST: ICD-10-CM

## 2018-12-06 DIAGNOSIS — R31.29 MICROHEMATURIA: Primary | ICD-10-CM

## 2018-12-06 DIAGNOSIS — R10.9 FLANK PAIN: ICD-10-CM

## 2018-12-06 PROCEDURE — 99999 PR PBB SHADOW E&M-EST. PATIENT-LVL III: CPT | Mod: PBBFAC,,, | Performed by: UROLOGY

## 2018-12-06 PROCEDURE — 87186 SC STD MICRODIL/AGAR DIL: CPT

## 2018-12-06 PROCEDURE — 87077 CULTURE AEROBIC IDENTIFY: CPT

## 2018-12-06 PROCEDURE — 99215 OFFICE O/P EST HI 40 MIN: CPT | Mod: S$GLB,,, | Performed by: UROLOGY

## 2018-12-06 PROCEDURE — 87088 URINE BACTERIA CULTURE: CPT

## 2018-12-06 PROCEDURE — 87086 URINE CULTURE/COLONY COUNT: CPT

## 2018-12-06 PROCEDURE — 3008F BODY MASS INDEX DOCD: CPT | Mod: CPTII,S$GLB,, | Performed by: UROLOGY

## 2018-12-06 RX ORDER — PHENTERMINE HYDROCHLORIDE 37.5 MG/1
TABLET ORAL
Refills: 0 | COMMUNITY
Start: 2018-10-24 | End: 2019-02-01

## 2018-12-06 RX ORDER — CEPHALEXIN 500 MG/1
500 CAPSULE ORAL EVERY 8 HOURS
Qty: 30 CAPSULE | Refills: 0 | Status: SHIPPED | OUTPATIENT
Start: 2018-12-06 | End: 2018-12-16

## 2018-12-06 NOTE — LETTER
December 6, 2018      Anjelica Renee MD  [address]  [facility] [id number]           [place] [facility]  [address]  [place] [address]  Phone: [phone]          Patient: Carmen Morrow   MR Number: [id number]   YOB: 1970   Date of Visit: 12/6/2018       Dear Dr. Anjelica Rneee:    Thank you for referring Carmen Morrow to me for evaluation. Attached you will find relevant portions of my assessment and plan of care.    If you have questions, please do not hesitate to call me. I look forward to following Carmen Morrow along with you.    Sincerely,    Ta Guardado MD    Enclosure  CC:  No Recipients    If you would like to receive this communication electronically, please contact [email or web address] or [phone] to request more information on [facility] access.    For providers and/or their staff who would like to refer a patient to [facility], please contact us through our one-stop-shop provider referral line, [facility], at [phone].    If you feel you have received this communication in error or would no longer like to receive these types of communications, please e-mail [email or web address]

## 2018-12-06 NOTE — PROGRESS NOTES
HPI:  Carmen Morrow is a 48 y.o. year old female that  presents with No chief complaint on file.  .  This patient referred by her PCP with hematuria    Patient new to me however had negative workup for microscopic hematuria 2015 at [facility] including negative cystoscopy and negative CT urogram.    Patient recently has been having some right flank pain and stone protocol CT shows no stones.  Urine culture did show low growth group B strep for which ampicillin was prescribed however she states that her pharmacy is having trouble obtaining this    Patient has no dysuria or gross hematuria    Patient has never had urologic surgery and there is no family history of kidney or bladder cancer    Chart review shows notes for [facility]  as summarized above.  Stone protocol CT from this month reviewed by me in agree that no stones are present.  In July of this year GFR is greater than 60      Past Medical History:   Diagnosis Date    Brachial neuritis or radiculitis NOS 2012    Degeneration of cervical intervertebral disc 2012    GERD (gastroesophageal reflux disease)     SVT (supraventricular tachycardia)      Social History     Socioeconomic History    Marital status:      Spouse name: Not on file    Number of children: Not on file    Years of education: Not on file    Highest education level: Not on file   Social Needs    Financial resource strain: Not on file    Food insecurity - worry: Not on file    Food insecurity - inability: Not on file    Transportation needs - medical: Not on file    Transportation needs - non-medical: Not on file   Occupational History    Not on file   Tobacco Use    Smoking status: Former Smoker     Last attempt to quit: 2000     Years since quittin.9    Smokeless tobacco: Never Used   Substance and Sexual Activity    Alcohol use: No    Drug use: No    Sexual activity: Yes     Partners: Male     Birth  control/protection: None   Other Topics Concern    Not on file   Social History Narrative    Not on file     Past Surgical History:   Procedure Laterality Date    anterior diskectomy fusion  05/20/2016    ARTHROCENTESIS;AMNIOX Right 12/22/2016    [facility]    BREAST RECONSTRUCTION      augmentation    BREAST SURGERY Bilateral 2006    CARPAL TUNNEL RELEASE Left 01/30/2018    CHONDROPLASTY-KNEE Right 12/22/2016    [facility] by [facility] at [facility]    COLONOSCOPY N/A 1/9/2015    Performed by Zain Carl [facility] at [facility] ENDO (4TH FLR)    CSF leak  05/22/2016    Replace and repair fat graft from surgery on 5/20/2016    ESOPHAGOGASTRODUODENOSCOPY (EGD) N/A 8/9/2017    Performed by [facility] Jacques [facility] at [facility] ENDO    KNEE ARTHROSCOPY      LAMINECTOMY      MANOMETRY-ANORECTAL N/A 1/12/2015    Performed by Gama East MD at [facility] ENDO (4TH FLR)    RESECTION-TURBINATES (SMR) Bilateral 9/1/2017    Performed by Breana Abbott MD at [facility] OR    SEPTOPLASTY N/A 9/1/2017    Performed by Breana Abbott [facility] at [facility] OR    SINUS SURGERY FUNCTIONAL ENDOSCOPIC WITH NAVIGATION N/A 9/1/2017    Performed by Breana Abbott [facility] at [facility] OR    SPINAL FUSION      SUBCHONDROPLASTY; C-ARM Right 12/22/2016    [facility] at [facility] OR    SYNOVECTOMY-KNEE Right 12/22/2016    Per[facility] by [facility] at [facility] OR     Family History   Problem Relation Age of Onset    Heart disease Mother     Hypertension Mother     Asthma Mother     Heart disease Father     COPD Father     Diabetes Father     Ovarian cancer Neg Hx     Colon cancer Neg Hx     Breast cancer Neg Hx     Cancer Neg Hx            Review of Systems  The patient has no chest pains.  The patient has no shortness of breath  Patient wears        glasses.  All other review of systems are negative.      Physical Exam:  BP (!) 160/88 (BP Location: Right arm, Patient Position:  "Sitting)   Pulse 92   Temp 98.6 °F (37 °C)   Ht 5' 5" (1.651 m)   Wt 73.9 kg (163 lb)   LMP 11/12/2018   BMI 27.12 kg/m²   General appearance: alert, cooperative, no distress  Constitutional:Oriented to person, place, and time.appears well-developed and well-nourished.   HEENT: Normocephalic, atraumatic, neck symmetrical, no nasal discharge   Eyes: conjunctivae/corneas clear, PERRL, EOM's intact  Lungs: clear to auscultation bilaterally, no dullness to percussion bilaterally  Heart: regular rate and rhythm without rub; no displacement of the PMI   Abdomen: soft, non-tender; bowel sounds normoactive; no organomegaly  :  Urethral meatus without lesion, no urethral masses noted, bladder nontender /nondistended, vagina normal appearing,      no      cystocele, anus and perineum without lesions, no adnexal or uterine masses noted.  Extremities: extremities symmetric; no clubbing, cyanosis, or edema  Integument: Skin color, texture, turgor normal; no rashes; hair distrubution normal  Neurologic: Alert and oriented X 3, normal strength, normal coordination and gait  Psychiatric: no pressured speech; normal affect; no evidence of impaired cognition     LABS:    Complete Blood Count  Lab Results   Component Value Date    RBC 4.63 07/18/2018    HGB 13.0 07/18/2018    HCT 39.6 07/18/2018    MCV 86 07/18/2018    MCH 28.1 07/18/2018    MCHC 32.8 07/18/2018    RDW 15.8 (H) 07/18/2018     (H) 07/18/2018    MPV 10.4 07/18/2018    GRAN 5.3 07/18/2018    GRAN 60.9 07/18/2018    LYMPH 2.6 07/18/2018    LYMPH 29.7 07/18/2018    MONO 0.6 07/18/2018    MONO 6.7 07/18/2018    EOS 0.2 07/18/2018    BASO 0.02 07/18/2018    EOSINOPHIL 2.3 07/18/2018    BASOPHIL 0.2 07/18/2018    DIFFMETHOD Automated 07/18/2018       Comprehensive Metabolic Panel  Lab Results   Component Value Date    GLU 94 07/18/2018    BUN 13 07/18/2018    CREATININE 0.8 07/18/2018     07/18/2018    K 3.9 07/18/2018     07/18/2018    PROT 8.0 " 07/18/2018    ALBUMIN 4.0 07/18/2018    BILITOT 0.6 07/18/2018    AST 17 07/18/2018    ALKPHOS 65 07/18/2018    CO2 29 07/18/2018    ALT 18 07/18/2018    ANIONGAP 6 (L) 07/18/2018    EGFRNONAA >60 07/18/2018    ESTGFRAFRICA >60 07/18/2018       PSA  No results found for: PSA      Assessment:    ICD-10-CM ICD-9-CM    1. Microhematuria R31.29 599.72    2. Abnormal result on screening urine test R82.90 791.9    3. Flank pain R10.9 789.09      The primary encounter diagnosis was Microhematuria. Diagnoses of Abnormal result on screening urine test and Flank pain were also pertinent to this visit.      Plan:  1.  Micro hematuria.  Patient has had recent negative workup in repeat workup not indicated    2.  Dip urine positive for blood.  As per 1.    3.  Flank pain.  And 4.  Low growth group B strep.  Doubt flank pain is of urologic origin however given positive results of urine culture, I do think it reasonable to give her a course of antibiotics.  As she has been unable to get the ampicillin, will write a prescription for Keflex.    At this point follow up with me on an as-needed basis.  No orders of the defined types were placed in this encounter.          Ta Guardado MD    PLEASE NOTE:  Please be advised that portions of this note were dictated using voice recognition software and may contain dictation related errors in spelling/grammar/appropriate pronouns/syntax or other errors that might have not been found and or corrected on text review.

## 2018-12-10 ENCOUNTER — PATIENT MESSAGE (OUTPATIENT)
Dept: OBSTETRICS AND GYNECOLOGY | Facility: CLINIC | Age: 48
End: 2018-12-10

## 2018-12-10 ENCOUNTER — TELEPHONE (OUTPATIENT)
Dept: UROLOGY | Facility: CLINIC | Age: 48
End: 2018-12-10

## 2018-12-10 DIAGNOSIS — Z12.31 VISIT FOR SCREENING MAMMOGRAM: Primary | ICD-10-CM

## 2018-12-10 LAB — BACTERIA UR CULT: NORMAL

## 2018-12-10 NOTE — TELEPHONE ENCOUNTER
----- Message from Ta Guardado MD sent at 12/10/2018 10:30 AM CST -----  Please contact the patient and let her know urine culture showed urinary tract infection sensitive to the antibiotic that was written by me at her office visit.    Please schedule follow-up appointment with me in 6-8 weeks for recheck.

## 2018-12-10 NOTE — TELEPHONE ENCOUNTER
Patient called back, informed of outcome/plan.  Voiced understanding.  Appointment for re-check scheduled with patient.

## 2019-01-10 ENCOUNTER — OFFICE VISIT (OUTPATIENT)
Dept: OBSTETRICS AND GYNECOLOGY | Facility: CLINIC | Age: 49
End: 2019-01-10
Payer: COMMERCIAL

## 2019-01-10 VITALS — BODY MASS INDEX: 28.3 KG/M2 | HEIGHT: 65 IN | WEIGHT: 169.88 LBS

## 2019-01-10 DIAGNOSIS — Z12.4 ROUTINE PAPANICOLAOU SMEAR: Primary | ICD-10-CM

## 2019-01-10 PROCEDURE — 99999 PR PBB SHADOW E&M-EST. PATIENT-LVL III: CPT | Mod: PBBFAC,,, | Performed by: OBSTETRICS & GYNECOLOGY

## 2019-01-10 PROCEDURE — 88175 CYTOPATH C/V AUTO FLUID REDO: CPT

## 2019-01-10 PROCEDURE — 99396 PREV VISIT EST AGE 40-64: CPT | Mod: S$GLB,,, | Performed by: OBSTETRICS & GYNECOLOGY

## 2019-01-10 PROCEDURE — 99999 PR PBB SHADOW E&M-EST. PATIENT-LVL III: ICD-10-PCS | Mod: PBBFAC,,, | Performed by: OBSTETRICS & GYNECOLOGY

## 2019-01-10 PROCEDURE — 99396 PR PREVENTIVE VISIT,EST,40-64: ICD-10-PCS | Mod: S$GLB,,, | Performed by: OBSTETRICS & GYNECOLOGY

## 2019-01-10 NOTE — PROGRESS NOTES
"HPI:   48 y.o.   OB History      Para Term  AB Living    2 1 1   1 0    SAB TAB Ectopic Multiple Live Births    1               Patient's last menstrual period was 2019.    Patient is  here for her annual gynecologic exam.  She has no complaints.     ROS:  GENERAL: No fever, chills, fatigability or weight loss.  SKIN: No rashes, itching or changes in color or texture of skin.  HEAD: No headaches or recent head trauma.  EYES: Visual acuity fine. No photophobia, ocular pain or diplopia.  EARS: Denies ear pain, discharge or vertigo.  NOSE: No loss of smell, no epistaxis or postnasal drip.  MOUTH & THROAT: No hoarseness or change in voice. No excessive gum bleeding.  NODES: Denies swollen glands.  CHEST: Denies MENDEZ, cyanosis, wheezing, cough and sputum production.  CARDIOVASCULAR: Denies chest pain, PND, orthopnea or reduced exercise tolerance.  ABDOMEN: Appetite fine. No weight loss. Denies diarrhea, abdominal pain, hematemesis or blood in stool.  URINARY: No flank pain, dysuria or hematuria.  PERIPHERAL VASCULAR: No claudication or cyanosis.  MUSCULOSKELETAL: No joint stiffness or swelling. Denies back pain.  NEUROLOGIC: No history of seizures, paralysis, alteration of gait or coordination.    PE:   Ht 5' 5" (1.651 m)   Wt 77 kg (169 lb 13.8 oz)   LMP 2019   BMI 28.27 kg/m²   APPEARANCE: Well nourished, well developed, in no acute distress.  NECK: Neck symmetric without masses or thyromegaly.  BREASTS: Symmetrical, no skin changes or visible lesions. No palpable masses, nipple discharge or adenopathy bilaterally.  ABDOMEN: Flat. Soft. No tenderness or masses. No hepatosplenomegaly. No hernias. No CVA tenderness.  VULVA: No lesions. Normal female genitalia.  URETHRAL MEATUS: Normal size and location, no lesions, no prolapse.  URETHRA: No masses, tenderness, prolapse or scarring.  VAGINA: Moist and well rugated, no discharge, no significant cystocele or rectocele.  CERVIX: No lesions and " discharge. PAP done.  UTERUS: Normal size, regular shape, mobile, non-tender, bladder base nontender.  ADNEXA: No masses, tenderness or CDS nodularity.  ANUS PERINEUM: Normal.    PROCEDURES:  Pap smear    Assessment:  Normal Gynecologic Exam    Plan:  Mammogram and Colonoscopy if indicated by current recommendations.  Return to clinic in one year or for any problems or complaints.  Reg cycles

## 2019-01-28 ENCOUNTER — OFFICE VISIT (OUTPATIENT)
Dept: NEUROLOGY | Facility: CLINIC | Age: 49
End: 2019-01-28
Payer: COMMERCIAL

## 2019-01-28 VITALS
WEIGHT: 168.88 LBS | SYSTOLIC BLOOD PRESSURE: 139 MMHG | HEIGHT: 65 IN | HEART RATE: 71 BPM | DIASTOLIC BLOOD PRESSURE: 85 MMHG | BODY MASS INDEX: 28.14 KG/M2

## 2019-01-28 DIAGNOSIS — M54.81 BILATERAL OCCIPITAL NEURALGIA: ICD-10-CM

## 2019-01-28 PROCEDURE — 99999 PR PBB SHADOW E&M-EST. PATIENT-LVL III: CPT | Mod: PBBFAC,,, | Performed by: PSYCHIATRY & NEUROLOGY

## 2019-01-28 PROCEDURE — 99999 PR PBB SHADOW E&M-EST. PATIENT-LVL III: ICD-10-PCS | Mod: PBBFAC,,, | Performed by: PSYCHIATRY & NEUROLOGY

## 2019-01-28 PROCEDURE — 99214 PR OFFICE/OUTPT VISIT, EST, LEVL IV, 30-39 MIN: ICD-10-PCS | Mod: S$GLB,,, | Performed by: PSYCHIATRY & NEUROLOGY

## 2019-01-28 PROCEDURE — 99214 OFFICE O/P EST MOD 30 MIN: CPT | Mod: S$GLB,,, | Performed by: PSYCHIATRY & NEUROLOGY

## 2019-01-28 PROCEDURE — 3008F PR BODY MASS INDEX (BMI) DOCUMENTED: ICD-10-PCS | Mod: CPTII,S$GLB,, | Performed by: PSYCHIATRY & NEUROLOGY

## 2019-01-28 PROCEDURE — 3008F BODY MASS INDEX DOCD: CPT | Mod: CPTII,S$GLB,, | Performed by: PSYCHIATRY & NEUROLOGY

## 2019-01-28 RX ORDER — AMITRIPTYLINE HYDROCHLORIDE 25 MG/1
50 TABLET, FILM COATED ORAL NIGHTLY
Qty: 60 TABLET | Refills: 3 | Status: SHIPPED | OUTPATIENT
Start: 2019-01-28 | End: 2019-03-11 | Stop reason: SDUPTHER
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
## 2019-02-01 ENCOUNTER — OFFICE VISIT (OUTPATIENT)
Dept: UROLOGY | Facility: CLINIC | Age: 49
End: 2019-02-01
Payer: COMMERCIAL

## 2019-02-01 VITALS
DIASTOLIC BLOOD PRESSURE: 72 MMHG | HEIGHT: 65 IN | SYSTOLIC BLOOD PRESSURE: 134 MMHG | HEART RATE: 79 BPM | TEMPERATURE: 98 F | WEIGHT: 168 LBS | BODY MASS INDEX: 27.99 KG/M2

## 2019-02-01 DIAGNOSIS — N39.0 URINARY TRACT INFECTION WITHOUT HEMATURIA, SITE UNSPECIFIED: Primary | ICD-10-CM

## 2019-02-01 LAB
BILIRUB SERPL-MCNC: ABNORMAL MG/DL
BLOOD URINE, POC: ABNORMAL
COLOR, POC UA: YELLOW
GLUCOSE UR QL STRIP: ABNORMAL
KETONES UR QL STRIP: ABNORMAL
LEUKOCYTE ESTERASE URINE, POC: ABNORMAL
NITRITE, POC UA: ABNORMAL
PH, POC UA: 5
PROTEIN, POC: ABNORMAL
SPECIFIC GRAVITY, POC UA: 1.01
UROBILINOGEN, POC UA: ABNORMAL

## 2019-02-01 PROCEDURE — 81002 POCT URINE DIPSTICK WITHOUT MICROSCOPE: ICD-10-PCS | Mod: S$GLB,,, | Performed by: UROLOGY

## 2019-02-01 PROCEDURE — 3008F PR BODY MASS INDEX (BMI) DOCUMENTED: ICD-10-PCS | Mod: CPTII,S$GLB,, | Performed by: UROLOGY

## 2019-02-01 PROCEDURE — 87086 URINE CULTURE/COLONY COUNT: CPT

## 2019-02-01 PROCEDURE — 99999 PR PBB SHADOW E&M-EST. PATIENT-LVL IV: ICD-10-PCS | Mod: PBBFAC,,, | Performed by: UROLOGY

## 2019-02-01 PROCEDURE — 81002 URINALYSIS NONAUTO W/O SCOPE: CPT | Mod: S$GLB,,, | Performed by: UROLOGY

## 2019-02-01 PROCEDURE — 99213 OFFICE O/P EST LOW 20 MIN: CPT | Mod: 25,S$GLB,, | Performed by: UROLOGY

## 2019-02-01 PROCEDURE — 99999 PR PBB SHADOW E&M-EST. PATIENT-LVL IV: CPT | Mod: PBBFAC,,, | Performed by: UROLOGY

## 2019-02-01 PROCEDURE — 3008F BODY MASS INDEX DOCD: CPT | Mod: CPTII,S$GLB,, | Performed by: UROLOGY

## 2019-02-01 PROCEDURE — 99213 PR OFFICE/OUTPT VISIT, EST, LEVL III, 20-29 MIN: ICD-10-PCS | Mod: 25,S$GLB,, | Performed by: UROLOGY

## 2019-02-01 NOTE — PROGRESS NOTES
"This patient was last seen by me December of last year at which time she had a urinary tract infection, E coli, which was treated appropriately.    She has a prior history negative workup for micro hematuria    Patient now comes in follow-up and reports no interval problems.  Patient currently has no dysuria fevers or chills    Physical exam reveals reveals a well-developed well-nourished patient  in no acute distress.  Patient is alert and oriented ×3 with normal mood and affect.  Respiratory effort is normal and there is no peripheral edema.  Skin is normal to inspection and palpation    /72 (BP Location: Left arm, Patient Position: Sitting, BP Method: Medium (Automatic))   Pulse 79   Temp 98 °F (36.7 °C) (Oral)   Ht 5' 5" (1.651 m)   Wt 76.2 kg (168 lb)   LMP 01/03/2019   BMI 27.96 kg/m²   Review of Systems  General ROS: negative for chills, fever or weight loss  Respiratory ROS: no cough, shortness of breath, or wheezing  Cardiovascular ROS: no chest pain or dyspnea on exertion  Musculoskeletal ROS: negative for gait disturbance or muscular weakness    Family History   Problem Relation Age of Onset    Heart disease Mother     Hypertension Mother     Asthma Mother     Heart disease Father     COPD Father     Diabetes Father     Ovarian cancer Neg Hx     Colon cancer Neg Hx     Breast cancer Neg Hx     Cancer Neg Hx      Past Medical History:   Diagnosis Date    Allergy     Brachial neuritis or radiculitis NOS 11/14/2012    Degeneration of cervical intervertebral disc 11/14/2012    GERD (gastroesophageal reflux disease)     SVT (supraventricular tachycardia)      Family History   Problem Relation Age of Onset    Heart disease Mother     Hypertension Mother     Asthma Mother     Heart disease Father     COPD Father     Diabetes Father     Ovarian cancer Neg Hx     Colon cancer Neg Hx     Breast cancer Neg Hx     Cancer Neg Hx      Social History     Tobacco Use    Smoking " status: Former Smoker     Last attempt to quit: 2000     Years since quittin.0    Smokeless tobacco: Never Used   Substance Use Topics    Alcohol use: No       Impression:      ICD-10-CM ICD-9-CM    1. Urinary tract infection without hematuria, site unspecified N39.0 599.0 POCT URINE DIPSTICK WITHOUT MICROSCOPE      Urine culture       Plan:    #1.  Urinary tract infection, history of.  Patient's urine will be cultured and once results are available ,we will contact the patient if antibiotics are indicated.    At this point follow up with me on an as-needed basis    PLEASE NOTE:  Please be advised that portions of this note were dictated using voice recognition software and may contain dictation related errors in spelling/grammar/appropriate pronouns/syntax or other errors that might have not been found and or corrected on text review.

## 2019-02-02 LAB — BACTERIA UR CULT: NORMAL

## 2019-02-14 ENCOUNTER — PATIENT MESSAGE (OUTPATIENT)
Dept: INTERNAL MEDICINE | Facility: CLINIC | Age: 49
End: 2019-02-14

## 2019-02-15 ENCOUNTER — PATIENT MESSAGE (OUTPATIENT)
Dept: INTERNAL MEDICINE | Facility: CLINIC | Age: 49
End: 2019-02-15

## 2019-02-15 RX ORDER — ESOMEPRAZOLE MAGNESIUM 40 MG/1
40 CAPSULE, DELAYED RELEASE ORAL
Qty: 90 CAPSULE | Refills: 3 | Status: SHIPPED | OUTPATIENT
Start: 2019-02-15 | End: 2020-08-10

## 2019-03-06 ENCOUNTER — OFFICE VISIT (OUTPATIENT)
Dept: URGENT CARE | Facility: CLINIC | Age: 49
End: 2019-03-06
Payer: COMMERCIAL

## 2019-03-06 VITALS
DIASTOLIC BLOOD PRESSURE: 85 MMHG | HEIGHT: 65 IN | BODY MASS INDEX: 28.66 KG/M2 | SYSTOLIC BLOOD PRESSURE: 132 MMHG | WEIGHT: 172 LBS | TEMPERATURE: 98 F | RESPIRATION RATE: 16 BRPM | HEART RATE: 76 BPM | OXYGEN SATURATION: 99 %

## 2019-03-06 DIAGNOSIS — R05.9 COUGH: Primary | ICD-10-CM

## 2019-03-06 DIAGNOSIS — J01.90 ACUTE SINUSITIS, RECURRENCE NOT SPECIFIED, UNSPECIFIED LOCATION: ICD-10-CM

## 2019-03-06 LAB
CTP QC/QA: YES
FLUAV AG NPH QL: NEGATIVE
FLUBV AG NPH QL: NEGATIVE

## 2019-03-06 PROCEDURE — 87804 POCT INFLUENZA A/B: ICD-10-PCS | Mod: 59,QW,S$GLB, | Performed by: EMERGENCY MEDICINE

## 2019-03-06 PROCEDURE — 87804 INFLUENZA ASSAY W/OPTIC: CPT | Mod: QW,S$GLB,, | Performed by: EMERGENCY MEDICINE

## 2019-03-06 PROCEDURE — 3008F PR BODY MASS INDEX (BMI) DOCUMENTED: ICD-10-PCS | Mod: CPTII,S$GLB,, | Performed by: EMERGENCY MEDICINE

## 2019-03-06 PROCEDURE — 96372 PR INJECTION,THERAP/PROPH/DIAG2ST, IM OR SUBCUT: ICD-10-PCS | Mod: S$GLB,,, | Performed by: EMERGENCY MEDICINE

## 2019-03-06 PROCEDURE — 3008F BODY MASS INDEX DOCD: CPT | Mod: CPTII,S$GLB,, | Performed by: EMERGENCY MEDICINE

## 2019-03-06 PROCEDURE — 99214 PR OFFICE/OUTPT VISIT, EST, LEVL IV, 30-39 MIN: ICD-10-PCS | Mod: 25,S$GLB,, | Performed by: EMERGENCY MEDICINE

## 2019-03-06 PROCEDURE — 96372 THER/PROPH/DIAG INJ SC/IM: CPT | Mod: S$GLB,,, | Performed by: EMERGENCY MEDICINE

## 2019-03-06 PROCEDURE — 99214 OFFICE O/P EST MOD 30 MIN: CPT | Mod: 25,S$GLB,, | Performed by: EMERGENCY MEDICINE

## 2019-03-06 RX ORDER — BETAMETHASONE SODIUM PHOSPHATE AND BETAMETHASONE ACETATE 3; 3 MG/ML; MG/ML
6 INJECTION, SUSPENSION INTRA-ARTICULAR; INTRALESIONAL; INTRAMUSCULAR; SOFT TISSUE
Status: COMPLETED | OUTPATIENT
Start: 2019-03-06 | End: 2019-03-06

## 2019-03-06 RX ORDER — AZITHROMYCIN 250 MG/1
TABLET, FILM COATED ORAL
Qty: 6 TABLET | Refills: 0 | Status: SHIPPED | OUTPATIENT
Start: 2019-03-06 | End: 2019-03-13 | Stop reason: ALTCHOICE

## 2019-03-06 RX ADMIN — BETAMETHASONE SODIUM PHOSPHATE AND BETAMETHASONE ACETATE 6 MG: 3; 3 INJECTION, SUSPENSION INTRA-ARTICULAR; INTRALESIONAL; INTRAMUSCULAR; SOFT TISSUE at 09:03

## 2019-03-06 NOTE — PROGRESS NOTES
"Subjective:       Patient ID: Carmen Morrow is a 48 y.o. female.    Vitals:  height is 5' 5" (1.651 m) and weight is 78 kg (172 lb). Her oral temperature is 97.7 °F (36.5 °C). Her blood pressure is 132/85 and her pulse is 76. Her respiration is 16 and oxygen saturation is 99%.     Chief Complaint: Sinus Problem    Patient presents today with sinus pain and pressure with a cough. She has yellow sputum production , and her head feels full. No fever reported . She also complains of under her eyes feels " pressure."Onset of symptoms two days ago. Hx of same, does well with steroid IM and z-chayo, has ENT to f/u with.      Sinus Problem   This is a new problem. The current episode started in the past 7 days. The problem is unchanged. There has been no fever. Her pain is at a severity of 3/10. The pain is mild. Associated symptoms include congestion, coughing and sinus pressure. Pertinent negatives include no chills, headaches, shortness of breath or sore throat. Treatments tried: dayquil  The treatment provided mild relief.       Constitution: Negative for chills, fatigue and fever.   HENT: Positive for congestion, postnasal drip, sinus pain and sinus pressure. Negative for sore throat.    Neck: Negative for painful lymph nodes.   Cardiovascular: Negative for chest pain and leg swelling.   Eyes: Negative for double vision and blurred vision.   Respiratory: Positive for cough and sputum production. Negative for shortness of breath.    Gastrointestinal: Negative for nausea, vomiting and diarrhea.   Genitourinary: Negative for dysuria, frequency, urgency and history of kidney stones.   Musculoskeletal: Negative for joint pain, joint swelling, muscle cramps and muscle ache.   Skin: Negative for color change, pale, rash and bruising.   Allergic/Immunologic: Negative for seasonal allergies.   Neurological: Negative for dizziness, history of vertigo, light-headedness, passing out and headaches.   Hematologic/Lymphatic: " Negative for swollen lymph nodes.   Psychiatric/Behavioral: Negative for nervous/anxious, sleep disturbance and depression. The patient is not nervous/anxious.        Objective:      Physical Exam   Constitutional: She is oriented to person, place, and time. She appears well-developed and well-nourished.   Occas non prod cough   HENT:   Head: Normocephalic and atraumatic.   Right Ear: Tympanic membrane, external ear and ear canal normal.   Left Ear: Tympanic membrane, external ear and ear canal normal.   Nose: Mucosal edema present. No rhinorrhea. Right sinus exhibits maxillary sinus tenderness and frontal sinus tenderness. Left sinus exhibits maxillary sinus tenderness and frontal sinus tenderness.   Mouth/Throat: Uvula is midline, oropharynx is clear and moist and mucous membranes are normal.   Neck: Normal range of motion. Neck supple.   Cardiovascular: Normal rate, regular rhythm and normal heart sounds.   Pulmonary/Chest: Breath sounds normal.   Musculoskeletal: Normal range of motion.   Lymphadenopathy:     She has no cervical adenopathy.   Neurological: She is alert and oriented to person, place, and time.   Skin: Skin is warm and dry.   Psychiatric: She has a normal mood and affect. Her behavior is normal.       Assessment:       1. Cough    2. Acute sinusitis, recurrence not specified, unspecified location        Plan:         Cough  -     POCT Influenza A/B    Acute sinusitis, recurrence not specified, unspecified location        Jose Mix MD  Go to the Emergency Department for any problems  Call your PCP for follow up next available.

## 2019-03-06 NOTE — PATIENT INSTRUCTIONS
Jose Mix MD  Go to the Emergency Department for any problems  Call your PCP for follow up next available.    Sinusitis (Antibiotic Treatment)    The sinuses are air-filled spaces within the bones of the face. They connect to the inside of the nose. Sinusitis is an inflammation of the tissue lining the sinus cavity. Sinus inflammation can occur during a cold. It can also be due to allergies to pollens and other particles in the air. Sinusitis can cause symptoms of sinus congestion and fullness. A sinus infection causes fever, headache and facial pain. There is often green or yellow drainage from the nose or into the back of the throat (post-nasal drip). You have been given antibiotics to treat this condition.  Home care:  · Take the full course of antibiotics as instructed. Do not stop taking them, even if you feel better.  · Drink plenty of water, hot tea, and other liquids. This may help thin mucus. It also may promote sinus drainage.  · Heat may help soothe painful areas of the face. Use a towel soaked in hot water. Or,  the shower and direct the hot spray onto your face. Using a vaporizer along with a menthol rub at night may also help.   · An expectorant containing guaifenesin may help thin the mucus and promote drainage from the sinuses.  · Over-the-counter decongestants may be used unless a similar medicine was prescribed. Nasal sprays work the fastest. Use one that contains phenylephrine or oxymetazoline. First blow the nose gently. Then use the spray. Do not use these medicines more often than directed on the label or symptoms may get worse. You may also use tablets containing pseudoephedrine. Avoid products that combine ingredients, because side effects may be increased. Read labels. You can also ask the pharmacist for help. (NOTE: Persons with high blood pressure should not use decongestants. They can raise blood pressure.)  · Over-the-counter antihistamines may help if allergies contributed  to your sinusitis.    · Do not use nasal rinses or irrigation during an acute sinus infection, unless told to by your health care provider. Rinsing may spread the infection to other sinuses.  · Use acetaminophen or ibuprofen to control pain, unless another pain medicine was prescribed. (If you have chronic liver or kidney disease or ever had a stomach ulcer, talk with your doctor before using these medicines. Aspirin should never be used in anyone under 18 years of age who is ill with a fever. It may cause severe liver damage.)  · Don't smoke. This can worsen symptoms.  Follow-up care  Follow up with your healthcare provider or our staff if you are not improving within the next week.  When to seek medical advice  Call your healthcare provider if any of these occur:  · Facial pain or headache becoming more severe  · Stiff neck  · Unusual drowsiness or confusion  · Swelling of the forehead or eyelids  · Vision problems, including blurred or double vision  · Fever of 100.4ºF (38ºC) or higher, or as directed by your healthcare provider  · Seizure  · Breathing problems  · Symptoms not resolving within 10 days  Date Last Reviewed: 4/13/2015  © 8732-5477 [employer]. [address] [place] [id number]. All rights reserved. This information is not intended as a substitute for professional medical care. Always follow your healthcare professional's instructions.

## 2019-03-09 ENCOUNTER — TELEPHONE (OUTPATIENT)
Dept: URGENT CARE | Facility: CLINIC | Age: 49
End: 2019-03-09

## 2019-03-11 ENCOUNTER — OFFICE VISIT (OUTPATIENT)
Dept: NEUROLOGY | Facility: CLINIC | Age: 49
End: 2019-03-11
Payer: COMMERCIAL

## 2019-03-11 VITALS — BODY MASS INDEX: 29.09 KG/M2 | WEIGHT: 174.63 LBS | HEIGHT: 65 IN

## 2019-03-11 DIAGNOSIS — M54.81 BILATERAL OCCIPITAL NEURALGIA: ICD-10-CM

## 2019-03-11 PROCEDURE — 99214 PR OFFICE/OUTPT VISIT, EST, LEVL IV, 30-39 MIN: ICD-10-PCS | Mod: S$GLB,,, | Performed by: PSYCHIATRY & NEUROLOGY

## 2019-03-11 PROCEDURE — 99999 PR PBB SHADOW E&M-EST. PATIENT-LVL II: CPT | Mod: PBBFAC,,, | Performed by: PSYCHIATRY & NEUROLOGY

## 2019-03-11 PROCEDURE — 99999 PR PBB SHADOW E&M-EST. PATIENT-LVL II: ICD-10-PCS | Mod: PBBFAC,,, | Performed by: PSYCHIATRY & NEUROLOGY

## 2019-03-11 PROCEDURE — 99214 OFFICE O/P EST MOD 30 MIN: CPT | Mod: S$GLB,,, | Performed by: PSYCHIATRY & NEUROLOGY

## 2019-03-11 PROCEDURE — 3008F PR BODY MASS INDEX (BMI) DOCUMENTED: ICD-10-PCS | Mod: CPTII,S$GLB,, | Performed by: PSYCHIATRY & NEUROLOGY

## 2019-03-11 PROCEDURE — 3008F BODY MASS INDEX DOCD: CPT | Mod: CPTII,S$GLB,, | Performed by: PSYCHIATRY & NEUROLOGY

## 2019-03-11 RX ORDER — AMITRIPTYLINE HYDROCHLORIDE 50 MG/1
50 TABLET, FILM COATED ORAL NIGHTLY
Qty: 30 TABLET | Refills: 11 | Status: SHIPPED | OUTPATIENT
Start: 2019-03-11 | End: 2019-11-21
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
## 2019-03-13 ENCOUNTER — OFFICE VISIT (OUTPATIENT)
Dept: INTERNAL MEDICINE | Facility: CLINIC | Age: 49
End: 2019-03-13
Payer: COMMERCIAL

## 2019-03-13 VITALS
BODY MASS INDEX: 28.66 KG/M2 | DIASTOLIC BLOOD PRESSURE: 80 MMHG | HEIGHT: 65 IN | WEIGHT: 172 LBS | HEART RATE: 90 BPM | TEMPERATURE: 100 F | SYSTOLIC BLOOD PRESSURE: 132 MMHG

## 2019-03-13 DIAGNOSIS — J10.1 INFLUENZA A: Primary | ICD-10-CM

## 2019-03-13 DIAGNOSIS — J30.2 SEASONAL ALLERGIC RHINITIS, UNSPECIFIED TRIGGER: ICD-10-CM

## 2019-03-13 LAB
CTP QC/QA: YES
POC MOLECULAR INFLUENZA A AGN: POSITIVE
POC MOLECULAR INFLUENZA B AGN: ABNORMAL

## 2019-03-13 PROCEDURE — 99213 OFFICE O/P EST LOW 20 MIN: CPT | Mod: S$GLB,,, | Performed by: INTERNAL MEDICINE

## 2019-03-13 PROCEDURE — 87502 POCT INFLUENZA A/B MOLECULAR: ICD-10-PCS | Mod: QW,S$GLB,, | Performed by: INTERNAL MEDICINE

## 2019-03-13 PROCEDURE — 3008F BODY MASS INDEX DOCD: CPT | Mod: CPTII,S$GLB,, | Performed by: INTERNAL MEDICINE

## 2019-03-13 PROCEDURE — 87502 INFLUENZA DNA AMP PROBE: CPT | Mod: QW,S$GLB,, | Performed by: INTERNAL MEDICINE

## 2019-03-13 PROCEDURE — 99999 PR PBB SHADOW E&M-EST. PATIENT-LVL III: CPT | Mod: PBBFAC,,, | Performed by: INTERNAL MEDICINE

## 2019-03-13 PROCEDURE — 99213 PR OFFICE/OUTPT VISIT, EST, LEVL III, 20-29 MIN: ICD-10-PCS | Mod: S$GLB,,, | Performed by: INTERNAL MEDICINE

## 2019-03-13 PROCEDURE — 99999 PR PBB SHADOW E&M-EST. PATIENT-LVL III: ICD-10-PCS | Mod: PBBFAC,,, | Performed by: INTERNAL MEDICINE

## 2019-03-13 PROCEDURE — 3008F PR BODY MASS INDEX (BMI) DOCUMENTED: ICD-10-PCS | Mod: CPTII,S$GLB,, | Performed by: INTERNAL MEDICINE

## 2019-03-13 RX ORDER — OSELTAMIVIR PHOSPHATE 75 MG/1
75 CAPSULE ORAL 2 TIMES DAILY
Qty: 10 CAPSULE | Refills: 0 | Status: SHIPPED | OUTPATIENT
Start: 2019-03-13 | End: 2019-03-18

## 2019-03-13 RX ORDER — MONTELUKAST SODIUM 10 MG/1
10 TABLET ORAL DAILY
Qty: 90 TABLET | Refills: 0 | Status: SHIPPED | OUTPATIENT
Start: 2019-03-13 | End: 2019-06-03 | Stop reason: SDUPTHER

## 2019-03-13 RX ORDER — BENZONATATE 200 MG/1
200 CAPSULE ORAL 3 TIMES DAILY PRN
Qty: 30 CAPSULE | Refills: 0 | Status: SHIPPED | OUTPATIENT
Start: 2019-03-13 | End: 2019-03-23

## 2019-03-13 RX ORDER — ALBUTEROL SULFATE 90 UG/1
2 AEROSOL, METERED RESPIRATORY (INHALATION) EVERY 4 HOURS PRN
Qty: 1 INHALER | Refills: 0 | Status: SHIPPED | OUTPATIENT
Start: 2019-03-13 | End: 2019-11-21 | Stop reason: SDUPTHER

## 2019-03-13 NOTE — PROGRESS NOTES
"Subjective:       Patient ID: Carmen Morrow is a 48 y.o. female.    Chief Complaint: URI and Cough    Patient of Dr. Bailon presents for an urgent visit c/o respiratory infection. Recent health history includes an urgent care visit one week ago for 2 day history of nasal congestion, sneezing, runny nose, cough productive of yellow mucus, without fever. Influenza swab was negative. Treated for a "sinus infection" with Celestone  6mg IM and a Z-pack. This resolved. She began to feel sick again yesterday with fever up to 101.2, headache, chills, sweats, body aches, mild rhinitis but bad cough with chest tightness. Her  has been sick like this for the past five days, he is just making it in to see a doctor today - results unknown yet.     PMH: includes Seasonal Allergies.   Former tobacco use.   Flu shot annually.   Allergies: Oxycodone, Hydrocodone.   Medications: include Zyrtec, Flonase, Astelin and Singulair (out). Not currently using Albuterol HFA prescribed for an episode of bronchitis in the past.       Review of Systems   Gastrointestinal: Negative for abdominal pain, diarrhea, nausea and vomiting.       Objective:    /80, Pulse 90, Temp 100.0, Wt 172 lbs  Physical Exam   Constitutional: She is oriented to person, place, and time. She appears ill.   HENT:   Mouth/Throat: No oropharyngeal exudate.   Edema of nasal turbinates, watery discharge, injection of oropharynx.    Eyes: Conjunctivae are normal. Right eye exhibits no discharge. Left eye exhibits no discharge.   Neck: Normal range of motion. Neck supple.   Cardiovascular: Normal rate, regular rhythm and normal heart sounds.   Pulmonary/Chest: Effort normal and breath sounds normal. No respiratory distress. She has no wheezes. She has no rales.   Lymphadenopathy:     She has no cervical adenopathy.   Neurological: She is alert and oriented to person, place, and time.   Skin: Skin is warm and dry.       Assessment:       1. Influenza A    2. " Seasonal allergic rhinitis, unspecified trigger        Plan:       Influenza A  -     POCT Influenza A/B Molecular - POSITIVE for Influenza type A.   -     oseltamivir (TAMIFLU) 75 MG capsule; Take 1 capsule (75 mg total) by mouth 2 (two) times daily. for 5 days  Dispense: 10 capsule; Refill: 0  -     albuterol (PROVENTIL/VENTOLIN HFA) 90 mcg/actuation inhaler; Inhale 2 puffs into the lungs every 4 (four) hours as needed for Wheezing.  Dispense: 1 Inhaler; Refill: 0  -     benzonatate (TESSALON) 200 MG capsule; Take 1 capsule (200 mg total) by mouth 3 (three) times daily as needed for Cough.  Dispense: 30 capsule; Refill: 0  -     OTC analgesics, plenty liquids and rest - work excuse provided.     Seasonal allergic rhinitis, unspecified trigger  -     montelukast (SINGULAIR) 10 mg tablet; Take 1 tablet (10 mg total) by mouth once daily.  Dispense: 90 tablet; Refill: 0

## 2019-03-13 NOTE — LETTER
March 13, 2019    Carmen Morrow  [address] [facility] [address]         [facility]  [address]  [place] [address]  Phone: [phone]  Fax: [phone] March 13, 2019     Patient: Carmen Morrow   YOB: 1970   Date of Visit: 3/13/2019       To Whom It May Concern:    Mrs. Morrow was seen in clinic today. It is my medical opinion that Carmen Morrow should remain out of work until Monday, 03/18/19 due to illness. Returning to regular duty at that time.     If you have any questions or concerns, please don't hesitate to call.    Sincerely,        Jing Choi MD

## 2019-03-14 ENCOUNTER — PATIENT MESSAGE (OUTPATIENT)
Dept: INTERNAL MEDICINE | Facility: CLINIC | Age: 49
End: 2019-03-14

## 2019-03-14 ENCOUNTER — TELEPHONE (OUTPATIENT)
Dept: INTERNAL MEDICINE | Facility: CLINIC | Age: 49
End: 2019-03-14

## 2019-03-14 NOTE — TELEPHONE ENCOUNTER
----- Message from Padma Pratt sent at 3/14/2019  8:03 AM CDT -----  Contact: [facility] [phone]  Prescription Alternative Needed:     The pharmacy needs alternative on the following RX:    albuterol (PROVENTIL/VENTOLIN HFA) 90 mcg/actuation inhaler    Pharmacy: [id number] - [place], [place] - [address]    Please advise.    Thank You

## 2019-03-20 ENCOUNTER — OFFICE VISIT (OUTPATIENT)
Dept: INTERNAL MEDICINE | Facility: CLINIC | Age: 49
End: 2019-03-20
Payer: COMMERCIAL

## 2019-03-20 VITALS
OXYGEN SATURATION: 97 % | HEIGHT: 65 IN | HEART RATE: 78 BPM | SYSTOLIC BLOOD PRESSURE: 122 MMHG | BODY MASS INDEX: 28.95 KG/M2 | WEIGHT: 173.75 LBS | TEMPERATURE: 98 F | DIASTOLIC BLOOD PRESSURE: 62 MMHG

## 2019-03-20 DIAGNOSIS — R05.9 COUGH: Primary | ICD-10-CM

## 2019-03-20 PROCEDURE — 99999 PR PBB SHADOW E&M-EST. PATIENT-LVL III: ICD-10-PCS | Mod: PBBFAC,,, | Performed by: INTERNAL MEDICINE

## 2019-03-20 PROCEDURE — 99999 PR PBB SHADOW E&M-EST. PATIENT-LVL III: CPT | Mod: PBBFAC,,, | Performed by: INTERNAL MEDICINE

## 2019-03-20 PROCEDURE — 3008F BODY MASS INDEX DOCD: CPT | Mod: CPTII,S$GLB,, | Performed by: INTERNAL MEDICINE

## 2019-03-20 PROCEDURE — 99213 OFFICE O/P EST LOW 20 MIN: CPT | Mod: S$GLB,,, | Performed by: INTERNAL MEDICINE

## 2019-03-20 PROCEDURE — 3008F PR BODY MASS INDEX (BMI) DOCUMENTED: ICD-10-PCS | Mod: CPTII,S$GLB,, | Performed by: INTERNAL MEDICINE

## 2019-03-20 PROCEDURE — 99213 PR OFFICE/OUTPT VISIT, EST, LEVL III, 20-29 MIN: ICD-10-PCS | Mod: S$GLB,,, | Performed by: INTERNAL MEDICINE

## 2019-03-20 RX ORDER — PROMETHAZINE HYDROCHLORIDE AND CODEINE PHOSPHATE 6.25; 1 MG/5ML; MG/5ML
5 SOLUTION ORAL NIGHTLY PRN
Qty: 120 ML | Refills: 0 | Status: SHIPPED | OUTPATIENT
Start: 2019-03-20 | End: 2019-03-30

## 2019-03-20 NOTE — PROGRESS NOTES
Subjective:       Patient ID: Carmen Morrow is a 48 y.o. female.    Chief Complaint: Cough (faitgue, chest congestion)    Took Tamiflu for the flu last week but is still congested and coughing.  Is fatigued      Review of Systems   Constitutional: Negative for activity change, chills, fatigue and fever.   HENT: Negative for congestion, ear pain, nosebleeds, postnasal drip, sinus pressure and sore throat.    Eyes: Negative.  Negative for visual disturbance.   Respiratory: Negative for cough, chest tightness, shortness of breath and wheezing.    Cardiovascular: Negative for chest pain.   Gastrointestinal: Negative for abdominal pain, diarrhea, nausea and vomiting.   Genitourinary: Negative for difficulty urinating, dysuria, frequency and urgency.   Musculoskeletal: Negative for arthralgias and neck stiffness.   Skin: Negative for rash.   Neurological: Negative for dizziness, weakness and headaches.   Psychiatric/Behavioral: Negative for sleep disturbance. The patient is not nervous/anxious.        Objective:      Physical Exam   Constitutional: She is oriented to person, place, and time. She appears well-developed and well-nourished.  Non-toxic appearance. No distress.   HENT:   Head: Normocephalic and atraumatic.   Right Ear: Tympanic membrane, external ear and ear canal normal.   Left Ear: Tympanic membrane, external ear and ear canal normal.   Eyes: EOM are normal. Pupils are equal, round, and reactive to light. No scleral icterus.   Neck: Normal range of motion. Neck supple. No thyromegaly present.   Cardiovascular: Normal rate, regular rhythm and normal heart sounds.   Pulmonary/Chest: Effort normal and breath sounds normal.   Abdominal: Soft. Bowel sounds are normal. She exhibits no mass. There is no tenderness. There is no rebound.   Musculoskeletal: Normal range of motion.   Lymphadenopathy:     She has no cervical adenopathy.   Neurological: She is alert and oriented to person, place, and time. She has  normal reflexes. She displays normal reflexes. No cranial nerve deficit. She exhibits normal muscle tone. Coordination normal.   Skin: Skin is warm and dry.   Psychiatric: She has a normal mood and affect. Her behavior is normal.       Assessment:       1. Cough        Plan:   Carmen was seen today for cough.    Diagnoses and all orders for this visit:    Cough    Other orders  -     promethazine-codeine 6.25-10 mg/5 ml (PHENERGAN WITH CODEINE) 6.25-10 mg/5 mL syrup; Take 5 mLs by mouth nightly as needed.

## 2019-04-28 DIAGNOSIS — M54.81 BILATERAL OCCIPITAL NEURALGIA: ICD-10-CM

## 2019-04-29 RX ORDER — AMITRIPTYLINE HYDROCHLORIDE 25 MG/1
50 TABLET, FILM COATED ORAL NIGHTLY
Qty: 180 TABLET | Refills: 3 | Status: SHIPPED | OUTPATIENT
Start: 2019-04-29 | End: 2019-05-14 | Stop reason: SDUPTHER

## 2019-05-01 ENCOUNTER — OFFICE VISIT (OUTPATIENT)
Dept: INTERNAL MEDICINE | Facility: CLINIC | Age: 49
End: 2019-05-01
Payer: COMMERCIAL

## 2019-05-01 VITALS
WEIGHT: 175.69 LBS | HEIGHT: 65 IN | BODY MASS INDEX: 29.27 KG/M2 | SYSTOLIC BLOOD PRESSURE: 126 MMHG | HEART RATE: 74 BPM | DIASTOLIC BLOOD PRESSURE: 80 MMHG | OXYGEN SATURATION: 99 %

## 2019-05-01 DIAGNOSIS — M25.511 ACUTE PAIN OF RIGHT SHOULDER: Primary | ICD-10-CM

## 2019-05-01 PROCEDURE — 99213 PR OFFICE/OUTPT VISIT, EST, LEVL III, 20-29 MIN: ICD-10-PCS | Mod: 25,S$GLB,, | Performed by: PHYSICIAN ASSISTANT

## 2019-05-01 PROCEDURE — 96372 PR INJECTION,THERAP/PROPH/DIAG2ST, IM OR SUBCUT: ICD-10-PCS | Mod: S$GLB,,, | Performed by: PHYSICIAN ASSISTANT

## 2019-05-01 PROCEDURE — 99999 PR PBB SHADOW E&M-EST. PATIENT-LVL IV: ICD-10-PCS | Mod: PBBFAC,,, | Performed by: PHYSICIAN ASSISTANT

## 2019-05-01 PROCEDURE — 3008F BODY MASS INDEX DOCD: CPT | Mod: CPTII,S$GLB,, | Performed by: PHYSICIAN ASSISTANT

## 2019-05-01 PROCEDURE — 99999 PR PBB SHADOW E&M-EST. PATIENT-LVL IV: CPT | Mod: PBBFAC,,, | Performed by: PHYSICIAN ASSISTANT

## 2019-05-01 PROCEDURE — 96372 THER/PROPH/DIAG INJ SC/IM: CPT | Mod: S$GLB,,, | Performed by: PHYSICIAN ASSISTANT

## 2019-05-01 PROCEDURE — 3008F PR BODY MASS INDEX (BMI) DOCUMENTED: ICD-10-PCS | Mod: CPTII,S$GLB,, | Performed by: PHYSICIAN ASSISTANT

## 2019-05-01 PROCEDURE — 99213 OFFICE O/P EST LOW 20 MIN: CPT | Mod: 25,S$GLB,, | Performed by: PHYSICIAN ASSISTANT

## 2019-05-01 RX ORDER — KETOROLAC TROMETHAMINE 30 MG/ML
60 INJECTION, SOLUTION INTRAMUSCULAR; INTRAVENOUS ONCE
Status: COMPLETED | OUTPATIENT
Start: 2019-05-01 | End: 2019-05-01

## 2019-05-01 RX ORDER — METHOCARBAMOL 500 MG/1
500 TABLET, FILM COATED ORAL NIGHTLY PRN
Qty: 40 TABLET | Refills: 0 | Status: SHIPPED | OUTPATIENT
Start: 2019-05-01 | End: 2019-11-21

## 2019-05-01 RX ORDER — NAPROXEN 500 MG/1
500 TABLET ORAL 2 TIMES DAILY
Qty: 28 TABLET | Refills: 0 | Status: SHIPPED | OUTPATIENT
Start: 2019-05-01 | End: 2019-08-12

## 2019-05-01 RX ADMIN — KETOROLAC TROMETHAMINE 60 MG: 30 INJECTION, SOLUTION INTRAMUSCULAR; INTRAVENOUS at 07:05

## 2019-05-01 NOTE — PROGRESS NOTES
Subjective:       Patient ID: Carmen Morrow is a 48 y.o. female.        Chief Complaint: Shoulder Pain    Carmen Morrow is an established patient of Tori Bailon MD here today for urgent care visit.    Right shoulder pain:  Started last week upon reaching back to put her Ipad on the bedside table.  Now noticing when drying hair that her thumb and index finger will tingle.  Very painful to abduct the shoulder.  No weakness of the arm.  She has taken motrin a few times for pain with some improvement.           Review of Systems   Constitutional: Negative for chills, diaphoresis, fatigue and fever.   HENT: Negative for congestion and sore throat.    Eyes: Negative for visual disturbance.   Respiratory: Negative for cough, chest tightness and shortness of breath.    Cardiovascular: Negative for chest pain, palpitations and leg swelling.   Gastrointestinal: Negative for abdominal pain, blood in stool, constipation, diarrhea, nausea and vomiting.   Genitourinary: Negative for dysuria, frequency, hematuria and urgency.   Musculoskeletal: Positive for arthralgias (right shoulder). Negative for back pain.   Skin: Negative for rash.   Neurological: Negative for dizziness, syncope, weakness and headaches.   Psychiatric/Behavioral: Negative for dysphoric mood and sleep disturbance. The patient is not nervous/anxious.        Objective:      Physical Exam   Constitutional: She appears well-developed and well-nourished.   HENT:   Head: Normocephalic.   Right Ear: External ear normal.   Left Ear: External ear normal.   Mouth/Throat: Oropharynx is clear and moist.   Eyes: Pupils are equal, round, and reactive to light.   Cardiovascular: Normal rate, regular rhythm and normal heart sounds. Exam reveals no gallop and no friction rub.   No murmur heard.  Pulmonary/Chest: Effort normal and breath sounds normal. No respiratory distress.   Abdominal: Soft. Normal appearance. There is no tenderness.   Musculoskeletal: She  "exhibits no edema.        Right shoulder: She exhibits decreased range of motion (cannot abduct >90 degrees without pain). She exhibits no tenderness, no spasm, normal pulse and normal strength.   Neurological: She is alert. She has normal strength. No sensory deficit.   Skin: Skin is warm and dry.   Psychiatric: She has a normal mood and affect.   Nursing note and vitals reviewed.      Assessment:       1. Acute pain of right shoulder        Plan:       Carmen was seen today for shoulder pain.    Diagnoses and all orders for this visit:    Acute pain of right shoulder  -     ketorolac injection 60 mg  -     naproxen (NAPROSYN) 500 MG tablet; Take 1 tablet (500 mg total) by mouth 2 (two) times daily.  -     methocarbamol (ROBAXIN) 500 MG Tab; Take 1 tablet (500 mg total) by mouth nightly as needed.    If not improving, consider x-ray, ortho referral, PT.    Pt has been given instructions populated from [employer] database and has verbalized understanding of the after visit summary and information contained wherein.    Follow up with a primary care provider. May go to ER for acute shortness of breath, lightheadedness, fever, or any other emergent complaints or changes in condition.    "This note will be shared with the patient"    Future Appointments   Date Time Provider Department Center   5/14/2019  3:00 PM Tori Bailon MD [facility]               "

## 2019-05-14 ENCOUNTER — LAB VISIT (OUTPATIENT)
Dept: LAB | Facility: HOSPITAL | Age: 49
End: 2019-05-14
Attending: INTERNAL MEDICINE
Payer: COMMERCIAL

## 2019-05-14 ENCOUNTER — PATIENT MESSAGE (OUTPATIENT)
Dept: NEUROLOGY | Facility: CLINIC | Age: 49
End: 2019-05-14

## 2019-05-14 ENCOUNTER — OFFICE VISIT (OUTPATIENT)
Dept: INTERNAL MEDICINE | Facility: CLINIC | Age: 49
End: 2019-05-14
Payer: COMMERCIAL

## 2019-05-14 VITALS
SYSTOLIC BLOOD PRESSURE: 128 MMHG | BODY MASS INDEX: 29.42 KG/M2 | HEART RATE: 89 BPM | OXYGEN SATURATION: 95 % | HEIGHT: 65 IN | DIASTOLIC BLOOD PRESSURE: 84 MMHG | WEIGHT: 176.56 LBS

## 2019-05-14 DIAGNOSIS — Z00.00 WELLNESS EXAMINATION: ICD-10-CM

## 2019-05-14 DIAGNOSIS — K21.9 GASTROESOPHAGEAL REFLUX DISEASE WITHOUT ESOPHAGITIS: ICD-10-CM

## 2019-05-14 DIAGNOSIS — M25.511 RIGHT SHOULDER PAIN, UNSPECIFIED CHRONICITY: ICD-10-CM

## 2019-05-14 DIAGNOSIS — Z12.31 ENCOUNTER FOR SCREENING MAMMOGRAM FOR BREAST CANCER: ICD-10-CM

## 2019-05-14 DIAGNOSIS — M54.81 BILATERAL OCCIPITAL NEURALGIA: Primary | ICD-10-CM

## 2019-05-14 DIAGNOSIS — Z00.00 PHYSICAL EXAM: Primary | ICD-10-CM

## 2019-05-14 DIAGNOSIS — K59.00 CONSTIPATION, UNSPECIFIED CONSTIPATION TYPE: ICD-10-CM

## 2019-05-14 LAB
ALBUMIN SERPL BCP-MCNC: 4.5 G/DL (ref 3.5–5.2)
ALP SERPL-CCNC: 68 U/L (ref 55–135)
ALT SERPL W/O P-5'-P-CCNC: 19 U/L (ref 10–44)
ANION GAP SERPL CALC-SCNC: 12 MMOL/L (ref 8–16)
AST SERPL-CCNC: 21 U/L (ref 10–40)
BASOPHILS # BLD AUTO: 0.04 K/UL (ref 0–0.2)
BASOPHILS NFR BLD: 0.4 % (ref 0–1.9)
BILIRUB SERPL-MCNC: 0.7 MG/DL (ref 0.1–1)
BUN SERPL-MCNC: 11 MG/DL (ref 6–20)
CALCIUM SERPL-MCNC: 9.8 MG/DL (ref 8.7–10.5)
CHLORIDE SERPL-SCNC: 103 MMOL/L (ref 95–110)
CO2 SERPL-SCNC: 23 MMOL/L (ref 23–29)
CREAT SERPL-MCNC: 0.8 MG/DL (ref 0.5–1.4)
DIFFERENTIAL METHOD: ABNORMAL
EOSINOPHIL # BLD AUTO: 0.3 K/UL (ref 0–0.5)
EOSINOPHIL NFR BLD: 2.6 % (ref 0–8)
ERYTHROCYTE [DISTWIDTH] IN BLOOD BY AUTOMATED COUNT: 15.4 % (ref 11.5–14.5)
EST. GFR  (AFRICAN AMERICAN): >60 ML/MIN/1.73 M^2
EST. GFR  (NON AFRICAN AMERICAN): >60 ML/MIN/1.73 M^2
GLUCOSE SERPL-MCNC: 86 MG/DL (ref 70–110)
HCT VFR BLD AUTO: 41.1 % (ref 37–48.5)
HGB BLD-MCNC: 13.2 G/DL (ref 12–16)
LYMPHOCYTES # BLD AUTO: 2.9 K/UL (ref 1–4.8)
LYMPHOCYTES NFR BLD: 27.9 % (ref 18–48)
MCH RBC QN AUTO: 28.2 PG (ref 27–31)
MCHC RBC AUTO-ENTMCNC: 32.1 G/DL (ref 32–36)
MCV RBC AUTO: 88 FL (ref 82–98)
MONOCYTES # BLD AUTO: 0.7 K/UL (ref 0.3–1)
MONOCYTES NFR BLD: 6.4 % (ref 4–15)
NEUTROPHILS # BLD AUTO: 6.6 K/UL (ref 1.8–7.7)
NEUTROPHILS NFR BLD: 62.5 % (ref 38–73)
PLATELET # BLD AUTO: 355 K/UL (ref 150–350)
PMV BLD AUTO: 10.4 FL (ref 9.2–12.9)
POTASSIUM SERPL-SCNC: 3.8 MMOL/L (ref 3.5–5.1)
PROT SERPL-MCNC: 8.8 G/DL (ref 6–8.4)
RBC # BLD AUTO: 4.68 M/UL (ref 4–5.4)
SODIUM SERPL-SCNC: 138 MMOL/L (ref 136–145)
WBC # BLD AUTO: 10.49 K/UL (ref 3.9–12.7)

## 2019-05-14 PROCEDURE — 85025 COMPLETE CBC W/AUTO DIFF WBC: CPT

## 2019-05-14 PROCEDURE — 80053 COMPREHEN METABOLIC PANEL: CPT

## 2019-05-14 PROCEDURE — 99999 PR PBB SHADOW E&M-EST. PATIENT-LVL IV: CPT | Mod: PBBFAC,,, | Performed by: INTERNAL MEDICINE

## 2019-05-14 PROCEDURE — 99999 PR PBB SHADOW E&M-EST. PATIENT-LVL IV: ICD-10-PCS | Mod: PBBFAC,,, | Performed by: INTERNAL MEDICINE

## 2019-05-14 PROCEDURE — 36415 COLL VENOUS BLD VENIPUNCTURE: CPT

## 2019-05-14 PROCEDURE — 99396 PREV VISIT EST AGE 40-64: CPT | Mod: S$GLB,,, | Performed by: INTERNAL MEDICINE

## 2019-05-14 PROCEDURE — 99396 PR PREVENTIVE VISIT,EST,40-64: ICD-10-PCS | Mod: S$GLB,,, | Performed by: INTERNAL MEDICINE

## 2019-05-14 NOTE — PROGRESS NOTES
Subjective:      Patient ID: Carmen Morrwo is a 48 y.o. female.    Chief Complaint: Annual Exam    HPI:  HPI   Patient is here for her annual exam:    Consultant:  Dr. Bess Diaz: may see for hip or knee pain  Dr. Galindo  headaches      Annual exam: 5/14/2019  Colonoscopy:Mother and sister have had polyps 1/9/2015: repeat 5 years  Endoscopy 8/2017: on Nexium  Dr. Jacques  Mammogram: 1/8/2019  Gyn: 1/2019 Dr. Wiedemann  Optho: yearly  Flu: 2018  Tetanus: Tdap  Shingrix after age 50  Pneumovax after 65  Prevnar after 65      Discussed exercise           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    GERD (gastroesophageal reflux disease)    Baker's cyst of knee, right    Chondromalacia patellae of left knee    Quadriceps weakness    Bilateral occipital neuralgia     Past Medical History:   Diagnosis Date    Allergy     Brachial neuritis or radiculitis NOS 11/14/2012    Degeneration of cervical intervertebral disc 11/14/2012    GERD (gastroesophageal reflux disease)     SVT (supraventricular tachycardia)      Past Surgical History:   Procedure Laterality Date    anterior diskectomy fusion  05/20/2016    ARTHROCENTESIS;AMNIOX Right 12/22/2016    Per[facility] at [facility]    AUGMENTATION OF BREAST Bilateral     1994-95    BREAST SURGERY Bilateral 2006    CARPAL TUNNEL RELEASE Left 01/30/2018    CHONDROPLASTY-KNEE Right 12/22/2016    [facility] at [facility]    COLONOSCOPY N/A 1/9/2015    [facility] at [facility] ([facility] F[facility])    CSF leak  05/22/2016    Replace and repair fat graft from surgery on 5/20/2016    ESOPHAGOGASTRODUODENOSCOPY (EGD) N/A 8/9/2017    [facility] by Shaneka Jacques,  "[facility] ENDO    KNEE ARTHROSCOPY      LAMINECTOMY      MANOMETRY-ANORECTAL N/A 1/12/2015    Performed by Gama East MD at [facility] ENDO (4TH FLR)    RESECTION-TURBINATES (SMR) Bilateral 9/1/2017    Per[facility] by [facility] at [facility] OR    SEPTOPLASTY N/A 9/1/2017    [facility]formed by [facility] at [facility] OR    SINUS SURGERY FUNCTIONAL ENDOSCOPIC WITH NAVIGATION N/A 9/1/2017    Performed by [facility] Abbott MD at [facility] OR    SPINAL FUSION      SUBCHONDROPLASTY; C-ARM Right 12/22/2016    [facility] by [facility] at [facility] OR    SYNOVECTOMY-KNEE Right 12/22/2016    Performed by Bess Diaz MD at [facility] OR     Family History   Problem Relation Age of Onset    Heart disease Mother     Hypertension Mother     Asthma Mother     Heart disease Father     COPD Father     Diabetes Father     Ovarian cancer Neg Hx     Colon cancer Neg Hx     Breast cancer Neg Hx     Cancer Neg Hx      Review of Systems   Constitutional: Positive for unexpected weight change. Negative for activity change.        Discussed elavil   HENT: Negative for hearing loss, rhinorrhea and trouble swallowing.    Eyes: Negative for discharge and visual disturbance.   Respiratory: Negative for chest tightness and wheezing.    Cardiovascular: Negative for chest pain and palpitations.   Gastrointestinal: Positive for constipation. Negative for blood in stool, diarrhea and vomiting.   Endocrine: Negative for polydipsia and polyuria.   Genitourinary: Negative for difficulty urinating, dysuria, hematuria and menstrual problem.   Musculoskeletal: Positive for arthralgias. Negative for joint swelling and neck pain.   Neurological: Negative for weakness and headaches.   Psychiatric/Behavioral: Negative for confusion and dysphoric mood.     Objective:     Vitals:    05/14/19 1508 05/14/19 1538   BP: (!) 140/80 128/84   Pulse: 89    SpO2: 95%    Weight: 80.1 kg (176 lb 9.4 oz)    Height: 5' 5" (1.651 m)  "   PainSc:   2      Body mass index is 29.39 kg/m².  Physical Exam   Constitutional: She is oriented to person, place, and time. She appears well-developed and well-nourished. No distress.   Neck: Carotid bruit is not present. No thyromegaly present.   Cardiovascular: Normal rate, regular rhythm and normal heart sounds. PMI is not displaced.   Pulmonary/Chest: Effort normal and breath sounds normal. No respiratory distress.   Abdominal: Soft. Bowel sounds are normal. She exhibits no distension. There is no tenderness.   Musculoskeletal: She exhibits no edema.   Neurological: She is alert and oriented to person, place, and time.     Assessment:     1. Physical exam    2. Right shoulder pain, unspecified chronicity    3. Encounter for screening mammogram for breast cancer    4. Wellness examination    5. Gastroesophageal reflux disease without esophagitis    6. Constipation, unspecified constipation type      Plan:   Carmen was seen today for annual exam.    Diagnoses and all orders for this visit:    Physical exam:  Annual exam completed no new findings    Right shoulder pain, unspecified chronicity  -     Ambulatory consult to Orthopedics    Encounter for screening mammogram for breast cancer  -     Mammo Digital Screening Bilat w/ Gerald; Future    Wellness examination  -     CBC auto differential; Future  -     Comprehensive metabolic panel; Future    Gastroesophageal reflux disease without esophagitis:  We discussed seeing if the patient could reduce the amount of PPI she is taking.  I have explained to her a very slow taper    Constipation, unspecified constipation type:  Patient has use Linzess successfully in the past.    Other orders  -     linaclotide (LINZESS) 145 mcg Cap capsule; Take 1 capsule (145 mcg total) by mouth once daily.        Problem List Items Addressed This Visit     CN (constipation)    GERD (gastroesophageal reflux disease)      Other Visit Diagnoses     Physical exam    -  Primary    Right  shoulder pain, unspecified chronicity        Relevant Orders    Ambulatory consult to Orthopedics    Encounter for screening mammogram for breast cancer        Relevant Orders    Mammo Digital Screening Bilat w/ Gerald    Wellness examination        Relevant Orders    CBC auto differential    Comprehensive metabolic panel        Orders Placed This Encounter   Procedures    Mammo Digital Screening Bilat w/ Gerald     Standing Status:   Future     Standing Expiration Date:   7/13/2020     Order Specific Question:   May the Radiologist modify the order per protocol to meet the clinical needs of the patient?     Answer:   Yes    CBC auto differential     Standing Status:   Future     Standing Expiration Date:   7/13/2019    Comprehensive metabolic panel     Standing Status:   Future     Standing Expiration Date:   7/13/2019    Ambulatory consult to Orthopedics     Referral Priority:   Routine     Referral Type:   Consultation     Number of Visits Requested:   1     No follow-ups on file.     Medication List           Accurate as of 5/14/19  3:49 PM. If you have any questions, ask your nurse or doctor.               START taking these medications    linaclotide 145 mcg Cap capsule  Commonly known as:  LINZESS  Take 1 capsule (145 mcg total) by mouth once daily.  Started by:  Tori Bailon MD        CONTINUE taking these medications    acyclovir 200 MG capsule  Commonly known as:  ZOVIRAX  Take 1 capsule (200 mg total) by mouth 2 (two) times daily.     albuterol 90 mcg/actuation inhaler  Commonly known as:  PROVENTIL/VENTOLIN HFA  Inhale 2 puffs into the lungs every 4 (four) hours as needed for Wheezing.     amitriptyline 50 MG tablet  Commonly known as:  ELAVIL  Take 1 tablet (50 mg total) by mouth every evening.     azelastine 137 mcg (0.1 %) nasal spray  Commonly known as:  ASTELIN  1 spray (137 mcg total) by Nasal route 2 (two) times daily.     esomeprazole 40 MG capsule  Commonly known as:  NEXIUM  Take 1 capsule  (40 mg total) by mouth before breakfast.     fluticasone propionate 50 mcg/actuation nasal spray  Commonly known as:  FLONASE  2 sprays by Each Nare route every evening. 2 Spray, Suspension Nasal Every day     methocarbamol 500 MG Tab  Commonly known as:  ROBAXIN  Take 1 tablet (500 mg total) by mouth nightly as needed.     montelukast 10 mg tablet  Commonly known as:  SINGULAIR  Take 1 tablet (10 mg total) by mouth once daily.     naproxen 500 MG tablet  Commonly known as:  NAPROSYN  Take 1 tablet (500 mg total) by mouth 2 (two) times daily.     ZyrTEC 10 MG tablet  Generic drug:  cetirizine           Where to Get Your Medications      These medications were sent to [facility]/pharmacy #[id number] - [place] - [address]  [address] [id number]    Phone:  [phone]   · linaclotide 145 mcg Cap capsule

## 2019-05-15 ENCOUNTER — PATIENT MESSAGE (OUTPATIENT)
Dept: NEUROLOGY | Facility: CLINIC | Age: 49
End: 2019-05-15

## 2019-05-15 ENCOUNTER — PATIENT MESSAGE (OUTPATIENT)
Dept: INTERNAL MEDICINE | Facility: CLINIC | Age: 49
End: 2019-05-15

## 2019-05-15 ENCOUNTER — TELEPHONE (OUTPATIENT)
Dept: INTERNAL MEDICINE | Facility: CLINIC | Age: 49
End: 2019-05-15

## 2019-05-15 DIAGNOSIS — R77.9 ELEVATED SERUM PROTEIN LEVEL: Primary | ICD-10-CM

## 2019-05-15 RX ORDER — NORTRIPTYLINE HYDROCHLORIDE 50 MG/1
50 CAPSULE ORAL NIGHTLY
Qty: 30 CAPSULE | Refills: 3 | Status: SHIPPED | OUTPATIENT
Start: 2019-05-15 | End: 2019-08-10 | Stop reason: SDUPTHER

## 2019-05-15 NOTE — TELEPHONE ENCOUNTER
----- Message from Tori Bailon MD sent at 5/15/2019  7:17 AM CDT -----  Carmen,     I am going to ask my nurse to call you and set up a repeat protein level with a serum protein electrophoresis.  Dr. 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

## 2019-05-16 ENCOUNTER — HOSPITAL ENCOUNTER (OUTPATIENT)
Dept: RADIOLOGY | Facility: HOSPITAL | Age: 49
Discharge: HOME | End: 2019-05-16
Attending: PHYSICIAN ASSISTANT
Payer: COMMERCIAL

## 2019-05-16 ENCOUNTER — OFFICE VISIT (OUTPATIENT)
Dept: SPORTS MEDICINE | Facility: CLINIC | Age: 49
End: 2019-05-16
Payer: COMMERCIAL

## 2019-05-16 VITALS
SYSTOLIC BLOOD PRESSURE: 129 MMHG | BODY MASS INDEX: 29.32 KG/M2 | WEIGHT: 176 LBS | HEART RATE: 87 BPM | DIASTOLIC BLOOD PRESSURE: 83 MMHG | HEIGHT: 65 IN

## 2019-05-16 DIAGNOSIS — M25.511 RIGHT SHOULDER PAIN, UNSPECIFIED CHRONICITY: ICD-10-CM

## 2019-05-16 DIAGNOSIS — M25.511 RIGHT SHOULDER PAIN, UNSPECIFIED CHRONICITY: Primary | ICD-10-CM

## 2019-05-16 PROCEDURE — 99999 PR PBB SHADOW E&M-EST. PATIENT-LVL IV: CPT | Mod: PBBFAC,,, | Performed by: PHYSICIAN ASSISTANT

## 2019-05-16 PROCEDURE — 73030 X-RAY EXAM OF SHOULDER: CPT | Mod: TC,FY,PO,RT

## 2019-05-16 PROCEDURE — 99214 PR OFFICE/OUTPT VISIT, EST, LEVL IV, 30-39 MIN: ICD-10-PCS | Mod: S$GLB,,, | Performed by: PHYSICIAN ASSISTANT

## 2019-05-16 PROCEDURE — 73030 X-RAY EXAM OF SHOULDER: CPT | Mod: 26,RT,, | Performed by: RADIOLOGY

## 2019-05-16 PROCEDURE — 99214 OFFICE O/P EST MOD 30 MIN: CPT | Mod: S$GLB,,, | Performed by: PHYSICIAN ASSISTANT

## 2019-05-16 PROCEDURE — 3008F BODY MASS INDEX DOCD: CPT | Mod: CPTII,S$GLB,, | Performed by: PHYSICIAN ASSISTANT

## 2019-05-16 PROCEDURE — 73030 XR SHOULDER COMPLETE 2 OR MORE VIEWS RIGHT: ICD-10-PCS | Mod: 26,RT,, | Performed by: RADIOLOGY

## 2019-05-16 PROCEDURE — 99999 PR PBB SHADOW E&M-EST. PATIENT-LVL IV: ICD-10-PCS | Mod: PBBFAC,,, | Performed by: PHYSICIAN ASSISTANT

## 2019-05-16 PROCEDURE — 3008F PR BODY MASS INDEX (BMI) DOCUMENTED: ICD-10-PCS | Mod: CPTII,S$GLB,, | Performed by: PHYSICIAN ASSISTANT

## 2019-05-16 RX ORDER — MELOXICAM 15 MG/1
15 TABLET ORAL DAILY
Qty: 30 TABLET | Refills: 0 | Status: SHIPPED | OUTPATIENT
Start: 2019-05-16 | End: 2019-06-19 | Stop reason: SDUPTHER

## 2019-05-16 NOTE — LETTER
May 16, 2019      Tori Bailon MD  [address]  [facility] [address]           [facility]  [address]  [facility] [address]  Phone: [phone]          Patient: Carmen Morrow   MR Number: [id number]   YOB: 1970   Date of Visit: 5/16/2019       Dear Dr. Tori Bailon:    Thank you for referring Carmen Morrow to me for evaluation. Attached you will find relevant portions of my assessment and plan of care.    If you have questions, please do not hesitate to call me. I look forward to following Carmen Morrow along with you.    Sincerely,    Jalyn Armstrong PA-C    Enclosure  CC:  No Recipients    If you would like to receive this communication electronically, please contact [email or web address] or [phone] to request more information on [employer] access.    For providers and/or their staff who would like to refer a patient to [facility], please contact us through our one-stop-shop provider referral line, [facility], at [phone].    If you feel you have received this communication in error or would no longer like to receive these types of communications, please e-mail [email or web address]

## 2019-05-18 ENCOUNTER — PATIENT MESSAGE (OUTPATIENT)
Dept: INTERNAL MEDICINE | Facility: CLINIC | Age: 49
End: 2019-05-18

## 2019-05-29 PROBLEM — R29.898 WEAKNESS OF RIGHT UPPER EXTREMITY: Status: ACTIVE | Noted: 2019-05-29

## 2019-05-29 PROBLEM — M25.511 ACUTE PAIN OF RIGHT SHOULDER: Status: ACTIVE | Noted: 2019-05-29

## 2019-06-03 DIAGNOSIS — J30.2 SEASONAL ALLERGIC RHINITIS, UNSPECIFIED TRIGGER: ICD-10-CM

## 2019-06-03 RX ORDER — MONTELUKAST SODIUM 10 MG/1
TABLET ORAL
Qty: 90 TABLET | Refills: 0 | Status: SHIPPED | OUTPATIENT
Start: 2019-06-03 | End: 2019-11-21 | Stop reason: SDUPTHER

## 2019-06-13 ENCOUNTER — TELEPHONE (OUTPATIENT)
Dept: SPORTS MEDICINE | Facility: CLINIC | Age: 49
End: 2019-06-13

## 2019-06-18 ENCOUNTER — OFFICE VISIT (OUTPATIENT)
Dept: SPORTS MEDICINE | Facility: CLINIC | Age: 49
End: 2019-06-18
Payer: COMMERCIAL

## 2019-06-18 VITALS
HEIGHT: 65 IN | WEIGHT: 176 LBS | DIASTOLIC BLOOD PRESSURE: 91 MMHG | BODY MASS INDEX: 29.32 KG/M2 | SYSTOLIC BLOOD PRESSURE: 134 MMHG | HEART RATE: 81 BPM

## 2019-06-18 DIAGNOSIS — M25.511 RIGHT SHOULDER PAIN, UNSPECIFIED CHRONICITY: Primary | ICD-10-CM

## 2019-06-18 PROCEDURE — 20610 DRAIN/INJ JOINT/BURSA W/O US: CPT | Mod: RT,S$GLB,, | Performed by: PHYSICIAN ASSISTANT

## 2019-06-18 PROCEDURE — 3008F BODY MASS INDEX DOCD: CPT | Mod: CPTII,S$GLB,, | Performed by: PHYSICIAN ASSISTANT

## 2019-06-18 PROCEDURE — 99214 OFFICE O/P EST MOD 30 MIN: CPT | Mod: 25,S$GLB,, | Performed by: PHYSICIAN ASSISTANT

## 2019-06-18 PROCEDURE — 20610 PR DRAIN/INJECT LARGE JOINT/BURSA: ICD-10-PCS | Mod: RT,S$GLB,, | Performed by: PHYSICIAN ASSISTANT

## 2019-06-18 PROCEDURE — 3008F PR BODY MASS INDEX (BMI) DOCUMENTED: ICD-10-PCS | Mod: CPTII,S$GLB,, | Performed by: PHYSICIAN ASSISTANT

## 2019-06-18 PROCEDURE — 99214 PR OFFICE/OUTPT VISIT, EST, LEVL IV, 30-39 MIN: ICD-10-PCS | Mod: 25,S$GLB,, | Performed by: PHYSICIAN ASSISTANT

## 2019-06-18 PROCEDURE — 99999 PR PBB SHADOW E&M-EST. PATIENT-LVL III: CPT | Mod: PBBFAC,,, | Performed by: PHYSICIAN ASSISTANT

## 2019-06-18 PROCEDURE — 99999 PR PBB SHADOW E&M-EST. PATIENT-LVL III: ICD-10-PCS | Mod: PBBFAC,,, | Performed by: PHYSICIAN ASSISTANT

## 2019-06-18 RX ORDER — TRIAMCINOLONE ACETONIDE 40 MG/ML
40 INJECTION, SUSPENSION INTRA-ARTICULAR; INTRAMUSCULAR
Status: COMPLETED | OUTPATIENT
Start: 2019-06-18 | End: 2019-06-18

## 2019-06-18 RX ORDER — BUPIVACAINE HYDROCHLORIDE 2.5 MG/ML
3 INJECTION, SOLUTION INFILTRATION; PERINEURAL
Status: COMPLETED | OUTPATIENT
Start: 2019-06-18 | End: 2019-06-18

## 2019-06-18 RX ADMIN — BUPIVACAINE HYDROCHLORIDE 7.5 MG: 2.5 INJECTION, SOLUTION INFILTRATION; PERINEURAL at 03:06

## 2019-06-18 RX ADMIN — TRIAMCINOLONE ACETONIDE 40 MG: 40 INJECTION, SUSPENSION INTRA-ARTICULAR; INTRAMUSCULAR at 03:06

## 2019-06-18 NOTE — PROGRESS NOTES
CC: RIGHT shoulder pain    48 y.o. Female with a history of right shoulder pain x 3 months without injury or trauma. She was given mobic and has been doing PTat [employer] without relief. No pain at rest but significant pain with certain movements, notably external rotation. No prior surgery or instability issues.  She states that the pain is severe and not responding to any conservative care.     She reports that the pain and weakness is worse with overhead activity. It also bothers her at night.    Is affecting ADLs.  Pain is 5/10 at it's worst.    Past Medical History:   Diagnosis Date    Allergy     Brachial neuritis or radiculitis NOS 11/14/2012    Degeneration of cervical intervertebral disc 11/14/2012    GERD (gastroesophageal reflux disease)     SVT (supraventricular tachycardia)        Past Surgical History:   Procedure Laterality Date    anterior diskectomy fusion  05/20/2016    ARTHROCENTESIS;AMNIOX Right 12/22/2016    [facility] at [facility]    AUGMENTATION OF BREAST Bilateral     1994-95    BREAST SURGERY Bilateral 2006    CARPAL TUNNEL RELEASE Left 01/30/2018    CHONDROPLASTY-KNEE Right 12/22/2016    [facility] by [facility] at [facility] OR    COLONOSCOPY N/A 1/9/2015    [facility]formed by [facility] at [facility] ENDO (4TH FLR)    CSF leak  05/22/2016    Replace and repair fat graft from surgery on 5/20/2016    ESOPHAGOGASTRODUODENOSCOPY (EGD) N/A 8/9/2017    [facility] by [facility] at [facility] ENDO    KNEE ARTHROSCOPY      LAMINECTOMY      MANOMETRY-ANORECTAL N/A 1/12/2015    Performed by Gama East MD at [facility] ENDO (4TH FLR)    RESECTION-TURBINATES (SMR) Bilateral 9/1/2017    Performed by Breana Abbott MD at [facility] OR    SEPTOPLASTY N/A 9/1/2017    [facility]formed by [facility] MD at [facility] OR    SINUS SURGERY FUNCTIONAL ENDOSCOPIC WITH NAVIGATION N/A 9/1/2017    Performed by [facility] Abbott MD at [facility] OR    SPINAL FUSION       SUBCHONDROPLASTY; C-ARM Right 12/22/2016    [facility] at [facility] OR    SYNOVECTOMY-KNEE Right 12/22/2016    Performed by [facility] at [facility] OR       Family History   Problem Relation Age of Onset    Heart disease Mother     Hypertension Mother     Asthma Mother     Heart disease Father     COPD Father     Diabetes Father     Ovarian cancer Neg Hx     Colon cancer Neg Hx     Breast cancer Neg Hx     Cancer Neg Hx          Current Outpatient Medications:     acyclovir (ZOVIRAX) 200 MG capsule, Take 1 capsule (200 mg total) by mouth 2 (two) times daily., Disp: 10 capsule, Rfl: 5    albuterol (PROVENTIL/VENTOLIN HFA) 90 mcg/actuation inhaler, Inhale 2 puffs into the lungs every 4 (four) hours as needed for Wheezing., Disp: 1 Inhaler, Rfl: 0    amitriptyline (ELAVIL) 50 MG tablet, Take 1 tablet (50 mg total) by mouth every evening., Disp: 30 tablet, Rfl: 11    cetirizine (ZYRTEC) 10 MG tablet, Take 10 mg by mouth Daily. 1 Tablet Oral Every day, Disp: , Rfl:     esomeprazole (NEXIUM) 40 MG capsule, Take 1 capsule (40 mg total) by mouth before breakfast., Disp: 90 capsule, Rfl: 3    fluticasone (FLONASE) 50 mcg/actuation nasal spray, 2 sprays by Each Nare route every evening. 2 Spray, Suspension Nasal Every day, Disp: 1 Bottle, Rfl: 12    linaclotide (LINZESS) 145 mcg Cap capsule, Take 1 capsule (145 mcg total) by mouth once daily., Disp: 30 capsule, Rfl: 3    methocarbamol (ROBAXIN) 500 MG Tab, Take 1 tablet (500 mg total) by mouth nightly as needed., Disp: 40 tablet, Rfl: 0    montelukast (SINGULAIR) 10 mg tablet, TAKE 1 TABLET BY MOUTH EVERY DAY, Disp: 90 tablet, Rfl: 0    naproxen (NAPROSYN) 500 MG tablet, Take 1 tablet (500 mg total) by mouth 2 (two) times daily., Disp: 28 tablet, Rfl: 0    nortriptyline (PAMELOR) 50 MG capsule, Take 1 capsule (50 mg total) by mouth every evening., Disp: 30 capsule, Rfl: 3    azelastine (ASTELIN) 137 mcg (0.1 %) nasal spray, 1 spray  "(137 mcg total) by Nasal route 2 (two) times daily., Disp: 30 mL, Rfl: 11    Review of patient's allergies indicates:   Allergen Reactions    Oxycodone Itching     Can take hydrcodone          REVIEW OF SYSTEMS:  Constitution: Negative. Negative for chills, fever and night sweats.   HENT: Negative for congestion and headaches.    Eyes: Negative for blurred vision, left vision loss and right vision loss.   Cardiovascular: Negative for chest pain and syncope.   Respiratory: Negative for cough and shortness of breath.    Endocrine: Negative for polydipsia, polyphagia and polyuria.   Hematologic/Lymphatic: Negative for bleeding problem. Does not bruise/bleed easily.   Skin: Negative for dry skin, itching and rash.   Musculoskeletal: Negative for falls.  Positive for right shoulder pain and muscle weakness.   Gastrointestinal: Negative for abdominal pain and bowel incontinence.   Genitourinary: Negative for bladder incontinence and nocturia.   Neurological: Negative for disturbances in coordination, loss of balance and seizures.   Psychiatric/Behavioral: Negative for depression. The patient does not have insomnia.    Allergic/Immunologic: Negative for hives and persistent infections.      PHYSICAL EXAMINATION:  Vitals:  BP (!) 134/91   Pulse 81   Ht 5' 5" (1.651 m)   Wt 79.8 kg (176 lb)   BMI 29.29 kg/m²    General: The patient is alert and oriented x 3.  Mood is pleasant.  Observation of ears, eyes and nose reveal no gross abnormalities.  No labored breathing observed.  Gait is coordinated. Patient can toe walk and heel walk without difficulty.      RIGHT Shoulder / Upper Extremity Exam    OBSERVATION:     Swelling  none  Deformity  none   Discoloration  none   Scapular winging none   Scars   none  Atrophy  none    TENDERNESS / CREPITUS (T/C):          T/C      T/C   Clavicle   -/-  SUPRAspinatus    -/-     AC Jt.    -/-  INFRAspinatus  -/-    SC Jt.    -/-  Deltoid    -/-      G. Tuberosity  -/-  LH BICEP " groove  -/-   Acromion:  -/-  Midline Neck   -/-     Scapular Spine -/-  Trapezium   -/-   SMA Scapula  -/-  GH jt. line - post  -/-     Scapulothoracic  -/-         ROM: (* = with pain)  Left shoulder   Right shoulder        AROM (PROM)   AROM (PROM)   FE    170° (175°)     170° (175°)     ER at 0°    60°  (65°)    60*°  (65*°)   ER at 90° ABD  90°  (90°)    100°  (105°)   IR at 90°  ABD   NA  (40°)     NA  (40°)      IR (spine level)   T8     L1*    STRENGTH: (* = with pain) Left shoulder   Right shoulder   SCAPTION   5/5    5*/5    IR    5/5    5/5   ER    5/5    5*/5   BICEPS   5/5    5/5   Deltoid    5/5    5/5     SIGNS:  Painful side       NEER   +    OCHUS  neg    HILTON   -    SPEEDS  neg     DROP ARM   -   BELLY PRESS neg   Superior escape none    LIFT-OFF  neg   X-Body ADD    neg    MOVING VALGUS neg        STABILITY TESTING    Left shoulder   Right shoulder    Translation     Anterior  up face     up face    Posterior  up face    up face    Sulcus   < 10mm    < 10 mm     Signs   Apprehension   neg      neg       Relocation   no change     no change      Jerk test  neg     neg    EXTREMITY NEURO-VASCULAR EXAM:    Sensation grossly intact to light touch all dermatomal regions.    DTR 2+ Biceps, Triceps, BR and Negative Whits sign   Grossly intact motor function at Elbow, Wrist and Hand   Distal pulses radial and ulnar 2+, brisk cap refill, symmetric.      NECK:  Painless FROM and spinous processes non-tender. Negative Spurlings sign.      XRAYS:  Xrays 5/16/19 including AP, Outlet and Axillary Lateral of Left shoulder are ordered / images reviewed by me:  FINDINGS:  No evidence for acute fracture, dislocation or destructive process.  No joint effusion.  There is mild AC joint arthrosis.  Osteophyte about the inferior aspect of the glenoid noted.  The surrounding soft tissues appear unremarkable.  Visualized thorax demonstrate no acute finding      ASSESSMENT:   Right shoulder pain    PLAN:       1. Continue Pt at [facility]  2. Continue Mobic  3. CSI right shoulder  4. RTC with Dr. Delgado for follow up    Injection Procedure  A time out was performed, including verification of patient ID, procedure, site and side, availability of information and equipment, review of safety issues, and agreement with consent, the procedure site was marked.    After time out was performed, the patient was prepped aseptically with povidone-iodine swabsticks. A diagnostic and therapeutic injection of 3:1cc Marcaine:Kenalog was given under sterile technique using a 22.5 gauge needle from the Posterior  aspect of the right Subacromial in the sitting position.      Carmen Morrow had no adverse reactions to the medication. Pain decreased. She was instructed to apply ice to the joint for 20 minutes and avoid strenuous activities for 24-36 hours following the injection. She was warned of possible blood sugar and/or blood pressure changes during that time. Following that time, she can resume regular activities.    She was reminded to call the clinic immediately for any adverse side effects as explained in clinic today.    All questions were answered, patient will contact us for questions or concerns in the interim.

## 2019-06-19 ENCOUNTER — OFFICE VISIT (OUTPATIENT)
Dept: DERMATOLOGY | Facility: CLINIC | Age: 49
End: 2019-06-19
Payer: COMMERCIAL

## 2019-06-19 DIAGNOSIS — L71.9 ROSACEA: Primary | ICD-10-CM

## 2019-06-19 DIAGNOSIS — M25.511 RIGHT SHOULDER PAIN, UNSPECIFIED CHRONICITY: ICD-10-CM

## 2019-06-19 PROCEDURE — 99202 PR OFFICE/OUTPT VISIT, NEW, LEVL II, 15-29 MIN: ICD-10-PCS | Mod: S$GLB,,, | Performed by: NURSE PRACTITIONER

## 2019-06-19 PROCEDURE — 99999 PR PBB SHADOW E&M-EST. PATIENT-LVL II: ICD-10-PCS | Mod: PBBFAC,,, | Performed by: NURSE PRACTITIONER

## 2019-06-19 PROCEDURE — 99202 OFFICE O/P NEW SF 15 MIN: CPT | Mod: S$GLB,,, | Performed by: NURSE PRACTITIONER

## 2019-06-19 PROCEDURE — 99999 PR PBB SHADOW E&M-EST. PATIENT-LVL II: CPT | Mod: PBBFAC,,, | Performed by: NURSE PRACTITIONER

## 2019-06-19 RX ORDER — AZELAIC ACID 0.15 G/G
GEL TOPICAL
Qty: 50 G | Refills: 2 | Status: SHIPPED | OUTPATIENT
Start: 2019-06-19 | End: 2020-08-10

## 2019-06-19 NOTE — PROGRESS NOTES
Subjective:       Patient ID:  Carmen Morrow is a 48 y.o. female who presents for   Chief Complaint   Patient presents with    Rash     chin, X10yrs, come and goes, tiny bumps, getting worse, no tx      Rash  - Initial  Affected locations: chin  Duration: 10 years  Signs / symptoms: itching (2-3/10 )  Severity: mild  Timing: intermittent (on & off x10 years)  Aggravated by: nothing  Treatments tried: washing face once daily w. dial soap. and otc moisturizer.    also complaining of red bumps on left cheek x2-3 weeks  Reports when she picks at lesions it bleeds.    Denies any previous h/o nmsc or mm    Review of Systems   Skin: Positive for rash and activity-related sunscreen use. Negative for daily sunscreen use.        Denies any previous h/o atopic dermatitis     Allergic/Immunologic: Positive for environmental allergies.        Objective:    Physical Exam   Constitutional: She appears well-developed and well-nourished. No distress.   Neurological: She is alert and oriented to person, place, and time. She is not disoriented.   Psychiatric: She has a normal mood and affect.   Skin:   Areas Examined (abnormalities noted in diagram):   Head / Face Inspection Performed  Neck Inspection Performed              Diagram Legend     Erythematous scaling macule/papule c/w actinic keratosis       Vascular papule c/w angioma      Pigmented verrucoid papule/plaque c/w seborrheic keratosis      Yellow umbilicated papule c/w sebaceous hyperplasia      Irregularly shaped tan macule c/w lentigo     1-2 mm smooth white papules consistent with Milia      Movable subcutaneous cyst with punctum c/w epidermal inclusion cyst      Subcutaneous movable cyst c/w pilar cyst      Firm pink to brown papule c/w dermatofibroma      Pedunculated fleshy papule(s) c/w skin tag(s)      Evenly pigmented macule c/w junctional nevus     Mildly variegated pigmented, slightly irregular-bordered macule c/w mildly atypical nevus      Flesh colored  to evenly pigmented papule c/w intradermal nevus       Pink pearly papule/plaque c/w basal cell carcinoma      Erythematous hyperkeratotic cursted plaque c/w SCC      Surgical scar with no sign of skin cancer recurrence      Open and closed comedones      Inflammatory papules and pustules      Verrucoid papule consistent consistent with wart     Erythematous eczematous patches and plaques     Dystrophic onycholytic nail with subungual debris c/w onychomycosis     Umbilicated papule    Erythematous-base heme-crusted tan verrucoid plaque consistent with inflamed seborrheic keratosis     Erythematous Silvery Scaling Plaque c/w Psoriasis     See annotation      Assessment / Plan:        Rosacea  Telangectasia & papules  -     azelaic acid (FINACEA) 15 % gel; Apply to face QHS. Increase to BID as tolerated.  Dispense: 50 g; Refill: 2    wash face BID w/ gentle cleanser  Spf q am  D/c dial soap       Follow up in about 3 months (around 9/19/2019).

## 2019-06-19 NOTE — PATIENT INSTRUCTIONS
Nightly after applying finacea:  cerave PM   Skin medica night cream  Elta MD night cream    Morning:  Elta MD UV elements

## 2019-06-20 RX ORDER — MELOXICAM 15 MG/1
TABLET ORAL
Qty: 30 TABLET | Refills: 0 | Status: SHIPPED | OUTPATIENT
Start: 2019-06-20 | End: 2019-11-21

## 2019-08-01 ENCOUNTER — PATIENT MESSAGE (OUTPATIENT)
Dept: OBSTETRICS AND GYNECOLOGY | Facility: CLINIC | Age: 49
End: 2019-08-01

## 2019-08-01 NOTE — TELEPHONE ENCOUNTER
Very common!   Send provera 10 nightly for 10 nights, tell   Will help decrease, then after will get a clean out  Update us mon/tuesday  thanks

## 2019-08-02 ENCOUNTER — PATIENT MESSAGE (OUTPATIENT)
Dept: OBSTETRICS AND GYNECOLOGY | Facility: CLINIC | Age: 49
End: 2019-08-02

## 2019-08-02 RX ORDER — MEDROXYPROGESTERONE ACETATE 10 MG/1
10 TABLET ORAL DAILY
Qty: 10 TABLET | Refills: 0 | Status: SHIPPED | OUTPATIENT
Start: 2019-08-02 | End: 2020-08-10

## 2019-08-10 DIAGNOSIS — M54.81 BILATERAL OCCIPITAL NEURALGIA: ICD-10-CM

## 2019-08-12 RX ORDER — NORTRIPTYLINE HYDROCHLORIDE 50 MG/1
50 CAPSULE ORAL NIGHTLY
Qty: 90 CAPSULE | Refills: 1 | Status: SHIPPED | OUTPATIENT
Start: 2019-08-12 | End: 2020-02-17

## 2019-08-21 NOTE — PROGRESS NOTES
Subjective:          Chief Complaint: Carmen Morrow is a 47 y.o. female who had concerns including Injections of the Left Knee and Injections of the Right Knee.    Patient is here for a follow up for her left knee. She is here today for bilateral Euflexxa #2. .      DATE OF PROCEDURE: 12/22/2016     ATTENDING SURGEON: Surgeon(s) and Role:  * Bess Diaz MD - Primary     Assistants:  MD Shauna Roth PA-C      PREOPERATIVE DIAGNOSIS:  Right  Chondromalacia, (excludes patella) M94.29, Internal derangement knee M23.90 and Synovitis M65.9     POSTOPERATIVE DIAGNOSIS:   Right  Chondromalacia, (excludes patella) M94.29, Internal derangement knee M23.90 and Synovitis M65.9     PROCEDURES(S) PERFORMED:   1. Right  Femoral Subchondroplasty 85792  2. Right  Arthroscopy, debridement/shaving of articular cartilage (chondroplasty) 81767  3. Right  Arthroscopy, knee, synovectomy, limited 06271  4. Right  Amniox Arthrocentesis, 71527        Pain   Associated symptoms include joint swelling. Pertinent negatives include no abdominal pain, chest pain, chills, congestion, coughing, fever, headaches, myalgias, nausea, numbness, rash, sore throat or vomiting.          Review of Systems   Constitution: Negative. Negative for chills, fever, weight gain and weight loss.   HENT: Negative for congestion and sore throat.    Eyes: Negative for blurred vision and double vision.   Cardiovascular: Negative for chest pain, leg swelling and palpitations.   Respiratory: Negative for cough and shortness of breath.    Hematologic/Lymphatic: Does not bruise/bleed easily.   Skin: Negative for itching, poor wound healing and rash.   Musculoskeletal: Positive for joint pain, joint swelling and stiffness. Negative for back pain, muscle weakness and myalgias.   Gastrointestinal: Negative for abdominal pain, constipation, diarrhea, nausea and vomiting.   Genitourinary: Negative.  Negative for frequency and hematuria.    Neurological: Negative for dizziness, headaches, numbness, paresthesias and sensory change.   Psychiatric/Behavioral: Negative for altered mental status and depression. The patient is not nervous/anxious.    Allergic/Immunologic: Negative for hives.       Pain Related Questions  Over the past 3 days, what was your average pain during activity? (I.e. running, jogging, walking, climbing stairs, getting dressed, ect.): 0  Over the past 3 days, what was your highest pain level?: 0  Over the past 3 days, what was your lowest pain level? : 0    Other  How many nights a week are you awakened by your affected body part?: 0  Was the patient's HEIGHT measured or patient reported?: Patient Reported  Was the patient's WEIGHT measured or patient reported?: Measured      Objective:        General: Carmen is well-developed, well-nourished, appears stated age, in no acute distress, alert and oriented to time, place and person.     General    Vitals reviewed.  Constitutional: She is oriented to person, place, and time. She appears well-developed and well-nourished. No distress.   HENT:   Mouth/Throat: No oropharyngeal exudate.   Eyes: Right eye exhibits no discharge. Left eye exhibits no discharge.   Neck: Normal range of motion.   Cardiovascular: Normal rate and regular rhythm.    Pulmonary/Chest: Effort normal and breath sounds normal. No respiratory distress.   Neurological: She is alert and oriented to person, place, and time. She has normal reflexes. No cranial nerve deficit. Coordination normal.   Psychiatric: She has a normal mood and affect. Her behavior is normal. Judgment and thought content normal.     General Musculoskeletal Exam   Gait: normal       Right Knee Exam     Inspection   Erythema: absent  Scars: present  Swelling: absent  Effusion: effusion  Deformity: deformity  Bruising: absent    Tenderness   The patient is tender to palpation of the medial joint line (over incisions).    Crepitus   The patient has  crepitus of the patella (moderate).    Range of Motion   Extension: 0   Flexion:  140 normal     Tests   Meniscus   Carina:  Medial - negative Lateral - negative  Ligament Examination Lachman: normal (-1 to 2mm) PCL-Posterior Drawer: normal (0 to 2mm)     MCL - Valgus: normal (0 to 2mm)  LCL - Varus: normalPivot Shift: normal (Equal)Reverse Pivot Shift: normal (Equal)Dial Test at 30 degrees: normal (< 5 degrees)Dial Test at 90 degrees: normal (< 5 degrees)  Posterior Sag Test: negative  Posterolateral Corner: unstable (>15 degrees difference)  Patella   Patellar Apprehension: negative  Passive Patellar Tilt: neutral  Patellar Tracking: normal  Patellar Glide (quadrants): Lateral - 1   Medial - 2  Q-Angle at 90 degrees: normal  Patellar Grind: negative  J-Sign: none    Other   Meniscal Cyst: absent  Popliteal (Baker's) Cyst: absent  Sensation: normal    Comments:  Mild quadriceps atrophy     Left Knee Exam     Inspection   Erythema: absent  Scars: absent  Swelling: absent  Effusion: absent  Deformity: deformity  Bruising: absent    Tenderness   The patient is experiencing no tenderness.         Crepitus   The patient has crepitus of the patella (moderate).    Range of Motion   Extension: 0   Flexion: 140     Tests   Meniscus   Carina:  Medial - negative Lateral - negative  Stability Lachman: normal (-1 to 2mm) PCL-Posterior Drawer: normal (0 to 2mm)  MCL - Valgus: normal (0 to 2mm)  LCL - Varus: normal (0 to 2mm)Pivot Shift: normal (Equal)Reverse Pivot Shift: normal (Equal)Dial Test at 30 degrees: normal (< 5 degrees)Dial Test at 90 degrees: normal (< 5 degrees)  Posterior Sag Test: negative  Posterolateral Corner: unstable (>15 degrees difference)  Patella   Patellar Apprehension: negative  Passive Patellar Tilt: neutral  Patellar Tracking: normal  Patellar Glide (Quadrants): Lateral - 1 Medial - 2  Q-Angle at 90 degrees: normal  Patellar Grind: positive  J-Sign: J sign absent    Other   Meniscal Cyst:  absent  Popliteal (Baker's) Cyst: absent  Sensation: normal    Right Hip Exam     Tests   Meek: negative  Left Hip Exam     Tests   Meek: negative          Muscle Strength   Right Lower Extremity   Hip Abduction: 5/5   Quadriceps:  4/5   Hamstrin/5   Left Lower Extremity   Hip Abduction: 5/5   Quadriceps:  4/5   Hamstrin/5     Reflexes     Left Side  Quadriceps:  2+  Achilles:  2+    Right Side   Quadriceps:  2+  Achilles:  2+    Vascular Exam     Right Pulses  Dorsalis Pedis:      2+  Posterior Tibial:      2+        Left Pulses  Dorsalis Pedis:      2+  Posterior Tibial:      2+          MRI RESULTS Right knee:   High grade articular cartilage loss involving the medial femoral condyle.    Punctate focus of abnormal increased T2 signal involving the anterior horn of the medial meniscus only visualized on a single slice last appreciated on the sagittal proton density fat-sat sequence cannot exclude meniscal tear.    Small Baker's cyst.    MRI results left knee:    Findings:     Menisci:  There is no tear of the medial or lateral meniscus.     Ligaments:  ACL, PCL, MCL, and LCL complex are intact.    Tendons:  Extensor mechanism is maintained.    Cartilage:   Patellofemoral: High-grade chondromalacia along the majority of the inferolateral patellar facet with mild subchondral edema, progressed since the prior study dated 10/2/13.  Medial tibiofemoral: 1.9 cm area of high-grade chondromalacia along the posterior weightbearing surface of the medial femoral condyle without evidence of subchondral edema.  Lateral tibiofemoral: Articular cartilage is maintained.    Bone: No fracture or marrow replacing process.    Miscellaneous: There is no joint effusion.        Assessment:       Encounter Diagnoses   Name Primary?    Primary osteoarthritis of right knee Yes    Primary osteoarthritis of left knee           Plan:       1. IKDC, SF-12 and KOOS was not filled out today in clinic.     RTC in 1 weeks with Shauna  Jolene MOSHER for bilateral Euflexxa injections #3. Patient will not fill out IKDC, SF-12 and KOOS on return.     2. Injection Procedure  A time out was performed, including verification of patient ID, procedure, site and side, availability of information and equipment, review of safety issues, and agreement with consent, the procedure site was marked.    After time out was performed, the patient was prepped aseptically with povidone-iodine swabsticks. A diagnostic and therapeutic injection of 2cc Euflexxa was given under sterile technique using a 22g x 1.5 needle from the Superolateral  aspect of the bilateral Knee Joint in the supine position.      Carmen Morrow had no adverse reactions to the medication. Pain decreased. She was instructed to apply ice to the joint for 20 minutes and avoid strenuous activities for 24-36 hours following the injection. She was warned of possible blood sugar and/or blood pressure changes during that time. Following that time, she can resume regular activities.    She was reminded to call the clinic immediately for any adverse side effects as explained in clinic today.                                Patient questionnaires may have been collected.   Not applicable

## 2019-09-22 DIAGNOSIS — J30.2 SEASONAL ALLERGIC RHINITIS, UNSPECIFIED TRIGGER: ICD-10-CM

## 2019-09-22 RX ORDER — MONTELUKAST SODIUM 10 MG/1
TABLET ORAL
Qty: 90 TABLET | Refills: 0 | OUTPATIENT
Start: 2019-09-22

## 2019-10-10 ENCOUNTER — OFFICE VISIT (OUTPATIENT)
Dept: INTERNAL MEDICINE | Facility: CLINIC | Age: 49
End: 2019-10-10
Payer: COMMERCIAL

## 2019-10-10 VITALS
DIASTOLIC BLOOD PRESSURE: 78 MMHG | BODY MASS INDEX: 27.16 KG/M2 | HEART RATE: 68 BPM | SYSTOLIC BLOOD PRESSURE: 122 MMHG | WEIGHT: 163 LBS | HEIGHT: 65 IN | OXYGEN SATURATION: 99 %

## 2019-10-10 DIAGNOSIS — L03.116 CELLULITIS OF LEFT LOWER EXTREMITY: Primary | ICD-10-CM

## 2019-10-10 PROCEDURE — 3008F BODY MASS INDEX DOCD: CPT | Mod: CPTII,S$GLB,, | Performed by: INTERNAL MEDICINE

## 2019-10-10 PROCEDURE — 99999 PR PBB SHADOW E&M-EST. PATIENT-LVL IV: CPT | Mod: PBBFAC,,, | Performed by: INTERNAL MEDICINE

## 2019-10-10 PROCEDURE — 99213 OFFICE O/P EST LOW 20 MIN: CPT | Mod: S$GLB,,, | Performed by: INTERNAL MEDICINE

## 2019-10-10 PROCEDURE — 99999 PR PBB SHADOW E&M-EST. PATIENT-LVL IV: ICD-10-PCS | Mod: PBBFAC,,, | Performed by: INTERNAL MEDICINE

## 2019-10-10 PROCEDURE — 3008F PR BODY MASS INDEX (BMI) DOCUMENTED: ICD-10-PCS | Mod: CPTII,S$GLB,, | Performed by: INTERNAL MEDICINE

## 2019-10-10 PROCEDURE — 99213 PR OFFICE/OUTPT VISIT, EST, LEVL III, 20-29 MIN: ICD-10-PCS | Mod: S$GLB,,, | Performed by: INTERNAL MEDICINE

## 2019-10-10 RX ORDER — CEFDINIR 300 MG/1
300 CAPSULE ORAL 2 TIMES DAILY
Qty: 20 CAPSULE | Refills: 0 | Status: SHIPPED | OUTPATIENT
Start: 2019-10-10 | End: 2019-10-20

## 2019-10-10 NOTE — PROGRESS NOTES
REASON FOR VISIT:  This is a 49-year-old female, for three days she has noted   some pain over her left anterior shin and recently has noticed some redness over   the area.  She does not recall any trauma or bites.  She has not shaved her   legs.  No fever.    PAST MEDICAL HISTORY:  Chronic headache.  Cervicalgia.  Chronic rhinitis.  Gastroesophageal reflux disease.    MEDICATION:  List was reviewed per MedCard.  She has already been taking   naproxen 500 mg twice a day.    PHYSICAL EXAMINATION:  Her weight is 163 pounds, pulse 72, blood pressure 120/72.  There is an oval area of hyperemia and erythema over the left anterior shin more   laterally.  It is one area of discomfort to palpation.  It does feel warm.    There is no inguinal adenopathy.  2+ pedal pulses.    IMPRESSION:  Cellulitis.    PLAN:  Omnicef 300 mg to take twice a day for the next 10 days, leg elevation   and let me know if there is no improvement.        JAM/HN  dd: 10/10/2019 12:10:54 (CDT)  td: 10/10/2019 20:49:03 (CDT)  Doc ID   #[id number]  Job ID #[id number]    CC:

## 2019-10-16 DIAGNOSIS — J30.2 SEASONAL ALLERGIC RHINITIS, UNSPECIFIED TRIGGER: ICD-10-CM

## 2019-10-16 RX ORDER — MONTELUKAST SODIUM 10 MG/1
TABLET ORAL
Qty: 90 TABLET | Refills: 0 | OUTPATIENT
Start: 2019-10-16

## 2019-11-12 DIAGNOSIS — J30.2 SEASONAL ALLERGIC RHINITIS, UNSPECIFIED TRIGGER: ICD-10-CM

## 2019-11-13 RX ORDER — MONTELUKAST SODIUM 10 MG/1
TABLET ORAL
Qty: 90 TABLET | Refills: 0 | OUTPATIENT
Start: 2019-11-13

## 2019-11-21 ENCOUNTER — OFFICE VISIT (OUTPATIENT)
Dept: INTERNAL MEDICINE | Facility: CLINIC | Age: 49
End: 2019-11-21
Payer: COMMERCIAL

## 2019-11-21 VITALS
BODY MASS INDEX: 28.02 KG/M2 | DIASTOLIC BLOOD PRESSURE: 82 MMHG | HEART RATE: 71 BPM | SYSTOLIC BLOOD PRESSURE: 118 MMHG | HEIGHT: 65 IN | WEIGHT: 168.19 LBS | TEMPERATURE: 98 F

## 2019-11-21 DIAGNOSIS — J06.9 UPPER RESPIRATORY TRACT INFECTION, UNSPECIFIED TYPE: Primary | ICD-10-CM

## 2019-11-21 DIAGNOSIS — J10.1 INFLUENZA A: ICD-10-CM

## 2019-11-21 DIAGNOSIS — J30.2 SEASONAL ALLERGIC RHINITIS, UNSPECIFIED TRIGGER: ICD-10-CM

## 2019-11-21 LAB
INFLUENZA A, MOLECULAR: NEGATIVE
INFLUENZA B, MOLECULAR: NEGATIVE
SPECIMEN SOURCE: NORMAL

## 2019-11-21 PROCEDURE — 3008F PR BODY MASS INDEX (BMI) DOCUMENTED: ICD-10-PCS | Mod: CPTII,S$GLB,, | Performed by: PHYSICIAN ASSISTANT

## 2019-11-21 PROCEDURE — 3008F BODY MASS INDEX DOCD: CPT | Mod: CPTII,S$GLB,, | Performed by: PHYSICIAN ASSISTANT

## 2019-11-21 PROCEDURE — 99214 OFFICE O/P EST MOD 30 MIN: CPT | Mod: S$GLB,,, | Performed by: PHYSICIAN ASSISTANT

## 2019-11-21 PROCEDURE — 99214 PR OFFICE/OUTPT VISIT, EST, LEVL IV, 30-39 MIN: ICD-10-PCS | Mod: S$GLB,,, | Performed by: PHYSICIAN ASSISTANT

## 2019-11-21 PROCEDURE — 87502 INFLUENZA DNA AMP PROBE: CPT

## 2019-11-21 PROCEDURE — 99999 PR PBB SHADOW E&M-EST. PATIENT-LVL IV: CPT | Mod: PBBFAC,,, | Performed by: PHYSICIAN ASSISTANT

## 2019-11-21 PROCEDURE — 99999 PR PBB SHADOW E&M-EST. PATIENT-LVL IV: ICD-10-PCS | Mod: PBBFAC,,, | Performed by: PHYSICIAN ASSISTANT

## 2019-11-21 RX ORDER — FLUTICASONE PROPIONATE 50 MCG
2 SPRAY, SUSPENSION (ML) NASAL NIGHTLY
Qty: 1 BOTTLE | Refills: 12 | Status: SHIPPED | OUTPATIENT
Start: 2019-11-21 | End: 2020-11-16

## 2019-11-21 RX ORDER — MONTELUKAST SODIUM 10 MG/1
10 TABLET ORAL DAILY
Qty: 90 TABLET | Refills: 3 | Status: SHIPPED | OUTPATIENT
Start: 2019-11-21 | End: 2020-09-17

## 2019-11-21 RX ORDER — METHYLPREDNISOLONE 4 MG/1
TABLET ORAL
Qty: 1 PACKAGE | Refills: 0 | Status: SHIPPED | OUTPATIENT
Start: 2019-11-21 | End: 2020-08-10

## 2019-11-21 RX ORDER — PROMETHAZINE HYDROCHLORIDE AND CODEINE PHOSPHATE 6.25; 1 MG/5ML; MG/5ML
5 SOLUTION ORAL EVERY 6 HOURS PRN
Qty: 120 ML | Refills: 0 | Status: SHIPPED | OUTPATIENT
Start: 2019-11-21 | End: 2019-12-01

## 2019-11-21 RX ORDER — ALBUTEROL SULFATE 90 UG/1
2 AEROSOL, METERED RESPIRATORY (INHALATION) EVERY 4 HOURS PRN
Qty: 1 INHALER | Refills: 0 | Status: SHIPPED | OUTPATIENT
Start: 2019-11-21 | End: 2020-08-10

## 2019-11-21 NOTE — PROGRESS NOTES
Subjective:       Patient ID: Carmen Morrow is a 49 y.o. female.        Chief Complaint: Nasal Congestion (x2days); Cough; and Generalized Body Aches (pain=6)    Carmen Morrow is an established patient of Tori Bailon MD here today for urgent care visit.    Feeling fatigued for a few days and then yesterday began to feel awful.  Nasal congestion, body aches, chills, sweats, cough, lots of nasal discharge.  No chest pain, wheezing, shortness of breath.  Felt feverish but did not check temperature.  S/p flu shot.      AR - needs refill of singulair and flonase.    GERD - taking nexium 40 mg daily.    Occipital neuralgia - followed by neuro, changed from elavil to pamelor.         Review of Systems   Constitutional: Positive for chills, diaphoresis, fatigue and fever (subjective).   HENT: Positive for congestion. Negative for sore throat.    Eyes: Negative for visual disturbance.   Respiratory: Positive for cough. Negative for chest tightness and shortness of breath.    Cardiovascular: Negative for chest pain, palpitations and leg swelling.   Gastrointestinal: Negative for abdominal pain, blood in stool, constipation, diarrhea, nausea and vomiting.   Genitourinary: Negative for dysuria, frequency, hematuria and urgency.   Musculoskeletal: Negative for arthralgias and back pain.   Skin: Negative for rash.   Neurological: Negative for dizziness, syncope, weakness and headaches.   Psychiatric/Behavioral: Negative for dysphoric mood and sleep disturbance. The patient is not nervous/anxious.        Objective:      Physical Exam   Constitutional: She appears well-developed and well-nourished.   HENT:   Head: Normocephalic.   Right Ear: External ear normal.   Left Ear: External ear normal.   Nose: Mucosal edema and rhinorrhea present. Right sinus exhibits no maxillary sinus tenderness and no frontal sinus tenderness. Left sinus exhibits no maxillary sinus tenderness and no frontal sinus tenderness.    Mouth/Throat: Oropharynx is clear and moist.   Cerumen impaction right canal   Eyes: Pupils are equal, round, and reactive to light.   Cardiovascular: Normal rate, regular rhythm and normal heart sounds. Exam reveals no gallop and no friction rub.   No murmur heard.  Pulmonary/Chest: Effort normal and breath sounds normal. No respiratory distress.   Coughs frequently during exam   Abdominal: Soft. Normal appearance. There is no tenderness. There is no CVA tenderness.   Musculoskeletal: She exhibits no edema.   Neurological: She is alert.   Skin: Skin is warm and dry.   Psychiatric: She has a normal mood and affect.   Nursing note and vitals reviewed.      Assessment:       1. Upper respiratory tract infection, unspecified type    2. Seasonal allergic rhinitis, unspecified trigger    3. Influenza A        Plan:       Carmen was seen today for nasal congestion, cough and generalized body aches.    Diagnoses and all orders for this visit:    Upper respiratory tract infection, unspecified type  -     promethazine-codeine 6.25-10 mg/5 ml (PHENERGAN WITH CODEINE) 6.25-10 mg/5 mL syrup; Take 5 mLs by mouth every 6 (six) hours as needed for Cough.  -     albuterol (PROVENTIL/VENTOLIN HFA) 90 mcg/actuation inhaler; Inhale 2 puffs into the lungs every 4 (four) hours as needed for Wheezing.  -     methylPREDNISolone (MEDROL DOSEPACK) 4 mg tablet; use as directed  -     Influenza A & B by Molecular - negative    Seasonal allergic rhinitis, unspecified trigger  -     REFILL: montelukast (SINGULAIR) 10 mg tablet; Take 1 tablet (10 mg total) by mouth once daily.  -     REFILL: fluticasone propionate (FLONASE) 50 mcg/actuation nasal spray; 2 sprays (100 mcg total) by Each Nostril route every evening. 2 Spray, Suspension Nasal Every day    Given precautionary rx for medrol dosepack and albuterol inhaler as she has wheezed in past with URI's - will hold on starting unless she begins wheezing.  Drink plenty of fluids, get lots of  "rest, and follow-up poor results.    Out of work for the rest of the week.    Pt has been given instructions populated from [employer] database and has verbalized understanding of the after visit summary and information contained wherein.    Follow up with a primary care provider. May go to ER for acute shortness of breath, lightheadedness, fever, or any other emergent complaints or changes in condition.    "This note will be shared with the patient"    Future Appointments   Date Time Provider Department Center   11/27/2019  8:00 AM Yvette Syed OD [facility]               "

## 2019-11-21 NOTE — PATIENT INSTRUCTIONS
If you start wheezing - you may start the medrol dosepack and albuterol inhaler    I will put your flu swab results on your portal - if it is positive I will send in tamiflu      Viral Upper Respiratory Illness (Adult)  You have a viral upper respiratory illness (URI), which is another term for the common cold. This illness is contagious during the first few days. It is spread through the air by coughing and sneezing. It may also be spread by direct contact (touching the sick person and then touching your own eyes, nose, or mouth). Frequent handwashing will decrease risk of spread. Most viral illnesses go away within 7 to 10 days with rest and simple home remedies. Sometimes the illness may last for several weeks. Antibiotics will not kill a virus, and they are generally not prescribed for this condition.    Home care  · If symptoms are severe, rest at home for the first 2 to 3 days. When you resume activity, don't let yourself get too tired.  · Avoid being exposed to cigarette smoke (yours or others).  · You may use acetaminophen or ibuprofen to control pain and fever, unless another medicine was prescribed. (Note: If you have chronic liver or kidney disease, have ever had a stomach ulcer or gastrointestinal bleeding, or are taking blood-thinning medicines, talk with your healthcare provider before using these medicines.) Aspirin should never be given to anyone under 18 years of age who is ill with a viral infection or fever. It may cause severe liver or brain damage.  · Your appetite may be poor, so a light diet is fine. Avoid dehydration by drinking 6 to 8 glasses of fluids per day (water, soft drinks, juices, tea, or soup). Extra fluids will help loosen secretions in the nose and lungs.  · Over-the-counter cold medicines will not shorten the length of time youre sick, but they may be helpful for the following symptoms: cough, sore throat, and nasal and sinus congestion. (Note: Do not use decongestants if you  have high blood pressure.)  Follow-up care  Follow up with your healthcare provider, or as advised.  When to seek medical advice  Call your healthcare provider right away if any of these occur:  · Cough with lots of colored sputum (mucus)  · Severe headache; face, neck, or ear pain  · Difficulty swallowing due to throat pain  · Fever of 100.4°F (38°C)  Call 911, or get immediate medical care  Call emergency services right away if any of these occur:  · Chest pain, shortness of breath, wheezing, or difficulty breathing  · Coughing up blood  · Inability to swallow due to throat pain  Date Last Reviewed: 9/13/2015  © 6389-3236 [employer]. [address] [place] [address], [place] [address]. All rights reserved. This information is not intended as a substitute for professional medical care. Always follow your healthcare professional's instructions.

## 2019-11-27 ENCOUNTER — OFFICE VISIT (OUTPATIENT)
Dept: OPTOMETRY | Facility: CLINIC | Age: 49
End: 2019-11-27
Payer: COMMERCIAL

## 2019-11-27 DIAGNOSIS — H52.201 MYOPIA OF RIGHT EYE WITH ASTIGMATISM AND PRESBYOPIA: Primary | ICD-10-CM

## 2019-11-27 DIAGNOSIS — H52.4 MYOPIA OF RIGHT EYE WITH ASTIGMATISM AND PRESBYOPIA: Primary | ICD-10-CM

## 2019-11-27 DIAGNOSIS — H52.4 ASTIGMATISM OF LEFT EYE WITH PRESBYOPIA: ICD-10-CM

## 2019-11-27 DIAGNOSIS — H52.202 ASTIGMATISM OF LEFT EYE WITH PRESBYOPIA: ICD-10-CM

## 2019-11-27 DIAGNOSIS — H52.11 MYOPIA OF RIGHT EYE WITH ASTIGMATISM AND PRESBYOPIA: Primary | ICD-10-CM

## 2019-11-27 PROCEDURE — 92015 PR REFRACTION: ICD-10-PCS | Mod: S$GLB,,, | Performed by: OPTOMETRIST

## 2019-11-27 PROCEDURE — 92004 PR EYE EXAM, NEW PATIENT,COMPREHESV: ICD-10-PCS | Mod: S$GLB,,, | Performed by: OPTOMETRIST

## 2019-11-27 PROCEDURE — 99999 PR PBB SHADOW E&M-EST. PATIENT-LVL II: CPT | Mod: PBBFAC,,, | Performed by: OPTOMETRIST

## 2019-11-27 PROCEDURE — 92004 COMPRE OPH EXAM NEW PT 1/>: CPT | Mod: S$GLB,,, | Performed by: OPTOMETRIST

## 2019-11-27 PROCEDURE — 99999 PR PBB SHADOW E&M-EST. PATIENT-LVL II: ICD-10-PCS | Mod: PBBFAC,,, | Performed by: OPTOMETRIST

## 2019-11-27 PROCEDURE — 92015 DETERMINE REFRACTIVE STATE: CPT | Mod: S$GLB,,, | Performed by: OPTOMETRIST

## 2019-11-27 NOTE — PROGRESS NOTES
HPI     50 y/o female is here today for a complete eye exam.  Last eye exam 1 year ago outside [facility].  Pt states she has problems with near vision seems to be decreasing.  Floaters no flashes   Eyes itch from allergies  No diplopia  No eye surgeries  fhx glaucoma  Mother  Pt. Is finishing a medrol dose pack    Last edited by Yvette Syed, OD on 11/27/2019  9:18 AM. (History)            Assessment /Plan     For exam results, see Encounter Report.    Myopia of right eye with astigmatism and presbyopia    Astigmatism of left eye with presbyopia      Updated SRx. Pt 20/20 OU uncorrected. Discussed PALs vs NVO specs. Pt interested in NVO. Trial framed in office at computer and reading with good result. Discussed glasses only for near. Will keep habitual PALs for driving only. Monitor yearly.     (+)FHx of glaucoma. WNL IOP today. Average c/d ratio OU. Continue to monitor yearly. Order baseline testing if warranted in the future.       RTC in 1 year for annual eye exam or prn.

## 2020-02-15 DIAGNOSIS — M54.81 BILATERAL OCCIPITAL NEURALGIA: ICD-10-CM

## 2020-02-17 RX ORDER — NORTRIPTYLINE HYDROCHLORIDE 50 MG/1
50 CAPSULE ORAL NIGHTLY
Qty: 30 CAPSULE | Refills: 0 | Status: SHIPPED | OUTPATIENT
Start: 2020-02-17 | End: 2020-04-13

## 2020-02-28 ENCOUNTER — OFFICE VISIT (OUTPATIENT)
Dept: URGENT CARE | Facility: CLINIC | Age: 50
End: 2020-02-28
Payer: COMMERCIAL

## 2020-02-28 ENCOUNTER — OFFICE VISIT (OUTPATIENT)
Dept: OBSTETRICS AND GYNECOLOGY | Facility: CLINIC | Age: 50
End: 2020-02-28
Payer: COMMERCIAL

## 2020-02-28 VITALS
TEMPERATURE: 98 F | HEART RATE: 82 BPM | HEIGHT: 65 IN | BODY MASS INDEX: 29.82 KG/M2 | WEIGHT: 179 LBS | DIASTOLIC BLOOD PRESSURE: 85 MMHG | SYSTOLIC BLOOD PRESSURE: 140 MMHG | OXYGEN SATURATION: 99 % | RESPIRATION RATE: 18 BRPM

## 2020-02-28 VITALS
HEIGHT: 65 IN | BODY MASS INDEX: 29.96 KG/M2 | DIASTOLIC BLOOD PRESSURE: 75 MMHG | WEIGHT: 179.81 LBS | SYSTOLIC BLOOD PRESSURE: 118 MMHG

## 2020-02-28 DIAGNOSIS — J02.9 SORE THROAT: ICD-10-CM

## 2020-02-28 DIAGNOSIS — R09.81 NASAL CONGESTION: ICD-10-CM

## 2020-02-28 DIAGNOSIS — B34.9 VIRAL SYNDROME: Primary | ICD-10-CM

## 2020-02-28 DIAGNOSIS — R03.0 ELEVATED BLOOD PRESSURE READING: ICD-10-CM

## 2020-02-28 DIAGNOSIS — R51.9 HEADACHE, UNSPECIFIED HEADACHE TYPE: ICD-10-CM

## 2020-02-28 DIAGNOSIS — J34.89 SINUS PRESSURE: ICD-10-CM

## 2020-02-28 DIAGNOSIS — R09.89 RUNNY NOSE: ICD-10-CM

## 2020-02-28 DIAGNOSIS — R05.9 COUGH: ICD-10-CM

## 2020-02-28 DIAGNOSIS — R52 BODY ACHES: ICD-10-CM

## 2020-02-28 DIAGNOSIS — Z12.4 ROUTINE PAPANICOLAOU SMEAR: Primary | ICD-10-CM

## 2020-02-28 DIAGNOSIS — R09.82 POST-NASAL DRIP: ICD-10-CM

## 2020-02-28 LAB
CTP QC/QA: YES
FLUAV AG NPH QL: NEGATIVE
FLUBV AG NPH QL: NEGATIVE

## 2020-02-28 PROCEDURE — 99396 PR PREVENTIVE VISIT,EST,40-64: ICD-10-PCS | Mod: S$GLB,,, | Performed by: OBSTETRICS & GYNECOLOGY

## 2020-02-28 PROCEDURE — 99999 PR PBB SHADOW E&M-EST. PATIENT-LVL III: CPT | Mod: PBBFAC,,, | Performed by: OBSTETRICS & GYNECOLOGY

## 2020-02-28 PROCEDURE — 99999 PR PBB SHADOW E&M-EST. PATIENT-LVL III: ICD-10-PCS | Mod: PBBFAC,,, | Performed by: OBSTETRICS & GYNECOLOGY

## 2020-02-28 PROCEDURE — 87804 INFLUENZA ASSAY W/OPTIC: CPT | Mod: QW,S$GLB,, | Performed by: NURSE PRACTITIONER

## 2020-02-28 PROCEDURE — 87804 POCT INFLUENZA A/B: ICD-10-PCS | Mod: 59,QW,S$GLB, | Performed by: NURSE PRACTITIONER

## 2020-02-28 PROCEDURE — 99214 OFFICE O/P EST MOD 30 MIN: CPT | Mod: 25,S$GLB,, | Performed by: NURSE PRACTITIONER

## 2020-02-28 PROCEDURE — 99396 PREV VISIT EST AGE 40-64: CPT | Mod: S$GLB,,, | Performed by: OBSTETRICS & GYNECOLOGY

## 2020-02-28 PROCEDURE — 88175 CYTOPATH C/V AUTO FLUID REDO: CPT

## 2020-02-28 PROCEDURE — 99214 PR OFFICE/OUTPT VISIT, EST, LEVL IV, 30-39 MIN: ICD-10-PCS | Mod: 25,S$GLB,, | Performed by: NURSE PRACTITIONER

## 2020-02-28 RX ORDER — AZELASTINE 1 MG/ML
1 SPRAY, METERED NASAL 2 TIMES DAILY
Qty: 30 ML | Refills: 0 | Status: SHIPPED | OUTPATIENT
Start: 2020-02-28 | End: 2020-08-10

## 2020-02-28 RX ORDER — PROMETHAZINE HYDROCHLORIDE AND DEXTROMETHORPHAN HYDROBROMIDE 6.25; 15 MG/5ML; MG/5ML
5 SYRUP ORAL EVERY 6 HOURS PRN
Qty: 100 ML | Refills: 0 | Status: SHIPPED | OUTPATIENT
Start: 2020-02-28 | End: 2020-03-06

## 2020-02-28 RX ORDER — BENZONATATE 100 MG/1
100 CAPSULE ORAL 3 TIMES DAILY PRN
Qty: 21 CAPSULE | Refills: 0 | Status: SHIPPED | OUTPATIENT
Start: 2020-02-28 | End: 2020-03-06

## 2020-02-28 NOTE — LETTER
February 28, 2020      [facility]  [address], [address]  [place] [address]  Phone: [phone]  Fax: [phone]       Patient: Carmen Morrow   YOB: 1970  Date of Visit: 02/28/2020    To Whom It May Concern:    Christie Morrow  was at [facility] on 02/28/2020. She may return to work/school when fever free for 24 hrs without administration of fever reducing medications. If you have any questions or concerns, or if I can be of further assistance, please do not hesitate to contact me.    Sincerely,    Sierra QUIANA Jeter NP

## 2020-02-28 NOTE — PROGRESS NOTES
"HPI:   49 y.o.   OB History        2    Para   1    Term   1            AB   1    Living   0       SAB   1    TAB        Ectopic        Multiple        Live Births                  No LMP recorded (lmp unknown).    Patient is  here for her annual gynecologic exam.  She has no complaints.     ROS:  GENERAL: No fever, chills, fatigability or weight loss.  SKIN: No rashes, itching or changes in color or texture of skin.  HEAD: No headaches or recent head trauma.  EYES: Visual acuity fine. No photophobia, ocular pain or diplopia.  EARS: Denies ear pain, discharge or vertigo.  NOSE: No loss of smell, no epistaxis or postnasal drip.  MOUTH & THROAT: No hoarseness or change in voice. No excessive gum bleeding.  NODES: Denies swollen glands.  CHEST: Denies MENDEZ, cyanosis, wheezing, cough and sputum production.  CARDIOVASCULAR: Denies chest pain, PND, orthopnea or reduced exercise tolerance.  ABDOMEN: Appetite fine. No weight loss. Denies diarrhea, abdominal pain, hematemesis or blood in stool.  URINARY: No flank pain, dysuria or hematuria.  PERIPHERAL VASCULAR: No claudication or cyanosis.  MUSCULOSKELETAL: No joint stiffness or swelling. Denies back pain.  NEUROLOGIC: No history of seizures, paralysis, alteration of gait or coordination.    PE:   /75   Ht 5' 5" (1.651 m)   Wt 81.6 kg (179 lb 12.8 oz)   LMP  (LMP Unknown)   BMI 29.92 kg/m²   APPEARANCE: Well nourished, well developed, in no acute distress.  NECK: Neck symmetric without masses or thyromegaly.  BREASTS: Symmetrical, no skin changes or visible lesions. No palpable masses, nipple discharge or adenopathy bilaterally.  ABDOMEN: Flat. Soft. No tenderness or masses. No hepatosplenomegaly. No hernias. No CVA tenderness.  VULVA: No lesions. Normal female genitalia.  URETHRAL MEATUS: Normal size and location, no lesions, no prolapse.  URETHRA: No masses, tenderness, prolapse or scarring.  VAGINA: Moist and well rugated, no discharge, no " significant cystocele or rectocele.  CERVIX: No lesions and discharge. PAP done.  UTERUS: Normal size, regular shape, mobile, non-tender, bladder base nontender.  ADNEXA: No masses, tenderness or CDS nodularity.  ANUS PERINEUM: Normal.    PROCEDURES:  Pap smear    Assessment:  Normal Gynecologic Exam    Plan:  Mammogram and Colonoscopy if indicated by current recommendations.  Return to clinic in one year or for any problems or complaints.  Cycles skip, some hf  observe

## 2020-02-28 NOTE — PROGRESS NOTES
"Subjective:       Patient ID: Carmen Morrow is a 49 y.o. female.    Vitals:  height is 5' 5" (1.651 m) and weight is 81.2 kg (179 lb). Her temperature is 98 °F (36.7 °C). Her blood pressure is 140/85 (abnormal) and her pulse is 82. Her respiration is 18 and oxygen saturation is 99%.     Chief Complaint: Cough and Sinus Problem    This is a 49 y.o. female who presents today with a chief complaint of cough and sinus for 2 days.      Cough   This is a new problem. The current episode started in the past 7 days. The problem has been gradually worsening. The problem occurs constantly. The cough is productive of sputum. Associated symptoms include myalgias, nasal congestion, postnasal drip and rhinorrhea. Pertinent negatives include no chills, ear pain, eye redness, fever, hemoptysis, rash, sore throat, shortness of breath or wheezing. She has tried OTC cough suppressant for the symptoms. The treatment provided no relief. Her past medical history is significant for asthma and bronchitis. There is no history of pneumonia.   Sinus Problem   This is a new problem. The current episode started in the past 7 days. The problem has been gradually worsening since onset. There has been no fever. Her pain is at a severity of 1/10. The pain is mild. Associated symptoms include congestion, coughing and sinus pressure. Pertinent negatives include no chills, diaphoresis, ear pain, shortness of breath or sore throat. Past treatments include oral decongestants. The treatment provided no relief.       Constitution: Positive for fatigue. Negative for chills, sweating and fever.   HENT: Positive for congestion, postnasal drip, sinus pain and sinus pressure. Negative for ear pain, sore throat and voice change.    Neck: Negative for painful lymph nodes.   Eyes: Negative for eye redness.   Respiratory: Positive for cough and sputum production. Negative for chest tightness, bloody sputum, COPD, shortness of breath, stridor, wheezing and " asthma.    Gastrointestinal: Negative for nausea and vomiting.   Musculoskeletal: Positive for muscle ache.   Skin: Negative for rash.   Allergic/Immunologic: Negative for seasonal allergies and asthma.   Hematologic/Lymphatic: Negative for swollen lymph nodes.       Objective:      Physical Exam   Constitutional: She is oriented to person, place, and time. She appears well-developed and well-nourished. She is cooperative.  Non-toxic appearance. She does not have a sickly appearance. She does not appear ill. No distress.   HENT:   Head: Normocephalic and atraumatic.   Right Ear: Hearing, external ear and ear canal normal. A middle ear effusion is present.   Left Ear: Hearing, external ear and ear canal normal. A middle ear effusion is present.   Nose: Mucosal edema present. No rhinorrhea or nasal deformity. No epistaxis. Right sinus exhibits maxillary sinus tenderness. Right sinus exhibits no frontal sinus tenderness. Left sinus exhibits maxillary sinus tenderness. Left sinus exhibits no frontal sinus tenderness.   Mouth/Throat: Uvula is midline and mucous membranes are normal. No trismus in the jaw. Normal dentition. No uvula swelling. Posterior oropharyngeal erythema present. No oropharyngeal exudate or posterior oropharyngeal edema.   Eyes: Conjunctivae and lids are normal. No scleral icterus.   Neck: Trachea normal, full passive range of motion without pain and phonation normal. Neck supple. No neck rigidity. No edema and no erythema present.   Cardiovascular: Normal rate, regular rhythm, normal heart sounds, intact distal pulses and normal pulses.   Pulmonary/Chest: Effort normal and breath sounds normal. No respiratory distress. She has no decreased breath sounds. She has no rhonchi.   Abdominal: Normal appearance.   Musculoskeletal: Normal range of motion. She exhibits no edema or deformity.   Lymphadenopathy:     She has cervical adenopathy.        Right cervical: Superficial cervical adenopathy present.         Left cervical: Superficial cervical adenopathy present.   Neurological: She is alert and oriented to person, place, and time. She exhibits normal muscle tone. Coordination normal.   Skin: Skin is warm, dry, intact, not diaphoretic and not pale.   Psychiatric: She has a normal mood and affect. Her speech is normal and behavior is normal. Judgment and thought content normal. Cognition and memory are normal.   Nursing note and vitals reviewed.        Assessment:       1. Viral syndrome    2. Cough    3. Body aches    4. Nasal congestion    5. Post-nasal drip    6. Runny nose    7. Sinus pressure    8. Sore throat    9. Headache, unspecified headache type    10. Elevated blood pressure reading        Plan:         Viral syndrome  -     azelastine (ASTELIN) 137 mcg (0.1 %) nasal spray; 1 spray (137 mcg total) by Nasal route 2 (two) times daily. for 14 days  Dispense: 30 mL; Refill: 0    Cough  -     POCT Influenza A/B  -     benzonatate (TESSALON) 100 MG capsule; Take 1 capsule (100 mg total) by mouth 3 (three) times daily as needed for Cough.  Dispense: 21 capsule; Refill: 0  -     promethazine-dextromethorphan (PROMETHAZINE-DM) 6.25-15 mg/5 mL Syrp; Take 5 mLs by mouth every 6 (six) hours as needed (May take every 4 hours, PRN. DoNOT take more than 30 mL in 24 hours.).  Dispense: 100 mL; Refill: 0    Body aches    Nasal congestion    Post-nasal drip    Runny nose    Sinus pressure    Sore throat    Headache, unspecified headache type    Elevated blood pressure reading      Results for orders placed or performed in visit on 02/28/20   POCT Influenza A/B   Result Value Ref Range    Rapid Influenza A Ag Negative Negative    Rapid Influenza B Ag Negative Negative     Acceptable Yes      Patient Instructions     Always follow your healthcare professional's instructions.    You have received urgent care diagnosis and treatment and you may be released before all of your medical problems are known or treated.  "Unless you have been given a referral, you (the patient), will arrange for follow-up care as instructed.     If you have been given a referral, [facility] will contact you (their phone number is [phone]). If they do NOT call you by the end of the business day, please call them the following day.    You have been diagnosed with Viral Syndrome. TREATMENT: YOUR TREATMENT IS AIMED AT SYMPTOM MANAGEMENT.       A viral illness may cause a number of symptoms. The symptoms depend on the part of the body that the virus affects. If it settles in your nose, throat, and lungs, it may cause cough, sore throat, congestion, and sometimes headache. If it settles in your stomach and intestinal tract, it may cause vomiting and diarrhea. Sometimes it causes vague symptoms like "aching all over," feeling tired, loss of appetite, or fever. A viral illness usually lasts 1 to 2 weeks, but sometimes it lasts longer. In some cases, a more serious infection can look like a viral syndrome in the first few days of the illness; therefore, you may need another exam and additional tests to know the difference.          Home care  Follow these guidelines for taking care of yourself at home:  · If symptoms are severe, rest at home for the first 2 to 3 days.  · Stay away from cigarette smoke - both your smoke and the smoke from others.  · You may use over-the-counter acetaminophen or ibuprofen for fever, muscle aching, and headache, unless another medicine was prescribed for this. If you have chronic liver or kidney disease or ever had a stomach ulcer or GI bleeding, talk with your doctor before using these medicines. No one who is younger than 18 and ill with a fever should take aspirin. It may cause severe disease or death.  · Your appetite may be poor, so a light diet is fine. Avoid dehydration by drinking 8 to 12 8-ounce glasses of fluids each day. This may include water; orange juice; lemonade; apple, grape, and cranberry juice; clear fruit " drinks; electrolyte replacement and sports drinks; and decaffeinated teas and coffee. If you have been diagnosed with a kidney disease, ask your doctor how much and what types of fluids you should drink to prevent dehydration. If you have kidney disease, drinking too much fluid can cause it build up in the your body and be dangerous to your health.  · Over-the-counter remedies won't shorten the length of the illness but may be helpful for cough, sore throat; and nasal and sinus congestion. Don't use decongestants if you have high blood pressure.    Follow-up care  Follow up with your healthcare provider if your symptoms continue for more than 7 days.    Fever Cough Body Aches Nausea and Vomiting Diarrhea Congestion or Runny Nose    OTC Tylenol   OTC NSAIDs  Tessalon Pearls   Phenergan DM   OTC cough medications  Mobic   OTC Tylenol   OTC NSAIDs  Zofran   Phenergan   OTC Emetrol  DO NOT take ant-diarrheal medications  OTC Claritin, Zyrtec, Allegra, OR Xyzal   OTC Flonase   OTC Benadryl      Cough Allergy Symptoms Asthma Headache or Body Aches   Tessalon Perles are a non-narcotic cough medicine. It works by numbing the throat and lungs, making the cough reflex less active, it is used to relieve coughing during the day. If you have been given Phenergan DM cough syrup, you do NOT need to take both medications at the same time.    Phenergan DM is a combination medication that is used to treat cough. Phenergan DM works like an antihistamine and cough suppressant. This medication will make you sleepy like Benadryl, have a drying effect, and act on a part of the brain (cough center) to reduce the need to cough. DO NOT take Benadryl and Phenergan together. You may take over-the-counter claritin, zyrtec, allegra, or xyzal as directed, these are antihistamines that will work to dry up secretions/mucus. Antihistamines work to block the effects of a certain natural substance (histamine), which causes allergy  "symptoms.    OR    Use over-the-counter Flonase (a nasal steroid) or prescribed Azelastine (a nasal antihistamine) to treat runny nose, sneezing, itchy nose, nasal congestion, and postnasal drip.    Proper administration is to "look down at your toes and aim for your nose". The goal is to aim for your nasolabial folds, the creases in your nose. If you feel the medication drip down your throat, you have NOT administered it correctly. If you can "smell the roses" (floral scent), then you have administered it correctly. If you have a history of asthma, expressed a concern about wheezing or have been told you were wheezing during your exam today, you are being given Albuterol. Albuterol is a bronchodilator that relaxes muscles in the airways and increases air flow to the lungs; it is used to treat or prevent bronchospasm, or narrowing of the airways in the lungs.     If you have a history of asthma, expressed a concern about wheezing or have been told you were wheezing during your exam today and are NOT being prescribed Albuterol that is because you have insured me that you have an adequate supply of the drug at home.    Mobic is a nonsteroidal anti-inflammatory drug (NSAID), it is used to treat inflammation. It reduces pain, swelling, and stiffness of the joints. Please do NOT take any other NSAID medications while on this medication, you can take Tylenol if you feel the need. If you were not prescribed an anti-inflammatory medication, and if you do not have any history of stomach/intestinal ulcers, or kidney disease, or are not taking a blood thinner such as Coumadin, Plavix, Pradaxa, Eloquis, or Xaralta for example, it is OK to take over the counter Ibuprofen or Advil or Motrin or Aleve as directed.  Do not take any of these medications on an empty stomach.         Dehydration (Adult)  Dehydration occurs when your body loses too much fluid. This may be the result of prolonged vomiting or diarrhea, excessive sweating, " or a high fever. It may also happen if you don't drink enough fluid when you're sick or out in the heat. Misuse of diuretics (water pills) can also be a cause.  Symptoms include thirst and decreased urine output. You may also feel dizzy, weak, fatigued, or very drowsy. The diet described below is usually enough to treat dehydration. In some cases, you may need medicine.    Home care  · Drink at least 12 8-ounce glasses of fluid every day to resolve the dehydration. Fluid may include water; orange juice; lemonade; apple, grape, or cranberry juice; clear fruit drinks; electrolyte replacement and sports drinks; and teas and coffee without caffeine. If you have been diagnosed with a kidney disease, ask your doctor how much and what types of fluids you should drink to prevent dehydration. If you have kidney disease, fluid can build up in the body. This can be dangerous to your health.  · If you have a fever, muscle aches, or a headache as a result of a cold or flu, you may take acetaminophen or ibuprofen, unless another medicine was prescribed. If you have chronic liver or kidney disease, or have ever had a stomach ulcer or gastrointestinal bleeding, talk with your health care provider before using these medicines. Don't take aspirin if you are younger than 18 and have a fever. Aspirin raises the chance for severe liver injury.    When to seek medical advice  DEHYDRATION:  · Continued vomiting  · Frequent diarrhea (more than 5 times a day); blood (red or black color) or mucus in diarrhea  · Blood in vomit or stool  · Swollen abdomen or increasing abdominal pain  · Weakness, dizziness, or fainting  · Unusual drowsiness or confusion  · Reduced urine output or extreme thirst    FEVER:  Call your healthcare provider right away if any of these occur:  · Your child is 3 months old or younger and has a fever of 100.4°F (38°C) or higher. Get medical care right away. Fever in a young baby can be a sign of a dangerous  infection.  · Your child is of any age and has repeated fevers above 104°F (40°C).  · Your child is younger than 2 years of age and a fever of 100.4°F (38°C) continues for more than 1 day.  · A fever of 100.4°F (38°C) for more than 3 days in anyone older than 2 years of age.       Why didn't I get a steroid today?    The benefits are small and, unlike topical glucocorticoids, systemic glucocorticoids possess a significant side effect profile. Major side effects include:    · Effects immunity predisposing you to getting a more severe infection and increases your white blood cell count. You have the flu, you do not need anything to weaken your immune system right now.  · Young children -- Growth impairment   · Skin consequences: Skin thinning and ecchymoses, Cushingoid appearance (rounded face), acne, weight gain, mild hirsutism, facial erythema, and striae.  · Eye consequences: Cataracts, increased intraocular pressure, exophthalmos.   · Cardiovascular consequences: Fluid retention, premature atherosclerotic disease, and arrhythmias.   · GI consequences: Increased risk for adverse gastrointestinal effects, such as gastritis, ulcer formation, and gastrointestinal bleeding  · Bone and muscle consequences: Osteoporosis, osteonecrosis, and myopathy.  · Neuropsychiatric effects: Mood disorders, psychosis, memory impairment, and   · Metabolic and Endocrine consequences: Suppress the hypothalamic-pituitary-adrenal (HPA) axis and increase blood sugar.     Can I just have a shot instead of pills?  No. If you are sick, you need a course of treatment (not just a one time dose).     Why didn't I get an antibiotic today?  You have been diagnosed with a virus an antibiotic will not help you. Antibiotics work by destroying the cell walls of bacteria, viruses do not have cell walls.     When to seek medical advice:    DEHYDRATION:  · Continued vomiting  · Frequent diarrhea (more than 5 times a day); blood (red or black color) or  "mucus in diarrhea  · Blood in vomit or stool  · Swollen abdomen or increasing abdominal pain  · Reduced urine output or extreme thirst  · Repeated vomiting after the first 4 hours on fluids  · Occasional vomiting for more than 48 hours  · Frequent diarrhea (more than 5 times a day), blood in diarrhea (red or black color), or mucus in diarrhea  · Blood in vomit or stool  · Swollen abdomen or signs of abdominal pain  · No urine for 8 hours, no tears when crying, "sunken" eyes, or dry mouth  · Unusual behavior changes, fussiness, drowsiness, confusion, or seizure  · Fever (see Fever and children, below)  FEVER:  Call your healthcare provider right away if any of these occur:  · Your child is 3 months old or younger and has a fever of 100.4°F (38°C) or higher. Get medical care right away. Fever in a young baby can be a sign of a dangerous infection.  · Your child is of any age and has repeated fevers above 104°F (40°C).  · Your child is younger than 2 years of age and a fever of 100.4°F (38°C) continues for more than 1 day.  · A fever of 100.4°F (38°C) for more than 3 days in anyone older than 2 years of age.  Call 911  Call 911 or your local emergency services number if the child shows any of these symptoms or signs:  · Trouble breathing  · Confusion  · Is very drowsy or difficult to awaken  · Fainting or loss of consciousness  · Rapid heart rate  · Seizure  · Stiff neck     Your provider discussed your plan of care with you during your physical exam. It was reviewed once more by the provider when giving you an after visit summary. If the patient is a minor, the discharge instructions were discussed with an adult and that adult acknowledge their understanding of the provider's teaching.               "

## 2020-02-28 NOTE — PATIENT INSTRUCTIONS
"Always follow your healthcare professional's instructions.    You have received urgent care diagnosis and treatment and you may be released before all of your medical problems are known or treated. Unless you have been given a referral, you (the patient), will arrange for follow-up care as instructed.     If you have been given a referral, [facility] will contact you (their phone number is [phone]). If they do NOT call you by the end of the business day, please call them the following day.    You have been diagnosed with Viral Syndrome. TREATMENT: YOUR TREATMENT IS AIMED AT SYMPTOM MANAGEMENT.       A viral illness may cause a number of symptoms. The symptoms depend on the part of the body that the virus affects. If it settles in your nose, throat, and lungs, it may cause cough, sore throat, congestion, and sometimes headache. If it settles in your stomach and intestinal tract, it may cause vomiting and diarrhea. Sometimes it causes vague symptoms like "aching all over," feeling tired, loss of appetite, or fever. A viral illness usually lasts 1 to 2 weeks, but sometimes it lasts longer. In some cases, a more serious infection can look like a viral syndrome in the first few days of the illness; therefore, you may need another exam and additional tests to know the difference.          Home care  Follow these guidelines for taking care of yourself at home:  · If symptoms are severe, rest at home for the first 2 to 3 days.  · Stay away from cigarette smoke - both your smoke and the smoke from others.  · You may use over-the-counter acetaminophen or ibuprofen for fever, muscle aching, and headache, unless another medicine was prescribed for this. If you have chronic liver or kidney disease or ever had a stomach ulcer or GI bleeding, talk with your doctor before using these medicines. No one who is younger than 18 and ill with a fever should take aspirin. It may cause severe disease or death.  · Your appetite may be " poor, so a light diet is fine. Avoid dehydration by drinking 8 to 12 8-ounce glasses of fluids each day. This may include water; orange juice; lemonade; apple, grape, and cranberry juice; clear fruit drinks; electrolyte replacement and sports drinks; and decaffeinated teas and coffee. If you have been diagnosed with a kidney disease, ask your doctor how much and what types of fluids you should drink to prevent dehydration. If you have kidney disease, drinking too much fluid can cause it build up in the your body and be dangerous to your health.  · Over-the-counter remedies won't shorten the length of the illness but may be helpful for cough, sore throat; and nasal and sinus congestion. Don't use decongestants if you have high blood pressure.    Follow-up care  Follow up with your healthcare provider if your symptoms continue for more than 7 days.    Fever Cough Body Aches Nausea and Vomiting Diarrhea Congestion or Runny Nose    OTC Tylenol   OTC NSAIDs  Tessalon Pearls   Phenergan DM   OTC cough medications  Mobic   OTC Tylenol   OTC NSAIDs  Zofran   Phenergan   OTC Emetrol  DO NOT take ant-diarrheal medications  OTC Claritin, Zyrtec, Allegra, OR Xyzal   OTC Flonase   OTC Benadryl      Cough Allergy Symptoms Asthma Headache or Body Aches   Tessalon Perles are a non-narcotic cough medicine. It works by numbing the throat and lungs, making the cough reflex less active, it is used to relieve coughing during the day. If you have been given Phenergan DM cough syrup, you do NOT need to take both medications at the same time.    Phenergan DM is a combination medication that is used to treat cough. Phenergan DM works like an antihistamine and cough suppressant. This medication will make you sleepy like Benadryl, have a drying effect, and act on a part of the brain (cough center) to reduce the need to cough. DO NOT take Benadryl and Phenergan together. You may take over-the-counter claritin, zyrtec, allegra, or  "xyzal as directed, these are antihistamines that will work to dry up secretions/mucus. Antihistamines work to block the effects of a certain natural substance (histamine), which causes allergy symptoms.    OR    Use over-the-counter Flonase (a nasal steroid) or prescribed Azelastine (a nasal antihistamine) to treat runny nose, sneezing, itchy nose, nasal congestion, and postnasal drip.    Proper administration is to "look down at your toes and aim for your nose". The goal is to aim for your nasolabial folds, the creases in your nose. If you feel the medication drip down your throat, you have NOT administered it correctly. If you can "smell the roses" (floral scent), then you have administered it correctly. If you have a history of asthma, expressed a concern about wheezing or have been told you were wheezing during your exam today, you are being given Albuterol. Albuterol is a bronchodilator that relaxes muscles in the airways and increases air flow to the lungs; it is used to treat or prevent bronchospasm, or narrowing of the airways in the lungs.     If you have a history of asthma, expressed a concern about wheezing or have been told you were wheezing during your exam today and are NOT being prescribed Albuterol that is because you have insured me that you have an adequate supply of the drug at home.    Mobic is a nonsteroidal anti-inflammatory drug (NSAID), it is used to treat inflammation. It reduces pain, swelling, and stiffness of the joints. Please do NOT take any other NSAID medications while on this medication, you can take Tylenol if you feel the need. If you were not prescribed an anti-inflammatory medication, and if you do not have any history of stomach/intestinal ulcers, or kidney disease, or are not taking a blood thinner such as Coumadin, Plavix, Pradaxa, Eloquis, or Xaralta for example, it is OK to take over the counter Ibuprofen or Advil or Motrin or Aleve as directed.  Do not take any of these " medications on an empty stomach.         Dehydration (Adult)  Dehydration occurs when your body loses too much fluid. This may be the result of prolonged vomiting or diarrhea, excessive sweating, or a high fever. It may also happen if you don't drink enough fluid when you're sick or out in the heat. Misuse of diuretics (water pills) can also be a cause.  Symptoms include thirst and decreased urine output. You may also feel dizzy, weak, fatigued, or very drowsy. The diet described below is usually enough to treat dehydration. In some cases, you may need medicine.    Home care  · Drink at least 12 8-ounce glasses of fluid every day to resolve the dehydration. Fluid may include water; orange juice; lemonade; apple, grape, or cranberry juice; clear fruit drinks; electrolyte replacement and sports drinks; and teas and coffee without caffeine. If you have been diagnosed with a kidney disease, ask your doctor how much and what types of fluids you should drink to prevent dehydration. If you have kidney disease, fluid can build up in the body. This can be dangerous to your health.  · If you have a fever, muscle aches, or a headache as a result of a cold or flu, you may take acetaminophen or ibuprofen, unless another medicine was prescribed. If you have chronic liver or kidney disease, or have ever had a stomach ulcer or gastrointestinal bleeding, talk with your health care provider before using these medicines. Don't take aspirin if you are younger than 18 and have a fever. Aspirin raises the chance for severe liver injury.    When to seek medical advice  DEHYDRATION:  · Continued vomiting  · Frequent diarrhea (more than 5 times a day); blood (red or black color) or mucus in diarrhea  · Blood in vomit or stool  · Swollen abdomen or increasing abdominal pain  · Weakness, dizziness, or fainting  · Unusual drowsiness or confusion  · Reduced urine output or extreme thirst    FEVER:  Call your healthcare provider right away if  any of these occur:  · Your child is 3 months old or younger and has a fever of 100.4°F (38°C) or higher. Get medical care right away. Fever in a young baby can be a sign of a dangerous infection.  · Your child is of any age and has repeated fevers above 104°F (40°C).  · Your child is younger than 2 years of age and a fever of 100.4°F (38°C) continues for more than 1 day.  · A fever of 100.4°F (38°C) for more than 3 days in anyone older than 2 years of age.       Why didn't I get a steroid today?    The benefits are small and, unlike topical glucocorticoids, systemic glucocorticoids possess a significant side effect profile. Major side effects include:    · Effects immunity predisposing you to getting a more severe infection and increases your white blood cell count. You have the flu, you do not need anything to weaken your immune system right now.  · Young children -- Growth impairment   · Skin consequences: Skin thinning and ecchymoses, Cushingoid appearance (rounded face), acne, weight gain, mild hirsutism, facial erythema, and striae.  · Eye consequences: Cataracts, increased intraocular pressure, exophthalmos.   · Cardiovascular consequences: Fluid retention, premature atherosclerotic disease, and arrhythmias.   · GI consequences: Increased risk for adverse gastrointestinal effects, such as gastritis, ulcer formation, and gastrointestinal bleeding  · Bone and muscle consequences: Osteoporosis, osteonecrosis, and myopathy.  · Neuropsychiatric effects: Mood disorders, psychosis, memory impairment, and   · Metabolic and Endocrine consequences: Suppress the hypothalamic-pituitary-adrenal (HPA) axis and increase blood sugar.     Can I just have a shot instead of pills?  No. If you are sick, you need a course of treatment (not just a one time dose).     Why didn't I get an antibiotic today?  You have been diagnosed with a virus an antibiotic will not help you. Antibiotics work by destroying the cell walls of  "bacteria, viruses do not have cell walls.     When to seek medical advice:    DEHYDRATION:  · Continued vomiting  · Frequent diarrhea (more than 5 times a day); blood (red or black color) or mucus in diarrhea  · Blood in vomit or stool  · Swollen abdomen or increasing abdominal pain  · Reduced urine output or extreme thirst  · Repeated vomiting after the first 4 hours on fluids  · Occasional vomiting for more than 48 hours  · Frequent diarrhea (more than 5 times a day), blood in diarrhea (red or black color), or mucus in diarrhea  · Blood in vomit or stool  · Swollen abdomen or signs of abdominal pain  · No urine for 8 hours, no tears when crying, "sunken" eyes, or dry mouth  · Unusual behavior changes, fussiness, drowsiness, confusion, or seizure  · Fever (see Fever and children, below)  FEVER:  Call your healthcare provider right away if any of these occur:  · Your child is 3 months old or younger and has a fever of 100.4°F (38°C) or higher. Get medical care right away. Fever in a young baby can be a sign of a dangerous infection.  · Your child is of any age and has repeated fevers above 104°F (40°C).  · Your child is younger than 2 years of age and a fever of 100.4°F (38°C) continues for more than 1 day.  · A fever of 100.4°F (38°C) for more than 3 days in anyone older than 2 years of age.  Call 911  Call 911 or your local emergency services number if the child shows any of these symptoms or signs:  · Trouble breathing  · Confusion  · Is very drowsy or difficult to awaken  · Fainting or loss of consciousness  · Rapid heart rate  · Seizure  · Stiff neck     Your provider discussed your plan of care with you during your physical exam. It was reviewed once more by the provider when giving you an after visit summary. If the patient is a minor, the discharge instructions were discussed with an adult and that adult acknowledge their understanding of the provider's teaching.      "

## 2020-03-02 ENCOUNTER — TELEPHONE (OUTPATIENT)
Dept: URGENT CARE | Facility: CLINIC | Age: 50
End: 2020-03-02

## 2020-03-11 RX ORDER — ACYCLOVIR 200 MG/1
200 CAPSULE ORAL 2 TIMES DAILY
Qty: 10 CAPSULE | Refills: 5 | Status: SHIPPED | OUTPATIENT
Start: 2020-03-11 | End: 2021-11-08 | Stop reason: SDUPTHER

## 2020-03-12 DIAGNOSIS — M54.81 BILATERAL OCCIPITAL NEURALGIA: ICD-10-CM

## 2020-03-12 RX ORDER — NORTRIPTYLINE HYDROCHLORIDE 50 MG/1
CAPSULE ORAL
Qty: 30 CAPSULE | Refills: 0 | OUTPATIENT
Start: 2020-03-12

## 2020-03-22 DIAGNOSIS — R50.9 FEVER, UNSPECIFIED: Primary | ICD-10-CM

## 2020-03-23 ENCOUNTER — CLINICAL SUPPORT (OUTPATIENT)
Dept: INTERNAL MEDICINE | Facility: CLINIC | Age: 50
End: 2020-03-23
Payer: COMMERCIAL

## 2020-03-23 DIAGNOSIS — R50.9 FEVER, UNSPECIFIED: ICD-10-CM

## 2020-03-23 PROCEDURE — U0002 COVID-19 LAB TEST NON-CDC: HCPCS

## 2020-03-24 LAB — SARS-COV-2 RNA RESP QL NAA+PROBE: NOT DETECTED

## 2020-03-25 ENCOUNTER — PATIENT MESSAGE (OUTPATIENT)
Dept: INTERNAL MEDICINE | Facility: CLINIC | Age: 50
End: 2020-03-25

## 2020-03-27 LAB
FINAL PATHOLOGIC DIAGNOSIS: NORMAL
Lab: NORMAL

## 2020-03-31 ENCOUNTER — TELEPHONE (OUTPATIENT)
Dept: OBSTETRICS AND GYNECOLOGY | Facility: CLINIC | Age: 50
End: 2020-03-31

## 2020-03-31 NOTE — TELEPHONE ENCOUNTER
----- Message from Michael A. Wiedemann, MD sent at 3/31/2020  8:22 AM CDT -----  Notify the patient that her pap was normal

## 2020-04-03 ENCOUNTER — PATIENT MESSAGE (OUTPATIENT)
Dept: INTERNAL MEDICINE | Facility: CLINIC | Age: 50
End: 2020-04-03

## 2020-04-12 DIAGNOSIS — M54.81 BILATERAL OCCIPITAL NEURALGIA: ICD-10-CM

## 2020-04-13 RX ORDER — NORTRIPTYLINE HYDROCHLORIDE 50 MG/1
CAPSULE ORAL
Qty: 30 CAPSULE | Refills: 0 | Status: SHIPPED | OUTPATIENT
Start: 2020-04-13 | End: 2020-08-10

## 2020-04-21 DIAGNOSIS — Z01.84 ANTIBODY RESPONSE EXAMINATION: ICD-10-CM

## 2020-05-11 DIAGNOSIS — M54.81 BILATERAL OCCIPITAL NEURALGIA: ICD-10-CM

## 2020-05-11 RX ORDER — NORTRIPTYLINE HYDROCHLORIDE 50 MG/1
CAPSULE ORAL
Qty: 30 CAPSULE | Refills: 0 | OUTPATIENT
Start: 2020-05-11

## 2020-05-14 ENCOUNTER — OFFICE VISIT (OUTPATIENT)
Dept: INTERNAL MEDICINE | Facility: CLINIC | Age: 50
End: 2020-05-14
Payer: COMMERCIAL

## 2020-05-14 VITALS
BODY MASS INDEX: 29.97 KG/M2 | WEIGHT: 180.13 LBS | HEART RATE: 66 BPM | DIASTOLIC BLOOD PRESSURE: 74 MMHG | SYSTOLIC BLOOD PRESSURE: 124 MMHG

## 2020-05-14 DIAGNOSIS — M79.605 PAIN OF LEFT LOWER EXTREMITY: Primary | ICD-10-CM

## 2020-05-14 PROCEDURE — 99214 PR OFFICE/OUTPT VISIT, EST, LEVL IV, 30-39 MIN: ICD-10-PCS | Mod: S$GLB,,, | Performed by: INTERNAL MEDICINE

## 2020-05-14 PROCEDURE — 3008F BODY MASS INDEX DOCD: CPT | Mod: CPTII,S$GLB,, | Performed by: INTERNAL MEDICINE

## 2020-05-14 PROCEDURE — 99214 OFFICE O/P EST MOD 30 MIN: CPT | Mod: S$GLB,,, | Performed by: INTERNAL MEDICINE

## 2020-05-14 PROCEDURE — 99999 PR PBB SHADOW E&M-EST. PATIENT-LVL III: ICD-10-PCS | Mod: PBBFAC,,, | Performed by: INTERNAL MEDICINE

## 2020-05-14 PROCEDURE — 99999 PR PBB SHADOW E&M-EST. PATIENT-LVL III: CPT | Mod: PBBFAC,,, | Performed by: INTERNAL MEDICINE

## 2020-05-14 PROCEDURE — 3008F PR BODY MASS INDEX (BMI) DOCUMENTED: ICD-10-PCS | Mod: CPTII,S$GLB,, | Performed by: INTERNAL MEDICINE

## 2020-05-14 NOTE — PROGRESS NOTES
Subjective:      Patient ID: Carmen Morrow is a 49 y.o. female.    Chief Complaint: Leg Pain    HPI:  Leg Pain       Patient comes in today with a problem that has not been evaluated in the past.  She states that that she has had the problem for about a year.  She may have it about 5-6 times over the last year.  When it comes it lasts for about a week.  It tends to be a very specific spot on the left lower leg anteriorly above the ankle that is painful and at times may be red or appear bruised and it is superficial enough to touch.  It is worsened by flexion and extension at the ankle.  It disappears on its own.  She may take ibuprofen for some relief.    She has a history of left sciatic nerve problems but this tends to be mostly in the left anterior thigh    The patient is a nurse at [employer]  She is a former smoker but has not smoked since 2000..  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    GERD (gastroesophageal reflux disease)    Baker's cyst of knee, right    Chondromalacia patellae of left knee    Quadriceps weakness    Bilateral occipital neuralgia    Acute pain of right shoulder    Weakness of right upper extremity     Past Medical History:   Diagnosis Date    Allergy     Brachial neuritis or radiculitis NOS 11/14/2012    Degeneration of cervical intervertebral disc 11/14/2012    GERD (gastroesophageal reflux disease)     SVT (supraventricular tachycardia)      Past Surgical History:   Procedure Laterality Date    anterior diskectomy fusion  05/20/2016    AUGMENTATION OF BREAST Bilateral     1994-95    BREAST SURGERY Bilateral 2006    CARPAL TUNNEL RELEASE Left 01/30/2018    CSF leak  05/22/2016    Replace and repair  fat graft from surgery on 5/20/2016    KNEE ARTHROSCOPY      LAMINECTOMY      SPINAL FUSION       Family History   Problem Relation Age of Onset    Heart disease Mother     Hypertension Mother     Asthma Mother     Heart disease Father     COPD Father     Diabetes Father     Ovarian cancer Neg Hx     Colon cancer Neg Hx     Breast cancer Neg Hx     Cancer Neg Hx      Review of Systems   Constitutional: Negative for activity change, chills, fever and unexpected weight change.   Respiratory: Negative for cough, chest tightness, shortness of breath and wheezing.    Cardiovascular: Negative for chest pain, palpitations and leg swelling.     Objective:     Vitals:    05/14/20 0717   BP: 124/74   Pulse: 66   Weight: 81.7 kg (180 lb 1.9 oz)     Body mass index is 29.97 kg/m².  Physical Exam   Left leg:  There is what may be some muscle wasting new above the knee.  This is been evaluated in Orthopedics.  There is no tenderness to the area that she states the repeated pain is present.  There is no discoloration today in the last event was on Sunday.  Her pulses are difficult to palpate in the left foot.  The color is good  The warmth is good  Assessment:     1. Pain of left lower extremity      Plan:   Carmen was seen today for leg pain.    Diagnoses and all orders for this visit:    Pain of left lower extremity  Comments:  Suspect a vascular etiology  Orders:  -     Ambulatory referral/consult to Vascular Cardiology; Future     Possibly this is a varicosity    Problem List Items Addressed This Visit     None      Visit Diagnoses     Pain of left lower extremity    -  Primary    Suspect a vascular etiology    Relevant Orders    Ambulatory referral/consult to Vascular Cardiology        Orders Placed This Encounter   Procedures    Ambulatory referral/consult to Vascular Cardiology     Standing Status:   Future     Standing Expiration Date:   6/14/2021     Referral Priority:   Routine     Referral Type:    Consultation     Referral Reason:   Specialty Services Required     Requested Specialty:   Cardiology     Number of Visits Requested:   1     Follow up if symptoms worsen or fail to improve.     Medication List           Accurate as of May 14, 2020  7:24 AM. If you have any questions, ask your nurse or doctor.               CONTINUE taking these medications    acyclovir 200 MG capsule  Commonly known as:  ZOVIRAX  Take 1 capsule (200 mg total) by mouth 2 (two) times daily.     albuterol 90 mcg/actuation inhaler  Commonly known as:  PROVENTIL/VENTOLIN HFA  Inhale 2 puffs into the lungs every 4 (four) hours as needed for Wheezing.     azelaic acid 15 % gel  Commonly known as:  FINACEA  Apply to face QHS. Increase to BID as tolerated.     azelastine 137 mcg (0.1 %) nasal spray  Commonly known as:  ASTELIN  1 spray (137 mcg total) by Nasal route 2 (two) times daily. for 14 days     benzphetamine 50 mg Tab     esomeprazole 40 MG capsule  Commonly known as:  NEXIUM  Take 1 capsule (40 mg total) by mouth before breakfast.     fluticasone propionate 50 mcg/actuation nasal spray  Commonly known as:  FLONASE  2 sprays (100 mcg total) by Each Nostril route every evening. 2 Spray, Suspension Nasal Every day     linaCLOtide 145 mcg Cap capsule  Commonly known as:  LINZESS  Take 1 capsule (145 mcg total) by mouth once daily.     medroxyPROGESTERone 10 MG tablet  Commonly known as:  PROVERA  Take 1 tablet (10 mg total) by mouth once daily.     methylPREDNISolone 4 mg tablet  Commonly known as:  MEDROL DOSEPACK  use as directed     montelukast 10 mg tablet  Commonly known as:  SINGULAIR  Take 1 tablet (10 mg total) by mouth once daily.     nortriptyline 50 MG capsule  Commonly known as:  PAMELOR  TAKE 1 CAPSULE BY MOUTH EVERY EVENING.     plecanatide 3 mg Tab  Commonly known as:  TRULANCE  Take 3 mg by mouth once daily. For severe constipation     ZyrTEC 10 MG tablet  Generic drug:  cetirizine

## 2020-05-22 ENCOUNTER — INITIAL CONSULT (OUTPATIENT)
Dept: CARDIOLOGY | Facility: CLINIC | Age: 50
End: 2020-05-22
Payer: COMMERCIAL

## 2020-05-22 VITALS
DIASTOLIC BLOOD PRESSURE: 106 MMHG | BODY MASS INDEX: 30.16 KG/M2 | HEIGHT: 65 IN | SYSTOLIC BLOOD PRESSURE: 148 MMHG | HEART RATE: 80 BPM | WEIGHT: 181 LBS

## 2020-05-22 DIAGNOSIS — M79.605 PAIN OF LEFT LOWER EXTREMITY: ICD-10-CM

## 2020-05-22 DIAGNOSIS — I83.813 VARICOSE VEINS OF BOTH LOWER EXTREMITIES WITH PAIN: ICD-10-CM

## 2020-05-22 DIAGNOSIS — M79.605 LEFT LEG PAIN: Primary | ICD-10-CM

## 2020-05-22 DIAGNOSIS — G89.29 CHRONIC MIDLINE LOW BACK PAIN WITH SCIATICA, SCIATICA LATERALITY UNSPECIFIED: ICD-10-CM

## 2020-05-22 DIAGNOSIS — M54.40 CHRONIC MIDLINE LOW BACK PAIN WITH SCIATICA, SCIATICA LATERALITY UNSPECIFIED: ICD-10-CM

## 2020-05-22 PROCEDURE — 3008F BODY MASS INDEX DOCD: CPT | Mod: CPTII,S$GLB,, | Performed by: INTERNAL MEDICINE

## 2020-05-22 PROCEDURE — 99205 PR OFFICE/OUTPT VISIT, NEW, LEVL V, 60-74 MIN: ICD-10-PCS | Mod: S$GLB,,, | Performed by: INTERNAL MEDICINE

## 2020-05-22 PROCEDURE — 99999 PR PBB SHADOW E&M-EST. PATIENT-LVL V: CPT | Mod: PBBFAC,,, | Performed by: INTERNAL MEDICINE

## 2020-05-22 PROCEDURE — 3008F PR BODY MASS INDEX (BMI) DOCUMENTED: ICD-10-PCS | Mod: CPTII,S$GLB,, | Performed by: INTERNAL MEDICINE

## 2020-05-22 PROCEDURE — 99205 OFFICE O/P NEW HI 60 MIN: CPT | Mod: S$GLB,,, | Performed by: INTERNAL MEDICINE

## 2020-05-22 PROCEDURE — 99999 PR PBB SHADOW E&M-EST. PATIENT-LVL V: ICD-10-PCS | Mod: PBBFAC,,, | Performed by: INTERNAL MEDICINE

## 2020-05-22 NOTE — PROGRESS NOTES
"[facility]    CC: LLE Pain     Patient ID: Carmen Morrow is a 49 y.o. female with a past medical history of GERD, chronic lower back pain, gout, obesity, who presents for an initial appointment.  Pertinent history/events are as follows:     -Pt kindly referred by Dr. aBilon for evaluation of LLE pain.      HPI:  Mrs. Morrow reports episodes where she develops a "red spot" on her left shin.  Episodes occur sporadically and lasts 4-5 days.  During the episodes, the area of the red spot is exquisitely painful.  States pain is similar to pain she experiences when she has a gout flare.  Gout flares usually last 4-5 days as well, and are associated with red painful hallux.  States she also has episodes of "deep pain" at left anterior thigh and knee area.  Episodes occur 1-2 times a week.  No claudication, rest pain, or tissue loss.  Formerly smoked half a pack a day for total 6 years.  Quit 20 years ago.  Exam shows BLE's with mild varicose veins with no edema and no TTP.    Past Medical History:   Diagnosis Date    Allergy     Brachial neuritis or radiculitis NOS 11/14/2012    Degeneration of cervical intervertebral disc 11/14/2012    GERD (gastroesophageal reflux disease)     SVT (supraventricular tachycardia)      Past Surgical History:   Procedure Laterality Date    anterior diskectomy fusion  05/20/2016    AUGMENTATION OF BREAST Bilateral     1994-95    BREAST SURGERY Bilateral 2006    CARPAL TUNNEL RELEASE Left 01/30/2018    CSF leak  05/22/2016    Replace and repair fat graft from surgery on 5/20/2016    KNEE ARTHROSCOPY      LAMINECTOMY      SPINAL FUSION       Social History     Socioeconomic History    Marital status:      Spouse name: Not on file    Number of children: Not on file    Years of education: Not on file    Highest education level: Not on file   Occupational History    Not on file   Social Needs    Financial resource strain: Not hard at all    Food " insecurity:     Worry: Never true     Inability: Never true    Transportation needs:     Medical: No     Non-medical: No   Tobacco Use    Smoking status: Former Smoker     Last attempt to quit: 2000     Years since quittin.4    Smokeless tobacco: Never Used   Substance and Sexual Activity    Alcohol use: No     Frequency: Never     Binge frequency: Never    Drug use: No    Sexual activity: Yes     Partners: Male     Birth control/protection: None   Lifestyle    Physical activity:     Days per week: 1 day     Minutes per session: 60 min    Stress: Only a little   Relationships    Social connections:     Talks on phone: More than three times a week     Gets together: Once a week     Attends Cheondoism service: Not on file     Active member of club or organization: Yes     Attends meetings of clubs or organizations: More than 4 times per year     Relationship status:    Other Topics Concern    Not on file   Social History Narrative    Not on file     Family History   Problem Relation Age of Onset    Heart disease Mother     Hypertension Mother     Asthma Mother     Heart disease Father     COPD Father     Diabetes Father     Ovarian cancer Neg Hx     Colon cancer Neg Hx     Breast cancer Neg Hx     Cancer Neg Hx        Review of patient's allergies indicates:   Allergen Reactions    Oxycodone Itching     Can take hydrcodone       Medication List with Changes/Refills   Current Medications    ACYCLOVIR (ZOVIRAX) 200 MG CAPSULE    Take 1 capsule (200 mg total) by mouth 2 (two) times daily.    ALBUTEROL (PROVENTIL/VENTOLIN HFA) 90 MCG/ACTUATION INHALER    Inhale 2 puffs into the lungs every 4 (four) hours as needed for Wheezing.    AZELAIC ACID (FINACEA) 15 % GEL    Apply to face QHS. Increase to BID as tolerated.    AZELASTINE (ASTELIN) 137 MCG (0.1 %) NASAL SPRAY    1 spray (137 mcg total) by Nasal route 2 (two) times daily. for 14 days    BENZPHETAMINE 50 MG TAB    Take by mouth.     "CETIRIZINE (ZYRTEC) 10 MG TABLET    Take 10 mg by mouth Daily. 1 Tablet Oral Every day    ESOMEPRAZOLE (NEXIUM) 40 MG CAPSULE    Take 1 capsule (40 mg total) by mouth before breakfast.    FLUTICASONE PROPIONATE (FLONASE) 50 MCG/ACTUATION NASAL SPRAY    2 sprays (100 mcg total) by Each Nostril route every evening. 2 Spray, Suspension Nasal Every day    LINACLOTIDE (LINZESS) 145 MCG CAP CAPSULE    Take 1 capsule (145 mcg total) by mouth once daily.    MEDROXYPROGESTERONE (PROVERA) 10 MG TABLET    Take 1 tablet (10 mg total) by mouth once daily.    METHYLPREDNISOLONE (MEDROL DOSEPACK) 4 MG TABLET    use as directed    MONTELUKAST (SINGULAIR) 10 MG TABLET    Take 1 tablet (10 mg total) by mouth once daily.    NORTRIPTYLINE (PAMELOR) 50 MG CAPSULE    TAKE 1 CAPSULE BY MOUTH EVERY EVENING.    PLECANATIDE (TRULANCE) 3 MG TAB    Take 3 mg by mouth once daily. For severe constipation       Review of Systems  Constitution: Denies chills, fever, and sweats.  HENT: Denies headaches or blurry vision.  Cardiovascular: Denies chest pain or irregular heart beat.  Respiratory: Denies cough or shortness of breath.  Gastrointestinal: Denies abdominal pain, nausea, or vomiting.  Musculoskeletal: Denies muscle cramps.  Neurological: Denies dizziness or focal weakness.  Psychiatric/Behavioral: Normal mental status.  Hematologic/Lymphatic: Denies bleeding problem or easy bruising/bleeding.  Skin: Denies rash or suspicious lesions    Physical Examination  BP (!) 148/106   Pulse 80   Ht 5' 5" (1.651 m)   Wt 82.1 kg (181 lb)   BMI 30.12 kg/m²     Constitutional: No acute distress, conversant  HEENT: Sclera anicteric, Pupils equal, round and reactive to light, extraocular motions intact, Oropharynx clear  Neck: No JVD, no carotid bruits  Cardiovascular: regular rate and rhythm, no murmur, rubs or gallops, normal S1/S2  Pulmonary: Clear to auscultation bilaterally  Abdominal: Abdomen soft, nontender, nondistended, positive bowel " sounds  Extremities: BLE's with mild varicose veins with no edema and no TTP.  Pulses:  Carotid pulses are 2+ on the right side, and 2+ on the left side.  Radial pulses are 2+ on the right side, and 2+ on the left side.   Femoral pulses are 2+ on the right side, and 2+ on the left side.  Popliteal pulses are 2+ on the right side, and 2+ on the left side.   Dorsalis pedis pulses are 2+ on the right side, and 2+ on the left side.   Posterior tibial pulses are 2+ on the right side, and 2+ on the left side.    Skin: No ecchymosis, erythema, or ulcers  Psych: Alert and oriented x 3, appropriate affect  Neuro: CNII-XII intact, no focal deficits    Labs:  Most Recent Data  CBC:   Lab Results   Component Value Date    WBC 13.20 (H) 08/12/2019    HGB 12.6 08/12/2019    HCT 37.7 08/12/2019     08/12/2019    MCV 87 08/12/2019    RDW 14.7 (H) 08/12/2019     BMP:   Lab Results   Component Value Date     08/12/2019    K 3.3 (L) 08/12/2019     08/12/2019    CO2 28 08/12/2019    BUN 13 08/12/2019    CREATININE 0.65 08/12/2019    GLU 97 08/12/2019    CALCIUM 9.3 08/12/2019     LFTS;   Lab Results   Component Value Date    PROT 8.0 08/12/2019    ALBUMIN 4.4 08/12/2019    BILITOT 0.4 08/12/2019    AST 20 08/12/2019    ALKPHOS 60 08/12/2019    ALT 20 08/12/2019     COAGS:   Lab Results   Component Value Date    INR 0.9 08/12/2019     FLP:   Lab Results   Component Value Date    CHOL 148 07/18/2018    HDL 62 07/18/2018    LDLCALC 73.4 07/18/2018    TRIG 63 07/18/2018    CHOLHDL 41.9 07/18/2018     CARDIAC:   Lab Results   Component Value Date    TROPONINI <0.012 08/12/2019       Assessment/Plan:  Carmen Morrow is a 49 y.o. female with a past medical history of GERD, chronic lower back pain, gout, obesity, who presents for an initial appointment.     1. LLE Pain- Etiology unclear.  Likely related to gout flare (red spot at left shin with pain) with possible component of cervical or lumbar disc disease (left  thigh pain).  Check BLE venous reflux study and SHANT study.  Check uric acid level, given history of gout.  Check MRI lumbar spine without contrast.    2. Varicose Veins- Check BLE venous reflux study.      3. Low Back Pain- Check MRI lumbar spine to evaluate further.     Follow up in 2 months    Total duration of face to face visit time 30 minutes.  Total time spent counseling greater than fifty percent of total visit time.  Counseling included discussion regarding imaging findings, diagnosis, possibilities, treatment options, risks and benefits.  The patient had many questions regarding the options and long-term effects.    Alex Wilson MD, PhD  Interventional Cardiology

## 2020-05-22 NOTE — LETTER
May 22, 2020      Tori Bailon MD  [address]  [facility] [address]           [facility]  [address]  [facility] [address]  Phone: [phone]          Patient: Carmen Morrow   MR Number: [id number]   YOB: 1970   Date of Visit: 5/22/2020       Dear Dr. Tori Bailon:    Thank you for referring Carmen Morrow to me for evaluation. Attached you will find relevant portions of my assessment and plan of care.    If you have questions, please do not hesitate to call me. I look forward to following Carmen Morrow along with you.    Sincerely,    Alex Wilson MD PhD    Enclosure  CC:  No Recipients    If you would like to receive this communication electronically, please contact [email or web address] or [phone] to request more information on [employer] Link access.    For providers and/or their staff who would like to refer a patient to [facility], please contact us through our one-stop-shop provider referral line, [facility], at [phone].    If you feel you have received this communication in error or would no longer like to receive these types of communications, please e-mail [email or web address]

## 2020-05-22 NOTE — PATIENT INSTRUCTIONS
Assessment/Plan:  Carmen Morrow is a 49 y.o. female with a past medical history of GERD, chronic lower back pain, gout, obesity, who presents for an initial appointment.     1. LLE Pain- Etiology unclear.  Likely related to gout flare (red spot at left shin with pain) with possible component of cervical or lumbar disc disease (left thigh pain).  Check BLE venous reflux study and SHANT study.  Check uric acid level, given history of gout.  Check MRI lumbar spine without contrast.    2. Varicose Veins- Check BLE venous reflux study.      3. Low Back Pain- Check MRI lumbar spine to evaluate further.     Follow up in 2 months

## 2020-07-09 ENCOUNTER — LAB VISIT (OUTPATIENT)
Dept: LAB | Facility: HOSPITAL | Age: 50
End: 2020-07-09
Attending: INTERNAL MEDICINE
Payer: COMMERCIAL

## 2020-07-09 ENCOUNTER — OFFICE VISIT (OUTPATIENT)
Dept: INTERNAL MEDICINE | Facility: CLINIC | Age: 50
End: 2020-07-09
Payer: COMMERCIAL

## 2020-07-09 VITALS
WEIGHT: 177.69 LBS | BODY MASS INDEX: 29.61 KG/M2 | SYSTOLIC BLOOD PRESSURE: 146 MMHG | HEIGHT: 65 IN | OXYGEN SATURATION: 98 % | DIASTOLIC BLOOD PRESSURE: 88 MMHG | HEART RATE: 82 BPM

## 2020-07-09 DIAGNOSIS — L29.9 ITCHING: ICD-10-CM

## 2020-07-09 DIAGNOSIS — L29.9 ITCHING: Primary | ICD-10-CM

## 2020-07-09 LAB
ALBUMIN SERPL BCP-MCNC: 3.7 G/DL (ref 3.5–5.2)
ALP SERPL-CCNC: 79 U/L (ref 55–135)
ALT SERPL W/O P-5'-P-CCNC: 20 U/L (ref 10–44)
ANION GAP SERPL CALC-SCNC: 7 MMOL/L (ref 8–16)
AST SERPL-CCNC: 19 U/L (ref 10–40)
BASOPHILS # BLD AUTO: 0.08 K/UL (ref 0–0.2)
BASOPHILS NFR BLD: 0.8 % (ref 0–1.9)
BILIRUB SERPL-MCNC: 0.7 MG/DL (ref 0.1–1)
BUN SERPL-MCNC: 12 MG/DL (ref 6–20)
CALCIUM SERPL-MCNC: 9.1 MG/DL (ref 8.7–10.5)
CHLORIDE SERPL-SCNC: 105 MMOL/L (ref 95–110)
CO2 SERPL-SCNC: 27 MMOL/L (ref 23–29)
CREAT SERPL-MCNC: 0.8 MG/DL (ref 0.5–1.4)
DIFFERENTIAL METHOD: ABNORMAL
EOSINOPHIL # BLD AUTO: 0.2 K/UL (ref 0–0.5)
EOSINOPHIL NFR BLD: 1.9 % (ref 0–8)
ERYTHROCYTE [DISTWIDTH] IN BLOOD BY AUTOMATED COUNT: 14.3 % (ref 11.5–14.5)
EST. GFR  (AFRICAN AMERICAN): >60 ML/MIN/1.73 M^2
EST. GFR  (NON AFRICAN AMERICAN): >60 ML/MIN/1.73 M^2
GLUCOSE SERPL-MCNC: 114 MG/DL (ref 70–110)
HCT VFR BLD AUTO: 41.5 % (ref 37–48.5)
HGB BLD-MCNC: 13 G/DL (ref 12–16)
IMM GRANULOCYTES # BLD AUTO: 0.03 K/UL (ref 0–0.04)
IMM GRANULOCYTES NFR BLD AUTO: 0.3 % (ref 0–0.5)
LYMPHOCYTES # BLD AUTO: 3.2 K/UL (ref 1–4.8)
LYMPHOCYTES NFR BLD: 33.8 % (ref 18–48)
MCH RBC QN AUTO: 28.9 PG (ref 27–31)
MCHC RBC AUTO-ENTMCNC: 31.3 G/DL (ref 32–36)
MCV RBC AUTO: 92 FL (ref 82–98)
MONOCYTES # BLD AUTO: 0.7 K/UL (ref 0.3–1)
MONOCYTES NFR BLD: 7.5 % (ref 4–15)
NEUTROPHILS # BLD AUTO: 5.3 K/UL (ref 1.8–7.7)
NEUTROPHILS NFR BLD: 55.7 % (ref 38–73)
NRBC BLD-RTO: 0 /100 WBC
PLATELET # BLD AUTO: 379 K/UL (ref 150–350)
PMV BLD AUTO: 10.1 FL (ref 9.2–12.9)
POTASSIUM SERPL-SCNC: 3.9 MMOL/L (ref 3.5–5.1)
PROT SERPL-MCNC: 7.6 G/DL (ref 6–8.4)
RBC # BLD AUTO: 4.5 M/UL (ref 4–5.4)
SODIUM SERPL-SCNC: 139 MMOL/L (ref 136–145)
WBC # BLD AUTO: 9.57 K/UL (ref 3.9–12.7)

## 2020-07-09 PROCEDURE — 3008F BODY MASS INDEX DOCD: CPT | Mod: CPTII,S$GLB,, | Performed by: INTERNAL MEDICINE

## 2020-07-09 PROCEDURE — 99213 PR OFFICE/OUTPT VISIT, EST, LEVL III, 20-29 MIN: ICD-10-PCS | Mod: S$GLB,,, | Performed by: INTERNAL MEDICINE

## 2020-07-09 PROCEDURE — 99999 PR PBB SHADOW E&M-EST. PATIENT-LVL IV: CPT | Mod: PBBFAC,,, | Performed by: INTERNAL MEDICINE

## 2020-07-09 PROCEDURE — 85025 COMPLETE CBC W/AUTO DIFF WBC: CPT

## 2020-07-09 PROCEDURE — 36415 COLL VENOUS BLD VENIPUNCTURE: CPT

## 2020-07-09 PROCEDURE — 99213 OFFICE O/P EST LOW 20 MIN: CPT | Mod: S$GLB,,, | Performed by: INTERNAL MEDICINE

## 2020-07-09 PROCEDURE — 99999 PR PBB SHADOW E&M-EST. PATIENT-LVL IV: ICD-10-PCS | Mod: PBBFAC,,, | Performed by: INTERNAL MEDICINE

## 2020-07-09 PROCEDURE — 80053 COMPREHEN METABOLIC PANEL: CPT

## 2020-07-09 PROCEDURE — 3008F PR BODY MASS INDEX (BMI) DOCUMENTED: ICD-10-PCS | Mod: CPTII,S$GLB,, | Performed by: INTERNAL MEDICINE

## 2020-07-09 RX ORDER — GABAPENTIN 300 MG/1
300 CAPSULE ORAL
COMMUNITY
Start: 2020-06-17 | End: 2021-01-21

## 2020-07-09 NOTE — PROGRESS NOTES
Subjective:       Patient ID: Carmen Morrow is a 49 y.o. female.    Chief Complaint: No chief complaint on file.    49 year old lady complains of intense itching with no rash or obvious allergen exposure.  Has considered that the PPE she must don several times a day is irritating her, but again with no skin signs.  No new soaps, cosmetics, clothes, sheets.   is not itching    Review of Systems   Constitutional: Negative for activity change, chills, fatigue and fever.   HENT: Negative for congestion, ear pain, nosebleeds, postnasal drip, sinus pressure and sore throat.    Eyes: Negative.  Negative for visual disturbance.   Respiratory: Negative for cough, chest tightness, shortness of breath and wheezing.    Cardiovascular: Negative for chest pain.   Gastrointestinal: Negative for abdominal pain, diarrhea, nausea and vomiting.   Genitourinary: Negative for difficulty urinating, dysuria, frequency and urgency.   Musculoskeletal: Negative for arthralgias and neck stiffness.   Skin: Negative for rash.   Neurological: Negative for dizziness, weakness and headaches.   Psychiatric/Behavioral: Negative for sleep disturbance. The patient is not nervous/anxious.        Objective:      Physical Exam  Skin:               Assessment:       1. Itching        Plan:   Diagnoses and all orders for this visit:    Itching  -     Comprehensive metabolic panel; Future  -     CBC auto differential; Future  -     Ambulatory referral/consult to Dermatology; Future

## 2020-07-13 ENCOUNTER — HOSPITAL ENCOUNTER (OUTPATIENT)
Dept: CARDIOLOGY | Facility: HOSPITAL | Age: 50
Discharge: HOME | End: 2020-07-13
Attending: INTERNAL MEDICINE
Payer: COMMERCIAL

## 2020-07-13 DIAGNOSIS — M79.605 PAIN OF LEFT LOWER EXTREMITY: ICD-10-CM

## 2020-07-13 DIAGNOSIS — M79.605 LEFT LEG PAIN: ICD-10-CM

## 2020-07-13 DIAGNOSIS — I83.813 VARICOSE VEINS OF BOTH LOWER EXTREMITIES WITH PAIN: ICD-10-CM

## 2020-07-13 DIAGNOSIS — M54.40 CHRONIC MIDLINE LOW BACK PAIN WITH SCIATICA, SCIATICA LATERALITY UNSPECIFIED: ICD-10-CM

## 2020-07-13 DIAGNOSIS — G89.29 CHRONIC MIDLINE LOW BACK PAIN WITH SCIATICA, SCIATICA LATERALITY UNSPECIFIED: ICD-10-CM

## 2020-07-13 LAB
ABI CLAUDICATION TIME: 0 MIN
IMMEDIATE ARM BP: 151 MMHG
IMMEDIATE LEFT ABI: 1.17
IMMEDIATE LEFT TIBIAL: 177 MMHG
IMMEDIATE RIGHT ABI: 1.32
IMMEDIATE RIGHT TIBIAL: 199 MMHG
LEFT ABI: 1.28
LEFT ARM BP: 134 MMHG
LEFT CALF BP: 174 MMHG
LEFT DORSALIS PEDIS: 172 MMHG
LEFT LOWER LEG BP: 165 MMHG
LEFT POSTERIOR TIBIAL: 170 MMHG
LEFT TBI: 0.96
LEFT TOE PRESSURE: 129 MMHG
LEFT UPPER LEG BP: 175 MMHG
RIGHT ABI: 1.25
RIGHT ARM BP: 134 MMHG
RIGHT CALF BP: 163 MMHG
RIGHT DORSALIS PEDIS: 165 MMHG
RIGHT LOWER LEG BP: 185 MMHG
RIGHT POSTERIOR TIBIAL: 167 MMHG
RIGHT TBI: 0.98
RIGHT TOE PRESSURE: 131 MMHG
RIGHT UPPER LEG BP: 221 MMHG
TREADMILL GRADE: 12 %
TREADMILL SPEED: 2 MPH
TREADMILL TIME: 5 MIN

## 2020-07-13 PROCEDURE — 93924 CV SEGMENTAL PRESSURES LOW EXT W STRESS (CUPID ONLY): ICD-10-PCS | Mod: 26,,, | Performed by: INTERNAL MEDICINE

## 2020-07-13 PROCEDURE — 93924 LWR XTR VASC STDY BILAT: CPT | Mod: 26,,, | Performed by: INTERNAL MEDICINE

## 2020-07-13 PROCEDURE — 93924 LWR XTR VASC STDY BILAT: CPT | Mod: 50

## 2020-07-15 ENCOUNTER — HOSPITAL ENCOUNTER (OUTPATIENT)
Dept: CARDIOLOGY | Facility: HOSPITAL | Age: 50
Discharge: HOME | End: 2020-07-15
Attending: INTERNAL MEDICINE
Payer: COMMERCIAL

## 2020-07-15 ENCOUNTER — HOSPITAL ENCOUNTER (OUTPATIENT)
Dept: RADIOLOGY | Facility: HOSPITAL | Age: 50
Discharge: HOME | End: 2020-07-15
Attending: INTERNAL MEDICINE
Payer: COMMERCIAL

## 2020-07-15 DIAGNOSIS — I83.813 VARICOSE VEINS OF BOTH LOWER EXTREMITIES WITH PAIN: ICD-10-CM

## 2020-07-15 DIAGNOSIS — M79.605 PAIN OF LEFT LOWER EXTREMITY: ICD-10-CM

## 2020-07-15 DIAGNOSIS — M79.605 LEFT LEG PAIN: ICD-10-CM

## 2020-07-15 DIAGNOSIS — G89.29 CHRONIC MIDLINE LOW BACK PAIN WITH SCIATICA, SCIATICA LATERALITY UNSPECIFIED: ICD-10-CM

## 2020-07-15 DIAGNOSIS — M54.40 CHRONIC MIDLINE LOW BACK PAIN WITH SCIATICA, SCIATICA LATERALITY UNSPECIFIED: ICD-10-CM

## 2020-07-15 LAB
LEFT GREAT SAPHENOUS DISTAL THIGH DIA: 0.24 CM
LEFT GREAT SAPHENOUS JUNCTION DIA: 0.38 CM
LEFT GREAT SAPHENOUS KNEE DIA: 0.23 CM
LEFT GREAT SAPHENOUS MIDDLE THIGH DIA: 0.3 CM
LEFT GREAT SAPHENOUS PROXIMAL CALF DIA: 0.15 CM
RIGHT GREAT SAPHENOUS DISTAL THIGH DIA: 0.34 CM
RIGHT GREAT SAPHENOUS JUNCTION DIA: 0.46 CM
RIGHT GREAT SAPHENOUS KNEE DIA: 0.24 CM
RIGHT GREAT SAPHENOUS MIDDLE THIGH DIA: 0.3 CM
RIGHT GREAT SAPHENOUS PROXIMAL CALF DIA: 0.18 CM

## 2020-07-15 PROCEDURE — 72148 MRI LUMBAR SPINE W/O DYE: CPT | Mod: 26,,, | Performed by: RADIOLOGY

## 2020-07-15 PROCEDURE — 93970 EXTREMITY STUDY: CPT | Mod: 26,,, | Performed by: INTERNAL MEDICINE

## 2020-07-15 PROCEDURE — 72148 MRI LUMBAR SPINE W/O DYE: CPT | Mod: TC

## 2020-07-15 PROCEDURE — 72148 MRI LUMBAR SPINE WITHOUT CONTRAST: ICD-10-PCS | Mod: 26,,, | Performed by: RADIOLOGY

## 2020-07-15 PROCEDURE — 93970 EXTREMITY STUDY: CPT | Mod: 50,TC

## 2020-07-15 PROCEDURE — 93970 CV US LOWER VENOUS INSUFFICIENCY BILATERAL (CUPID ONLY): ICD-10-PCS | Mod: 26,,, | Performed by: INTERNAL MEDICINE

## 2020-07-21 ENCOUNTER — OFFICE VISIT (OUTPATIENT)
Dept: CARDIOLOGY | Facility: CLINIC | Age: 50
End: 2020-07-21
Payer: COMMERCIAL

## 2020-07-21 VITALS
HEART RATE: 65 BPM | BODY MASS INDEX: 30.34 KG/M2 | DIASTOLIC BLOOD PRESSURE: 81 MMHG | WEIGHT: 182.13 LBS | SYSTOLIC BLOOD PRESSURE: 147 MMHG | HEIGHT: 65 IN

## 2020-07-21 DIAGNOSIS — M54.12 CERVICAL RADICULAR PAIN: Primary | ICD-10-CM

## 2020-07-21 DIAGNOSIS — M50.30 DEGENERATION OF CERVICAL INTERVERTEBRAL DISC: ICD-10-CM

## 2020-07-21 DIAGNOSIS — M47.812 CERVICAL SPONDYLOSIS WITHOUT MYELOPATHY: ICD-10-CM

## 2020-07-21 DIAGNOSIS — M79.605 LEFT LEG PAIN: ICD-10-CM

## 2020-07-21 DIAGNOSIS — I83.813 VARICOSE VEINS OF BOTH LOWER EXTREMITIES WITH PAIN: ICD-10-CM

## 2020-07-21 DIAGNOSIS — M51.9 LUMBAR DISC DISEASE: ICD-10-CM

## 2020-07-21 DIAGNOSIS — I73.9 PAD (PERIPHERAL ARTERY DISEASE): ICD-10-CM

## 2020-07-21 PROCEDURE — 99215 PR OFFICE/OUTPT VISIT, EST, LEVL V, 40-54 MIN: ICD-10-PCS | Mod: S$GLB,,, | Performed by: INTERNAL MEDICINE

## 2020-07-21 PROCEDURE — 3008F BODY MASS INDEX DOCD: CPT | Mod: CPTII,S$GLB,, | Performed by: INTERNAL MEDICINE

## 2020-07-21 PROCEDURE — 99999 PR PBB SHADOW E&M-EST. PATIENT-LVL IV: CPT | Mod: PBBFAC,,, | Performed by: INTERNAL MEDICINE

## 2020-07-21 PROCEDURE — 99999 PR PBB SHADOW E&M-EST. PATIENT-LVL IV: ICD-10-PCS | Mod: PBBFAC,,, | Performed by: INTERNAL MEDICINE

## 2020-07-21 PROCEDURE — 99215 OFFICE O/P EST HI 40 MIN: CPT | Mod: S$GLB,,, | Performed by: INTERNAL MEDICINE

## 2020-07-21 PROCEDURE — 3008F PR BODY MASS INDEX (BMI) DOCUMENTED: ICD-10-PCS | Mod: CPTII,S$GLB,, | Performed by: INTERNAL MEDICINE

## 2020-07-21 NOTE — PROGRESS NOTES
"[facility]    CC: LLE Pain     Patient ID: Carmen Morrow is a 49 y.o. female with a past medical history of GERD, chronic lower back pain, gout, obesity, who presents for a follow up appointment.  Pertinent history/events are as follows:     -Pt kindly referred by Dr. Bailon for evaluation of LLE pain.      -At our initial clinic visit on 5/22/2020, Mrs. Morrow reported episodes where she develops a "red spot" on her left shin.  Episodes occur sporadically and lasts 4-5 days.  During the episodes, the area of the red spot is exquisitely painful.  States pain is similar to pain she experiences when she has a gout flare.  Gout flares usually last 4-5 days as well, and are associated with red painful hallux.  States she also has episodes of "deep pain" at left anterior thigh and knee area.  Episodes occur 1-2 times a week.  No claudication, rest pain, or tissue loss.  Formerly smoked half a pack a day for total 6 years.  Quit 20 years ago.  Exam shows BLE's with mild varicose veins with no edema and no TTP.  Plan:   LLE Pain- Etiology unclear.  Likely related to gout flare (red spot at left shin with pain) with possible component of cervical or lumbar disc disease (left thigh pain).  Check BLE venous reflux study and SHANT study.  Check uric acid level, given history of gout.  Check MRI lumbar spine without contrast.  Varicose Veins- Check BLE venous reflux study.    Low Back Pain- Check MRI lumbar spine to evaluate further.     HPI:  Mrs. Morrow reports painful spasms of her lower back since clinic visit on 5/22/2020.  She received a steroid injection to her lumbar spine with some improvement.  No further episodes of the painful  "red spot" on her left shin.  MRI Lumbar Spine Without Contrast 7/15/2020 revealed interval development of disc extrusion at L4-L5 resulting in mild spinal canal stenosis as compared to MRI from 08/23/2018.  BLE Venous Reflux Study 7/15/2020 revealed no DVT and no reflux.  " SHANT/Segmental Pressure Study on 2020 revealed normal resting SHANT's; normal TBI's bilaterally; reduced left LE exercise SHANT consistent with mild left LE PAD.      Past Medical History:   Diagnosis Date    Allergy     Brachial neuritis or radiculitis NOS 2012    Degeneration of cervical intervertebral disc 2012    GERD (gastroesophageal reflux disease)     SVT (supraventricular tachycardia)      Past Surgical History:   Procedure Laterality Date    anterior diskectomy fusion  2016    AUGMENTATION OF BREAST Bilateral     -    BREAST SURGERY Bilateral     CARPAL TUNNEL RELEASE Left 2018    CSF leak  2016    Replace and repair fat graft from surgery on 2016    KNEE ARTHROSCOPY      LAMINECTOMY      SPINAL FUSION       Social History     Socioeconomic History    Marital status:      Spouse name: Not on file    Number of children: Not on file    Years of education: Not on file    Highest education level: Not on file   Occupational History    Not on file   Social Needs    Financial resource strain: Not hard at all    Food insecurity     Worry: Never true     Inability: Never true    Transportation needs     Medical: No     Non-medical: No   Tobacco Use    Smoking status: Former Smoker     Quit date: 2000     Years since quittin.5    Smokeless tobacco: Never Used   Substance and Sexual Activity    Alcohol use: No     Frequency: Never     Binge frequency: Never    Drug use: No    Sexual activity: Yes     Partners: Male     Birth control/protection: None   Lifestyle    Physical activity     Days per week: 1 day     Minutes per session: 60 min    Stress: Only a little   Relationships    Social connections     Talks on phone: More than three times a week     Gets together: Once a week     Attends Yazidi service: Not on file     Active member of club or organization: Yes     Attends meetings of clubs or organizations: More than 4  times per year     Relationship status:    Other Topics Concern    Not on file   Social History Narrative    Not on file     Family History   Problem Relation Age of Onset    Heart disease Mother     Hypertension Mother     Asthma Mother     Heart disease Father     COPD Father     Diabetes Father     Ovarian cancer Neg Hx     Colon cancer Neg Hx     Breast cancer Neg Hx     Cancer Neg Hx        Review of patient's allergies indicates:   Allergen Reactions    Oxycodone Itching     Can take hydrcodone       Medication List with Changes/Refills   Current Medications    ACYCLOVIR (ZOVIRAX) 200 MG CAPSULE    Take 1 capsule (200 mg total) by mouth 2 (two) times daily.    ALBUTEROL (PROVENTIL/VENTOLIN HFA) 90 MCG/ACTUATION INHALER    Inhale 2 puffs into the lungs every 4 (four) hours as needed for Wheezing.    AZELAIC ACID (FINACEA) 15 % GEL    Apply to face QHS. Increase to BID as tolerated.    AZELASTINE (ASTELIN) 137 MCG (0.1 %) NASAL SPRAY    1 spray (137 mcg total) by Nasal route 2 (two) times daily. for 14 days    BENZPHETAMINE 50 MG TAB    Take by mouth.    CETIRIZINE (ZYRTEC) 10 MG TABLET    Take 10 mg by mouth Daily. 1 Tablet Oral Every day    ESOMEPRAZOLE (NEXIUM) 40 MG CAPSULE    Take 1 capsule (40 mg total) by mouth before breakfast.    FLUTICASONE PROPIONATE (FLONASE) 50 MCG/ACTUATION NASAL SPRAY    2 sprays (100 mcg total) by Each Nostril route every evening. 2 Spray, Suspension Nasal Every day    GABAPENTIN (NEURONTIN) 300 MG CAPSULE        LINACLOTIDE (LINZESS) 145 MCG CAP CAPSULE    Take 1 capsule (145 mcg total) by mouth once daily.    MEDROXYPROGESTERONE (PROVERA) 10 MG TABLET    Take 1 tablet (10 mg total) by mouth once daily.    METHYLPREDNISOLONE (MEDROL DOSEPACK) 4 MG TABLET    use as directed    MONTELUKAST (SINGULAIR) 10 MG TABLET    Take 1 tablet (10 mg total) by mouth once daily.    NORTRIPTYLINE (PAMELOR) 50 MG CAPSULE    TAKE 1 CAPSULE BY MOUTH EVERY EVENING.    PLECANATIDE  "(TRULANCE) 3 MG TAB    Take 3 mg by mouth once daily. For severe constipation       Review of Systems  Constitution: Denies chills, fever, and sweats.  HENT: Denies headaches or blurry vision.  Cardiovascular: Denies chest pain or irregular heart beat.  Respiratory: Denies cough or shortness of breath.  Gastrointestinal: Denies abdominal pain, nausea, or vomiting.  Musculoskeletal: Denies muscle cramps.  Neurological: Denies dizziness or focal weakness.  Psychiatric/Behavioral: Normal mental status.  Hematologic/Lymphatic: Denies bleeding problem or easy bruising/bleeding.  Skin: Denies rash or suspicious lesions    Physical Examination  BP (!) 147/81 (BP Location: Left arm, Patient Position: Sitting, BP Method: Medium (Automatic))   Pulse 65   Ht 5' 5" (1.651 m)   Wt 82.6 kg (182 lb 1.6 oz)   BMI 30.30 kg/m²     Constitutional: No acute distress, conversant  HEENT: Sclera anicteric, Pupils equal, round and reactive to light, extraocular motions intact, Oropharynx clear  Neck: No JVD, no carotid bruits  Cardiovascular: regular rate and rhythm, no murmur, rubs or gallops, normal S1/S2  Pulmonary: Clear to auscultation bilaterally  Abdominal: Abdomen soft, nontender, nondistended, positive bowel sounds  Extremities: BLE's with mild varicose veins with no edema and no TTP.  Pulses:  Carotid pulses are 2+ on the right side, and 2+ on the left side.  Radial pulses are 2+ on the right side, and 2+ on the left side.   Femoral pulses are 2+ on the right side, and 2+ on the left side.  Popliteal pulses are 2+ on the right side, and 2+ on the left side.   Dorsalis pedis pulses are 2+ on the right side, and 2+ on the left side.   Posterior tibial pulses are 2+ on the right side, and 2+ on the left side.    Skin: No ecchymosis, erythema, or ulcers  Psych: Alert and oriented x 3, appropriate affect  Neuro: CNII-XII intact, no focal deficits    Labs:  Most Recent Data  CBC:   Lab Results   Component Value Date    WBC 9.57 " 07/09/2020    HGB 13.0 07/09/2020    HCT 41.5 07/09/2020     (H) 07/09/2020    MCV 92 07/09/2020    RDW 14.3 07/09/2020     BMP:   Lab Results   Component Value Date     07/09/2020    K 3.9 07/09/2020     07/09/2020    CO2 27 07/09/2020    BUN 12 07/09/2020    CREATININE 0.8 07/09/2020     (H) 07/09/2020    CALCIUM 9.1 07/09/2020     LFTS;   Lab Results   Component Value Date    PROT 7.6 07/09/2020    ALBUMIN 3.7 07/09/2020    BILITOT 0.7 07/09/2020    AST 19 07/09/2020    ALKPHOS 79 07/09/2020    ALT 20 07/09/2020     COAGS:   Lab Results   Component Value Date    INR 0.9 08/12/2019     FLP:   Lab Results   Component Value Date    CHOL 148 07/18/2018    HDL 62 07/18/2018    LDLCALC 73.4 07/18/2018    TRIG 63 07/18/2018    CHOLHDL 41.9 07/18/2018     CARDIAC:   Lab Results   Component Value Date    TROPONINI <0.012 08/12/2019     MRI Lumbar Spine Without Contrast 7/15/2020:  Interval development of disc extrusion at L4-L5 resulting in mild spinal canal stenosis as compared to MRI from 08/23/2018.  No acute fracture.    BLE Venous Reflux Study 7/15/2020:  · No DVT and no reflux is present.    SHANT/Segmental Pressure Study 7/13/2020:   · Normal resting SHANT's.  · Normal TBI's bilaterally.  · Reduced left LE exercise SHANT consistent with mild left LE PAD.    Assessment/Plan:  Carmen Morrow is a 49 y.o. female with a past medical history of GERD, chronic lower back pain, gout, obesity, who presents for a follow up appointment.     1. LLE Pain- MRI Lumbar Spine Without Contrast 7/15/2020 revealed interval development of disc extrusion at L4-L5 resulting in mild spinal canal stenosis as compared to MRI from 08/23/2018.  BLE Venous Reflux Study 7/15/2020 revealed no DVT and no reflux.  SHANT/Segmental Pressure Study on 7/13/2020 revealed normal resting SHANT's; normal TBI's bilaterally; reduced left LE exercise SHANT consistent with mild left LE PAD.  Hence, LLE pain likely due to lumbar disc  disease and not a vascular issue.  Pt has upcoming visit with Pain Management.  Continue to monitor.      2. Varicose Veins- BLE Venous Reflux Study 7/15/2020 revealed no DVT and no reflux.  SHANT/Segmental Pressure Study on 7/13/2020 revealed normal resting SHANT's; normal TBI's bilaterally; reduced left LE exercise SHANT consistent with mild left LE PAD.  Pt has no claudication or tissue loss.        3. Low Back Pain- MRI Lumbar Spine Without Contrast 7/15/2020 revealed interval development of disc extrusion at L4-L5 resulting in mild spinal canal stenosis as compared to MRI from 08/23/2018.  Hence, lumbar disc disease is the likely etiology of the Mrs. Morrow's back and LLE pain.  Follow up with pain management as scheduled.       4. Mild PAD- No claudication, rest pain, or tissue loss.  Start ASA 81 mg daily.  Check updated lipid panel prior to next visit.  Plan to start statin at that time.      Follow up in 3 months    Total duration of face to face visit time 30 minutes.  Total time spent counseling greater than fifty percent of total visit time.  Counseling included discussion regarding imaging findings, diagnosis, possibilities, treatment options, risks and benefits.  The patient had many questions regarding the options and long-term effects.    Alex Wilson MD, PhD  Interventional Cardiology

## 2020-07-21 NOTE — PATIENT INSTRUCTIONS
Assessment/Plan:  Carmen Morrow is a 49 y.o. female with a past medical history of GERD, chronic lower back pain, gout, obesity, who presents for a follow up appointment.     1. LLE Pain- MRI Lumbar Spine Without Contrast 7/15/2020 revealed interval development of disc extrusion at L4-L5 resulting in mild spinal canal stenosis as compared to MRI from 08/23/2018.  BLE Venous Reflux Study 7/15/2020 revealed no DVT and no reflux.  SHANT/Segmental Pressure Study on 7/13/2020 revealed normal resting SHANT's; normal TBI's bilaterally; reduced left LE exercise SAHNT consistent with mild left LE PAD.  Hence, LLE pain likely due to lumbar disc disease and not a vascular issue.  Pt has upcoming visit with Pain Management.  Continue to monitor.      2. Varicose Veins- BLE Venous Reflux Study 7/15/2020 revealed no DVT and no reflux.  SHANT/Segmental Pressure Study on 7/13/2020 revealed normal resting SHANT's; normal TBI's bilaterally; reduced left LE exercise SHANT consistent with mild left LE PAD.  Pt has no claudication or tissue loss.        3. Low Back Pain- MRI Lumbar Spine Without Contrast 7/15/2020 revealed interval development of disc extrusion at L4-L5 resulting in mild spinal canal stenosis as compared to MRI from 08/23/2018.  Hence, lumbar disc disease is the likely etiology of the Mrs. Morrow's back and LLE pain.  Follow up with pain management as scheduled.       4. Mild PAD- No claudication, rest pain, or tissue loss.  Start ASA 81 mg daily.  Check updated lipid panel prior to next visit.  Plan to start statin at that time.      Follow up in 3 months

## 2020-08-10 ENCOUNTER — LAB VISIT (OUTPATIENT)
Dept: LAB | Facility: HOSPITAL | Age: 50
End: 2020-08-10
Attending: INTERNAL MEDICINE
Payer: COMMERCIAL

## 2020-08-10 ENCOUNTER — PATIENT OUTREACH (OUTPATIENT)
Dept: OTHER | Facility: OTHER | Age: 50
End: 2020-08-10

## 2020-08-10 ENCOUNTER — OFFICE VISIT (OUTPATIENT)
Dept: INTERNAL MEDICINE | Facility: CLINIC | Age: 50
End: 2020-08-10
Payer: COMMERCIAL

## 2020-08-10 VITALS
HEIGHT: 65 IN | HEART RATE: 79 BPM | WEIGHT: 182.56 LBS | SYSTOLIC BLOOD PRESSURE: 152 MMHG | OXYGEN SATURATION: 98 % | BODY MASS INDEX: 30.42 KG/M2 | DIASTOLIC BLOOD PRESSURE: 96 MMHG

## 2020-08-10 DIAGNOSIS — R51.9 BILATERAL HEADACHES: ICD-10-CM

## 2020-08-10 DIAGNOSIS — R42 DIZZINESS: ICD-10-CM

## 2020-08-10 DIAGNOSIS — R11.0 NAUSEA: ICD-10-CM

## 2020-08-10 DIAGNOSIS — Z20.822 SUSPECTED COVID-19 VIRUS INFECTION: ICD-10-CM

## 2020-08-10 DIAGNOSIS — I10 ESSENTIAL HYPERTENSION: Primary | ICD-10-CM

## 2020-08-10 DIAGNOSIS — R53.83 FATIGUE, UNSPECIFIED TYPE: ICD-10-CM

## 2020-08-10 LAB
ALBUMIN SERPL BCP-MCNC: 3.9 G/DL (ref 3.5–5.2)
ALP SERPL-CCNC: 81 U/L (ref 55–135)
ALT SERPL W/O P-5'-P-CCNC: 19 U/L (ref 10–44)
ANION GAP SERPL CALC-SCNC: 9 MMOL/L (ref 8–16)
AST SERPL-CCNC: 19 U/L (ref 10–40)
BASOPHILS # BLD AUTO: 0.07 K/UL (ref 0–0.2)
BASOPHILS NFR BLD: 0.8 % (ref 0–1.9)
BILIRUB SERPL-MCNC: 0.9 MG/DL (ref 0.1–1)
BUN SERPL-MCNC: 10 MG/DL (ref 6–20)
CALCIUM SERPL-MCNC: 9 MG/DL (ref 8.7–10.5)
CHLORIDE SERPL-SCNC: 104 MMOL/L (ref 95–110)
CO2 SERPL-SCNC: 25 MMOL/L (ref 23–29)
CREAT SERPL-MCNC: 0.8 MG/DL (ref 0.5–1.4)
DIFFERENTIAL METHOD: ABNORMAL
EOSINOPHIL # BLD AUTO: 0.1 K/UL (ref 0–0.5)
EOSINOPHIL NFR BLD: 1.5 % (ref 0–8)
ERYTHROCYTE [DISTWIDTH] IN BLOOD BY AUTOMATED COUNT: 14.6 % (ref 11.5–14.5)
EST. GFR  (AFRICAN AMERICAN): >60 ML/MIN/1.73 M^2
EST. GFR  (NON AFRICAN AMERICAN): >60 ML/MIN/1.73 M^2
ESTIMATED AVG GLUCOSE: 103 MG/DL (ref 68–131)
GLUCOSE SERPL-MCNC: 76 MG/DL (ref 70–110)
HBA1C MFR BLD HPLC: 5.2 % (ref 4–5.6)
HCT VFR BLD AUTO: 41.6 % (ref 37–48.5)
HGB BLD-MCNC: 13.2 G/DL (ref 12–16)
IMM GRANULOCYTES # BLD AUTO: 0.03 K/UL (ref 0–0.04)
IMM GRANULOCYTES NFR BLD AUTO: 0.3 % (ref 0–0.5)
LYMPHOCYTES # BLD AUTO: 2.4 K/UL (ref 1–4.8)
LYMPHOCYTES NFR BLD: 27.2 % (ref 18–48)
MCH RBC QN AUTO: 28.7 PG (ref 27–31)
MCHC RBC AUTO-ENTMCNC: 31.7 G/DL (ref 32–36)
MCV RBC AUTO: 90 FL (ref 82–98)
MONOCYTES # BLD AUTO: 0.6 K/UL (ref 0.3–1)
MONOCYTES NFR BLD: 6.7 % (ref 4–15)
NEUTROPHILS # BLD AUTO: 5.6 K/UL (ref 1.8–7.7)
NEUTROPHILS NFR BLD: 63.5 % (ref 38–73)
NRBC BLD-RTO: 0 /100 WBC
PLATELET # BLD AUTO: 345 K/UL (ref 150–350)
PMV BLD AUTO: 11.3 FL (ref 9.2–12.9)
POTASSIUM SERPL-SCNC: 4 MMOL/L (ref 3.5–5.1)
PROT SERPL-MCNC: 8.1 G/DL (ref 6–8.4)
RBC # BLD AUTO: 4.6 M/UL (ref 4–5.4)
SODIUM SERPL-SCNC: 138 MMOL/L (ref 136–145)
TSH SERPL DL<=0.005 MIU/L-ACNC: 1.16 UIU/ML (ref 0.4–4)
VIT B12 SERPL-MCNC: 894 PG/ML (ref 210–950)
WBC # BLD AUTO: 8.82 K/UL (ref 3.9–12.7)

## 2020-08-10 PROCEDURE — U0003 INFECTIOUS AGENT DETECTION BY NUCLEIC ACID (DNA OR RNA); SEVERE ACUTE RESPIRATORY SYNDROME CORONAVIRUS 2 (SARS-COV-2) (CORONAVIRUS DISEASE [COVID-19]), AMPLIFIED PROBE TECHNIQUE, MAKING USE OF HIGH THROUGHPUT TECHNOLOGIES AS DESCRIBED BY CMS-2020-01-R: HCPCS

## 2020-08-10 PROCEDURE — 85025 COMPLETE CBC W/AUTO DIFF WBC: CPT

## 2020-08-10 PROCEDURE — 36415 COLL VENOUS BLD VENIPUNCTURE: CPT

## 2020-08-10 PROCEDURE — 83036 HEMOGLOBIN GLYCOSYLATED A1C: CPT

## 2020-08-10 PROCEDURE — 99999 PR PBB SHADOW E&M-EST. PATIENT-LVL IV: CPT | Mod: PBBFAC,,, | Performed by: INTERNAL MEDICINE

## 2020-08-10 PROCEDURE — 3008F PR BODY MASS INDEX (BMI) DOCUMENTED: ICD-10-PCS | Mod: CPTII,S$GLB,, | Performed by: INTERNAL MEDICINE

## 2020-08-10 PROCEDURE — 99999 PR PBB SHADOW E&M-EST. PATIENT-LVL IV: ICD-10-PCS | Mod: PBBFAC,,, | Performed by: INTERNAL MEDICINE

## 2020-08-10 PROCEDURE — 99214 OFFICE O/P EST MOD 30 MIN: CPT | Mod: S$GLB,,, | Performed by: INTERNAL MEDICINE

## 2020-08-10 PROCEDURE — 84443 ASSAY THYROID STIM HORMONE: CPT

## 2020-08-10 PROCEDURE — 82607 VITAMIN B-12: CPT

## 2020-08-10 PROCEDURE — 3008F BODY MASS INDEX DOCD: CPT | Mod: CPTII,S$GLB,, | Performed by: INTERNAL MEDICINE

## 2020-08-10 PROCEDURE — 80053 COMPREHEN METABOLIC PANEL: CPT

## 2020-08-10 PROCEDURE — 99214 PR OFFICE/OUTPT VISIT, EST, LEVL IV, 30-39 MIN: ICD-10-PCS | Mod: S$GLB,,, | Performed by: INTERNAL MEDICINE

## 2020-08-10 RX ORDER — AMLODIPINE BESYLATE 5 MG/1
5 TABLET ORAL DAILY
Qty: 30 TABLET | Refills: 1 | Status: SHIPPED | OUTPATIENT
Start: 2020-08-10 | End: 2020-09-01

## 2020-08-10 NOTE — PROGRESS NOTES
Internal Medicine    Subjective:      Patient ID: Carmen Morrow is a 49 y.o. female.    Chief Complaint: Nausea, Headache, and Fatigue    HPI:  Patient presents for urgent visit for fatigue and headaches.    Symptoms started last week.  Reports feeling fatigued, extremely nauseated.  Occasionally has trouble catching a deep breath, especially when yawning.  Denies muscle aches, chills, diaphoresis, cough, SOB, or upper respiratory symptoms.  Denies abdominal pain, diarrhea, or constipation although she is having more frequent BM's.  Has chronic headaches but now having headaches that are different.  Bilateral temporal headaches currently whereas usually has occipital headaches.  Does have some pins and needs around neck (has chronic occipital neuralgia).  Has chronic nasal congestion and this has not changed.  Has not lost sense of taste or smell.  Has some dizziness when leaning over at times. BP high today and has been running high at recent appointments.  Has not been monitoring BP at home.  Denies symptoms of depression.  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  Now working from home.          Review of Systems   Constitutional: Positive for appetite change and fatigue. Negative for chills, fever and unexpected weight change.   HENT: Negative for sore throat and trouble swallowing.    Eyes: Negative for visual disturbance.   Respiratory: Negative for cough, chest tightness, shortness of breath and wheezing.    Cardiovascular: Negative for chest pain, palpitations and leg swelling.   Gastrointestinal: Positive for nausea. Negative for abdominal pain, blood in stool, constipation, diarrhea and vomiting.   Genitourinary: Negative for difficulty urinating.   Musculoskeletal: Negative for arthralgias and myalgias.   Skin: Negative for rash and wound.   Neurological: Positive for dizziness and headaches. Negative for weakness and numbness.  "  Psychiatric/Behavioral: Negative for behavioral problems and confusion.       Past medical history, surgical history, and family medical history reviewed and updated as appropriate.    Medications and allergies reviewed.     Objective:     Vitals:    08/10/20 0751 08/10/20 0831   BP: (!) 148/94 (!) 152/96   BP Location: Right arm    Patient Position: Sitting    BP Method: Large (Manual)    Pulse: 79    SpO2: 98%    Weight: 82.8 kg (182 lb 8.7 oz)    Height: 5' 5" (1.651 m)      Physical Exam  Constitutional:       General: She is not in acute distress.     Appearance: She is well-developed.   HENT:      Head: Normocephalic and atraumatic.   Eyes:      General: No scleral icterus.     Conjunctiva/sclera: Conjunctivae normal.   Neck:      Thyroid: No thyromegaly.   Cardiovascular:      Rate and Rhythm: Normal rate and regular rhythm.      Heart sounds: No murmur. No friction rub. No gallop.    Pulmonary:      Effort: Pulmonary effort is normal. No respiratory distress.      Breath sounds: Normal breath sounds. No wheezing or rales.   Abdominal:      General: There is no distension.      Palpations: Abdomen is soft.      Tenderness: There is no abdominal tenderness. There is no guarding.   Musculoskeletal:         General: No tenderness.   Skin:     Findings: No erythema or rash.   Neurological:      Mental Status: She is alert and oriented to person, place, and time.   Psychiatric:         Mood and Affect: Mood normal.         Behavior: Behavior normal.         RESULTS:   Lab Results   Component Value Date    WBC 9.57 07/09/2020    HGB 13.0 07/09/2020    HCT 41.5 07/09/2020    MCV 92 07/09/2020     (H) 07/09/2020     BMP  Lab Results   Component Value Date     07/09/2020    K 3.9 07/09/2020     07/09/2020    CO2 27 07/09/2020    BUN 12 07/09/2020    CREATININE 0.8 07/09/2020    CALCIUM 9.1 07/09/2020    ANIONGAP 7 (L) 07/09/2020    ESTGFRAFRICA >60.0 07/09/2020    EGFRNONAA >60.0 07/09/2020 "     Lab Results   Component Value Date    ALT 20 07/09/2020    AST 19 07/09/2020    ALKPHOS 79 07/09/2020    BILITOT 0.7 07/09/2020     Lab Results   Component Value Date    TSH 0.970 07/18/2018     No results found for: LABA1C, HGBA1C      Assessment:     1. Essential hypertension    2. Fatigue, unspecified type    3. Bilateral headaches    4. Nausea    5. Dizziness    6. Suspected Covid-19 Virus Infection        Plan:     Carmen was seen today for nausea, headache and fatigue.    Diagnoses and all orders for this visit:    Essential hypertension  -     amLODIPine (NORVASC) 5 MG tablet; Take 1 tablet (5 mg total) by mouth once daily.  -     Hypertension [employer] [facility] ([facility]) Enrollment Order  -     Hypertension Digital Medicine ([facility]): Assign Onboarding Questionnaires    Fatigue, unspecified type  -     CBC auto differential; Future; Expected date: 08/10/2020  -     Comprehensive metabolic panel; Future; Expected date: 08/10/2020  -     Hemoglobin A1C; Future; Expected date: 08/10/2020  -     TSH; Future; Expected date: 08/10/2020  -     Vitamin B12; Future; Expected date: 08/10/2020  -     Cancel: COVID-19 Routine Screening; Future; Expected date: 08/10/2020  -     COVID-19 Routine Screening    Bilateral headaches  -     CBC auto differential; Future; Expected date: 08/10/2020  -     Comprehensive metabolic panel; Future; Expected date: 08/10/2020  -     Hemoglobin A1C; Future; Expected date: 08/10/2020  -     TSH; Future; Expected date: 08/10/2020  -     Vitamin B12; Future; Expected date: 08/10/2020  -     Cancel: COVID-19 Routine Screening; Future; Expected date: 08/10/2020  -     COVID-19 Routine Screening    Nausea  -     CBC auto differential; Future; Expected date: 08/10/2020  -     Comprehensive metabolic panel; Future; Expected date: 08/10/2020  -     Hemoglobin A1C; Future; Expected date: 08/10/2020  -     TSH; Future; Expected date: 08/10/2020  -     Vitamin B12; Future; Expected date: 08/10/2020  -      Cancel: COVID-19 Routine Screening; Future; Expected date: 08/10/2020  -     COVID-19 Routine Screening    Dizziness  -     CBC auto differential; Future; Expected date: 08/10/2020  -     Comprehensive metabolic panel; Future; Expected date: 08/10/2020  -     Hemoglobin A1C; Future; Expected date: 08/10/2020  -     TSH; Future; Expected date: 08/10/2020  -     Vitamin B12; Future; Expected date: 08/10/2020  -     Cancel: COVID-19 Routine Screening; Future; Expected date: 08/10/2020  -     COVID-19 Routine Screening    Suspected Covid-19 Virus Infection  -     COVID-19 Routine Screening    Follow up in about 2 weeks (around 8/24/2020).    Ame Carroll MD  Internal Medicine  [facility]

## 2020-08-11 LAB — SARS-COV-2 RNA RESP QL NAA+PROBE: NOT DETECTED

## 2020-08-12 ENCOUNTER — TELEPHONE (OUTPATIENT)
Dept: INTERNAL MEDICINE | Facility: CLINIC | Age: 50
End: 2020-08-12

## 2020-08-12 NOTE — TELEPHONE ENCOUNTER
Spoke to Lab. They said the swab was not canceled, it was re directed with the correct code and the results are in her chart.

## 2020-08-12 NOTE — TELEPHONE ENCOUNTER
"----- Message from Ame Carroll MD sent at 8/11/2020  4:33 PM CDT -----  Regarding: FW: Cancellation of Order # [id number]  Please call lab and see why this was cancelled.    ----- Message -----  From: [employer] Interface  Sent: 8/10/2020   7:10 PM CDT  To: Ame Carroll MD  Subject: Cancellation of Order # [id number]                Order number [id number] for the procedure SARS-COV-2 (COVID-19)   QUALITATIVE PCR [[id number]] has been canceled by [employer]   Interface [[id number]]. This procedure was ordered by Ame Carroll MD [[id number]] on Aug 10, 2020 for the patient Carmen Morrow [[id number]]. The reason for cancellation was "Other".    Ame Carroll MD [[id number]]    "

## 2020-08-14 ENCOUNTER — PATIENT OUTREACH (OUTPATIENT)
Dept: ADMINISTRATIVE | Facility: HOSPITAL | Age: 50
End: 2020-08-14

## 2020-08-14 DIAGNOSIS — Z11.59 NEED FOR HEPATITIS C SCREENING TEST: Primary | ICD-10-CM

## 2020-08-20 ENCOUNTER — OFFICE VISIT (OUTPATIENT)
Dept: PRIMARY CARE CLINIC | Facility: CLINIC | Age: 50
End: 2020-08-20
Payer: COMMERCIAL

## 2020-08-20 DIAGNOSIS — R10.13 EPIGASTRIC ABDOMINAL PAIN: ICD-10-CM

## 2020-08-20 DIAGNOSIS — R11.0 NAUSEA: Primary | ICD-10-CM

## 2020-08-20 DIAGNOSIS — R53.83 FATIGUE, UNSPECIFIED TYPE: ICD-10-CM

## 2020-08-20 DIAGNOSIS — I10 ESSENTIAL HYPERTENSION: ICD-10-CM

## 2020-08-20 PROCEDURE — 99213 OFFICE O/P EST LOW 20 MIN: CPT | Mod: 95,,, | Performed by: INTERNAL MEDICINE

## 2020-08-20 PROCEDURE — 99213 PR OFFICE/OUTPT VISIT, EST, LEVL III, 20-29 MIN: ICD-10-PCS | Mod: 95,,, | Performed by: INTERNAL MEDICINE

## 2020-08-20 RX ORDER — METRONIDAZOLE 500 MG/1
TABLET ORAL
Status: ON HOLD | COMMUNITY
Start: 2020-08-18 | End: 2020-09-01 | Stop reason: HOSPADM

## 2020-08-20 RX ORDER — PANTOPRAZOLE SODIUM 40 MG/1
40 TABLET, DELAYED RELEASE ORAL
Qty: 30 TABLET | Refills: 0 | Status: SHIPPED | OUTPATIENT
Start: 2020-08-20 | End: 2020-10-22

## 2020-08-20 RX ORDER — IBUPROFEN 800 MG/1
TABLET ORAL
COMMUNITY
Start: 2020-08-12 | End: 2020-09-03

## 2020-08-20 RX ORDER — ONDANSETRON 4 MG/1
4 TABLET, FILM COATED ORAL EVERY 8 HOURS PRN
Qty: 30 TABLET | Refills: 0 | Status: SHIPPED | OUTPATIENT
Start: 2020-08-20 | End: 2020-09-03 | Stop reason: SDUPTHER

## 2020-08-20 RX ORDER — AMOXICILLIN AND CLAVULANATE POTASSIUM 500; 125 MG/1; MG/1
TABLET, FILM COATED ORAL
COMMUNITY
Start: 2020-08-18 | End: 2020-08-24

## 2020-08-20 RX ORDER — AZITHROMYCIN 250 MG/1
TABLET, FILM COATED ORAL
COMMUNITY
Start: 2020-08-12 | End: 2020-08-24

## 2020-08-21 NOTE — PROGRESS NOTES
"Subjective:       Patient ID: Carmen Morrow is a 49 y.o. female.    Chief Complaint: Nausea and Fatigue    The patient location is: [place].  The chief complaint leading to consultation is: Nausea and Fatigue    Visit type: audiovisual    Face to Face time with patient: 11 minutes.   25 minutes of total time spent on the encounter, which includes face to face time and non-face to face time preparing to see the patient (eg, review of tests), Obtaining and/or reviewing separately obtained history, Documenting clinical information in the electronic or other health record, Independently interpreting results (not separately reported) and communicating results to the patient/family/caregiver, or Care coordination (not separately reported).     Each patient to whom he or she provides medical services by telemedicine is:  (1) informed of the relationship between the physician and patient and the respective role of any other health care provider with respect to management of the patient; and (2) notified that he or she may decline to receive medical services by telemedicine and may withdraw from such care at any time.    Notes:   Patient of Dr. Bailon presents urgently c/o nausea. Onset of illness 3 weeks ago with severe nausea without vomiting, profound fatigue, headache and dizziness. Presented to another provider in Primary Care ten days ago - found to have repeatedly elevated blood pressure on recent visits, 152/96 that day, started on Amlodipine 5mg daily. Abdominal exam normal. Labs at that time included normal CBC, CMP, TSH, B12, HbA1c and COVID nasal swab negative.   Presents today with persistent severe nausea and fatigue, still no vomiting. Had been constipated, now mild diarrhea. Has developed a mild epigastric pain like "pins sticking" her. Had previously discontinued Nexium used for GERD, resumed OTC Nexium daily beginning one week ago with no improvement. No melena or hematochezia, no weight loss. Blood " pressure has improved - 134/92, 143/89, 121/82 yesterday, 136/87 today. No fever, chills, sweats or generalized body aches. Has had multiple antibiotics in the past week for a dental problem including Azithromycin, Augmentin (causing diarrhea?) and Metronidazole.     Past medical history reviewed. Last abdominal imaging, CT scan 12/18 - unremarkable.   Former smoker, no alcohol.   Meds and allergies reviewed.   LMP June 2020, typically irregular - perimenopausal.     Review of Systems   Constitutional: Positive for activity change and fatigue. Negative for appetite change, chills, diaphoresis, fever and unexpected weight change.   HENT: Negative for hearing loss, rhinorrhea and trouble swallowing.    Eyes: Negative for discharge and visual disturbance.   Respiratory: Negative for cough, chest tightness, shortness of breath and wheezing.    Cardiovascular: Negative for chest pain and palpitations.   Gastrointestinal: Positive for constipation, diarrhea and nausea. Negative for abdominal distention, blood in stool and vomiting.   Endocrine: Negative for polydipsia and polyuria.   Genitourinary: Negative for difficulty urinating, dysuria, hematuria and pelvic pain.   Musculoskeletal: Negative for arthralgias, joint swelling, myalgias and neck pain.   Neurological: Positive for weakness and headaches. Negative for seizures and syncope.   Psychiatric/Behavioral: Negative for confusion and dysphoric mood.         Objective:      Physical Exam  Constitutional:       General: She is not in acute distress.     Appearance: Normal appearance.   Eyes:      General: No scleral icterus.     Conjunctiva/sclera: Conjunctivae normal.   Pulmonary:      Effort: Pulmonary effort is normal.   Neurological:      Mental Status: She is alert.   Psychiatric:         Mood and Affect: Mood normal.         Behavior: Behavior normal.         Assessment:       1. Nausea    2. Epigastric abdominal pain    3. Fatigue, unspecified type        Plan:        Nausea  -     US Abdomen Limited; Future; Expected date: 08/20/2020  -     ondansetron (ZOFRAN) 4 MG tablet; Take 1 tablet (4 mg total) by mouth every 8 (eight) hours as needed for Nausea.  Dispense: 30 tablet; Refill: 0  -     pantoprazole (PROTONIX) 40 MG tablet; Take 1 tablet (40 mg total) by mouth before breakfast. (stop Nexium).  Dispense: 30 tablet; Refill: 0    Epigastric abdominal pain  -     pantoprazole (PROTONIX) 40 MG tablet; Take 1 tablet (40 mg total) by mouth before breakfast. (stop Nexium).  Dispense: 30 tablet; Refill: 0    Fatigue, unspecified type    Essential hypertension - improved on Amlodipine, continue same.     Follow up with PCP as scheduled in four days.

## 2020-08-24 ENCOUNTER — OFFICE VISIT (OUTPATIENT)
Dept: INTERNAL MEDICINE | Facility: CLINIC | Age: 50
End: 2020-08-24
Payer: COMMERCIAL

## 2020-08-24 VITALS
HEART RATE: 68 BPM | SYSTOLIC BLOOD PRESSURE: 110 MMHG | BODY MASS INDEX: 30.6 KG/M2 | DIASTOLIC BLOOD PRESSURE: 70 MMHG | WEIGHT: 183.88 LBS

## 2020-08-24 DIAGNOSIS — R06.9 BREATHING PROBLEM: ICD-10-CM

## 2020-08-24 DIAGNOSIS — Z01.818 PRE-OP TESTING: ICD-10-CM

## 2020-08-24 DIAGNOSIS — R11.0 NAUSEA: Primary | ICD-10-CM

## 2020-08-24 DIAGNOSIS — R19.7 DIARRHEA, UNSPECIFIED TYPE: ICD-10-CM

## 2020-08-24 DIAGNOSIS — I10 ESSENTIAL HYPERTENSION: ICD-10-CM

## 2020-08-24 DIAGNOSIS — K21.9 GASTROESOPHAGEAL REFLUX DISEASE, ESOPHAGITIS PRESENCE NOT SPECIFIED: ICD-10-CM

## 2020-08-24 PROCEDURE — 99999 PR PBB SHADOW E&M-EST. PATIENT-LVL III: CPT | Mod: PBBFAC,,, | Performed by: INTERNAL MEDICINE

## 2020-08-24 PROCEDURE — 99214 PR OFFICE/OUTPT VISIT, EST, LEVL IV, 30-39 MIN: ICD-10-PCS | Mod: S$GLB,,, | Performed by: INTERNAL MEDICINE

## 2020-08-24 PROCEDURE — 3008F BODY MASS INDEX DOCD: CPT | Mod: CPTII,S$GLB,, | Performed by: INTERNAL MEDICINE

## 2020-08-24 PROCEDURE — 99214 OFFICE O/P EST MOD 30 MIN: CPT | Mod: S$GLB,,, | Performed by: INTERNAL MEDICINE

## 2020-08-24 PROCEDURE — 99999 PR PBB SHADOW E&M-EST. PATIENT-LVL III: ICD-10-PCS | Mod: PBBFAC,,, | Performed by: INTERNAL MEDICINE

## 2020-08-24 PROCEDURE — 3008F PR BODY MASS INDEX (BMI) DOCUMENTED: ICD-10-PCS | Mod: CPTII,S$GLB,, | Performed by: INTERNAL MEDICINE

## 2020-08-24 RX ORDER — AMOXICILLIN 875 MG/1
875 TABLET, FILM COATED ORAL
COMMUNITY
End: 2020-09-03

## 2020-08-24 NOTE — PROGRESS NOTES
"Subjective:      Patient ID: Carmen Morrow is a 49 y.o. female.    Chief Complaint: Nausea (Diarrhea)    HPI:  HPI   Interval history: continues with same problem  Since last appt:  Ultrasound abdomen reported 8/21/2020 normal    Since the last appointment in starting last Thursday the patient states that she has developed diarrhea.  She has not changed her diet and therefore we discussed the appropriate changes.    The patient states that her nausea has not responded to the change for GERD which would be pantoprazole she does get some relief from the Zofran    The patient also tells me that she has been on a Z-Vish then followed by I suspected is Augmentin although the patient says amoxicillin and Flagyl for a dental issue.    Patient also states that she feels that she has difficulty taking a deep breath.    Notes: 8/20/2020 Dr. Orellana  Patient of Dr. Bailon presents urgently c/o nausea. Onset of illness 3 weeks ago with severe nausea without vomiting, profound fatigue, headache and dizziness. Presented to another provider in Primary Care ten days ago - found to have repeatedly elevated blood pressure on recent visits, 152/96 that day, started on Amlodipine 5mg daily. Abdominal exam normal. Labs at that time included normal CBC, CMP, TSH, B12, HbA1c and COVID nasal swab negative.   Presents today with persistent severe nausea and fatigue, still no vomiting. Had been constipated, now mild diarrhea. Has developed a mild epigastric pain like "pins sticking" her. Had previously discontinued Nexium used for GERD, resumed OTC Nexium daily beginning one week ago with no improvement. No melena or hematochezia, no weight loss. Blood pressure has improved - 134/92, 143/89, 121/82 yesterday, 136/87 today. No fever, chills, sweats or generalized body aches. Has had multiple antibiotics in the past week for a dental problem including Azithromycin, Augmentin (causing diarrhea?) and Metronidazole.      Past medical " history reviewed. Last abdominal imaging, CT scan 12/18 - unremarkable.   Former smoker, no alcohol.   Meds and allergies reviewed.   LMP June 2020, typically irregular - perimenopausal.   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    GERD (gastroesophageal reflux disease)    Baker's cyst of knee, right    Chondromalacia patellae of left knee    Quadriceps weakness    Bilateral occipital neuralgia    Acute pain of right shoulder    Weakness of right upper extremity    Left leg pain    Varicose veins of both lower extremities with pain    Lumbar disc disease    PAD (peripheral artery disease)    Essential hypertension     Past Medical History:   Diagnosis Date    Allergy     Brachial neuritis or radiculitis NOS 11/14/2012    Degeneration of cervical intervertebral disc 11/14/2012    Essential hypertension 8/10/2020    GERD (gastroesophageal reflux disease)     SVT (supraventricular tachycardia)      Past Surgical History:   Procedure Laterality Date    anterior diskectomy fusion  05/20/2016    AUGMENTATION OF BREAST Bilateral     1994-95    BREAST SURGERY Bilateral 2006    CARPAL TUNNEL RELEASE Left 01/30/2018    CSF leak  05/22/2016    Replace and repair fat graft from surgery on 5/20/2016    KNEE ARTHROSCOPY      LAMINECTOMY      SPINAL FUSION       Family History   Problem Relation Age of Onset    Heart disease Mother     Hypertension Mother     Asthma Mother     Heart disease Father     COPD Father     Diabetes Father     Ovarian cancer Neg Hx     Colon cancer Neg Hx     Breast cancer Neg Hx     Cancer Neg Hx      Review of Systems   See above  Otherwise negative  Objective:   There were no vitals filed for this visit.  There is no  height or weight on file to calculate BMI.  Physical Exam  Constitutional:       General: She is not in acute distress.     Appearance: She is well-developed.   Neck:      Thyroid: No thyromegaly.      Vascular: No carotid bruit.   Cardiovascular:      Rate and Rhythm: Normal rate and regular rhythm.      Chest Wall: PMI is not displaced.      Heart sounds: Normal heart sounds.   Pulmonary:      Effort: Pulmonary effort is normal. No respiratory distress.      Breath sounds: Normal breath sounds.   Abdominal:      General: Bowel sounds are normal. There is no distension.      Palpations: Abdomen is soft.      Tenderness: There is no abdominal tenderness.      Comments: Epigastric discomfort   Neurological:      Mental Status: She is alert and oriented to person, place, and time.       Assessment:     1. Nausea    2. Diarrhea, unspecified type    3. Gastroesophageal reflux disease, esophagitis presence not specified    4. Essential hypertension    5. Breathing problem      Plan:   Carmen was seen today for nausea.    Diagnoses and all orders for this visit:    Nausea  -     Case request GI: ESOPHAGOGASTRODUODENOSCOPY (EGD)    Diarrhea, unspecified type    Gastroesophageal reflux disease, esophagitis presence not specified    Essential hypertension    Breathing problem     For symptom relief    1.  Diarrhea patient should begin the BRATdiet.  She should eliminate all fats for 4-5 days and all milk products for 4-5 days.  She may continue the Zofran and the pantoprazole.    Diagnostics:  I have suggested an endoscopy based on her symptoms as well as the significant epigastric discomfort    Blood pressure is well controlled on current medications    Breathing her lung exam was normal today  .  Will follow  Would like the patient to follow-up with me in 2 weeks sooner if she completes the endoscopy and I have given her the number to the endoscopy suite [phone] of    Problem List Items Addressed This Visit     GERD  (gastroesophageal reflux disease)    Essential hypertension      Other Visit Diagnoses     Nausea    -  Primary    Relevant Orders    Case request GI: ESOPHAGOGASTRODUODENOSCOPY (EGD) (Completed)    Diarrhea, unspecified type        Breathing problem            Orders Placed This Encounter   Procedures    Case request GI: ESOPHAGOGASTRODUODENOSCOPY (EGD)     Order Specific Question:   Medical Necessity:     Answer:   Medically Non-Urgent [100]     Order Specific Question:   CPT Code:     Answer:   SD EGD, FLEX, DIAGNOSTIC [94251]     Order Specific Question:   Case Referring Provider     Answer:   VALENTINA CHA [[id number]]     Order Specific Question:   Is an on-site pathologist required for this procedure?     Answer:   N/A     No follow-ups on file.     Medication List          Accurate as of August 24, 2020  7:07 AM. If you have any questions, ask your nurse or doctor.            CONTINUE taking these medications    acyclovir 200 MG capsule  Commonly known as: ZOVIRAX  Take 1 capsule (200 mg total) by mouth 2 (two) times daily.     amLODIPine 5 MG tablet  Commonly known as: NORVASC  Take 1 tablet (5 mg total) by mouth once daily.     amoxicillin 875 MG tablet  Commonly known as: AMOXIL     fluticasone propionate 50 mcg/actuation nasal spray  Commonly known as: FLONASE  2 sprays (100 mcg total) by Each Nostril route every evening. 2 Spray, Suspension Nasal Every day     gabapentin 300 MG capsule  Commonly known as: NEURONTIN     ibuprofen 800 MG tablet  Commonly known as: ADVIL,MOTRIN     metroNIDAZOLE 500 MG tablet  Commonly known as: FLAGYL     montelukast 10 mg tablet  Commonly known as: SINGULAIR  Take 1 tablet (10 mg total) by mouth once daily.     ondansetron 4 MG tablet  Commonly known as: ZOFRAN  Take 1 tablet (4 mg total) by mouth every 8 (eight) hours as needed for Nausea.     pantoprazole 40 MG tablet  Commonly known as: PROTONIX  Take 1 tablet (40 mg total) by mouth before breakfast. (stop Nexium).      plecanatide 3 mg Tab  Commonly known as: TRULANCE  Take 3 mg by mouth once daily. For severe constipation     ZyrTEC 10 MG tablet  Generic drug: cetirizine

## 2020-08-29 ENCOUNTER — LAB VISIT (OUTPATIENT)
Dept: URGENT CARE | Facility: CLINIC | Age: 50
End: 2020-08-29
Payer: COMMERCIAL

## 2020-08-29 VITALS — TEMPERATURE: 98 F

## 2020-08-29 DIAGNOSIS — Z01.818 PRE-OP TESTING: ICD-10-CM

## 2020-08-29 PROCEDURE — U0003 INFECTIOUS AGENT DETECTION BY NUCLEIC ACID (DNA OR RNA); SEVERE ACUTE RESPIRATORY SYNDROME CORONAVIRUS 2 (SARS-COV-2) (CORONAVIRUS DISEASE [COVID-19]), AMPLIFIED PROBE TECHNIQUE, MAKING USE OF HIGH THROUGHPUT TECHNOLOGIES AS DESCRIBED BY CMS-2020-01-R: HCPCS

## 2020-08-30 LAB — SARS-COV-2 RNA RESP QL NAA+PROBE: NOT DETECTED

## 2020-09-01 ENCOUNTER — HOSPITAL ENCOUNTER (OUTPATIENT)
Facility: HOSPITAL | Age: 50
Discharge: HOME | End: 2020-09-01
Attending: INTERNAL MEDICINE | Admitting: INTERNAL MEDICINE
Payer: COMMERCIAL

## 2020-09-01 ENCOUNTER — ANESTHESIA (OUTPATIENT)
Dept: ENDOSCOPY | Facility: HOSPITAL | Age: 50
End: 2020-09-01
Payer: COMMERCIAL

## 2020-09-01 ENCOUNTER — ANESTHESIA EVENT (OUTPATIENT)
Dept: ENDOSCOPY | Facility: HOSPITAL | Age: 50
End: 2020-09-01
Payer: COMMERCIAL

## 2020-09-01 VITALS
RESPIRATION RATE: 14 BRPM | DIASTOLIC BLOOD PRESSURE: 78 MMHG | OXYGEN SATURATION: 99 % | HEIGHT: 65 IN | BODY MASS INDEX: 30.49 KG/M2 | WEIGHT: 183 LBS | SYSTOLIC BLOOD PRESSURE: 129 MMHG | HEART RATE: 67 BPM | TEMPERATURE: 99 F

## 2020-09-01 DIAGNOSIS — R11.0 NAUSEA: ICD-10-CM

## 2020-09-01 LAB
B-HCG UR QL: NEGATIVE
CTP QC/QA: YES

## 2020-09-01 PROCEDURE — 37000008 HC ANESTHESIA 1ST 15 MINUTES: Performed by: INTERNAL MEDICINE

## 2020-09-01 PROCEDURE — 88342 CHG IMMUNOCYTOCHEMISTRY: ICD-10-PCS | Mod: 26,,, | Performed by: PATHOLOGY

## 2020-09-01 PROCEDURE — 37000009 HC ANESTHESIA EA ADD 15 MINS: Performed by: INTERNAL MEDICINE

## 2020-09-01 PROCEDURE — 43239 EGD BIOPSY SINGLE/MULTIPLE: CPT | Mod: ,,, | Performed by: INTERNAL MEDICINE

## 2020-09-01 PROCEDURE — 88342 IMHCHEM/IMCYTCHM 1ST ANTB: CPT | Mod: 26,,, | Performed by: PATHOLOGY

## 2020-09-01 PROCEDURE — 88305 TISSUE EXAM BY PATHOLOGIST: CPT | Mod: 26,,, | Performed by: PATHOLOGY

## 2020-09-01 PROCEDURE — E9220 PRA ENDO ANESTHESIA: HCPCS | Mod: ,,, | Performed by: NURSE ANESTHETIST, CERTIFIED REGISTERED

## 2020-09-01 PROCEDURE — 88342 IMHCHEM/IMCYTCHM 1ST ANTB: CPT | Performed by: PATHOLOGY

## 2020-09-01 PROCEDURE — E9220 PRA ENDO ANESTHESIA: ICD-10-PCS | Mod: ,,, | Performed by: NURSE ANESTHETIST, CERTIFIED REGISTERED

## 2020-09-01 PROCEDURE — 43239 EGD BIOPSY SINGLE/MULTIPLE: CPT | Performed by: INTERNAL MEDICINE

## 2020-09-01 PROCEDURE — 43239 PR EGD, FLEX, W/BIOPSY, SGL/MULTI: ICD-10-PCS | Mod: ,,, | Performed by: INTERNAL MEDICINE

## 2020-09-01 PROCEDURE — 88305 TISSUE EXAM BY PATHOLOGIST: ICD-10-PCS | Mod: 26,,, | Performed by: PATHOLOGY

## 2020-09-01 PROCEDURE — 63600175 PHARM REV CODE 636 W HCPCS: Performed by: NURSE ANESTHETIST, CERTIFIED REGISTERED

## 2020-09-01 PROCEDURE — 27201012 HC FORCEPS, HOT/COLD, DISP: Performed by: INTERNAL MEDICINE

## 2020-09-01 PROCEDURE — 63600175 PHARM REV CODE 636 W HCPCS: Performed by: ANESTHESIOLOGY

## 2020-09-01 PROCEDURE — 81025 URINE PREGNANCY TEST: CPT | Performed by: INTERNAL MEDICINE

## 2020-09-01 PROCEDURE — 88305 TISSUE EXAM BY PATHOLOGIST: CPT | Performed by: PATHOLOGY

## 2020-09-01 PROCEDURE — 25000003 PHARM REV CODE 250: Performed by: INTERNAL MEDICINE

## 2020-09-01 RX ORDER — SODIUM CHLORIDE 9 MG/ML
INJECTION, SOLUTION INTRAVENOUS CONTINUOUS
Status: DISCONTINUED | OUTPATIENT
Start: 2020-09-01 | End: 2020-09-01 | Stop reason: HOSPADM

## 2020-09-01 RX ORDER — ONDANSETRON 2 MG/ML
4 INJECTION INTRAMUSCULAR; INTRAVENOUS DAILY PRN
Status: DISCONTINUED | OUTPATIENT
Start: 2020-09-01 | End: 2020-09-01 | Stop reason: HOSPADM

## 2020-09-01 RX ORDER — SODIUM CHLORIDE 0.9 % (FLUSH) 0.9 %
10 SYRINGE (ML) INJECTION
Status: DISCONTINUED | OUTPATIENT
Start: 2020-09-01 | End: 2020-09-01 | Stop reason: HOSPADM

## 2020-09-01 RX ORDER — LIDOCAINE HYDROCHLORIDE 20 MG/ML
INJECTION INTRAVENOUS
Status: DISCONTINUED | OUTPATIENT
Start: 2020-09-01 | End: 2020-09-01

## 2020-09-01 RX ORDER — PROPOFOL 10 MG/ML
VIAL (ML) INTRAVENOUS
Status: DISCONTINUED | OUTPATIENT
Start: 2020-09-01 | End: 2020-09-01

## 2020-09-01 RX ORDER — PROPOFOL 10 MG/ML
VIAL (ML) INTRAVENOUS CONTINUOUS PRN
Status: DISCONTINUED | OUTPATIENT
Start: 2020-09-01 | End: 2020-09-01

## 2020-09-01 RX ADMIN — PROPOFOL 150 MCG/KG/MIN: 10 INJECTION, EMULSION INTRAVENOUS at 12:09

## 2020-09-01 RX ADMIN — PROPOFOL 50 MG: 10 INJECTION, EMULSION INTRAVENOUS at 12:09

## 2020-09-01 RX ADMIN — ONDANSETRON 4 MG: 2 INJECTION INTRAMUSCULAR; INTRAVENOUS at 12:09

## 2020-09-01 RX ADMIN — SODIUM CHLORIDE: 0.9 INJECTION, SOLUTION INTRAVENOUS at 11:09

## 2020-09-01 RX ADMIN — LIDOCAINE HYDROCHLORIDE 50 MG: 20 INJECTION, SOLUTION INTRAVENOUS at 12:09

## 2020-09-01 NOTE — ANESTHESIA PREPROCEDURE EVALUATION
09/01/2020  Carmen Morrow is a 49 y.o., female.  Past Medical History:   Diagnosis Date    Allergy     Brachial neuritis or radiculitis NOS 11/14/2012    Degeneration of cervical intervertebral disc 11/14/2012    Essential hypertension 8/10/2020    GERD (gastroesophageal reflux disease)     SVT (supraventricular tachycardia)      Past Surgical History:   Procedure Laterality Date    anterior diskectomy fusion  05/20/2016    AUGMENTATION OF BREAST Bilateral     1994-95    BREAST SURGERY Bilateral 2006    CARPAL TUNNEL RELEASE Left 01/30/2018    CSF leak  05/22/2016    Replace and repair fat graft from surgery on 5/20/2016    KNEE ARTHROSCOPY      LAMINECTOMY      SPINAL FUSION           Anesthesia Evaluation    I have reviewed the Patient Summary Reports.      I have reviewed the Medications.     Review of Systems  Hematology/Oncology:  Hematology Normal   Oncology Normal     EENT/Dental:EENT/Dental Normal   Cardiovascular:  Cardiovascular Normal     Renal/:  Renal/ Normal     Hepatic/GI:  Hepatic/GI Normal    Musculoskeletal:  Musculoskeletal Normal    Neurological:  Neurology Normal    Endocrine:  Endocrine Normal    Dermatological:  Skin Normal    Psych:  Psychiatric Normal           Physical Exam  General:  Well nourished    Airway/Jaw/Neck:  Airway Findings: Mouth Opening: Normal General Airway Assessment: Adult       Chest/Lungs:  Chest/Lungs Findings: Clear to auscultation, Normal Respiratory Rate     Heart/Vascular:  Heart Findings: Rate: Normal  Rhythm: Regular Rhythm  Sounds: Normal     Abdomen:  Abdomen Findings:  Normal       Mental Status:  Mental Status Findings:  Alert and Oriented         Anesthesia Plan  Type of Anesthesia, risks & benefits discussed:  Anesthesia Type:  general  Patient's Preference:   Intra-op Monitoring Plan: standard ASA monitors  Intra-op  Monitoring Plan Comments:   Post Op Pain Control Plan:   Post Op Pain Control Plan Comments:   Induction:   IV  Beta Blocker:  Patient is not currently on a Beta-Blocker (No further documentation required).       Informed Consent: Patient understands risks and agrees with Anesthesia plan.  Questions answered.   ASA Score: 2     Day of Surgery Review of History & Physical:  There are no significant changes.  H&P update referred to the provider.         Ready For Surgery From Anesthesia Perspective.

## 2020-09-01 NOTE — TRANSFER OF CARE
"Anesthesia Transfer of Care Note    Patient: Carmen Morrow    Procedure(s) Performed: Procedure(s) (LRB):  ESOPHAGOGASTRODUODENOSCOPY (EGD) (N/A)    Patient location: PACU    Anesthesia Type: general    Transport from OR: Transported from OR on room air with adequate spontaneous ventilation    Post pain: adequate analgesia    Post assessment: no apparent anesthetic complications and tolerated procedure well    Post vital signs: stable    Level of consciousness: responds to stimulation and sedated    Nausea/Vomiting: no nausea/vomiting    Complications: none    Transfer of care protocol was followed      Last vitals:   Visit Vitals  /67 (BP Location: Left arm, Patient Position: Lying)   Pulse 67   Temp 37.1 °C (98.8 °F)   Resp 14   Ht 5' 5" (1.651 m)   Wt 83 kg (183 lb)   SpO2 98%   BMI 30.45 kg/m²     "

## 2020-09-01 NOTE — [employer] PATIENT INSTRUCTIONS
Discharge Summary/Instructions after an Endoscopic Procedure  Patient Name: Carmen Morrow  Patient MRN: [id number]  Patient YOB: 1970 Tuesday, September 1, 2020  Alexi Macias MD  RESTRICTIONS:  During your procedure today, you received medications for sedation.  These   medications may affect your judgment, balance and coordination.  Therefore,   for 24 hours, you have the following restrictions:   - DO NOT drive a car, operate machinery, make legal/financial decisions,   sign important papers or drink alcohol.    ACTIVITY:  Today: no heavy lifting, straining or running due to procedural   sedation/anesthesia.  The following day: return to full activity including work.  DIET:  Eat and drink normally unless instructed otherwise.     TREATMENT FOR COMMON SIDE EFFECTS:  - Mild abdominal pain, nausea, belching, bloating or excessive gas:  rest,   eat lightly and use a heating pad.  - Sore Throat: treat with throat lozenges and/or gargle with warm salt   water.  - Because air was used during the procedure, expelling large amounts of air   from your rectum or belching is normal.  - If a bowel prep was taken, you may not have a bowel movement for 1-3 days.    This is normal.  SYMPTOMS TO WATCH FOR AND REPORT TO YOUR PHYSICIAN:  1. Abdominal pain or bloating, other than gas cramps.  2. Chest pain.  3. Back pain.  4. Signs of infection such as: chills or fever occurring within 24 hours   after the procedure.  5. Rectal bleeding, which would show as bright red, maroon, or black stools.   (A tablespoon of blood from the rectum is not serious, especially if   hemorrhoids are present.)  6. Vomiting.  7. Weakness or dizziness.  GO DIRECTLY TO THE NEAREST EMERGENCY ROOM IF YOU HAVE ANY OF THE FOLLOWING:      Difficulty breathing              Chills and/or fever over 101 F   Persistent vomiting and/or vomiting blood   Severe abdominal pain   Severe chest pain   Black, tarry stools   Bleeding- more than one  tablespoon   Any other symptom or condition that you feel may need urgent attention  Your doctor recommends these additional instructions:  If any biopsies were taken, your doctors clinic will contact you in 1 to 2   weeks with any results.  - Discharge patient to home.   - Resume previous diet today.   - Continue present medications.   - Await pathology results.   - Return to referring physician as previously scheduled.  For questions, problems or results please call your physician - Alexi Macias MD at Work:  [phone].  [facility], EMERGENCY ROOM PHONE NUMBER: [phone]  IF A COMPLICATION OR EMERGENCY SITUATION ARISES AND YOU ARE UNABLE TO REACH   YOUR PHYSICIAN - GO DIRECTLY TO THE EMERGENCY ROOM.  Alexi Macias MD  9/1/2020 12:40:57 PM  This report has been verified and signed electronically.  [employer]

## 2020-09-01 NOTE — H&P
Short Stay Endoscopy History and Physical    PCP - Tori Bailon MD    Procedure - EGD  ASA - 2  Mallampati - per anesthesia  History of Anesthesia problems - no  Family history Anesthesia problems -  no     HPI:  This is a 49 y.o. female here for evaluation of :     Reflux - no  Dysphagia - no  Abdominal pain - no  Diarrhea - no  Anemia - no  GI bleeding - no  Other - Nausea    ROS:  CONSTITUTIONAL: Denies weight change,  fatigue, fevers, chills, night sweats.  CARDIOVASCULAR: Denies chest pain, shortness of breath, orthopnea and edema.  RESPIRATORY: Denies cough, hemoptysis, dyspnea, and wheezing.  GI: See HPI.    Medical History:   Past Medical History:   Diagnosis Date    Allergy     Brachial neuritis or radiculitis NOS 2012    Degeneration of cervical intervertebral disc 2012    Essential hypertension 8/10/2020    GERD (gastroesophageal reflux disease)     SVT (supraventricular tachycardia)        Surgical History:   Past Surgical History:   Procedure Laterality Date    anterior diskectomy fusion  2016    AUGMENTATION OF BREAST Bilateral     -    BREAST SURGERY Bilateral     CARPAL TUNNEL RELEASE Left 2018    CSF leak  2016    Replace and repair fat graft from surgery on 2016    KNEE ARTHROSCOPY      LAMINECTOMY      SPINAL FUSION         Family History:  Family History   Problem Relation Age of Onset    Heart disease Mother     Hypertension Mother     Asthma Mother     Heart disease Father     COPD Father     Diabetes Father     Ovarian cancer Neg Hx     Colon cancer Neg Hx     Breast cancer Neg Hx     Cancer Neg Hx        Social History:   Social History     Tobacco Use    Smoking status: Former Smoker     Quit date: 2000     Years since quittin.6    Smokeless tobacco: Never Used   Substance Use Topics    Alcohol use: No     Frequency: Never     Binge frequency: Never    Drug use: No       Allergies:  Reviewed    Medications:   No current facility-administered medications on file prior to encounter.      Current Outpatient Medications on File Prior to Encounter   Medication Sig Dispense Refill    amLODIPine (NORVASC) 5 MG tablet Take 1 tablet (5 mg total) by mouth once daily. 30 tablet 1    cetirizine (ZYRTEC) 10 MG tablet Take 10 mg by mouth Daily. 1 Tablet Oral Every day      fluticasone propionate (FLONASE) 50 mcg/actuation nasal spray 2 sprays (100 mcg total) by Each Nostril route every evening. 2 Spray, Suspension Nasal Every day 1 Bottle 12    gabapentin (NEURONTIN) 300 MG capsule       ibuprofen (ADVIL,MOTRIN) 800 MG tablet TAKE 1 TABLET BY MOUTH EVERY 6 8 HOUR AS NEEDED FOR PAIN      montelukast (SINGULAIR) 10 mg tablet Take 1 tablet (10 mg total) by mouth once daily. 90 tablet 3    ondansetron (ZOFRAN) 4 MG tablet Take 1 tablet (4 mg total) by mouth every 8 (eight) hours as needed for Nausea. 30 tablet 0    pantoprazole (PROTONIX) 40 MG tablet Take 1 tablet (40 mg total) by mouth before breakfast. (stop Nexium). 30 tablet 0    plecanatide (TRULANCE) 3 mg Tab Take 3 mg by mouth once daily. For severe constipation 90 tablet 0    acyclovir (ZOVIRAX) 200 MG capsule Take 1 capsule (200 mg total) by mouth 2 (two) times daily. 10 capsule 5    amoxicillin (AMOXIL) 875 MG tablet Take 875 mg by mouth every 12 (twelve) hours. dental      metroNIDAZOLE (FLAGYL) 500 MG tablet Dental         Physical Exam:  Vital Signs:   Vitals:    09/01/20 1148   BP: (!) 140/75   Pulse: 69   Resp: 16   Temp: 98.8 °F (37.1 °C)     General Appearance: Well appearing in no acute distress  ENT: OP clear  Chest: CTA B  CV: RRR, no m/r/g  Abd: s/nt/nd/nabs  Ext: no edema    Labs:  Reviewed    Plan:  I have explained the risks and benefits of endoscopy procedures to the patient including but not limited to bleeding, perforation, infection, and death. The patient wishes to proceed.

## 2020-09-01 NOTE — DISCHARGE INSTRUCTIONS

## 2020-09-02 NOTE — ANESTHESIA POSTPROCEDURE EVALUATION
Anesthesia Post Evaluation    Patient: Carmen Morrow    Procedure(s) Performed: Procedure(s) (LRB):  ESOPHAGOGASTRODUODENOSCOPY (EGD) (N/A)    Final Anesthesia Type: general    Patient location during evaluation: PACU  Patient participation: Yes- Able to Participate  Level of consciousness: awake and alert  Post-procedure vital signs: reviewed and stable  Pain management: adequate  Airway patency: patent    PONV status at discharge: No PONV  Anesthetic complications: no      Cardiovascular status: hemodynamically stable  Respiratory status: room air, spontaneous ventilation and unassisted  Hydration status: euvolemic  Follow-up not needed.          Vitals Value Taken Time   /78 09/01/20 1314        Pulse 67 09/01/20 1314   Resp 14 09/01/20 1314   SpO2 99 % 09/01/20 1314         Event Time   Out of Recovery 13:15:19         Pain/Janey Score: Janey Score: 10 (9/1/2020  1:14 PM)

## 2020-09-03 ENCOUNTER — OFFICE VISIT (OUTPATIENT)
Dept: INTERNAL MEDICINE | Facility: CLINIC | Age: 50
End: 2020-09-03
Payer: COMMERCIAL

## 2020-09-03 VITALS
OXYGEN SATURATION: 99 % | SYSTOLIC BLOOD PRESSURE: 118 MMHG | HEIGHT: 65 IN | WEIGHT: 183.63 LBS | HEART RATE: 72 BPM | DIASTOLIC BLOOD PRESSURE: 70 MMHG | BODY MASS INDEX: 30.59 KG/M2

## 2020-09-03 DIAGNOSIS — R11.0 NAUSEA: ICD-10-CM

## 2020-09-03 PROCEDURE — 99214 PR OFFICE/OUTPT VISIT, EST, LEVL IV, 30-39 MIN: ICD-10-PCS | Mod: S$GLB,,, | Performed by: INTERNAL MEDICINE

## 2020-09-03 PROCEDURE — 3008F PR BODY MASS INDEX (BMI) DOCUMENTED: ICD-10-PCS | Mod: CPTII,S$GLB,, | Performed by: INTERNAL MEDICINE

## 2020-09-03 PROCEDURE — 99999 PR PBB SHADOW E&M-EST. PATIENT-LVL IV: CPT | Mod: PBBFAC,,, | Performed by: INTERNAL MEDICINE

## 2020-09-03 PROCEDURE — 99214 OFFICE O/P EST MOD 30 MIN: CPT | Mod: S$GLB,,, | Performed by: INTERNAL MEDICINE

## 2020-09-03 PROCEDURE — 99999 PR PBB SHADOW E&M-EST. PATIENT-LVL IV: ICD-10-PCS | Mod: PBBFAC,,, | Performed by: INTERNAL MEDICINE

## 2020-09-03 PROCEDURE — 3008F BODY MASS INDEX DOCD: CPT | Mod: CPTII,S$GLB,, | Performed by: INTERNAL MEDICINE

## 2020-09-03 RX ORDER — ONDANSETRON 4 MG/1
4 TABLET, FILM COATED ORAL EVERY 8 HOURS PRN
Qty: 30 TABLET | Refills: 0 | Status: SHIPPED | OUTPATIENT
Start: 2020-09-03 | End: 2021-01-21

## 2020-09-03 RX ORDER — ESOMEPRAZOLE MAGNESIUM 40 MG/1
40 CAPSULE, DELAYED RELEASE ORAL
Qty: 30 CAPSULE | Refills: 1 | Status: SHIPPED | OUTPATIENT
Start: 2020-09-03 | End: 2020-11-13

## 2020-09-03 NOTE — PROGRESS NOTES
Subjective:      Patient ID: Carmen Morrow is a 49 y.o. female.    Chief Complaint: Follow-up    HPI:  HPI   Dental work with multiple antibiotics prior to the first appt 8/10 with complaint of nausea/abdominal pain/nausea    Patient is here in follow-up of studies.  Her ultrasound was normal as well as her endoscopy.  Her illness started before August 10th.  On August 10th she was seen for hypertension as well as nausea.  Prior to this she had dental infection and was on multiple antibiotics to include Zithromax amoxicillin and Flagyl.  Between the appointment on August 20th and my appointment on the 24th she also had diarrhea but this is resolved.  She continues with nausea.  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    GERD (gastroesophageal reflux disease)    Baker's cyst of knee, right    Chondromalacia patellae of left knee    Quadriceps weakness    Bilateral occipital neuralgia    Acute pain of right shoulder    Weakness of right upper extremity    Left leg pain    Varicose veins of both lower extremities with pain    Lumbar disc disease    PAD (peripheral artery disease)    Essential hypertension    Nausea     Past Medical History:   Diagnosis Date    Allergy     Brachial neuritis or radiculitis NOS 11/14/2012    Degeneration of cervical intervertebral disc 11/14/2012    Essential hypertension 8/10/2020    GERD (gastroesophageal reflux disease)     SVT (supraventricular tachycardia)      Past Surgical History:   Procedure Laterality Date    anterior diskectomy fusion  05/20/2016    AUGMENTATION OF BREAST Bilateral     1994-95    BREAST SURGERY Bilateral 2006    CARPAL TUNNEL RELEASE Left 01/30/2018    CSF leak  05/22/2016    Replace  "and repair fat graft from surgery on 5/20/2016    ESOPHAGOGASTRODUODENOSCOPY N/A 9/1/2020    Procedure: ESOPHAGOGASTRODUODENOSCOPY (EGD);  Surgeon: Alexi Macias MD;  Location: [facility] ([address] [place] [address]);  Service: Endoscopy;  Laterality: N/A;  prep ins. emailed - COVID screening [place] [facility]    KNEE ARTHROSCOPY      LAMINECTOMY      SPINAL FUSION       Family History   Problem Relation Age of Onset    Heart disease Mother     Hypertension Mother     Asthma Mother     Heart disease Father     COPD Father     Diabetes Father     Ovarian cancer Neg Hx     Colon cancer Neg Hx     Breast cancer Neg Hx     Cancer Neg Hx      Review of Systems   Constitutional: Negative for activity change and unexpected weight change.   HENT: Negative for hearing loss, rhinorrhea and trouble swallowing.    Eyes: Negative for discharge and visual disturbance.   Respiratory: Negative for chest tightness and wheezing.    Cardiovascular: Negative for chest pain and palpitations.   Gastrointestinal: Positive for diarrhea. Negative for blood in stool, constipation and vomiting.   Endocrine: Negative for polydipsia and polyuria.   Genitourinary: Negative for difficulty urinating, dysuria, hematuria and menstrual problem.   Musculoskeletal: Negative for arthralgias, joint swelling and neck pain.   Neurological: Positive for headaches. Negative for weakness.   Psychiatric/Behavioral: Negative for confusion and dysphoric mood.     Objective:     Vitals:    09/03/20 0703   BP: 118/70   Pulse: 72   SpO2: 99%   Weight: 83.3 kg (183 lb 10.3 oz)   Height: 5' 5" (1.651 m)   PainSc: 0-No pain     Body mass index is 30.56 kg/m².  Physical Exam  Constitutional:       Appearance: She is well-developed.   Neck:      Thyroid: No thyromegaly.      Vascular: No JVD.   Cardiovascular:      Rate and Rhythm: Normal rate.      Heart sounds: Normal heart sounds.   Pulmonary:      Effort: Pulmonary effort is normal. No respiratory distress. "      Breath sounds: Normal breath sounds.   Abdominal:      Comments: Abdominal exam: epigastric area tender as is a generalized tenderness       Assessment:     1. Nausea      Plan:   Carmen was seen today for follow-up.    Diagnoses and all orders for this visit:    Nausea  -     ondansetron (ZOFRAN) 4 MG tablet; Take 1 tablet (4 mg total) by mouth every 8 (eight) hours as needed for Nausea.    Other orders  -     esomeprazole (NEXIUM) 40 MG capsule; Take 1 capsule (40 mg total) by mouth before breakfast.    Plan:   Today we  tried to figure out reasons for the continued nausea and how to make her more comfortable.  ( note started on antibiotics with her dentist)    Take amlodipine after eating at least a light amount of food   Stop  Montelukast and see if this has any improvement  Switch back to Nexium 40 mg from pantoprazole    Small meals  Add probiotic align  Continue use of Zofran  Follow-up in 2 weeks if improved may cancel and keep the annual exam.    Another thought is that the patient has been on Flagyl and possibly the  is a have caused a reaction with this.  Problem List Items Addressed This Visit     Nausea    Relevant Medications    ondansetron (ZOFRAN) 4 MG tablet        No orders of the defined types were placed in this encounter.    Follow up in about 2 weeks (around 9/17/2020) for Follow up and also keep the 9/23 appt.     Medication List          Accurate as of September 3, 2020  7:51 AM. If you have any questions, ask your nurse or doctor.            START taking these medications    esomeprazole 40 MG capsule  Commonly known as: NEXIUM  Take 1 capsule (40 mg total) by mouth before breakfast.  Started by: Tori Bailon MD        CHANGE how you take these medications    acyclovir 200 MG capsule  Commonly known as: ZOVIRAX  Take 1 capsule (200 mg total) by mouth 2 (two) times daily.  What changed: additional instructions        CONTINUE taking these medications    amLODIPine 5 MG  tablet  Commonly known as: NORVASC  TAKE 1 TABLET BY MOUTH EVERY DAY     fluticasone propionate 50 mcg/actuation nasal spray  Commonly known as: FLONASE  2 sprays (100 mcg total) by Each Nostril route every evening. 2 Spray, Suspension Nasal Every day     gabapentin 300 MG capsule  Commonly known as: NEURONTIN     montelukast 10 mg tablet  Commonly known as: SINGULAIR  Take 1 tablet (10 mg total) by mouth once daily.     ondansetron 4 MG tablet  Commonly known as: ZOFRAN  Take 1 tablet (4 mg total) by mouth every 8 (eight) hours as needed for Nausea.     pantoprazole 40 MG tablet  Commonly known as: PROTONIX  Take 1 tablet (40 mg total) by mouth before breakfast. (stop Nexium).     plecanatide 3 mg Tab  Commonly known as: TRULANCE  Take 3 mg by mouth once daily. For severe constipation     ZyrTEC 10 MG tablet  Generic drug: cetirizine           Where to Get Your Medications      These medications were sent to [id number] - [place] - [id number] [address]    Phone: [phone]   · esomeprazole 40 MG capsule  · ondansetron 4 MG tablet

## 2020-09-04 ENCOUNTER — PATIENT OUTREACH (OUTPATIENT)
Dept: OTHER | Facility: OTHER | Age: 50
End: 2020-09-04

## 2020-09-04 NOTE — LETTER
September 4, 2020     Carmen Morrow  [address] [place] [address] [address] [place] [id number]       Dear Carmen,    Welcome to [facility]! Our goal is to make care effective, proactive and convenient by using data you send us from home to better treat your chronic conditions.              My name is Tkeyah Bazin, and I am your dedicated Digital Medicine clinician. As an expert in medication management, I will help ensure that the medications you are taking continue to provide the intended benefits and help you reach your goals. You can reach me directly at [phone] or by sending me a message directly through your [facility] account.      I am Sharmin Jennings and I will be your health . My job is to help you identify lifestyle changes to improve your disease control. We will talk about nutrition, exercise, and other ways you may be able to adjust your current habits to better your health. Additionally, we will help ensure you are completing the tests and screenings that are necessary to help manage your conditions. You can reach me directly at [phone] or by sending me a message directly through your [facility] account.    Most importantly, YOU are at the center of this team. Together, we will work to improve your overall health and encourage you to meet your goals for a healthier lifestyle.     What we expect from YOU:   Be available to receive phone calls or [employer] messages, when appropriate, from your care team. Please let us know if there are any specific days or times that work best for us to reach you via phone.     Complete routine tests and screenings. Dont worry, we will help keep you on track!           What you should expect from your Digital Medicine Care Team:   We will work with you to create a personalized plan of care and provide you with encouragement and education, including regarding lifestyle changes, that could help you manage your disease states.     We will adjust  your current medications, if needed, and continue to monitor your long-term progress.     We will provide you and your physician with monthly progress reports after you have been in the program for more than 30 days.     We will send you reminders through [employer] and text messages to help ensure you do not miss any testing deadlines to help manage your disease states.    You will be able to reach us by phone or through your [employer] account by clicking our names under Care Team on the right side of the home screen.    I look forward to working with you to achieve your blood pressure goals!    We look forward to working with you to help manage your health,    Sincerely,    Your Digital Medicine Team    Please visit our websites to learn more:       Remember, we are not available for emergencies. If you have an emergency, please contact your doctors office directly or call [facility] on-call ([phone] or [phone]) or 911.

## 2020-09-04 NOTE — PROGRESS NOTES
Digital Medicine: Health  Introduction    Introduced Carmen Morrow to Digital Medicine. Discussed health  role and recommended lifestyle modifications.    The history is provided by the patient.               HYPERTENSION  Explained hypertension digital medicine goals including BP goal less than or equal to 130/80mmHg, improved convenience of BP management and reduced risk of heart attack, kidney failure, stroke, eye disease, dementia, and death.      Explained non-pharmacologic therapies like low salt diet and physical activity can reduce blood pressure. .      Explained that we expect patient to submit several blood pressure readings per week at random times of the day, but at least 30 minutes after taking blood pressure medications. Instructed patient not to allow anyone else to use their blood pressure monitor and phone as data submitted is directly entered into medical record. Reviewed and confirmed appropriate blood pressure monitoring technique.             Lifestyle  Plan  There are no preventive care reminders to display for this patient.    Last 5 Patient Entered Readings                                      Current 30 Day Average: 148/98     Recent Readings 8/10/2020 8/10/2020    SBP (mmHg) 148 151    DBP (mmHg) 97 98    Pulse 90 65

## 2020-09-14 LAB
FINAL PATHOLOGIC DIAGNOSIS: NORMAL
GROSS: NORMAL
MICROSCOPIC EXAM: NORMAL

## 2020-09-17 ENCOUNTER — OFFICE VISIT (OUTPATIENT)
Dept: INTERNAL MEDICINE | Facility: CLINIC | Age: 50
End: 2020-09-17
Payer: COMMERCIAL

## 2020-09-17 VITALS
HEIGHT: 65 IN | OXYGEN SATURATION: 99 % | SYSTOLIC BLOOD PRESSURE: 122 MMHG | BODY MASS INDEX: 30.56 KG/M2 | HEART RATE: 62 BPM | DIASTOLIC BLOOD PRESSURE: 80 MMHG | WEIGHT: 183.44 LBS

## 2020-09-17 DIAGNOSIS — F41.9 ANXIETY: Primary | ICD-10-CM

## 2020-09-17 DIAGNOSIS — R11.0 NAUSEA: ICD-10-CM

## 2020-09-17 PROCEDURE — 3008F PR BODY MASS INDEX (BMI) DOCUMENTED: ICD-10-PCS | Mod: CPTII,S$GLB,, | Performed by: INTERNAL MEDICINE

## 2020-09-17 PROCEDURE — 99213 PR OFFICE/OUTPT VISIT, EST, LEVL III, 20-29 MIN: ICD-10-PCS | Mod: S$GLB,,, | Performed by: INTERNAL MEDICINE

## 2020-09-17 PROCEDURE — 99999 PR PBB SHADOW E&M-EST. PATIENT-LVL IV: CPT | Mod: PBBFAC,,, | Performed by: INTERNAL MEDICINE

## 2020-09-17 PROCEDURE — 99213 OFFICE O/P EST LOW 20 MIN: CPT | Mod: S$GLB,,, | Performed by: INTERNAL MEDICINE

## 2020-09-17 PROCEDURE — 3008F BODY MASS INDEX DOCD: CPT | Mod: CPTII,S$GLB,, | Performed by: INTERNAL MEDICINE

## 2020-09-17 PROCEDURE — 99999 PR PBB SHADOW E&M-EST. PATIENT-LVL IV: ICD-10-PCS | Mod: PBBFAC,,, | Performed by: INTERNAL MEDICINE

## 2020-09-17 RX ORDER — DULOXETIN HYDROCHLORIDE 30 MG/1
30 CAPSULE, DELAYED RELEASE ORAL DAILY
Qty: 30 CAPSULE | Refills: 1 | Status: SHIPPED | OUTPATIENT
Start: 2020-09-17 | End: 2020-10-09 | Stop reason: SDUPTHER

## 2020-09-17 NOTE — PROGRESS NOTES
Subjective:      Patient ID: Carmen Morrow is a 50 y.o. female.    Chief Complaint: Follow-up    HPI:  HPI   Nausea had resolved for the last 3 days, overall she is doing better. She did notice with an episode of anxiety her nausea returned She discussed symptoms consistent with perimenopause.   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    GERD (gastroesophageal reflux disease)    Baker's cyst of knee, right    Chondromalacia patellae of left knee    Quadriceps weakness    Bilateral occipital neuralgia    Acute pain of right shoulder    Weakness of right upper extremity    Left leg pain    Varicose veins of both lower extremities with pain    Lumbar disc disease    PAD (peripheral artery disease)    Essential hypertension    Nausea     Past Medical History:   Diagnosis Date    Allergy     Brachial neuritis or radiculitis NOS 11/14/2012    Degeneration of cervical intervertebral disc 11/14/2012    Essential hypertension 8/10/2020    GERD (gastroesophageal reflux disease)     SVT (supraventricular tachycardia)      Past Surgical History:   Procedure Laterality Date    anterior diskectomy fusion  05/20/2016    AUGMENTATION OF BREAST Bilateral     1994-95    BREAST SURGERY Bilateral 2006    CARPAL TUNNEL RELEASE Left 01/30/2018    CSF leak  05/22/2016    Replace and repair fat graft from surgery on 5/20/2016    ESOPHAGOGASTRODUODENOSCOPY N/A 9/1/2020    Procedure: ESOPHAGOGASTRODUODENOSCOPY (EGD);  Surgeon: Alexi Macias MD;  Location: [address]);  Service: Endoscopy;  Laterality: N/A;  prep ins. emailed - COVID screening [place] [facility]    KNEE ARTHROSCOPY      LAMINECTOMY      SPINAL FUSION       Family History  "  Problem Relation Age of Onset    Heart disease Mother     Hypertension Mother     Asthma Mother     Heart disease Father     COPD Father     Diabetes Father     Ovarian cancer Neg Hx     Colon cancer Neg Hx     Breast cancer Neg Hx     Cancer Neg Hx      Review of Systems   Constitutional: Negative for activity change, chills, fever and unexpected weight change.   Respiratory: Negative for cough, chest tightness, shortness of breath and wheezing.    Cardiovascular: Negative for chest pain, palpitations and leg swelling.     Objective:     Vitals:    09/17/20 0849   BP: 122/80   Pulse: 62   SpO2: 99%   Weight: 83.2 kg (183 lb 6.8 oz)   Height: 5' 5" (1.651 m)   PainSc: 0-No pain     Body mass index is 30.52 kg/m².  Physical Exam  Constitutional:       Appearance: Normal appearance. She is well-developed.   Neck:      Thyroid: No thyromegaly.      Vascular: No JVD.   Cardiovascular:      Rate and Rhythm: Normal rate.      Heart sounds: Normal heart sounds.   Pulmonary:      Effort: Pulmonary effort is normal. No respiratory distress.      Breath sounds: Normal breath sounds.   Abdominal:      General: Abdomen is flat.      Palpations: Abdomen is soft.   Neurological:      Mental Status: She is alert.       Assessment:     1. Anxiety    2. Nausea      Plan:   Carmen was seen today for follow-up.    Diagnoses and all orders for this visit:    Anxiety  -     DULoxetine (CYMBALTA) 30 MG capsule; Take 1 capsule (30 mg total) by mouth once daily.    Nausea  Comments:  Resolved        Problem List Items Addressed This Visit     Nausea      Other Visit Diagnoses     Anxiety    -  Primary    Relevant Medications    DULoxetine (CYMBALTA) 30 MG capsule        No orders of the defined types were placed in this encounter.    Follow up in about 1 month (around 10/17/2020) for Follow up medication.     Medication List          Accurate as of September 17, 2020 11:59 PM. If you have any questions, ask your nurse or doctor. "            START taking these medications    DULoxetine 30 MG capsule  Commonly known as: CYMBALTA  Take 1 capsule (30 mg total) by mouth once daily.  Started by: Tori Bailon MD        CHANGE how you take these medications    acyclovir 200 MG capsule  Commonly known as: ZOVIRAX  Take 1 capsule (200 mg total) by mouth 2 (two) times daily.  What changed: additional instructions        CONTINUE taking these medications    amLODIPine 5 MG tablet  Commonly known as: NORVASC  TAKE 1 TABLET BY MOUTH EVERY DAY     esomeprazole 40 MG capsule  Commonly known as: NEXIUM  Take 1 capsule (40 mg total) by mouth before breakfast.     fluticasone propionate 50 mcg/actuation nasal spray  Commonly known as: FLONASE  2 sprays (100 mcg total) by Each Nostril route every evening. 2 Spray, Suspension Nasal Every day     gabapentin 300 MG capsule  Commonly known as: NEURONTIN     ondansetron 4 MG tablet  Commonly known as: ZOFRAN  Take 1 tablet (4 mg total) by mouth every 8 (eight) hours as needed for Nausea.     pantoprazole 40 MG tablet  Commonly known as: PROTONIX  Take 1 tablet (40 mg total) by mouth before breakfast. (stop Nexium).     plecanatide 3 mg Tab  Commonly known as: TRULANCE  Take 3 mg by mouth once daily. For severe constipation     ZyrTEC 10 MG tablet  Generic drug: cetirizine           Where to Get Your Medications      These medications were sent to [facility]/pharmacy #[id number] - [place] - [address]  [address] [id number]    Phone: [phone]   · DULoxetine 30 MG capsule

## 2020-09-21 ENCOUNTER — PATIENT OUTREACH (OUTPATIENT)
Dept: OTHER | Facility: OTHER | Age: 50
End: 2020-09-21

## 2020-09-21 NOTE — PROGRESS NOTES
Digital Medicine: Clinician Introduction    Carmen Morrow is a 50 y.o. female who is newly enrolled in the Digital Medicine Clinic.    Spoke with patient regarding introduction to [facility].    The history is provided by the patient.      Review of patient's allergies indicates:   -- Oxycodone -- Itching    --  Can take hydrcodone  Completed Medication Reconciliation  Verified pharmacy information.    HYPERTENSION  Explained hypertension digital medicine goals including BP goal less than or equal to 130/80mmHg, improved convenience of BP management and reduced risk of heart attack, kidney failure, stroke, eye disease, dementia, and death.     Explained non-pharmacologic therapies like low salt diet and physical activity can reduce blood pressure.       Explained that we expect patient to submit several blood pressure readings per week at random times of the day, but at least 30 minutes after taking blood pressure medications. Instructed patient not to allow anyone else to use their blood pressure monitor and phone as data submitted is directly entered into medical record. Reviewed and confirmed appropriate blood pressure monitoring technique.         Reviewed signs/symptoms of hypertension (headache, changes in vision, chest pain, shortness of breath)   Reviewed signs/symptoms of hypotension (lightheaded, dizziness, weakness)     Patient's BP goal is less than or equal to 130/80. Patients BP average is 122/84 mmHg, which is above goal, per 2017 ACC/AHA Hypertension Guidelines.       Last 5 Patient Entered Readings                                      Current 30 Day Average: 122/84     Recent Readings 9/21/2020 9/16/2020 9/15/2020 9/11/2020 9/9/2020    SBP (mmHg) 121 126 120 116 124    DBP (mmHg) 76 81 82 80 84    Pulse 69 81 74 81 74                Depression Screening  Carmen Morrow did not answer the depression screening.     Sleep Apnea Screening  Patient not previously diagnosed with LUIS        Medication Affordability Screening  Patient did not answer the medication affordability questionnaires. Patient is currently not having problems affording medications    Medication Adherence-Medication adherence was assessed.  Patient continue taking medication as prescribed.            ASSESSMENT(S)  Patients BP average is 122/84 mmHg, which is above goal. Patient's BP goal is less than or equal to 130/80 per 2017 ACC/AHA Hypertension Guidelines.     Hypertension Plan  Continue current therapy.  Provided patient education.  Consider increasing amlodipine if needed for additional BP lowering.     Addressed patient questions and patient has my contact information if needed prior to next outreach. Patient verbalizes understanding.      Explained the importance of self-monitoring and medication adherence. Encouraged the patient to communicate with their health  for lifestyle modifications to help improve or maintain a healthy lifestyle.        Sent link to [facility] Digital [facility] webpages and my contact information via [employer] for future questions.        Explained to the patient that the Digital Medicine team is not available for emergencies. Advised patient call [facility] ([phone] or [phone]) or [phone] if needed.           There are no preventive care reminders to display for this patient.     Current Medication Regimen:  Hypertension Medications             amLODIPine (NORVASC) 5 MG tablet TAKE 1 TABLET BY MOUTH EVERY DAY

## 2020-10-16 ENCOUNTER — LAB VISIT (OUTPATIENT)
Dept: LAB | Facility: HOSPITAL | Age: 50
End: 2020-10-16
Attending: INTERNAL MEDICINE
Payer: COMMERCIAL

## 2020-10-16 DIAGNOSIS — I73.9 PAD (PERIPHERAL ARTERY DISEASE): ICD-10-CM

## 2020-10-16 DIAGNOSIS — M51.9 LUMBAR DISC DISEASE: ICD-10-CM

## 2020-10-16 DIAGNOSIS — Z11.59 NEED FOR HEPATITIS C SCREENING TEST: ICD-10-CM

## 2020-10-16 DIAGNOSIS — I83.813 VARICOSE VEINS OF BOTH LOWER EXTREMITIES WITH PAIN: ICD-10-CM

## 2020-10-16 DIAGNOSIS — M47.812 CERVICAL SPONDYLOSIS WITHOUT MYELOPATHY: ICD-10-CM

## 2020-10-16 DIAGNOSIS — M79.605 LEFT LEG PAIN: ICD-10-CM

## 2020-10-16 DIAGNOSIS — M54.12 CERVICAL RADICULAR PAIN: ICD-10-CM

## 2020-10-16 DIAGNOSIS — M50.30 DEGENERATION OF CERVICAL INTERVERTEBRAL DISC: ICD-10-CM

## 2020-10-16 LAB
CHOLEST SERPL-MCNC: 153 MG/DL (ref 120–199)
CHOLEST/HDLC SERPL: 2.4 {RATIO} (ref 2–5)
HCV AB SERPL QL IA: NEGATIVE
HDLC SERPL-MCNC: 64 MG/DL (ref 40–75)
HDLC SERPL: 41.8 % (ref 20–50)
LDLC SERPL CALC-MCNC: 79 MG/DL (ref 63–159)
NONHDLC SERPL-MCNC: 89 MG/DL
TRIGL SERPL-MCNC: 50 MG/DL (ref 30–150)

## 2020-10-16 PROCEDURE — 80061 LIPID PANEL: CPT

## 2020-10-16 PROCEDURE — 36415 COLL VENOUS BLD VENIPUNCTURE: CPT

## 2020-10-16 PROCEDURE — 86803 HEPATITIS C AB TEST: CPT

## 2020-10-19 ENCOUNTER — OFFICE VISIT (OUTPATIENT)
Dept: INTERNAL MEDICINE | Facility: CLINIC | Age: 50
End: 2020-10-19
Payer: COMMERCIAL

## 2020-10-19 VITALS
SYSTOLIC BLOOD PRESSURE: 132 MMHG | HEART RATE: 68 BPM | WEIGHT: 182.56 LBS | OXYGEN SATURATION: 99 % | BODY MASS INDEX: 30.42 KG/M2 | HEIGHT: 65 IN | DIASTOLIC BLOOD PRESSURE: 70 MMHG

## 2020-10-19 DIAGNOSIS — F41.9 ANXIETY: ICD-10-CM

## 2020-10-19 PROCEDURE — 99999 PR PBB SHADOW E&M-EST. PATIENT-LVL IV: ICD-10-PCS | Mod: PBBFAC,,, | Performed by: INTERNAL MEDICINE

## 2020-10-19 PROCEDURE — 3008F BODY MASS INDEX DOCD: CPT | Mod: CPTII,S$GLB,, | Performed by: INTERNAL MEDICINE

## 2020-10-19 PROCEDURE — 99213 PR OFFICE/OUTPT VISIT, EST, LEVL III, 20-29 MIN: ICD-10-PCS | Mod: S$GLB,,, | Performed by: INTERNAL MEDICINE

## 2020-10-19 PROCEDURE — 99999 PR PBB SHADOW E&M-EST. PATIENT-LVL IV: CPT | Mod: PBBFAC,,, | Performed by: INTERNAL MEDICINE

## 2020-10-19 PROCEDURE — 3008F PR BODY MASS INDEX (BMI) DOCUMENTED: ICD-10-PCS | Mod: CPTII,S$GLB,, | Performed by: INTERNAL MEDICINE

## 2020-10-19 PROCEDURE — 99213 OFFICE O/P EST LOW 20 MIN: CPT | Mod: S$GLB,,, | Performed by: INTERNAL MEDICINE

## 2020-10-19 RX ORDER — DULOXETIN HYDROCHLORIDE 30 MG/1
30 CAPSULE, DELAYED RELEASE ORAL DAILY
Qty: 90 CAPSULE | Refills: 1 | Status: SHIPPED | OUTPATIENT
Start: 2020-10-19 | End: 2021-05-10

## 2020-10-19 NOTE — PROGRESS NOTES
Subjective:      Patient ID: Carmen Morrow is a 50 y.o. female.    Chief Complaint: Follow-up    HPI:  HPI   Patient is here in follow-up of discussion of duloxetine 30 mg.  She states that she is feeling much better.  She no longer has any nauseated which she believes is related to taking her medications with food.  She states that she is unable to get an orgasm without duloxetine but in vale discussion she did not want to stop the medication.  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    GERD (gastroesophageal reflux disease)    Baker's cyst of knee, right    Chondromalacia patellae of left knee    Quadriceps weakness    Bilateral occipital neuralgia    Acute pain of right shoulder    Weakness of right upper extremity    Left leg pain    Varicose veins of both lower extremities with pain    Lumbar disc disease    PAD (peripheral artery disease)    Essential hypertension    Nausea     Past Medical History:   Diagnosis Date    Allergy     Brachial neuritis or radiculitis NOS 11/14/2012    Degeneration of cervical intervertebral disc 11/14/2012    Essential hypertension 8/10/2020    GERD (gastroesophageal reflux disease)     SVT (supraventricular tachycardia)      Past Surgical History:   Procedure Laterality Date    anterior diskectomy fusion  05/20/2016    AUGMENTATION OF BREAST Bilateral     1994-95    BREAST SURGERY Bilateral 2006    CARPAL TUNNEL RELEASE Left 01/30/2018    CSF leak  05/22/2016    Replace and repair fat graft from surgery on 5/20/2016    ESOPHAGOGASTRODUODENOSCOPY N/A 9/1/2020    Procedure: ESOPHAGOGASTRODUODENOSCOPY (EGD);  Surgeon: Alexi Macias MD;  Location: [facility] ([address] [place] [address]);  Service: Endoscopy;  Laterality:  "N/A;  prep ins. emailed - COVID screening [place] [facility] - [facility]    KNEE ARTHROSCOPY      LAMINECTOMY      SPINAL FUSION       Family History   Problem Relation Age of Onset    Heart disease Mother     Hypertension Mother     Asthma Mother     Heart disease Father     COPD Father     Diabetes Father     Ovarian cancer Neg Hx     Colon cancer Neg Hx     Breast cancer Neg Hx     Cancer Neg Hx      Review of Systems   Constitutional: Negative for activity change, chills, fever and unexpected weight change.   Respiratory: Negative for cough, chest tightness, shortness of breath and wheezing.    Cardiovascular: Negative for chest pain, palpitations and leg swelling.     Objective:     Vitals:    10/19/20 0931   BP: 132/70   Pulse: 68   SpO2: 99%   Weight: 82.8 kg (182 lb 8.7 oz)   Height: 5' 5" (1.651 m)   PainSc: 0-No pain     Body mass index is 30.38 kg/m².  Physical Exam no exam was completed  Assessment:     1. Anxiety      Plan:   Carmen was seen today for follow-up.    Diagnoses and all orders for this visit:    Anxiety  -     DULoxetine (CYMBALTA) 30 MG capsule; Take 1 capsule (30 mg total) by mouth once daily.      Continue duloxetine follow-up with me in 6 months.  It was described to the patient that if she were to want to taper off the medication it should be done over a 2 week interval  Problem List Items Addressed This Visit     None      Visit Diagnoses     Anxiety        Relevant Medications    DULoxetine (CYMBALTA) 30 MG capsule        No orders of the defined types were placed in this encounter.    No follow-ups on file.     Medication List          Accurate as of October 19, 2020 12:45 PM. If you have any questions, ask your nurse or doctor.            CHANGE how you take these medications    acyclovir 200 MG capsule  Commonly known as: ZOVIRAX  Take 1 capsule (200 mg total) by mouth 2 (two) times daily.  What changed: additional instructions        CONTINUE taking these medications  "   amLODIPine 5 MG tablet  Commonly known as: NORVASC  TAKE 1 TABLET BY MOUTH EVERY DAY     DULoxetine 30 MG capsule  Commonly known as: CYMBALTA  Take 1 capsule (30 mg total) by mouth once daily.     esomeprazole 40 MG capsule  Commonly known as: NEXIUM  Take 1 capsule (40 mg total) by mouth before breakfast.     fluticasone propionate 50 mcg/actuation nasal spray  Commonly known as: FLONASE  2 sprays (100 mcg total) by Each Nostril route every evening. 2 Spray, Suspension Nasal Every day     gabapentin 300 MG capsule  Commonly known as: NEURONTIN     ondansetron 4 MG tablet  Commonly known as: ZOFRAN  Take 1 tablet (4 mg total) by mouth every 8 (eight) hours as needed for Nausea.     pantoprazole 40 MG tablet  Commonly known as: PROTONIX  Take 1 tablet (40 mg total) by mouth before breakfast. (stop Nexium).     plecanatide 3 mg Tab  Commonly known as: TRULANCE  Take 3 mg by mouth once daily. For severe constipation     ZyrTEC 10 MG tablet  Generic drug: cetirizine           Where to Get Your Medications      These medications were sent to [facility]/pharmacy #[id number] - [place] - [address]  [address] [address]    Phone: [phone]   · DULoxetine 30 MG capsule

## 2020-10-22 ENCOUNTER — OFFICE VISIT (OUTPATIENT)
Dept: CARDIOLOGY | Facility: CLINIC | Age: 50
End: 2020-10-22
Payer: COMMERCIAL

## 2020-10-22 VITALS
BODY MASS INDEX: 30.71 KG/M2 | HEART RATE: 80 BPM | WEIGHT: 184.31 LBS | HEIGHT: 65 IN | DIASTOLIC BLOOD PRESSURE: 88 MMHG | SYSTOLIC BLOOD PRESSURE: 128 MMHG

## 2020-10-22 DIAGNOSIS — I83.813 VARICOSE VEINS OF BOTH LOWER EXTREMITIES WITH PAIN: ICD-10-CM

## 2020-10-22 DIAGNOSIS — M54.2 CERVICALGIA: ICD-10-CM

## 2020-10-22 DIAGNOSIS — M54.50 CHRONIC MIDLINE LOW BACK PAIN WITHOUT SCIATICA: ICD-10-CM

## 2020-10-22 DIAGNOSIS — I73.9 PAD (PERIPHERAL ARTERY DISEASE): ICD-10-CM

## 2020-10-22 DIAGNOSIS — M51.9 LUMBAR DISC DISEASE: ICD-10-CM

## 2020-10-22 DIAGNOSIS — M79.605 LEFT LEG PAIN: Primary | ICD-10-CM

## 2020-10-22 DIAGNOSIS — M54.81 BILATERAL OCCIPITAL NEURALGIA: ICD-10-CM

## 2020-10-22 DIAGNOSIS — G89.29 CHRONIC MIDLINE LOW BACK PAIN WITHOUT SCIATICA: ICD-10-CM

## 2020-10-22 PROCEDURE — 99999 PR PBB SHADOW E&M-EST. PATIENT-LVL IV: CPT | Mod: PBBFAC,,, | Performed by: INTERNAL MEDICINE

## 2020-10-22 PROCEDURE — 99999 PR PBB SHADOW E&M-EST. PATIENT-LVL IV: ICD-10-PCS | Mod: PBBFAC,,, | Performed by: INTERNAL MEDICINE

## 2020-10-22 RX ORDER — ATORVASTATIN CALCIUM 20 MG/1
20 TABLET, FILM COATED ORAL DAILY
Qty: 90 TABLET | Refills: 3 | Status: SHIPPED | OUTPATIENT
Start: 2020-10-22 | End: 2021-12-29 | Stop reason: SDUPTHER

## 2020-10-22 RX ORDER — CYCLOBENZAPRINE HCL 10 MG
1 TABLET ORAL
COMMUNITY
Start: 2020-10-15 | End: 2021-07-09

## 2020-10-22 NOTE — PROGRESS NOTES
"Subjective:    Patient ID:  Carmen Morrow is a 50 y.o. female who presents for evaluation of Left leg pain    [facility]     CC: LLE Pain      Patient ID: Carmen Morrow is a 50 y.o. female with a past medical history of GERD, chronic lower back pain, gout, obesity, who presents for a follow up appointment.  Pertinent history/events are as follows:      -Pt kindly referred by Dr. Bailon for evaluation of LLE pain.       -At our initial clinic visit on 5/22/2020, Mrs. Morrow reported episodes where she develops a "red spot" on her left shin.  Episodes occur sporadically and lasts 4-5 days.  During the episodes, the area of the red spot is exquisitely painful.  States pain is similar to pain she experiences when she has a gout flare.  Gout flares usually last 4-5 days as well, and are associated with red painful hallux.  States she also has episodes of "deep pain" at left anterior thigh and knee area.  Episodes occur 1-2 times a week.  No claudication, rest pain, or tissue loss.  Formerly smoked half a pack a day for total 6 years.  Quit 20 years ago.  Exam shows BLE's with mild varicose veins with no edema and no TTP.  Plan:   LLE Pain- Etiology unclear.  Likely related to gout flare (red spot at left shin with pain) with possible component of cervical or lumbar disc disease (left thigh pain).  Check BLE venous reflux study and SHANT study.  Check uric acid level, given history of gout.  Check MRI lumbar spine without contrast.  Varicose Veins- Check BLE venous reflux study.    Low Back Pain- Check MRI lumbar spine to evaluate further.      On her prior visit on 07/21/20, Mrs. Morrow reports painful spasms of her lower back since clinic visit on 5/22/2020.  She received a steroid injection to her lumbar spine with some improvement.  No further episodes of the painful  "red spot" on her left shin.  MRI Lumbar Spine Without Contrast 7/15/2020 revealed interval development of disc extrusion at " L4-L5 resulting in mild spinal canal stenosis as compared to MRI from 08/23/2018.  BLE Venous Reflux Study 7/15/2020 revealed no DVT and no reflux.  SHANT/Segmental Pressure Study on 7/13/2020 revealed normal resting SHANT's; normal TBI's bilaterally; reduced left LE exercise SHANT consistent with mild left LE PAD.  Plan:  LLE Pain-LLE pain likely due to lumbar disc disease and not a vascular issue.  Pt has upcoming visit with Pain Management.  Continue to monitor.    Mild PAD- No claudication, rest pain, or tissue loss.  Start ASA 81 mg daily.  Check updated lipid panel prior to next visit.  Plan to start statin at that time.      HPI   Mrs. Morrow is doing well on this visit. No leg pain or discoloration. No evidence of CLI or tissue loss. Patient is concerned about her varicose veins because of cosmetic reasons only.   She follows with pain management and had a radiofrequency treatment completed at the clinic.     ROS   Constitution: Denies chills, fever, and sweats.  HENT: Denies headaches or blurry vision.  Cardiovascular: Denies chest pain or irregular heart beat.  Respiratory: Denies cough or shortness of breath.  Gastrointestinal: Denies abdominal pain, nausea, or vomiting.  Musculoskeletal: Denies muscle cramps.  Neurological: Denies dizziness or focal weakness.  Psychiatric/Behavioral: Normal mental status.  Hematologic/Lymphatic: Denies bleeding problem or easy bruising/bleeding.  Skin: Denies rash or suspicious lesions    Past Medical History:   Diagnosis Date    Allergy     Brachial neuritis or radiculitis NOS 11/14/2012    Degeneration of cervical intervertebral disc 11/14/2012    Essential hypertension 8/10/2020    GERD (gastroesophageal reflux disease)     SVT (supraventricular tachycardia)      Past Surgical History:   Procedure Laterality Date    anterior diskectomy fusion  05/20/2016    AUGMENTATION OF BREAST Bilateral     1994-95    BREAST SURGERY Bilateral 2006    CARPAL TUNNEL RELEASE Left  2018    CSF leak  2016    Replace and repair fat graft from surgery on 2016    ESOPHAGOGASTRODUODENOSCOPY N/A 2020    Procedure: ESOPHAGOGASTRODUODENOSCOPY (EGD);  Surgeon: Alexi Macias MD;  [facility] ([address] [place] [address]);  Service: Endoscopy;  Laterality: N/A;  prep ins. emailed - COVID screening [place] [facility]    KNEE ARTHROSCOPY      LAMINECTOMY      SPINAL FUSION       Family History   Problem Relation Age of Onset    Heart disease Mother     Hypertension Mother     Asthma Mother     Heart disease Father     COPD Father     Diabetes Father     Ovarian cancer Neg Hx     Colon cancer Neg Hx     Breast cancer Neg Hx     Cancer Neg Hx      Social History     Tobacco Use    Smoking status: Former Smoker     Quit date: 2000     Years since quittin.8    Smokeless tobacco: Never Used   Substance Use Topics    Alcohol use: No     Frequency: Never     Binge frequency: Never     Review of patient's allergies indicates:   Allergen Reactions    Oxycodone Itching     Can take hydrcodone     Current Outpatient Medications on File Prior to Visit   Medication Sig Dispense Refill    acyclovir (ZOVIRAX) 200 MG capsule Take 1 capsule (200 mg total) by mouth 2 (two) times daily. (Patient taking differently: Take 200 mg by mouth 2 (two) times daily. As needed) 10 capsule 5    amLODIPine (NORVASC) 5 MG tablet TAKE 1 TABLET BY MOUTH EVERY DAY 30 tablet 1    cetirizine (ZYRTEC) 10 MG tablet Take 10 mg by mouth Daily. 1 Tablet Oral Every day      DULoxetine (CYMBALTA) 30 MG capsule Take 1 capsule (30 mg total) by mouth once daily. 90 capsule 1    esomeprazole (NEXIUM) 40 MG capsule Take 1 capsule (40 mg total) by mouth before breakfast. 30 capsule 1    fluticasone propionate (FLONASE) 50 mcg/actuation nasal spray 2 sprays (100 mcg total) by Each Nostril route every evening. 2 Spray, Suspension Nasal Every day 1 Bottle 12    gabapentin (NEURONTIN) 300 MG capsule 300  "mg. At bedtime as needed      ondansetron (ZOFRAN) 4 MG tablet Take 1 tablet (4 mg total) by mouth every 8 (eight) hours as needed for Nausea. 30 tablet 0    plecanatide (TRULANCE) 3 mg Tab Take 3 mg by mouth once daily. For severe constipation 90 tablet 0    cyclobenzaprine (FLEXERIL) 10 MG tablet Take 1 tablet by mouth as needed.          Objective:     /88 Comment: has not had meds yet  Pulse 80   Ht 5' 5" (1.651 m)   Wt 83.6 kg (184 lb 4.9 oz)   BMI 30.67 kg/m²      Physical Exam    Constitutional: No acute distress, conversant  HEENT: Sclera anicteric, Pupils equal, round and reactive to light, extraocular motions intact, Oropharynx clear  Neck: No JVD, no carotid bruits  Cardiovascular: regular rate and rhythm, no murmur, rubs or gallops, normal S1/S2  Pulmonary: Clear to auscultation bilaterally  Abdominal: Abdomen soft, nontender, nondistended, positive bowel sounds  Extremities: BLE's with prominent varicose veins and no significant edema or TTP.  Pulses:  Carotid pulses are 2+ on the right side, and 2+ on the left side.  Radial pulses are 2+ on the right side, and 2+ on the left side.   Femoral pulses are 2+ on the right side, and 2+ on the left side.  Popliteal pulses are 2+ on the right side, and 2+ on the left side.   Dorsalis pedis pulses are 2+ on the right side, and 2+ on the left side.   Posterior tibial pulses are 2+ on the right side, and 2+ on the left side.    Skin: No ecchymosis, erythema, or ulcers  Psych: Alert and oriented x 3, appropriate affect  Neuro: CNII-XII intact, no focal deficits    CBC Outpatient trend x3  WBC (K/uL)   Date Value   08/10/2020 8.82   07/09/2020 9.57   08/12/2019 13.20 (H)     Hemoglobin (g/dL)   Date Value   08/10/2020 13.2   07/09/2020 13.0   08/12/2019 12.6     Platelets (K/uL)   Date Value   08/10/2020 345   07/09/2020 379 (H)   08/12/2019 336     Chemistry Outpatient trend x3  Sodium (mmol/L)   Date Value   08/10/2020 138   07/09/2020 139 "   08/12/2019 140     Potassium (mmol/L)   Date Value   08/10/2020 4.0   07/09/2020 3.9   08/12/2019 3.3 (L)     Chloride (mmol/L)   Date Value   08/10/2020 104   07/09/2020 105   08/12/2019 105     CO2 (mmol/L)   Date Value   08/10/2020 25   07/09/2020 27   08/12/2019 28     BUN, Bld (mg/dL)   Date Value   08/10/2020 10   07/09/2020 12   08/12/2019 13     Creatinine (mg/dL)   Date Value   08/10/2020 0.8   07/09/2020 0.8   08/12/2019 0.65     Glucose (mg/dL)   Date Value   08/10/2020 76   07/09/2020 114 (H)   08/12/2019 97     Calcium (mg/dL)   Date Value   08/10/2020 9.0   07/09/2020 9.1   08/12/2019 9.3     Alkaline Phosphatase (U/L)   Date Value   08/10/2020 81   07/09/2020 79   08/12/2019 60     ALT (U/L)   Date Value   08/10/2020 19   07/09/2020 20   08/12/2019 20     AST (U/L)   Date Value   08/10/2020 19   07/09/2020 19   08/12/2019 20     Albumin (g/dL)   Date Value   08/10/2020 3.9   07/09/2020 3.7   08/12/2019 4.4     Total Protein (g/dL)   Date Value   08/10/2020 8.1   07/09/2020 7.6   08/12/2019 8.0     Total Bilirubin (mg/dL)   Date Value   08/10/2020 0.9   07/09/2020 0.7   08/12/2019 0.4     INR (no units)   Date Value   08/12/2019 0.9     HgbA1c trend x3  Hemoglobin A1C   Date Value Ref Range Status   08/10/2020 5.2 4.0 - 5.6 % Final     Comment:     ADA Screening Guidelines:  5.7-6.4%  Consistent with prediabetes  >or=6.5%  Consistent with diabetes  High levels of fetal hemoglobin interfere with the HbA1C  assay. Heterozygous hemoglobin variants (HbS, HgC, etc)do  not significantly interfere with this assay.   However, presence of multiple variants may affect accuracy.       Lipid Panel  Results for orders placed or performed in visit on 10/16/20   Lipid Panel   Result Value Ref Range    Cholesterol 153 120 - 199 mg/dL    Triglycerides 50 30 - 150 mg/dL    HDL 64 40 - 75 mg/dL    LDL Cholesterol 79.0 63.0 - 159.0 mg/dL    Hdl/Cholesterol Ratio 41.8 20.0 - 50.0 %    Total Cholesterol/HDL Ratio 2.4 2.0  - 5.0    Non-HDL Cholesterol 89 mg/dL     MRI Lumbar Spine Without Contrast 7/15/2020:  Interval development of disc extrusion at L4-L5 resulting in mild spinal canal stenosis as compared to MRI from 08/23/2018.  No acute fracture.     BLE Venous Reflux Study 7/15/2020:  · No DVT and no reflux is present.     SHANT/Segmental Pressure Study 7/13/2020:   · Normal resting SHANT's.  · Normal TBI's bilaterally.  · Reduced left LE exercise SHANT consistent with mild left LE PAD    Assessment/Plan:        Carmen Morrow is a 49 y.o. female with a past medical history of GERD, chronic lower back pain, gout, obesity, who presents for a follow up appointment.      1. LLE Pain- MRI Lumbar Spine Without Contrast 7/15/2020 revealed interval development of disc extrusion at L4-L5 resulting in mild spinal canal stenosis as compared to MRI from 08/23/2018.  BLE Venous Reflux Study 7/15/2020 revealed no DVT and no reflux.  SHANT/Segmental Pressure Study on 7/13/2020 revealed normal resting SHANT's; normal TBI's bilaterally; reduced left LE exercise SHANT consistent with mild left LE PAD.  Hence, LLE pain likely due to lumbar disc disease and not a vascular issue.  Pt is following with Pain Management.  Continue to monitor.       2. Varicose Veins- BLE Venous Reflux Study 7/15/2020 revealed no DVT and no reflux.  SHANT/Segmental Pressure Study on 7/13/2020 revealed normal resting SHANT's; normal TBI's bilaterally; reduced left LE exercise SHANT consistent with mild left LE PAD.  Pt has no claudication or tissue loss.   Plan:  Continue to wear compression stockings      3. Low Back Pain- MRI Lumbar Spine Without Contrast 7/15/2020 revealed interval development of disc extrusion at L4-L5 resulting in mild spinal canal stenosis as compared to MRI from 08/23/2018.  Hence, lumbar disc disease is the likely etiology of the Mrs. Morrow's back and LLE pain.  Follow up with pain management as scheduled.        4. Mild PAD- No claudication, rest pain, or  tissue loss.  Start ASA 81 mg daily. Labs reviewed, current ASCVD 10 year life time risk is 1.0%, will start atorvastatin 20 mg therapy.     Follow up in 6 months.     Patient seen and discussed with Dr. Wilson. Further recommendations per the attending addendum.        Aron Ritchie MD  Vascular Medicine Fellow PGY IV  Department of Vascular Medicine  [facility]

## 2020-10-23 ENCOUNTER — PATIENT OUTREACH (OUTPATIENT)
Dept: OTHER | Facility: OTHER | Age: 50
End: 2020-10-23

## 2020-11-02 NOTE — PROGRESS NOTES
Chart review complete. DBP avg remains above goal. /70 in clinic on 10/19/2020 and 128/88 on 10/22/2020 - no medication changes made. No medication recommendations at this time. Will defer outreach and continue monitoring. Consider increasing amlodipine or addition of thiazide if needed for further BP lowering.    Last 5 Patient Entered Readings                                      Current 30 Day Average: 125/84     Recent Readings 10/26/2020 10/23/2020 10/17/2020 10/15/2020 10/7/2020    SBP (mmHg) 133 125 109 133 113    DBP (mmHg) 87 86 80 90 71    Pulse 74 78 103 85 90

## 2020-11-15 DIAGNOSIS — J30.2 SEASONAL ALLERGIC RHINITIS, UNSPECIFIED TRIGGER: ICD-10-CM

## 2020-11-16 RX ORDER — FLUTICASONE PROPIONATE 50 MCG
SPRAY, SUSPENSION (ML) NASAL
Qty: 48 ML | Refills: 1 | Status: SHIPPED | OUTPATIENT
Start: 2020-11-16 | End: 2021-05-10

## 2020-12-01 ENCOUNTER — OFFICE VISIT (OUTPATIENT)
Dept: PODIATRY | Facility: CLINIC | Age: 50
End: 2020-12-01
Payer: COMMERCIAL

## 2020-12-01 VITALS
WEIGHT: 181.63 LBS | SYSTOLIC BLOOD PRESSURE: 106 MMHG | DIASTOLIC BLOOD PRESSURE: 70 MMHG | HEIGHT: 65 IN | HEART RATE: 78 BPM | BODY MASS INDEX: 30.26 KG/M2

## 2020-12-01 DIAGNOSIS — L84 CORN OR CALLUS: ICD-10-CM

## 2020-12-01 DIAGNOSIS — M20.11 HAV (HALLUX ABDUCTO VALGUS), RIGHT: ICD-10-CM

## 2020-12-01 DIAGNOSIS — M20.61 TOE DEFORMITY, ACQUIRED, RIGHT: Primary | ICD-10-CM

## 2020-12-01 DIAGNOSIS — M79.671 RIGHT FOOT PAIN: ICD-10-CM

## 2020-12-01 PROCEDURE — 3008F PR BODY MASS INDEX (BMI) DOCUMENTED: ICD-10-PCS | Mod: CPTII,S$GLB,, | Performed by: PODIATRIST

## 2020-12-01 PROCEDURE — 1125F AMNT PAIN NOTED PAIN PRSNT: CPT | Mod: S$GLB,,, | Performed by: PODIATRIST

## 2020-12-01 PROCEDURE — 99999 PR PBB SHADOW E&M-EST. PATIENT-LVL III: ICD-10-PCS | Mod: PBBFAC,,, | Performed by: PODIATRIST

## 2020-12-01 PROCEDURE — 99999 PR PBB SHADOW E&M-EST. PATIENT-LVL III: CPT | Mod: PBBFAC,,, | Performed by: PODIATRIST

## 2020-12-01 PROCEDURE — 1125F PR PAIN SEVERITY QUANTIFIED, PAIN PRESENT: ICD-10-PCS | Mod: S$GLB,,, | Performed by: PODIATRIST

## 2020-12-01 PROCEDURE — 99203 OFFICE O/P NEW LOW 30 MIN: CPT | Mod: S$GLB,,, | Performed by: PODIATRIST

## 2020-12-01 PROCEDURE — 3008F BODY MASS INDEX DOCD: CPT | Mod: CPTII,S$GLB,, | Performed by: PODIATRIST

## 2020-12-01 PROCEDURE — 99203 PR OFFICE/OUTPT VISIT, NEW, LEVL III, 30-44 MIN: ICD-10-PCS | Mod: S$GLB,,, | Performed by: PODIATRIST

## 2020-12-01 NOTE — PROGRESS NOTES
Subjective:      Patient ID: Carmen Morrow is a 50 y.o. female.    Chief Complaint: Toe Pain (right foot 5th toe, great toe hurts when bending )      50 y.o. female presenting with right foot pain medial 5th toe and medial 1st met head. Noticed pain medial 5th toe 4 days ago. No injury. Aching pain. Very sensitive to touch medial 5th toe. Ambulating in flat shoe. No previous treatments for 5th toe pain. Also complains of pain medial 1st met head. Tender to touch, aching and throbbing pain. No previous treatments for hallux pain.       Review of Systems   Constitution: Negative for chills, decreased appetite, fever and malaise/fatigue.   HENT: Negative for congestion, ear discharge and sore throat.    Eyes: Negative for discharge and pain.   Cardiovascular: Negative for chest pain, claudication and leg swelling.   Respiratory: Negative for cough and shortness of breath.    Skin: Negative for color change, nail changes and rash.   Musculoskeletal: Negative for arthritis, joint pain, joint swelling and muscle weakness.        R foot pain    Gastrointestinal: Negative for bloating, abdominal pain, diarrhea, nausea and vomiting.   Genitourinary: Negative for flank pain and hematuria.   Neurological: Negative for headaches, numbness and weakness.   Psychiatric/Behavioral: Negative for altered mental status.             Past Medical History:   Diagnosis Date    Allergy     Brachial neuritis or radiculitis NOS 11/14/2012    Degeneration of cervical intervertebral disc 11/14/2012    Essential hypertension 8/10/2020    GERD (gastroesophageal reflux disease)     SVT (supraventricular tachycardia)        Past Surgical History:   Procedure Laterality Date    anterior diskectomy fusion  05/20/2016    AUGMENTATION OF BREAST Bilateral     1994-95    BREAST SURGERY Bilateral 2006    CARPAL TUNNEL RELEASE Left 01/30/2018    CSF leak  05/22/2016    Replace and repair fat graft from surgery on 5/20/2016     ESOPHAGOGASTRODUODENOSCOPY N/A 2020    Procedure: ESOPHAGOGASTRODUODENOSCOPY (EGD);  Surgeon: Alexi Macias MD;  Location: [address];  Service: Endoscopy;  Laterality: N/A;  prep ins. emailed - COVID screening [place] [facility]    KNEE ARTHROSCOPY      LAMINECTOMY      SPINAL FUSION         Family History   Problem Relation Age of Onset    Heart disease Mother     Hypertension Mother     Asthma Mother     Heart disease Father     COPD Father     Diabetes Father     Ovarian cancer Neg Hx     Colon cancer Neg Hx     Breast cancer Neg Hx     Cancer Neg Hx        Social History     Socioeconomic History    Marital status:      Spouse name: Not on file    Number of children: Not on file    Years of education: Not on file    Highest education level: Not on file   Occupational History    Not on file   Social Needs    Financial resource strain: Not hard at all    Food insecurity     Worry: Never true     Inability: Never true    Transportation needs     Medical: No     Non-medical: No   Tobacco Use    Smoking status: Former Smoker     Quit date: 2000     Years since quittin.9    Smokeless tobacco: Never Used   Substance and Sexual Activity    Alcohol use: No     Frequency: Never     Binge frequency: Never    Drug use: No    Sexual activity: Yes     Partners: Male     Birth control/protection: None   Lifestyle    Physical activity     Days per week: 1 day     Minutes per session: 60 min    Stress: Only a little   Relationships    Social connections     Talks on phone: More than three times a week     Gets together: Once a week     Attends [employer] service: More than 4 times per year     Active member of club or organization: Yes     Attends meetings of clubs or organizations: More than 4 times per year     Relationship status:    Other Topics Concern    Not on file   Social History Narrative    Not on file       Current Outpatient Medications  "  Medication Sig Dispense Refill    acyclovir (ZOVIRAX) 200 MG capsule Take 1 capsule (200 mg total) by mouth 2 (two) times daily. (Patient taking differently: Take 200 mg by mouth 2 (two) times daily. As needed) 10 capsule 5    amLODIPine (NORVASC) 5 MG tablet TAKE 1 TABLET BY MOUTH EVERY DAY 30 tablet 1    atorvastatin (LIPITOR) 20 MG tablet Take 1 tablet (20 mg total) by mouth once daily. 90 tablet 3    cetirizine (ZYRTEC) 10 MG tablet Take 10 mg by mouth Daily. 1 Tablet Oral Every day      cyclobenzaprine (FLEXERIL) 10 MG tablet Take 1 tablet by mouth as needed.      DULoxetine (CYMBALTA) 30 MG capsule Take 1 capsule (30 mg total) by mouth once daily. 90 capsule 1    esomeprazole (NEXIUM) 40 MG capsule TAKE 1 CAPSULE (40 MG TOTAL) BY MOUTH BEFORE BREAKFAST. 90 capsule 1    fluticasone propionate (FLONASE) 50 mcg/actuation nasal spray 2 SPRAYS (100 MCG TOTAL) BY EACH NOSTRIL ROUTE EVERY EVENING. 48 mL 1    gabapentin (NEURONTIN) 300 MG capsule 300 mg. At bedtime as needed      ondansetron (ZOFRAN) 4 MG tablet Take 1 tablet (4 mg total) by mouth every 8 (eight) hours as needed for Nausea. 30 tablet 0    plecanatide (TRULANCE) 3 mg Tab Take 3 mg by mouth once daily. For severe constipation 90 tablet 0     No current facility-administered medications for this visit.        Review of patient's allergies indicates:   Allergen Reactions    Oxycodone Itching     Can take hydrcodone       Vitals:    12/01/20 1333   BP: 106/70   Pulse: 78   Weight: 82.4 kg (181 lb 9.6 oz)   Height: 5' 5" (1.651 m)   PainSc:   2       Objective:      Physical Exam  Constitutional:       General: She is not in acute distress.     Appearance: She is well-developed.   HENT:      Nose: Nose normal.   Eyes:      Conjunctiva/sclera: Conjunctivae normal.   Neck:      Musculoskeletal: Normal range of motion.   Pulmonary:      Effort: Pulmonary effort is normal.   Chest:      Chest wall: No tenderness.   Abdominal:      Tenderness: " There is no abdominal tenderness.   Neurological:      Mental Status: She is alert and oriented to person, place, and time.   Psychiatric:         Behavior: Behavior normal.         Vascular: Distal DP/PT pulses palpable 2/4. CRT < 3 sec to tips of toes. mild vericosities noted to LEs. Hair growth present LE, warm to touch LE, No edema noted to LE.    Dermatologic: No open lesions, lacerations or wounds. Interdigital spaces clean, dry and intact. No erythema, rubor, calor noted LE    Musculoskeletal:  No calf tenderness LE, Compartments soft/compressible.   R foot: tender to touch medial 5th toe distal phalanx. 4th toe deformity at the level of PIPJ with adduction. Tender to touch 1st met head. Pain at 1st MPJ dorsally during plantarflexion of 1st MPJ. No obvious crepitus at 1st MPJ.     Neurological: Light touch, proprioception, and sharp/dull sensation are all intact. Protective threshold with the [place]-Wienstein monofilament is intact. Vibratory sensation intact.         Assessment:       Encounter Diagnoses   Name Primary?    Hav (hallux abducto valgus), right     Toe deformity, acquired, right Yes    Right foot pain     Corn or callus          Plan:       Carmen was seen today for toe pain.    Diagnoses and all orders for this visit:    Toe deformity, acquired, right    Hav (hallux abducto valgus), right  -     X-Ray Foot Complete Right; Future    Right foot pain    Corn or callus      I counseled the patient on her conditions, their implications and medical management.    50 y.o. female with right foot pain.     -rx right foot xray. +HAV with IM 11 with diastasis medial cuneiform and 2nd met base. 4th toe adduction deformity. No OA 1st MPJ.   -recommend toe sleeve vs toe seperator over 4th toe to relieve pressure between 4th and 5th toe. Different treatment optiosn both conservative and surgical for HAV and 4th toe deformity discussed with pt. Sample of toe sleeve/toe separator dispensed to pt.     -It was  discussed the importance of wearing shoes with adequate room in toe box to accommodate toe deformities. Recommended New Balance/[employer] shoe brands with adequate arch supports to alleviate abnormal pressure and improve stability of foot while walking. Avoid flat shoes and barefoot walking as these will exacerbate or worsen symptoms.   -The nature of the condition, options for management, as well as potential risks and complications were discussed in detail with patient. Patient was amenable to my recommendations and left my office fully informed and will follow up as instructed or sooner if necessary.    -f/u 3 weeks    Note dictated with voice recognition software, please excuse any grammatical errors.

## 2020-12-21 ENCOUNTER — IMMUNIZATION (OUTPATIENT)
Dept: FAMILY MEDICINE | Facility: CLINIC | Age: 50
End: 2020-12-21
Payer: COMMERCIAL

## 2020-12-21 DIAGNOSIS — Z23 NEED FOR VACCINATION: ICD-10-CM

## 2020-12-21 PROCEDURE — 91300 COVID-19, MRNA, LNP-S, PF, 30 MCG/0.3 ML DOSE VACCINE: ICD-10-PCS | Mod: ,,, | Performed by: FAMILY MEDICINE

## 2020-12-21 PROCEDURE — 91300 COVID-19, MRNA, LNP-S, PF, 30 MCG/0.3 ML DOSE VACCINE: CPT | Mod: ,,, | Performed by: FAMILY MEDICINE

## 2020-12-21 PROCEDURE — 0001A COVID-19, MRNA, LNP-S, PF, 30 MCG/0.3 ML DOSE VACCINE: CPT | Mod: CV19,,, | Performed by: FAMILY MEDICINE

## 2020-12-21 PROCEDURE — 0001A COVID-19, MRNA, LNP-S, PF, 30 MCG/0.3 ML DOSE VACCINE: ICD-10-PCS | Mod: CV19,,, | Performed by: FAMILY MEDICINE

## 2020-12-28 ENCOUNTER — OFFICE VISIT (OUTPATIENT)
Dept: PODIATRY | Facility: CLINIC | Age: 50
End: 2020-12-28
Payer: COMMERCIAL

## 2020-12-28 VITALS
DIASTOLIC BLOOD PRESSURE: 74 MMHG | HEART RATE: 69 BPM | SYSTOLIC BLOOD PRESSURE: 104 MMHG | BODY MASS INDEX: 30.37 KG/M2 | WEIGHT: 182.31 LBS | HEIGHT: 65 IN

## 2020-12-28 DIAGNOSIS — M20.61 TOE DEFORMITY, ACQUIRED, RIGHT: ICD-10-CM

## 2020-12-28 DIAGNOSIS — M79.671 FOOT PAIN, RIGHT: ICD-10-CM

## 2020-12-28 DIAGNOSIS — Z01.818 PREOP EXAMINATION: ICD-10-CM

## 2020-12-28 DIAGNOSIS — M20.11 HAV (HALLUX ABDUCTO VALGUS), RIGHT: Primary | ICD-10-CM

## 2020-12-28 PROCEDURE — 1126F AMNT PAIN NOTED NONE PRSNT: CPT | Mod: S$GLB,,, | Performed by: PODIATRIST

## 2020-12-28 PROCEDURE — 1126F PR PAIN SEVERITY QUANTIFIED, NO PAIN PRESENT: ICD-10-PCS | Mod: S$GLB,,, | Performed by: PODIATRIST

## 2020-12-28 PROCEDURE — 99999 PR PBB SHADOW E&M-EST. PATIENT-LVL V: ICD-10-PCS | Mod: PBBFAC,,, | Performed by: PODIATRIST

## 2020-12-28 PROCEDURE — 99999 PR PBB SHADOW E&M-EST. PATIENT-LVL V: CPT | Mod: PBBFAC,,, | Performed by: PODIATRIST

## 2020-12-28 PROCEDURE — 3008F BODY MASS INDEX DOCD: CPT | Mod: CPTII,S$GLB,, | Performed by: PODIATRIST

## 2020-12-28 PROCEDURE — 99214 PR OFFICE/OUTPT VISIT, EST, LEVL IV, 30-39 MIN: ICD-10-PCS | Mod: 57,S$GLB,, | Performed by: PODIATRIST

## 2020-12-28 PROCEDURE — 3008F PR BODY MASS INDEX (BMI) DOCUMENTED: ICD-10-PCS | Mod: CPTII,S$GLB,, | Performed by: PODIATRIST

## 2020-12-28 PROCEDURE — 99214 OFFICE O/P EST MOD 30 MIN: CPT | Mod: 57,S$GLB,, | Performed by: PODIATRIST

## 2020-12-28 RX ORDER — OXYCODONE AND ACETAMINOPHEN 5; 325 MG/1; MG/1
1 TABLET ORAL
COMMUNITY
Start: 2020-12-24 | End: 2021-01-21

## 2020-12-28 RX ORDER — AMOXICILLIN 500 MG/1
CAPSULE ORAL
COMMUNITY
Start: 2020-12-20 | End: 2021-01-21

## 2020-12-28 NOTE — PROGRESS NOTES
Subjective:      Patient ID: Carmen Morrow is a 50 y.o. female.    Chief Complaint: Follow-up (right foot, 5th toe and great toe)      50 y.o. female presenting with right foot pain medial 5th toe and medial 1st met head. Noticed pain medial 5th toe 4 days ago. No injury. Aching pain. Very sensitive to touch medial 5th toe. Ambulating in flat shoe. No previous treatments for 5th toe pain. Also complains of pain medial 1st met head. Tender to touch, aching and throbbing pain. No previous treatments for hallux pain.     12/28/20 f/u for painful R foot HAV deformity and chronic 4th toe deformity. Tried toe sleeve and toe separator between the 4th and 5th toe however it kept slipping off.  Patient does not want a waste any time with conservative treatment.  Patient decided to proceed with surgical correction of painful bunion and chronic 4th toe deformity.      Review of Systems   Constitution: Negative for chills, decreased appetite, fever and malaise/fatigue.   HENT: Negative for congestion, ear discharge and sore throat.    Eyes: Negative for discharge and pain.   Cardiovascular: Negative for chest pain, claudication and leg swelling.   Respiratory: Negative for cough and shortness of breath.    Skin: Negative for color change, nail changes and rash.   Musculoskeletal: Negative for arthritis, joint pain, joint swelling and muscle weakness.        R foot pain    Gastrointestinal: Negative for bloating, abdominal pain, diarrhea, nausea and vomiting.   Genitourinary: Negative for flank pain and hematuria.   Neurological: Negative for headaches, numbness and weakness.   Psychiatric/Behavioral: Negative for altered mental status.             Past Medical History:   Diagnosis Date    Allergy     Brachial neuritis or radiculitis NOS 11/14/2012    Degeneration of cervical intervertebral disc 11/14/2012    Essential hypertension 8/10/2020    GERD (gastroesophageal reflux disease)     SVT (supraventricular  tachycardia)        Past Surgical History:   Procedure Laterality Date    anterior diskectomy fusion  2016    AUGMENTATION OF BREAST Bilateral     -    BREAST SURGERY Bilateral 2006    CARPAL TUNNEL RELEASE Left 2018    CSF leak  2016    Replace and repair fat graft from surgery on 2016    ESOPHAGOGASTRODUODENOSCOPY N/A 2020    Procedure: ESOPHAGOGASTRODUODENOSCOPY (EGD);  Surgeon: Alexi Macias MD;  Location: [facility] ([address] [place] [address]);  Service: Endoscopy;  Laterality: N/A;  prep ins. emailed - COVID screening [place] [facility]    KNEE ARTHROSCOPY      LAMINECTOMY      SPINAL FUSION         Family History   Problem Relation Age of Onset    Heart disease Mother     Hypertension Mother     Asthma Mother     Heart disease Father     COPD Father     Diabetes Father     Ovarian cancer Neg Hx     Colon cancer Neg Hx     Breast cancer Neg Hx     Cancer Neg Hx        Social History     Socioeconomic History    Marital status:      Spouse name: Not on file    Number of children: Not on file    Years of education: Not on file    Highest education level: Not on file   Occupational History    Not on file   Social Needs    Financial resource strain: Not hard at all    Food insecurity     Worry: Never true     Inability: Never true    Transportation needs     Medical: No     Non-medical: No   Tobacco Use    Smoking status: Former Smoker     Quit date: 2000     Years since quittin.0    Smokeless tobacco: Never Used   Substance and Sexual Activity    Alcohol use: No     Frequency: Never     Binge frequency: Never    Drug use: No    Sexual activity: Yes     Partners: Male     Birth control/protection: None   Lifestyle    Physical activity     Days per week: 1 day     Minutes per session: 60 min    Stress: Only a little   Relationships    Social connections     Talks on phone: More than three times a week     Gets together: Once a week      Attends [employer] service: More than 4 times per year     Active member of club or organization: Yes     Attends meetings of clubs or organizations: More than 4 times per year     Relationship status:    Other Topics Concern    Not on file   Social History Narrative    Not on file       Current Outpatient Medications   Medication Sig Dispense Refill    acyclovir (ZOVIRAX) 200 MG capsule Take 1 capsule (200 mg total) by mouth 2 (two) times daily. (Patient taking differently: Take 200 mg by mouth 2 (two) times daily. As needed) 10 capsule 5    amLODIPine (NORVASC) 5 MG tablet TAKE 1 TABLET BY MOUTH EVERY DAY 30 tablet 1    amoxicillin (AMOXIL) 500 MG capsule TAKE 1 CAPSULE BY MOUTH EVERY 6 HOURS. TAKE ALL MEDICATION UNTIL COMPLETE      atorvastatin (LIPITOR) 20 MG tablet Take 1 tablet (20 mg total) by mouth once daily. 90 tablet 3    cetirizine (ZYRTEC) 10 MG tablet Take 10 mg by mouth Daily. 1 Tablet Oral Every day      cyclobenzaprine (FLEXERIL) 10 MG tablet Take 1 tablet by mouth as needed.      DULoxetine (CYMBALTA) 30 MG capsule Take 1 capsule (30 mg total) by mouth once daily. 90 capsule 1    esomeprazole (NEXIUM) 40 MG capsule TAKE 1 CAPSULE (40 MG TOTAL) BY MOUTH BEFORE BREAKFAST. 90 capsule 1    fluticasone propionate (FLONASE) 50 mcg/actuation nasal spray 2 SPRAYS (100 MCG TOTAL) BY EACH NOSTRIL ROUTE EVERY EVENING. 48 mL 1    gabapentin (NEURONTIN) 300 MG capsule 300 mg. At bedtime as needed      ondansetron (ZOFRAN) 4 MG tablet Take 1 tablet (4 mg total) by mouth every 8 (eight) hours as needed for Nausea. 30 tablet 0    plecanatide (TRULANCE) 3 mg Tab Take 3 mg by mouth once daily. For severe constipation 90 tablet 0    oxyCODONE-acetaminophen (PERCOCET) 5-325 mg per tablet Take 1 tablet by mouth every 4 to 6 hours as needed.       No current facility-administered medications for this visit.        Review of patient's allergies indicates:   Allergen Reactions    Oxycodone Itching     " Can take hydrcodone       Vitals:    12/28/20 0808   BP: 104/74   Pulse: 69   Weight: 82.7 kg (182 lb 4.8 oz)   Height: 5' 5" (1.651 m)   PainSc: 0-No pain       Objective:      Physical Exam  Constitutional:       General: She is not in acute distress.     Appearance: She is well-developed.   HENT:      Nose: Nose normal.   Eyes:      Conjunctiva/sclera: Conjunctivae normal.   Neck:      Musculoskeletal: Normal range of motion.   Pulmonary:      Effort: Pulmonary effort is normal.   Chest:      Chest wall: No tenderness.   Abdominal:      Tenderness: There is no abdominal tenderness.   Neurological:      Mental Status: She is alert and oriented to person, place, and time.   Psychiatric:         Behavior: Behavior normal.         Vascular: Distal DP/PT pulses palpable 2/4. CRT < 3 sec to tips of toes. mild vericosities noted to LEs. Hair growth present LE, warm to touch LE, No edema noted to LE.    Dermatologic: No open lesions, lacerations or wounds. Interdigital spaces clean, dry and intact. No erythema, rubor, calor noted LE    Musculoskeletal:  No calf tenderness LE, Compartments soft/compressible.   R foot: tender to touch medial 5th toe distal phalanx. 4th toe deformity at the level of PIPJ with adduction. Tender to touch 1st met head. Pain at 1st MPJ dorsally during plantarflexion of 1st MPJ. No obvious crepitus at 1st MPJ. No submet2 pain. No hypermobility at 1st TMTJ.     Neurological: Light touch, proprioception, and sharp/dull sensation are all intact. Protective threshold with the Tuckerton-Wienstein monofilament is intact. Vibratory sensation intact.               Assessment:       Encounter Diagnoses   Name Primary?    Preop examination     Hav (hallux abducto valgus), right Yes    Toe deformity, acquired, right     Foot pain, right          Plan:       Carmen was seen today for follow-up.    Diagnoses and all orders for this visit:    Hav (hallux abducto valgus), right    Preop examination  -     " COVID-19 Routine Screening; Future  -     CBC auto differential; Future  -     BASIC METABOLIC PANEL; Future  -     EKG 12-lead; Future    Toe deformity, acquired, right    Foot pain, right      I counseled the patient on her conditions, their implications and medical management.    50 y.o. female with right foot pain with HAV and 4th toe deformity.     -rx. Covid, CBC, BMP, EKG  -right foot xray reviewed. +HAV with IM 12 with mild diastasis medial cuneiform and 2nd met base. 4th toe adduction deformity. No OA 1st MPJ.   -pt would like to proceed with surgery.  We talked about different surgical treatment options and patient elected to proceed with minimally invasive HAV and toe deformity correction with screw.  Will plan for surgery on February 2nd.  Surgical clearance pending.     -Long discussion with patient regarding the procedure in detail.  Informed consent discussed in detail  including all alternatives, risks and complications not limited to consent form. These included but is not limited to bleeding, pain, infection, swelling, scarring, nerve entrapment, RSD, paralysis, loss of function of part or all of foot, brain damage and death.  Patient understands all risks, potential complications, and alternatives, including, but not limited to those listed on the consent form. All questions were answered. No guarantees given or implied as to outcome. Informed verbal and written consent was obtained. Consent forms read, signed, witnessed.   -During today's patient's visit, I spent 45 minutes with the patient. Greater than 50% of the time was spent discussing the items listed including chart review. The patient was evaluated and treated. I discussed diagnoses, prognosis and treatment with patient and family and reviewed diagnostic images. I have recommended surgery for patient. The risks vs. benefits of a surgical procedure to address the patient's concerns were discussed as well as alternative management options.  Patient desires surgery and arrangements will be made accordingly. The alternatives, risks and complications of surgery were discussed including but not limited to  infection, swelling, numbness, post-operative pain, along with the fact that no guarantees can be given. All the patients' questions were answered and the patient seemed comfortable with my explanation. The patient was also instructed that prior to surgery if at any time more questions were to come up patient should contact the office and we'll be happy to answer them. The types of surgical approaches available were reviewed as well as alternatives to a surgical approach. The patient will be scheduled for surgery.The informed surgical consent was reviewed and read aloud to the patient. I have reviewed the films and I have discussed my findings with the patient in great detail. I have discussed all risks including but not limited to infection, stiffness, scarring, limp, disability, deformity, damage to blood vessels or nerves, numbness, poor healing, need for braces, arthritis, chronic pain, amputation, and death. All benefits and realistic expectations discussed in great detail. I have made no promises as to the outcome. I have provided realistic expectations. I have offered the patient a second opinion which they have declined and have assured me they prefer to proceed despite the risks.  -I stressed the importance of usage of narcotics. I told patient I will be able to dispense narcotics to control the acute phase of pain but I WILL NOT be able to prescribe long term pain medications. patient verbalized understanding.   -The nature of the condition, options for management, as well as potential risks and complications were discussed in detail with patient. Patient was amenable to my recommendations and left my office fully informed and will follow up as instructed or sooner if necessary.      -Post op Protocol    Weight bearing status:  Partial  weight-bearing to heel in surgical shoes for 4 weeks   Rx. Physical therapy: none   Pain medication: TBD   Anticoagulation: none   Antibiotics: none      Note dictated with voice recognition software, please excuse any grammatical errors.

## 2020-12-30 RX ORDER — SODIUM CHLORIDE 0.9 % (FLUSH) 0.9 %
10 SYRINGE (ML) INJECTION
Status: CANCELLED | OUTPATIENT
Start: 2020-12-30

## 2020-12-30 RX ORDER — SODIUM CHLORIDE 9 MG/ML
INJECTION, SOLUTION INTRAVENOUS CONTINUOUS
Status: CANCELLED | OUTPATIENT
Start: 2020-12-30

## 2021-01-11 ENCOUNTER — IMMUNIZATION (OUTPATIENT)
Dept: FAMILY MEDICINE | Facility: CLINIC | Age: 51
End: 2021-01-11
Payer: COMMERCIAL

## 2021-01-11 DIAGNOSIS — Z23 NEED FOR VACCINATION: ICD-10-CM

## 2021-01-11 PROCEDURE — 0002A COVID-19, MRNA, LNP-S, PF, 30 MCG/0.3 ML DOSE VACCINE: CPT | Mod: PBBFAC | Performed by: FAMILY MEDICINE

## 2021-01-11 PROCEDURE — 91300 COVID-19, MRNA, LNP-S, PF, 30 MCG/0.3 ML DOSE VACCINE: CPT | Mod: PBBFAC | Performed by: FAMILY MEDICINE

## 2021-01-21 ENCOUNTER — OFFICE VISIT (OUTPATIENT)
Dept: INTERNAL MEDICINE | Facility: CLINIC | Age: 51
End: 2021-01-21
Payer: COMMERCIAL

## 2021-01-21 VITALS
WEIGHT: 185.19 LBS | BODY MASS INDEX: 30.82 KG/M2 | SYSTOLIC BLOOD PRESSURE: 122 MMHG | HEART RATE: 68 BPM | DIASTOLIC BLOOD PRESSURE: 70 MMHG

## 2021-01-21 DIAGNOSIS — Z12.31 ENCOUNTER FOR SCREENING MAMMOGRAM FOR BREAST CANCER: ICD-10-CM

## 2021-01-21 DIAGNOSIS — Z01.818 PREOPERATIVE EXAMINATION: Primary | ICD-10-CM

## 2021-01-21 DIAGNOSIS — Z98.1 S/P CERVICAL SPINAL FUSION: ICD-10-CM

## 2021-01-21 DIAGNOSIS — I10 ESSENTIAL HYPERTENSION: ICD-10-CM

## 2021-01-21 DIAGNOSIS — M21.619 BUNION: ICD-10-CM

## 2021-01-21 DIAGNOSIS — M20.41 HAMMER TOE OF RIGHT FOOT: ICD-10-CM

## 2021-01-21 PROCEDURE — 3078F DIAST BP <80 MM HG: CPT | Mod: CPTII,S$GLB,, | Performed by: INTERNAL MEDICINE

## 2021-01-21 PROCEDURE — 3008F BODY MASS INDEX DOCD: CPT | Mod: CPTII,S$GLB,, | Performed by: INTERNAL MEDICINE

## 2021-01-21 PROCEDURE — 3078F PR MOST RECENT DIASTOLIC BLOOD PRESSURE < 80 MM HG: ICD-10-PCS | Mod: CPTII,S$GLB,, | Performed by: INTERNAL MEDICINE

## 2021-01-21 PROCEDURE — 3008F PR BODY MASS INDEX (BMI) DOCUMENTED: ICD-10-PCS | Mod: CPTII,S$GLB,, | Performed by: INTERNAL MEDICINE

## 2021-01-21 PROCEDURE — 99214 PR OFFICE/OUTPT VISIT, EST, LEVL IV, 30-39 MIN: ICD-10-PCS | Mod: S$GLB,,, | Performed by: INTERNAL MEDICINE

## 2021-01-21 PROCEDURE — 99999 PR PBB SHADOW E&M-EST. PATIENT-LVL III: CPT | Mod: PBBFAC,,, | Performed by: INTERNAL MEDICINE

## 2021-01-21 PROCEDURE — 3074F SYST BP LT 130 MM HG: CPT | Mod: CPTII,S$GLB,, | Performed by: INTERNAL MEDICINE

## 2021-01-21 PROCEDURE — 99214 OFFICE O/P EST MOD 30 MIN: CPT | Mod: S$GLB,,, | Performed by: INTERNAL MEDICINE

## 2021-01-21 PROCEDURE — 3074F PR MOST RECENT SYSTOLIC BLOOD PRESSURE < 130 MM HG: ICD-10-PCS | Mod: CPTII,S$GLB,, | Performed by: INTERNAL MEDICINE

## 2021-01-21 PROCEDURE — 99999 PR PBB SHADOW E&M-EST. PATIENT-LVL III: ICD-10-PCS | Mod: PBBFAC,,, | Performed by: INTERNAL MEDICINE

## 2021-01-30 ENCOUNTER — LAB VISIT (OUTPATIENT)
Dept: FAMILY MEDICINE | Facility: CLINIC | Age: 51
End: 2021-01-30
Payer: COMMERCIAL

## 2021-01-30 DIAGNOSIS — Z01.818 PREOP EXAMINATION: ICD-10-CM

## 2021-01-30 PROCEDURE — U0003 INFECTIOUS AGENT DETECTION BY NUCLEIC ACID (DNA OR RNA); SEVERE ACUTE RESPIRATORY SYNDROME CORONAVIRUS 2 (SARS-COV-2) (CORONAVIRUS DISEASE [COVID-19]), AMPLIFIED PROBE TECHNIQUE, MAKING USE OF HIGH THROUGHPUT TECHNOLOGIES AS DESCRIBED BY CMS-2020-01-R: HCPCS

## 2021-01-31 LAB — SARS-COV-2 RNA RESP QL NAA+PROBE: NOT DETECTED

## 2021-02-02 PROBLEM — M20.11 HAV (HALLUX ABDUCTO VALGUS), RIGHT: Status: ACTIVE | Noted: 2021-02-02

## 2021-02-03 ENCOUNTER — PATIENT MESSAGE (OUTPATIENT)
Dept: PODIATRY | Facility: CLINIC | Age: 51
End: 2021-02-03

## 2021-02-03 ENCOUNTER — TELEPHONE (OUTPATIENT)
Dept: PODIATRY | Facility: CLINIC | Age: 51
End: 2021-02-03

## 2021-02-17 ENCOUNTER — OFFICE VISIT (OUTPATIENT)
Dept: PODIATRY | Facility: CLINIC | Age: 51
End: 2021-02-17
Payer: COMMERCIAL

## 2021-02-17 VITALS
BODY MASS INDEX: 30.99 KG/M2 | DIASTOLIC BLOOD PRESSURE: 78 MMHG | SYSTOLIC BLOOD PRESSURE: 110 MMHG | HEIGHT: 65 IN | HEART RATE: 66 BPM | WEIGHT: 186 LBS

## 2021-02-17 DIAGNOSIS — Z98.890 STATUS POST BUNIONECTOMY: ICD-10-CM

## 2021-02-17 DIAGNOSIS — Z09 FOLLOW-UP EXAMINATION FOLLOWING SURGERY: Primary | ICD-10-CM

## 2021-02-17 PROCEDURE — 1126F PR PAIN SEVERITY QUANTIFIED, NO PAIN PRESENT: ICD-10-PCS | Mod: S$GLB,,, | Performed by: PODIATRIST

## 2021-02-17 PROCEDURE — 99999 PR PBB SHADOW E&M-EST. PATIENT-LVL IV: CPT | Mod: PBBFAC,,, | Performed by: PODIATRIST

## 2021-02-17 PROCEDURE — 3008F BODY MASS INDEX DOCD: CPT | Mod: CPTII,S$GLB,, | Performed by: PODIATRIST

## 2021-02-17 PROCEDURE — 3008F PR BODY MASS INDEX (BMI) DOCUMENTED: ICD-10-PCS | Mod: CPTII,S$GLB,, | Performed by: PODIATRIST

## 2021-02-17 PROCEDURE — 99024 POSTOP FOLLOW-UP VISIT: CPT | Mod: S$GLB,,, | Performed by: PODIATRIST

## 2021-02-17 PROCEDURE — 99024 PR POST-OP FOLLOW-UP VISIT: ICD-10-PCS | Mod: S$GLB,,, | Performed by: PODIATRIST

## 2021-02-17 PROCEDURE — 99999 PR PBB SHADOW E&M-EST. PATIENT-LVL IV: ICD-10-PCS | Mod: PBBFAC,,, | Performed by: PODIATRIST

## 2021-02-17 PROCEDURE — 1126F AMNT PAIN NOTED NONE PRSNT: CPT | Mod: S$GLB,,, | Performed by: PODIATRIST

## 2021-02-17 RX ORDER — NAPROXEN 500 MG/1
TABLET ORAL
COMMUNITY
End: 2021-07-09

## 2021-02-17 RX ORDER — MELOXICAM 15 MG/1
TABLET ORAL
COMMUNITY
End: 2021-07-09

## 2021-03-08 ENCOUNTER — PATIENT OUTREACH (OUTPATIENT)
Dept: ADMINISTRATIVE | Facility: OTHER | Age: 51
End: 2021-03-08

## 2021-03-10 ENCOUNTER — OFFICE VISIT (OUTPATIENT)
Dept: PODIATRY | Facility: CLINIC | Age: 51
End: 2021-03-10
Payer: COMMERCIAL

## 2021-03-10 VITALS
SYSTOLIC BLOOD PRESSURE: 110 MMHG | HEIGHT: 65 IN | WEIGHT: 186 LBS | DIASTOLIC BLOOD PRESSURE: 78 MMHG | HEART RATE: 66 BPM | BODY MASS INDEX: 30.99 KG/M2

## 2021-03-10 DIAGNOSIS — Z98.890 STATUS POST BUNIONECTOMY: ICD-10-CM

## 2021-03-10 DIAGNOSIS — Z09 FOLLOW-UP EXAMINATION FOLLOWING SURGERY: Primary | ICD-10-CM

## 2021-03-10 PROCEDURE — 99999 PR PBB SHADOW E&M-EST. PATIENT-LVL IV: ICD-10-PCS | Mod: PBBFAC,,, | Performed by: PODIATRIST

## 2021-03-10 PROCEDURE — 99999 PR PBB SHADOW E&M-EST. PATIENT-LVL IV: CPT | Mod: PBBFAC,,, | Performed by: PODIATRIST

## 2021-03-10 PROCEDURE — 99024 POSTOP FOLLOW-UP VISIT: CPT | Mod: S$GLB,,, | Performed by: PODIATRIST

## 2021-03-10 PROCEDURE — 1126F AMNT PAIN NOTED NONE PRSNT: CPT | Mod: S$GLB,,, | Performed by: PODIATRIST

## 2021-03-10 PROCEDURE — 3008F PR BODY MASS INDEX (BMI) DOCUMENTED: ICD-10-PCS | Mod: CPTII,S$GLB,, | Performed by: PODIATRIST

## 2021-03-10 PROCEDURE — 99024 PR POST-OP FOLLOW-UP VISIT: ICD-10-PCS | Mod: S$GLB,,, | Performed by: PODIATRIST

## 2021-03-10 PROCEDURE — 1126F PR PAIN SEVERITY QUANTIFIED, NO PAIN PRESENT: ICD-10-PCS | Mod: S$GLB,,, | Performed by: PODIATRIST

## 2021-03-10 PROCEDURE — 3008F BODY MASS INDEX DOCD: CPT | Mod: CPTII,S$GLB,, | Performed by: PODIATRIST

## 2021-06-02 ENCOUNTER — OFFICE VISIT (OUTPATIENT)
Dept: PODIATRY | Facility: CLINIC | Age: 51
End: 2021-06-02
Payer: COMMERCIAL

## 2021-06-02 VITALS
BODY MASS INDEX: 27.82 KG/M2 | DIASTOLIC BLOOD PRESSURE: 78 MMHG | RESPIRATION RATE: 16 BRPM | WEIGHT: 167 LBS | HEART RATE: 79 BPM | OXYGEN SATURATION: 98 % | HEIGHT: 65 IN | SYSTOLIC BLOOD PRESSURE: 124 MMHG

## 2021-06-02 DIAGNOSIS — M79.674 PAIN OF TOE OF RIGHT FOOT: ICD-10-CM

## 2021-06-02 DIAGNOSIS — Z09 FOLLOW-UP EXAMINATION FOLLOWING SURGERY: ICD-10-CM

## 2021-06-02 DIAGNOSIS — Z98.890 STATUS POST BUNIONECTOMY: Primary | ICD-10-CM

## 2021-06-02 PROCEDURE — 99999 PR PBB SHADOW E&M-EST. PATIENT-LVL IV: ICD-10-PCS | Mod: PBBFAC,,, | Performed by: PODIATRIST

## 2021-06-02 PROCEDURE — 1126F PR PAIN SEVERITY QUANTIFIED, NO PAIN PRESENT: ICD-10-PCS | Mod: S$GLB,,, | Performed by: PODIATRIST

## 2021-06-02 PROCEDURE — 3008F PR BODY MASS INDEX (BMI) DOCUMENTED: ICD-10-PCS | Mod: CPTII,S$GLB,, | Performed by: PODIATRIST

## 2021-06-02 PROCEDURE — 99999 PR PBB SHADOW E&M-EST. PATIENT-LVL IV: CPT | Mod: PBBFAC,,, | Performed by: PODIATRIST

## 2021-06-02 PROCEDURE — 3008F BODY MASS INDEX DOCD: CPT | Mod: CPTII,S$GLB,, | Performed by: PODIATRIST

## 2021-06-02 PROCEDURE — 99213 OFFICE O/P EST LOW 20 MIN: CPT | Mod: S$GLB,,, | Performed by: PODIATRIST

## 2021-06-02 PROCEDURE — 1126F AMNT PAIN NOTED NONE PRSNT: CPT | Mod: S$GLB,,, | Performed by: PODIATRIST

## 2021-06-02 PROCEDURE — 99213 PR OFFICE/OUTPT VISIT, EST, LEVL III, 20-29 MIN: ICD-10-PCS | Mod: S$GLB,,, | Performed by: PODIATRIST

## 2021-07-08 ENCOUNTER — PATIENT OUTREACH (OUTPATIENT)
Dept: ADMINISTRATIVE | Facility: OTHER | Age: 51
End: 2021-07-08

## 2021-07-09 ENCOUNTER — OFFICE VISIT (OUTPATIENT)
Dept: OBSTETRICS AND GYNECOLOGY | Facility: CLINIC | Age: 51
End: 2021-07-09
Payer: COMMERCIAL

## 2021-07-09 VITALS
DIASTOLIC BLOOD PRESSURE: 70 MMHG | SYSTOLIC BLOOD PRESSURE: 100 MMHG | WEIGHT: 163.69 LBS | BODY MASS INDEX: 27.24 KG/M2

## 2021-07-09 DIAGNOSIS — Z01.419 WELL WOMAN EXAM WITH ROUTINE GYNECOLOGICAL EXAM: Primary | ICD-10-CM

## 2021-07-09 PROCEDURE — 3008F PR BODY MASS INDEX (BMI) DOCUMENTED: ICD-10-PCS | Mod: CPTII,S$GLB,, | Performed by: OBSTETRICS & GYNECOLOGY

## 2021-07-09 PROCEDURE — 88175 CYTOPATH C/V AUTO FLUID REDO: CPT | Performed by: OBSTETRICS & GYNECOLOGY

## 2021-07-09 PROCEDURE — 1126F AMNT PAIN NOTED NONE PRSNT: CPT | Mod: S$GLB,,, | Performed by: OBSTETRICS & GYNECOLOGY

## 2021-07-09 PROCEDURE — 99396 PR PREVENTIVE VISIT,EST,40-64: ICD-10-PCS | Mod: S$GLB,,, | Performed by: OBSTETRICS & GYNECOLOGY

## 2021-07-09 PROCEDURE — 3008F BODY MASS INDEX DOCD: CPT | Mod: CPTII,S$GLB,, | Performed by: OBSTETRICS & GYNECOLOGY

## 2021-07-09 PROCEDURE — 1126F PR PAIN SEVERITY QUANTIFIED, NO PAIN PRESENT: ICD-10-PCS | Mod: S$GLB,,, | Performed by: OBSTETRICS & GYNECOLOGY

## 2021-07-09 PROCEDURE — 99999 PR PBB SHADOW E&M-EST. PATIENT-LVL III: CPT | Mod: PBBFAC,,, | Performed by: OBSTETRICS & GYNECOLOGY

## 2021-07-09 PROCEDURE — 99999 PR PBB SHADOW E&M-EST. PATIENT-LVL III: ICD-10-PCS | Mod: PBBFAC,,, | Performed by: OBSTETRICS & GYNECOLOGY

## 2021-07-09 PROCEDURE — 99396 PREV VISIT EST AGE 40-64: CPT | Mod: S$GLB,,, | Performed by: OBSTETRICS & GYNECOLOGY

## 2021-07-14 LAB
CLINICAL INFO: NORMAL
CYTO CVX: NORMAL
CYTOLOGIST CVX/VAG CYTO: NORMAL
CYTOLOGY CMNT CVX/VAG CYTO-IMP: NORMAL
CYTOLOGY PAP THIN PREP EXPLANATION: NORMAL
DATE OF PREVIOUS PAP: NORMAL
DATE PREVIOUS BX: NO
LMP START DATE: NORMAL
SPECIMEN SOURCE CVX/VAG CYTO: NORMAL
STAT OF ADQ CVX/VAG CYTO-IMP: NORMAL

## 2021-07-16 ENCOUNTER — OFFICE VISIT (OUTPATIENT)
Dept: CARDIOLOGY | Facility: CLINIC | Age: 51
End: 2021-07-16
Payer: COMMERCIAL

## 2021-07-16 VITALS
HEIGHT: 65 IN | HEART RATE: 83 BPM | SYSTOLIC BLOOD PRESSURE: 111 MMHG | WEIGHT: 165.38 LBS | DIASTOLIC BLOOD PRESSURE: 67 MMHG | BODY MASS INDEX: 27.56 KG/M2

## 2021-07-16 DIAGNOSIS — G89.29 CHRONIC MIDLINE LOW BACK PAIN WITH SCIATICA, SCIATICA LATERALITY UNSPECIFIED: ICD-10-CM

## 2021-07-16 DIAGNOSIS — M54.40 CHRONIC MIDLINE LOW BACK PAIN WITH SCIATICA, SCIATICA LATERALITY UNSPECIFIED: ICD-10-CM

## 2021-07-16 DIAGNOSIS — I83.813 VARICOSE VEINS OF BOTH LOWER EXTREMITIES WITH PAIN: ICD-10-CM

## 2021-07-16 DIAGNOSIS — I73.9 PAD (PERIPHERAL ARTERY DISEASE): ICD-10-CM

## 2021-07-16 DIAGNOSIS — M79.605 LEFT LEG PAIN: Primary | ICD-10-CM

## 2021-07-16 PROCEDURE — 99999 PR PBB SHADOW E&M-EST. PATIENT-LVL III: ICD-10-PCS | Mod: PBBFAC,,, | Performed by: INTERNAL MEDICINE

## 2021-07-16 PROCEDURE — 1126F AMNT PAIN NOTED NONE PRSNT: CPT | Mod: S$GLB,,, | Performed by: INTERNAL MEDICINE

## 2021-07-16 PROCEDURE — 3008F PR BODY MASS INDEX (BMI) DOCUMENTED: ICD-10-PCS | Mod: CPTII,S$GLB,, | Performed by: INTERNAL MEDICINE

## 2021-07-16 PROCEDURE — 99999 PR PBB SHADOW E&M-EST. PATIENT-LVL III: CPT | Mod: PBBFAC,,, | Performed by: INTERNAL MEDICINE

## 2021-07-16 PROCEDURE — 1126F PR PAIN SEVERITY QUANTIFIED, NO PAIN PRESENT: ICD-10-PCS | Mod: S$GLB,,, | Performed by: INTERNAL MEDICINE

## 2021-07-16 PROCEDURE — 99215 PR OFFICE/OUTPT VISIT, EST, LEVL V, 40-54 MIN: ICD-10-PCS | Mod: S$GLB,,, | Performed by: INTERNAL MEDICINE

## 2021-07-16 PROCEDURE — 3008F BODY MASS INDEX DOCD: CPT | Mod: CPTII,S$GLB,, | Performed by: INTERNAL MEDICINE

## 2021-07-16 PROCEDURE — 99215 OFFICE O/P EST HI 40 MIN: CPT | Mod: S$GLB,,, | Performed by: INTERNAL MEDICINE

## 2021-07-16 RX ORDER — BENZPHETAMINE HYDROCHLORIDE 50 MG/1
1 TABLET ORAL 2 TIMES DAILY
COMMUNITY
Start: 2021-06-23 | End: 2021-11-08

## 2021-08-18 ENCOUNTER — PATIENT MESSAGE (OUTPATIENT)
Dept: ADMINISTRATIVE | Facility: OTHER | Age: 51
End: 2021-08-18

## 2021-08-19 ENCOUNTER — PATIENT MESSAGE (OUTPATIENT)
Dept: INTERNAL MEDICINE | Facility: CLINIC | Age: 51
End: 2021-08-19

## 2021-08-22 ENCOUNTER — PATIENT MESSAGE (OUTPATIENT)
Dept: INTERNAL MEDICINE | Facility: CLINIC | Age: 51
End: 2021-08-22

## 2021-08-23 ENCOUNTER — LAB VISIT (OUTPATIENT)
Dept: INTERNAL MEDICINE | Facility: CLINIC | Age: 51
End: 2021-08-23

## 2021-08-23 DIAGNOSIS — R51.9 COMPLAINT OF HEADACHE: ICD-10-CM

## 2021-08-23 DIAGNOSIS — R50.9 FEVER, UNSPECIFIED FEVER CAUSE: Primary | ICD-10-CM

## 2021-08-23 DIAGNOSIS — R09.81 SINUS CONGESTION: ICD-10-CM

## 2021-08-23 DIAGNOSIS — R50.9 FEVER, UNSPECIFIED FEVER CAUSE: ICD-10-CM

## 2021-08-23 LAB — SARS-COV-2 RDRP RESP QL NAA+PROBE: NEGATIVE

## 2021-08-23 PROCEDURE — U0002 COVID-19 LAB TEST NON-CDC: HCPCS | Performed by: PREVENTIVE MEDICINE

## 2021-11-08 ENCOUNTER — LAB VISIT (OUTPATIENT)
Dept: LAB | Facility: HOSPITAL | Age: 51
End: 2021-11-08
Attending: INTERNAL MEDICINE
Payer: COMMERCIAL

## 2021-11-08 ENCOUNTER — OFFICE VISIT (OUTPATIENT)
Dept: INTERNAL MEDICINE | Facility: CLINIC | Age: 51
End: 2021-11-08
Payer: COMMERCIAL

## 2021-11-08 VITALS
WEIGHT: 163.38 LBS | DIASTOLIC BLOOD PRESSURE: 86 MMHG | BODY MASS INDEX: 27.22 KG/M2 | OXYGEN SATURATION: 99 % | HEART RATE: 79 BPM | SYSTOLIC BLOOD PRESSURE: 128 MMHG | HEIGHT: 65 IN

## 2021-11-08 DIAGNOSIS — Z98.1 S/P CERVICAL SPINAL FUSION: ICD-10-CM

## 2021-11-08 DIAGNOSIS — I73.9 PAD (PERIPHERAL ARTERY DISEASE): ICD-10-CM

## 2021-11-08 DIAGNOSIS — Z76.89 ENCOUNTER TO ESTABLISH CARE WITH NEW DOCTOR: ICD-10-CM

## 2021-11-08 DIAGNOSIS — K59.04 CHRONIC IDIOPATHIC CONSTIPATION: ICD-10-CM

## 2021-11-08 DIAGNOSIS — I10 ESSENTIAL HYPERTENSION: ICD-10-CM

## 2021-11-08 DIAGNOSIS — B00.9 HSV (HERPES SIMPLEX VIRUS) INFECTION: ICD-10-CM

## 2021-11-08 DIAGNOSIS — Z76.89 ENCOUNTER TO ESTABLISH CARE WITH NEW DOCTOR: Primary | ICD-10-CM

## 2021-11-08 DIAGNOSIS — K21.9 GASTROESOPHAGEAL REFLUX DISEASE WITHOUT ESOPHAGITIS: ICD-10-CM

## 2021-11-08 LAB
ALBUMIN SERPL BCP-MCNC: 4.1 G/DL (ref 3.5–5.2)
ALP SERPL-CCNC: 77 U/L (ref 55–135)
ALT SERPL W/O P-5'-P-CCNC: 20 U/L (ref 10–44)
ANION GAP SERPL CALC-SCNC: 10 MMOL/L (ref 8–16)
AST SERPL-CCNC: 17 U/L (ref 10–40)
BASOPHILS # BLD AUTO: 0.07 K/UL (ref 0–0.2)
BASOPHILS NFR BLD: 1 % (ref 0–1.9)
BILIRUB SERPL-MCNC: 0.9 MG/DL (ref 0.1–1)
BUN SERPL-MCNC: 8 MG/DL (ref 6–20)
CALCIUM SERPL-MCNC: 9.5 MG/DL (ref 8.7–10.5)
CHLORIDE SERPL-SCNC: 104 MMOL/L (ref 95–110)
CHOLEST SERPL-MCNC: 129 MG/DL (ref 120–199)
CHOLEST/HDLC SERPL: 1.8 {RATIO} (ref 2–5)
CO2 SERPL-SCNC: 25 MMOL/L (ref 23–29)
CREAT SERPL-MCNC: 0.7 MG/DL (ref 0.5–1.4)
DIFFERENTIAL METHOD: ABNORMAL
EOSINOPHIL # BLD AUTO: 0.1 K/UL (ref 0–0.5)
EOSINOPHIL NFR BLD: 1.8 % (ref 0–8)
ERYTHROCYTE [DISTWIDTH] IN BLOOD BY AUTOMATED COUNT: 14.3 % (ref 11.5–14.5)
EST. GFR  (AFRICAN AMERICAN): >60 ML/MIN/1.73 M^2
EST. GFR  (NON AFRICAN AMERICAN): >60 ML/MIN/1.73 M^2
GLUCOSE SERPL-MCNC: 101 MG/DL (ref 70–110)
HCT VFR BLD AUTO: 41.8 % (ref 37–48.5)
HDLC SERPL-MCNC: 72 MG/DL (ref 40–75)
HDLC SERPL: 55.8 % (ref 20–50)
HGB BLD-MCNC: 13.1 G/DL (ref 12–16)
IMM GRANULOCYTES # BLD AUTO: 0.02 K/UL (ref 0–0.04)
IMM GRANULOCYTES NFR BLD AUTO: 0.3 % (ref 0–0.5)
LDLC SERPL CALC-MCNC: 48.4 MG/DL (ref 63–159)
LYMPHOCYTES # BLD AUTO: 2.3 K/UL (ref 1–4.8)
LYMPHOCYTES NFR BLD: 31.1 % (ref 18–48)
MCH RBC QN AUTO: 28.1 PG (ref 27–31)
MCHC RBC AUTO-ENTMCNC: 31.3 G/DL (ref 32–36)
MCV RBC AUTO: 90 FL (ref 82–98)
MONOCYTES # BLD AUTO: 0.5 K/UL (ref 0.3–1)
MONOCYTES NFR BLD: 6.8 % (ref 4–15)
NEUTROPHILS # BLD AUTO: 4.3 K/UL (ref 1.8–7.7)
NEUTROPHILS NFR BLD: 59 % (ref 38–73)
NONHDLC SERPL-MCNC: 57 MG/DL
NRBC BLD-RTO: 0 /100 WBC
PLATELET # BLD AUTO: 406 K/UL (ref 150–450)
PMV BLD AUTO: 10.3 FL (ref 9.2–12.9)
POTASSIUM SERPL-SCNC: 4.2 MMOL/L (ref 3.5–5.1)
PROT SERPL-MCNC: 8.2 G/DL (ref 6–8.4)
RBC # BLD AUTO: 4.66 M/UL (ref 4–5.4)
SODIUM SERPL-SCNC: 139 MMOL/L (ref 136–145)
TRIGL SERPL-MCNC: 43 MG/DL (ref 30–150)
WBC # BLD AUTO: 7.31 K/UL (ref 3.9–12.7)

## 2021-11-08 PROCEDURE — 36415 COLL VENOUS BLD VENIPUNCTURE: CPT | Performed by: INTERNAL MEDICINE

## 2021-11-08 PROCEDURE — 3074F PR MOST RECENT SYSTOLIC BLOOD PRESSURE < 130 MM HG: ICD-10-PCS | Mod: CPTII,S$GLB,, | Performed by: STUDENT IN AN ORGANIZED HEALTH CARE EDUCATION/TRAINING PROGRAM

## 2021-11-08 PROCEDURE — 99396 PREV VISIT EST AGE 40-64: CPT | Mod: S$GLB,,, | Performed by: STUDENT IN AN ORGANIZED HEALTH CARE EDUCATION/TRAINING PROGRAM

## 2021-11-08 PROCEDURE — 99999 PR PBB SHADOW E&M-EST. PATIENT-LVL IV: CPT | Mod: PBBFAC,,, | Performed by: STUDENT IN AN ORGANIZED HEALTH CARE EDUCATION/TRAINING PROGRAM

## 2021-11-08 PROCEDURE — 99396 PR PREVENTIVE VISIT,EST,40-64: ICD-10-PCS | Mod: S$GLB,,, | Performed by: STUDENT IN AN ORGANIZED HEALTH CARE EDUCATION/TRAINING PROGRAM

## 2021-11-08 PROCEDURE — 3079F DIAST BP 80-89 MM HG: CPT | Mod: CPTII,S$GLB,, | Performed by: STUDENT IN AN ORGANIZED HEALTH CARE EDUCATION/TRAINING PROGRAM

## 2021-11-08 PROCEDURE — 3079F PR MOST RECENT DIASTOLIC BLOOD PRESSURE 80-89 MM HG: ICD-10-PCS | Mod: CPTII,S$GLB,, | Performed by: STUDENT IN AN ORGANIZED HEALTH CARE EDUCATION/TRAINING PROGRAM

## 2021-11-08 PROCEDURE — 1160F PR REVIEW ALL MEDS BY PRESCRIBER/CLIN PHARMACIST DOCUMENTED: ICD-10-PCS | Mod: CPTII,S$GLB,, | Performed by: STUDENT IN AN ORGANIZED HEALTH CARE EDUCATION/TRAINING PROGRAM

## 2021-11-08 PROCEDURE — 85025 COMPLETE CBC W/AUTO DIFF WBC: CPT | Performed by: STUDENT IN AN ORGANIZED HEALTH CARE EDUCATION/TRAINING PROGRAM

## 2021-11-08 PROCEDURE — 1159F MED LIST DOCD IN RCRD: CPT | Mod: CPTII,S$GLB,, | Performed by: STUDENT IN AN ORGANIZED HEALTH CARE EDUCATION/TRAINING PROGRAM

## 2021-11-08 PROCEDURE — 80061 LIPID PANEL: CPT | Performed by: INTERNAL MEDICINE

## 2021-11-08 PROCEDURE — 1159F PR MEDICATION LIST DOCUMENTED IN MEDICAL RECORD: ICD-10-PCS | Mod: CPTII,S$GLB,, | Performed by: STUDENT IN AN ORGANIZED HEALTH CARE EDUCATION/TRAINING PROGRAM

## 2021-11-08 PROCEDURE — 3074F SYST BP LT 130 MM HG: CPT | Mod: CPTII,S$GLB,, | Performed by: STUDENT IN AN ORGANIZED HEALTH CARE EDUCATION/TRAINING PROGRAM

## 2021-11-08 PROCEDURE — 1160F RVW MEDS BY RX/DR IN RCRD: CPT | Mod: CPTII,S$GLB,, | Performed by: STUDENT IN AN ORGANIZED HEALTH CARE EDUCATION/TRAINING PROGRAM

## 2021-11-08 PROCEDURE — 3008F BODY MASS INDEX DOCD: CPT | Mod: CPTII,S$GLB,, | Performed by: STUDENT IN AN ORGANIZED HEALTH CARE EDUCATION/TRAINING PROGRAM

## 2021-11-08 PROCEDURE — 3008F PR BODY MASS INDEX (BMI) DOCUMENTED: ICD-10-PCS | Mod: CPTII,S$GLB,, | Performed by: STUDENT IN AN ORGANIZED HEALTH CARE EDUCATION/TRAINING PROGRAM

## 2021-11-08 PROCEDURE — 99999 PR PBB SHADOW E&M-EST. PATIENT-LVL IV: ICD-10-PCS | Mod: PBBFAC,,, | Performed by: STUDENT IN AN ORGANIZED HEALTH CARE EDUCATION/TRAINING PROGRAM

## 2021-11-08 PROCEDURE — 80053 COMPREHEN METABOLIC PANEL: CPT | Performed by: STUDENT IN AN ORGANIZED HEALTH CARE EDUCATION/TRAINING PROGRAM

## 2021-11-08 RX ORDER — ACYCLOVIR 200 MG/1
200 CAPSULE ORAL
Qty: 10 CAPSULE | Refills: 5 | Status: SHIPPED | OUTPATIENT
Start: 2021-11-08 | End: 2022-10-24 | Stop reason: SDUPTHER

## 2021-12-13 ENCOUNTER — PATIENT MESSAGE (OUTPATIENT)
Dept: INTERNAL MEDICINE | Facility: CLINIC | Age: 51
End: 2021-12-13
Payer: COMMERCIAL

## 2021-12-18 ENCOUNTER — OFFICE VISIT (OUTPATIENT)
Dept: URGENT CARE | Facility: CLINIC | Age: 51
End: 2021-12-18
Payer: COMMERCIAL

## 2021-12-18 VITALS
SYSTOLIC BLOOD PRESSURE: 112 MMHG | TEMPERATURE: 98 F | BODY MASS INDEX: 27.16 KG/M2 | OXYGEN SATURATION: 100 % | HEART RATE: 83 BPM | HEIGHT: 65 IN | WEIGHT: 163 LBS | RESPIRATION RATE: 16 BRPM | DIASTOLIC BLOOD PRESSURE: 76 MMHG

## 2021-12-18 DIAGNOSIS — R52 BODY ACHES: ICD-10-CM

## 2021-12-18 DIAGNOSIS — R09.81 SINUS CONGESTION: ICD-10-CM

## 2021-12-18 DIAGNOSIS — R05.9 COUGH: ICD-10-CM

## 2021-12-18 DIAGNOSIS — J06.9 UPPER RESPIRATORY TRACT INFECTION, UNSPECIFIED TYPE: Primary | ICD-10-CM

## 2021-12-18 LAB
CTP QC/QA: YES
CTP QC/QA: YES
POC MOLECULAR INFLUENZA A AGN: NEGATIVE
POC MOLECULAR INFLUENZA B AGN: NEGATIVE
SARS-COV-2 RDRP RESP QL NAA+PROBE: NEGATIVE

## 2021-12-18 PROCEDURE — U0002 COVID-19 LAB TEST NON-CDC: HCPCS | Mod: QW,S$GLB,, | Performed by: PHYSICIAN ASSISTANT

## 2021-12-18 PROCEDURE — 87502 INFLUENZA DNA AMP PROBE: CPT | Mod: QW,S$GLB,, | Performed by: PHYSICIAN ASSISTANT

## 2021-12-18 PROCEDURE — 87502 POCT INFLUENZA A/B MOLECULAR: ICD-10-PCS | Mod: QW,S$GLB,, | Performed by: PHYSICIAN ASSISTANT

## 2021-12-18 PROCEDURE — 99213 PR OFFICE/OUTPT VISIT, EST, LEVL III, 20-29 MIN: ICD-10-PCS | Mod: S$GLB,,, | Performed by: PHYSICIAN ASSISTANT

## 2021-12-18 PROCEDURE — U0002: ICD-10-PCS | Mod: QW,S$GLB,, | Performed by: PHYSICIAN ASSISTANT

## 2021-12-18 PROCEDURE — 99213 OFFICE O/P EST LOW 20 MIN: CPT | Mod: S$GLB,,, | Performed by: PHYSICIAN ASSISTANT

## 2021-12-18 RX ORDER — BENZONATATE 200 MG/1
200 CAPSULE ORAL 3 TIMES DAILY PRN
Qty: 30 CAPSULE | Refills: 0 | Status: SHIPPED | OUTPATIENT
Start: 2021-12-18 | End: 2021-12-28

## 2021-12-22 ENCOUNTER — PATIENT OUTREACH (OUTPATIENT)
Dept: ADMINISTRATIVE | Facility: OTHER | Age: 51
End: 2021-12-22
Payer: COMMERCIAL

## 2021-12-28 ENCOUNTER — IMMUNIZATION (OUTPATIENT)
Dept: FAMILY MEDICINE | Facility: CLINIC | Age: 51
End: 2021-12-28
Payer: COMMERCIAL

## 2021-12-28 DIAGNOSIS — Z23 NEED FOR VACCINATION: Primary | ICD-10-CM

## 2021-12-28 PROCEDURE — 0004A COVID-19, MRNA, LNP-S, PF, 30 MCG/0.3 ML DOSE VACCINE: CPT | Mod: PBBFAC | Performed by: FAMILY MEDICINE

## 2021-12-29 ENCOUNTER — OFFICE VISIT (OUTPATIENT)
Dept: CARDIOLOGY | Facility: CLINIC | Age: 51
End: 2021-12-29
Payer: COMMERCIAL

## 2021-12-29 VITALS
HEART RATE: 74 BPM | BODY MASS INDEX: 28.25 KG/M2 | SYSTOLIC BLOOD PRESSURE: 113 MMHG | DIASTOLIC BLOOD PRESSURE: 67 MMHG | WEIGHT: 169.56 LBS | HEIGHT: 65 IN

## 2021-12-29 DIAGNOSIS — I10 ESSENTIAL HYPERTENSION: ICD-10-CM

## 2021-12-29 DIAGNOSIS — I73.9 PAD (PERIPHERAL ARTERY DISEASE): ICD-10-CM

## 2021-12-29 DIAGNOSIS — I83.813 VARICOSE VEINS OF BOTH LOWER EXTREMITIES WITH PAIN: ICD-10-CM

## 2021-12-29 DIAGNOSIS — M51.9 LUMBAR DISC DISEASE: Primary | ICD-10-CM

## 2021-12-29 DIAGNOSIS — E78.2 MIXED HYPERLIPIDEMIA: ICD-10-CM

## 2021-12-29 DIAGNOSIS — M79.605 LEFT LEG PAIN: ICD-10-CM

## 2021-12-29 PROCEDURE — 3078F PR MOST RECENT DIASTOLIC BLOOD PRESSURE < 80 MM HG: ICD-10-PCS | Mod: CPTII,S$GLB,, | Performed by: INTERNAL MEDICINE

## 2021-12-29 PROCEDURE — 3078F DIAST BP <80 MM HG: CPT | Mod: CPTII,S$GLB,, | Performed by: INTERNAL MEDICINE

## 2021-12-29 PROCEDURE — 3008F BODY MASS INDEX DOCD: CPT | Mod: CPTII,S$GLB,, | Performed by: INTERNAL MEDICINE

## 2021-12-29 PROCEDURE — 99999 PR PBB SHADOW E&M-EST. PATIENT-LVL III: CPT | Mod: PBBFAC,,, | Performed by: INTERNAL MEDICINE

## 2021-12-29 PROCEDURE — 3008F PR BODY MASS INDEX (BMI) DOCUMENTED: ICD-10-PCS | Mod: CPTII,S$GLB,, | Performed by: INTERNAL MEDICINE

## 2021-12-29 PROCEDURE — 3074F SYST BP LT 130 MM HG: CPT | Mod: CPTII,S$GLB,, | Performed by: INTERNAL MEDICINE

## 2021-12-29 PROCEDURE — 99215 OFFICE O/P EST HI 40 MIN: CPT | Mod: S$GLB,,, | Performed by: INTERNAL MEDICINE

## 2021-12-29 PROCEDURE — 99215 PR OFFICE/OUTPT VISIT, EST, LEVL V, 40-54 MIN: ICD-10-PCS | Mod: S$GLB,,, | Performed by: INTERNAL MEDICINE

## 2021-12-29 PROCEDURE — 99999 PR PBB SHADOW E&M-EST. PATIENT-LVL III: ICD-10-PCS | Mod: PBBFAC,,, | Performed by: INTERNAL MEDICINE

## 2021-12-29 PROCEDURE — 1159F PR MEDICATION LIST DOCUMENTED IN MEDICAL RECORD: ICD-10-PCS | Mod: CPTII,S$GLB,, | Performed by: INTERNAL MEDICINE

## 2021-12-29 PROCEDURE — 3074F PR MOST RECENT SYSTOLIC BLOOD PRESSURE < 130 MM HG: ICD-10-PCS | Mod: CPTII,S$GLB,, | Performed by: INTERNAL MEDICINE

## 2021-12-29 PROCEDURE — 1159F MED LIST DOCD IN RCRD: CPT | Mod: CPTII,S$GLB,, | Performed by: INTERNAL MEDICINE

## 2021-12-29 RX ORDER — ATORVASTATIN CALCIUM 20 MG/1
20 TABLET, FILM COATED ORAL DAILY
Qty: 90 TABLET | Refills: 3 | Status: SHIPPED | OUTPATIENT
Start: 2021-12-29 | End: 2023-11-08

## 2022-01-10 NOTE — PATIENT INSTRUCTIONS
Understanding Rotator Cuff Tendonitis    Tendons are tough tissues that connect muscles to bone. A group of 4 muscles and their tendons form a cuff around the head of the upper arm bone. This is called the rotator cuff. It connects the upper arm to the shoulder blade. It gives the shoulder joint stability and strength.  If tendons are injured or strained, they may become irritated and swollen (inflamed). This is called tendonitis. Rotator cuff tendonitis may cause shoulder pain and loss of function.  What causes rotator cuff tendonitis?  Tendonitis results when the rotator cuff tendons are injured or overworked. The most common cause of injury is repetitive overhead activities. These can be work-related activities such as reaching, pushing, or lifting. Or they can be sports-related activities such as throwing, swimming, or lifting weights.  Symptoms of rotator cuff tendonitis  Pain on the side of the upper arm is the most common symptom. Pain may get worse with overhead movements or when you raise the arm above shoulder level. It may also hurt to lie on the shoulder at night.  Treatment for rotator cuff tendonitis  Treatment may include the following:  · Active rest. This lets the rotator cuff heal. Active rest means using your arm and shoulder, but avoiding activities that cause pain, such as reaching overhead or sleeping on the shoulder.  · Cold packs. Putting ice packs on the shoulder helps reduce swelling and relieve pain.  · Pain medicines. Prescription or over-the-counter pain medicines can help relieve pain and swelling.  · Arm and shoulder exercises. These help keep the shoulder joint mobile as it heals. They also help improve the strength of muscles around the joint.  Possible complications  It might be tempting to stop using your shoulder completely to avoid pain. But doing so may lead to a condition called frozen shoulder. To help prevent this, following instructions you are given for active rest  and for doing exercises to help your shoulder heal.  When to call your healthcare provider  Call your healthcare provider right away if you have any of these:  · Fever of 100.4°F (38°C) or higher, or as directed  · Symptoms that dont get better, or get worse  · New symptoms   Date Last Reviewed: 3/10/2016  © 1033-4184 [employer]. [address] [place] [address], [place] [address]. All rights reserved. This information is not intended as a substitute for professional medical care. Always follow your healthcare professional's instructions.         ambulate

## 2022-03-23 DIAGNOSIS — Z12.31 OTHER SCREENING MAMMOGRAM: ICD-10-CM

## 2022-06-11 DIAGNOSIS — I10 ESSENTIAL HYPERTENSION: ICD-10-CM

## 2022-06-11 NOTE — TELEPHONE ENCOUNTER
No new care gaps identified.  [facility] Embedded Care Gaps. Reference number: [id number]. 6/11/2022   10:48:49 AM SUSANNET

## 2022-06-12 RX ORDER — AMLODIPINE BESYLATE 5 MG/1
TABLET ORAL
Qty: 90 TABLET | Refills: 1 | Status: SHIPPED | OUTPATIENT
Start: 2022-06-12 | End: 2023-10-28

## 2022-06-12 NOTE — TELEPHONE ENCOUNTER
Refill Authorization Note   Carmen Morrow  is requesting a refill authorization.  Brief Assessment and Rationale for Refill:  Approve     Medication Therapy Plan:       Medication Reconciliation Completed: No   Comments:     No Care Gaps recommended.     Note composed:4:18 PM 06/12/2022

## 2022-06-27 ENCOUNTER — OFFICE VISIT (OUTPATIENT)
Dept: INTERNAL MEDICINE | Facility: CLINIC | Age: 52
End: 2022-06-27
Payer: COMMERCIAL

## 2022-06-27 ENCOUNTER — HOSPITAL ENCOUNTER (OUTPATIENT)
Dept: RADIOLOGY | Facility: HOSPITAL | Age: 52
Discharge: HOME | End: 2022-06-27
Attending: PHYSICIAN ASSISTANT
Payer: COMMERCIAL

## 2022-06-27 VITALS
OXYGEN SATURATION: 98 % | WEIGHT: 168 LBS | HEART RATE: 80 BPM | DIASTOLIC BLOOD PRESSURE: 80 MMHG | BODY MASS INDEX: 27.99 KG/M2 | SYSTOLIC BLOOD PRESSURE: 118 MMHG | HEIGHT: 65 IN

## 2022-06-27 DIAGNOSIS — R10.11 RIGHT UPPER QUADRANT PAIN: ICD-10-CM

## 2022-06-27 DIAGNOSIS — R07.81 RIB PAIN ON RIGHT SIDE: ICD-10-CM

## 2022-06-27 DIAGNOSIS — E78.5 HYPERLIPIDEMIA, UNSPECIFIED HYPERLIPIDEMIA TYPE: ICD-10-CM

## 2022-06-27 DIAGNOSIS — R07.81 RIB PAIN ON RIGHT SIDE: Primary | ICD-10-CM

## 2022-06-27 DIAGNOSIS — K59.04 CHRONIC IDIOPATHIC CONSTIPATION: ICD-10-CM

## 2022-06-27 DIAGNOSIS — I10 ESSENTIAL HYPERTENSION: ICD-10-CM

## 2022-06-27 PROCEDURE — 71046 X-RAY EXAM CHEST 2 VIEWS: CPT | Mod: 26,,, | Performed by: RADIOLOGY

## 2022-06-27 PROCEDURE — 1160F PR REVIEW ALL MEDS BY PRESCRIBER/CLIN PHARMACIST DOCUMENTED: ICD-10-PCS | Mod: CPTII,S$GLB,, | Performed by: PHYSICIAN ASSISTANT

## 2022-06-27 PROCEDURE — 1159F PR MEDICATION LIST DOCUMENTED IN MEDICAL RECORD: ICD-10-PCS | Mod: CPTII,S$GLB,, | Performed by: PHYSICIAN ASSISTANT

## 2022-06-27 PROCEDURE — 3074F PR MOST RECENT SYSTOLIC BLOOD PRESSURE < 130 MM HG: ICD-10-PCS | Mod: CPTII,S$GLB,, | Performed by: PHYSICIAN ASSISTANT

## 2022-06-27 PROCEDURE — 71046 XR CHEST PA AND LATERAL: ICD-10-PCS | Mod: 26,,, | Performed by: RADIOLOGY

## 2022-06-27 PROCEDURE — 3079F PR MOST RECENT DIASTOLIC BLOOD PRESSURE 80-89 MM HG: ICD-10-PCS | Mod: CPTII,S$GLB,, | Performed by: PHYSICIAN ASSISTANT

## 2022-06-27 PROCEDURE — 71046 X-RAY EXAM CHEST 2 VIEWS: CPT | Mod: TC

## 2022-06-27 PROCEDURE — 71100 X-RAY EXAM RIBS UNI 2 VIEWS: CPT | Mod: 26,RT,, | Performed by: RADIOLOGY

## 2022-06-27 PROCEDURE — 99999 PR PBB SHADOW E&M-EST. PATIENT-LVL V: ICD-10-PCS | Mod: PBBFAC,,, | Performed by: PHYSICIAN ASSISTANT

## 2022-06-27 PROCEDURE — 71100 XR RIBS 2 VIEW RIGHT: ICD-10-PCS | Mod: 26,RT,, | Performed by: RADIOLOGY

## 2022-06-27 PROCEDURE — 3008F BODY MASS INDEX DOCD: CPT | Mod: CPTII,S$GLB,, | Performed by: PHYSICIAN ASSISTANT

## 2022-06-27 PROCEDURE — 3008F PR BODY MASS INDEX (BMI) DOCUMENTED: ICD-10-PCS | Mod: CPTII,S$GLB,, | Performed by: PHYSICIAN ASSISTANT

## 2022-06-27 PROCEDURE — 99214 PR OFFICE/OUTPT VISIT, EST, LEVL IV, 30-39 MIN: ICD-10-PCS | Mod: S$GLB,,, | Performed by: PHYSICIAN ASSISTANT

## 2022-06-27 PROCEDURE — 3074F SYST BP LT 130 MM HG: CPT | Mod: CPTII,S$GLB,, | Performed by: PHYSICIAN ASSISTANT

## 2022-06-27 PROCEDURE — 99999 PR PBB SHADOW E&M-EST. PATIENT-LVL V: CPT | Mod: PBBFAC,,, | Performed by: PHYSICIAN ASSISTANT

## 2022-06-27 PROCEDURE — 71100 X-RAY EXAM RIBS UNI 2 VIEWS: CPT | Mod: TC,RT

## 2022-06-27 PROCEDURE — 3079F DIAST BP 80-89 MM HG: CPT | Mod: CPTII,S$GLB,, | Performed by: PHYSICIAN ASSISTANT

## 2022-06-27 PROCEDURE — 1159F MED LIST DOCD IN RCRD: CPT | Mod: CPTII,S$GLB,, | Performed by: PHYSICIAN ASSISTANT

## 2022-06-27 PROCEDURE — 1160F RVW MEDS BY RX/DR IN RCRD: CPT | Mod: CPTII,S$GLB,, | Performed by: PHYSICIAN ASSISTANT

## 2022-06-27 PROCEDURE — 99214 OFFICE O/P EST MOD 30 MIN: CPT | Mod: S$GLB,,, | Performed by: PHYSICIAN ASSISTANT

## 2022-06-27 NOTE — PROGRESS NOTES
Subjective:       Patient ID: Carmen Morrow is a 51 y.o. female.        Chief Complaint: Rib Injury and Back Pain    Carmen Morrow is an established patient of Claudia Max MD here today for urgent care visit.    Right lateral rib pain started 3-4 days ago  Also having low back pain, right sided, similar to prior pain, follows with pain management for this  If she is sitting there is no pain, seems to be radiating more towards the abdomen  Touching the area really increases her pain, also leaning forward or twisting movements are very painful  No chest pain or shortness of breath  No injury  No N/V, not worsened by eating  No rash  0/10 pain with sitting, 9/10 when pushing on the area    Lipids tx with lipitor 20 mg daily    HTN tx with amlodipine 5 mg daily    GERD tx with nexium 40 mg daily    Constipatin tx with trulance    AR tx with zyrtec 10 mg and flonase prn         Review of Systems   Constitutional: Negative for chills, diaphoresis, fatigue and fever.   HENT: Negative for congestion and sore throat.    Eyes: Negative for visual disturbance.   Respiratory: Negative for cough, chest tightness and shortness of breath.    Cardiovascular: Negative for chest pain, palpitations and leg swelling.   Gastrointestinal: Negative for abdominal pain, blood in stool, constipation, diarrhea, nausea and vomiting.   Genitourinary: Negative for dysuria, frequency, hematuria and urgency.   Musculoskeletal: Negative for arthralgias and back pain.        Right rib pain/RUQ pain   Skin: Negative for rash.   Neurological: Negative for dizziness, syncope, weakness and headaches.   Psychiatric/Behavioral: Negative for dysphoric mood and sleep disturbance. The patient is not nervous/anxious.        Objective:      Physical Exam  Vitals and nursing note reviewed.   Constitutional:       Appearance: Normal appearance. She is well-developed.   HENT:      Head: Normocephalic.      Right Ear: External ear normal.       "Left Ear: External ear normal.   Eyes:      Pupils: Pupils are equal, round, and reactive to light.   Cardiovascular:      Rate and Rhythm: Normal rate and regular rhythm.      Heart sounds: Normal heart sounds. No murmur heard.    No friction rub. No gallop.   Pulmonary:      Effort: Pulmonary effort is normal. No respiratory distress.      Breath sounds: Normal breath sounds.   Abdominal:      Palpations: Abdomen is soft.      Tenderness: There is no abdominal tenderness.       Skin:     General: Skin is warm and dry.   Neurological:      Mental Status: She is alert.         Assessment:       1. Rib pain on right side    2. Right upper quadrant pain    3. Essential hypertension    4. Chronic idiopathic constipation    5. Hyperlipidemia, unspecified hyperlipidemia type        Plan:       Carmen was seen today for rib injury and back pain.    Diagnoses and all orders for this visit:    Rib pain on right side  -     X-Ray Chest PA And Lateral; Future  -     X-Ray Ribs 2 View Right    Right upper quadrant pain  -     CBC Auto Differential; Future  -     Comprehensive Metabolic Panel; Future  -     US Abdomen Complete; Future    Essential hypertension - stable and controlled  BP Readings from Last 5 Encounters:   06/27/22 118/80   12/29/21 113/67   12/18/21 112/76   11/08/21 128/86   07/16/21 111/67      Chronic idiopathic constipation    Hyperlipidemia, unspecified hyperlipidemia type - stable and controlled  Lab Results   Component Value Date    CHOL 129 11/08/2021    TRIG 43 11/08/2021    HDL 72 11/08/2021    LDLCALC 48.4 (L) 11/08/2021     Labs, x-ray, US all today  ED prompts reviewed    Pt has been given instructions populated from [employer] database and has verbalized understanding of the after visit summary and information contained wherein.    Follow up with a primary care provider. May go to ER for acute shortness of breath, lightheadedness, fever, or any other emergent complaints or changes in condition.    "This " "note will be shared with the patient"    Future Appointments   Date Time Provider Department Center   6/27/2022 10:30 AM [facility] [id number] LB LIMIT [facility] XRAY IM [facility]   6/27/2022  2:30 PM [facility] US2 [id number] [facility] ULTRA [facility]   7/29/2022  8:30 AM Alex Wilson MD PhD [facility] PERVAS [facility]   8/16/2022  2:00 PM Michael A. Wiedemann, MD [facility] OBANTHONY [facility]                 "

## 2022-06-28 ENCOUNTER — TELEPHONE (OUTPATIENT)
Dept: INTERNAL MEDICINE | Facility: CLINIC | Age: 52
End: 2022-06-28
Payer: COMMERCIAL

## 2022-06-28 ENCOUNTER — OFFICE VISIT (OUTPATIENT)
Dept: INTERNAL MEDICINE | Facility: CLINIC | Age: 52
End: 2022-06-28
Payer: COMMERCIAL

## 2022-06-28 VITALS
DIASTOLIC BLOOD PRESSURE: 84 MMHG | SYSTOLIC BLOOD PRESSURE: 120 MMHG | HEIGHT: 65 IN | WEIGHT: 165.13 LBS | HEART RATE: 75 BPM | OXYGEN SATURATION: 98 % | BODY MASS INDEX: 27.51 KG/M2

## 2022-06-28 DIAGNOSIS — R10.11 RIGHT UPPER QUADRANT PAIN: Primary | ICD-10-CM

## 2022-06-28 DIAGNOSIS — T14.8XXA MUSCLE STRAIN: ICD-10-CM

## 2022-06-28 PROCEDURE — 99999 PR PBB SHADOW E&M-EST. PATIENT-LVL IV: CPT | Mod: PBBFAC,,, | Performed by: STUDENT IN AN ORGANIZED HEALTH CARE EDUCATION/TRAINING PROGRAM

## 2022-06-28 PROCEDURE — 1159F MED LIST DOCD IN RCRD: CPT | Mod: CPTII,S$GLB,, | Performed by: STUDENT IN AN ORGANIZED HEALTH CARE EDUCATION/TRAINING PROGRAM

## 2022-06-28 PROCEDURE — 3008F PR BODY MASS INDEX (BMI) DOCUMENTED: ICD-10-PCS | Mod: CPTII,S$GLB,, | Performed by: STUDENT IN AN ORGANIZED HEALTH CARE EDUCATION/TRAINING PROGRAM

## 2022-06-28 PROCEDURE — 3074F PR MOST RECENT SYSTOLIC BLOOD PRESSURE < 130 MM HG: ICD-10-PCS | Mod: CPTII,S$GLB,, | Performed by: STUDENT IN AN ORGANIZED HEALTH CARE EDUCATION/TRAINING PROGRAM

## 2022-06-28 PROCEDURE — 99999 PR PBB SHADOW E&M-EST. PATIENT-LVL IV: ICD-10-PCS | Mod: PBBFAC,,, | Performed by: STUDENT IN AN ORGANIZED HEALTH CARE EDUCATION/TRAINING PROGRAM

## 2022-06-28 PROCEDURE — 1160F PR REVIEW ALL MEDS BY PRESCRIBER/CLIN PHARMACIST DOCUMENTED: ICD-10-PCS | Mod: CPTII,S$GLB,, | Performed by: STUDENT IN AN ORGANIZED HEALTH CARE EDUCATION/TRAINING PROGRAM

## 2022-06-28 PROCEDURE — 3079F PR MOST RECENT DIASTOLIC BLOOD PRESSURE 80-89 MM HG: ICD-10-PCS | Mod: CPTII,S$GLB,, | Performed by: STUDENT IN AN ORGANIZED HEALTH CARE EDUCATION/TRAINING PROGRAM

## 2022-06-28 PROCEDURE — 3074F SYST BP LT 130 MM HG: CPT | Mod: CPTII,S$GLB,, | Performed by: STUDENT IN AN ORGANIZED HEALTH CARE EDUCATION/TRAINING PROGRAM

## 2022-06-28 PROCEDURE — 99213 OFFICE O/P EST LOW 20 MIN: CPT | Mod: S$GLB,,, | Performed by: STUDENT IN AN ORGANIZED HEALTH CARE EDUCATION/TRAINING PROGRAM

## 2022-06-28 PROCEDURE — 1160F RVW MEDS BY RX/DR IN RCRD: CPT | Mod: CPTII,S$GLB,, | Performed by: STUDENT IN AN ORGANIZED HEALTH CARE EDUCATION/TRAINING PROGRAM

## 2022-06-28 PROCEDURE — 99213 PR OFFICE/OUTPT VISIT, EST, LEVL III, 20-29 MIN: ICD-10-PCS | Mod: S$GLB,,, | Performed by: STUDENT IN AN ORGANIZED HEALTH CARE EDUCATION/TRAINING PROGRAM

## 2022-06-28 PROCEDURE — 3079F DIAST BP 80-89 MM HG: CPT | Mod: CPTII,S$GLB,, | Performed by: STUDENT IN AN ORGANIZED HEALTH CARE EDUCATION/TRAINING PROGRAM

## 2022-06-28 PROCEDURE — 3008F BODY MASS INDEX DOCD: CPT | Mod: CPTII,S$GLB,, | Performed by: STUDENT IN AN ORGANIZED HEALTH CARE EDUCATION/TRAINING PROGRAM

## 2022-06-28 PROCEDURE — 1159F PR MEDICATION LIST DOCUMENTED IN MEDICAL RECORD: ICD-10-PCS | Mod: CPTII,S$GLB,, | Performed by: STUDENT IN AN ORGANIZED HEALTH CARE EDUCATION/TRAINING PROGRAM

## 2022-06-28 RX ORDER — CYCLOBENZAPRINE HCL 5 MG
5 TABLET ORAL 3 TIMES DAILY PRN
Qty: 30 TABLET | Refills: 0 | Status: SHIPPED | OUTPATIENT
Start: 2022-06-28 | End: 2022-07-08

## 2022-06-28 RX ORDER — TRAMADOL HYDROCHLORIDE 50 MG/1
50 TABLET ORAL EVERY 6 HOURS
Qty: 28 TABLET | Refills: 0 | Status: SHIPPED | OUTPATIENT
Start: 2022-06-28 | End: 2022-10-24

## 2022-06-28 NOTE — PROGRESS NOTES
INTERNAL MEDICINE URGENT CARE VISIT NOTE        Assessment/Plan     1. Right upper quadrant pain  - cyclobenzaprine (FLEXERIL) 5 MG tablet; Take 1 tablet (5 mg total) by mouth 3 (three) times daily as needed for Muscle spasms.  Dispense: 30 tablet; Refill: 0  - traMADoL (ULTRAM) 50 mg tablet; Take 1 tablet (50 mg total) by mouth every 6 (six) hours.  Dispense: 28 tablet; Refill: 0    2. Muscle strain  strongly favor msk strain vs costochondritis as cause of acute pain that was induced by sudden position change    Health Maintenance reviewed and discussed with patient.   RTC in PRN, sooner if needed.    Subjective:     Chief Complaint: Pain (Right side rib area) and Back Pain       Patient ID: Carmen Morrow is a 51 y.o. female who is here to discuss R lower rib pain.     Was seen here yesterday for the same issue:   Right lateral rib pain started 3-4 days ago after bending over at home. No rash. No bruising. No pain with inspiration. Feels like a pulling/catching sensation.   Also having low back pain, right sided, similar to prior pain, follows with pain management for this  If she is sitting there is no pain, seems to be radiating more towards the abdomen  Touching the area really increases her pain, also leaning forward or twisting movements are very painful  No chest pain or shortness of breath  No injury  No N/V, not worsened by eating  No rash  0/10 pain with sitting, 9/10 when pushing on the area       Taking motrin, finds it ineffective    Will trial tramadol and flexeril     Past Medical History:  Past Medical History:   Diagnosis Date    Allergy     Brachial neuritis or radiculitis NOS 11/14/2012    Degeneration of cervical intervertebral disc 11/14/2012    Essential hypertension 8/10/2020    GERD (gastroesophageal reflux disease)     Mixed hyperlipidemia 12/29/2021    SVT (supraventricular tachycardia)        Home Medications:  Prior to Admission medications    Medication Sig Start Date End  Date Taking? Authorizing Provider   acyclovir (ZOVIRAX) 200 MG capsule Take 1 capsule (200 mg total) by mouth as needed (for oral lesions). 21 Yes Claudia Max MD   amLODIPine (NORVASC) 5 MG tablet TAKE 1 TABLET BY MOUTH EVERY DAY 22  Yes Claudia Max MD   atorvastatin (LIPITOR) 20 MG tablet Take 1 tablet (20 mg total) by mouth once daily. 21 Yes Alex Wilson MD PhD   cetirizine (ZYRTEC) 10 MG tablet Take 10 mg by mouth as needed. 1 Tablet Oral Every day   Yes Historical Provider   esomeprazole (NEXIUM) 40 MG capsule TAKE 1 CAPSULE (40 MG TOTAL) BY MOUTH BEFORE BREAKFAST. 5/10/21 6/28/22 Yes Tori Bailon MD   fluticasone propionate (FLONASE) 50 mcg/actuation nasal spray USE 2 SPRAYS IN EACH NOSTRIL EVERY EVENING 5/10/21  Yes Tori Bailon MD   plecanatide (TRULANCE) 3 mg Tab Take 3 mg by mouth as needed (for severe pain associated with constipation). For severe constipation 21  Yes Claudia Max MD       Allergies:  Review of patient's allergies indicates:   Allergen Reactions    Oxycodone Itching     Can take hydrcodone       Social History:  Social History     Tobacco Use    Smoking status: Former Smoker     Quit date: 2000     Years since quittin.5    Smokeless tobacco: Never Used   Substance Use Topics    Alcohol use: No    Drug use: No        Review of Systems   Constitutional: Negative for fever and unexpected weight change.   HENT: Negative for sinus pressure and sore throat.    Eyes: Negative for visual disturbance.   Respiratory: Negative for cough and shortness of breath.    Cardiovascular: Negative for chest pain and palpitations.   Gastrointestinal: Negative for constipation, diarrhea, nausea and vomiting. Abdominal pain: point tenderness underneath ribcage.   Endocrine: Negative for polyuria.   Genitourinary: Negative for dysuria and urgency.   Musculoskeletal: Negative for arthralgias and myalgias.   Skin: Negative for rash.  "  Allergic/Immunologic: Negative for environmental allergies.   Neurological: Negative for dizziness, light-headedness and headaches.   Hematological: Does not bruise/bleed easily.   Psychiatric/Behavioral: Negative for agitation and decreased concentration. The patient is not nervous/anxious.          Health Maintenance:     Health Maintenance Due   Topic Date Due    HIV Screening  Never done    Shingles Vaccine (1 of 2) Never done    COVID-19 Vaccine (4 - Booster for Pfizer series) 04/28/2022       Objective:   /84 (BP Location: Left arm, Patient Position: Sitting, BP Method: Large (Manual))   Pulse 75   Ht 5' 5" (1.651 m)   Wt 74.9 kg (165 lb 2 oz)   LMP  (Within Months) Comment: six months ago  SpO2 98%   BMI 27.48 kg/m²        General: AAO x3, no apparent distress  HEENT: PERRL, OP clear  Neck: Supple   CV: RRR, no m/r/g  Pulm: Lungs CTAB, no crackles, no wheezes  Abd: point tenderness under ribcage on R side  Extremities: appears warm and well-perfused  Skin: no rashes, lesions, or tears    Labs:     Lab Results   Component Value Date    WBC 9.27 06/27/2022    HGB 13.9 06/27/2022    HCT 42.6 06/27/2022     06/27/2022    CHOL 129 11/08/2021    TRIG 43 11/08/2021    HDL 72 11/08/2021    ALT 22 06/27/2022    AST 20 06/27/2022     06/27/2022    K 4.5 06/27/2022     06/27/2022    CREATININE 0.7 06/27/2022    BUN 11 06/27/2022    CO2 23 06/27/2022    TSH 1.161 08/10/2020    INR 0.9 08/12/2019    HGBA1C 5.2 08/10/2020      CMP  Sodium   Date Value Ref Range Status   06/27/2022 140 136 - 145 mmol/L Final     Potassium   Date Value Ref Range Status   06/27/2022 4.5 3.5 - 5.1 mmol/L Final     Chloride   Date Value Ref Range Status   06/27/2022 106 95 - 110 mmol/L Final     CO2   Date Value Ref Range Status   06/27/2022 23 23 - 29 mmol/L Final     Glucose   Date Value Ref Range Status   06/27/2022 90 70 - 110 mg/dL Final     BUN   Date Value Ref Range Status   06/27/2022 11 6 - 20 mg/dL " Final     Creatinine   Date Value Ref Range Status   06/27/2022 0.7 0.5 - 1.4 mg/dL Final     Calcium   Date Value Ref Range Status   06/27/2022 9.8 8.7 - 10.5 mg/dL Final     Total Protein   Date Value Ref Range Status   06/27/2022 8.0 6.0 - 8.4 g/dL Final     Albumin   Date Value Ref Range Status   06/27/2022 4.1 3.5 - 5.2 g/dL Final     Total Bilirubin   Date Value Ref Range Status   06/27/2022 0.7 0.1 - 1.0 mg/dL Final     Comment:     For infants and newborns, interpretation of results should be based  on gestational age, weight and in agreement with clinical  observations.    Premature Infant recommended reference ranges:  Up to 24 hours.............<8.0 mg/dL  Up to 48 hours............<12.0 mg/dL  3-5 days..................<15.0 mg/dL  6-29 days.................<15.0 mg/dL       Alkaline Phosphatase   Date Value Ref Range Status   06/27/2022 66 55 - 135 U/L Final     AST   Date Value Ref Range Status   06/27/2022 20 10 - 40 U/L Final     ALT   Date Value Ref Range Status   06/27/2022 22 10 - 44 U/L Final     Anion Gap   Date Value Ref Range Status   06/27/2022 11 8 - 16 mmol/L Final     eGFR if    Date Value Ref Range Status   06/27/2022 >60.0 >60 mL/min/1.73 m^2 Final     eGFR if non    Date Value Ref Range Status   06/27/2022 >60.0 >60 mL/min/1.73 m^2 Final     Comment:     Calculation used to obtain the estimated glomerular filtration  rate (eGFR) is the CKD-EPI equation.        Lab Results   Component Value Date    HGBA1C 5.2 08/10/2020             Claudia Max MD   [facility]  Department of Internal Medicine   06/28/2022

## 2022-06-28 NOTE — TELEPHONE ENCOUNTER
----- Message from Daxa Damian PA-C sent at 6/28/2022  6:27 AM CDT -----  Please call patient  X-ray and US were fine  If pain has not improved, would recommend she see PCP to determine next steps in evaluation  Tentatively scheduled appointment for today if needed

## 2022-09-29 ENCOUNTER — LAB VISIT (OUTPATIENT)
Dept: LAB | Facility: HOSPITAL | Age: 52
End: 2022-09-29
Attending: OBSTETRICS & GYNECOLOGY
Payer: COMMERCIAL

## 2022-09-29 ENCOUNTER — OFFICE VISIT (OUTPATIENT)
Dept: OBSTETRICS AND GYNECOLOGY | Facility: CLINIC | Age: 52
End: 2022-09-29
Payer: COMMERCIAL

## 2022-09-29 VITALS
DIASTOLIC BLOOD PRESSURE: 76 MMHG | BODY MASS INDEX: 28.52 KG/M2 | SYSTOLIC BLOOD PRESSURE: 124 MMHG | WEIGHT: 171.44 LBS

## 2022-09-29 DIAGNOSIS — Z01.419 WELL WOMAN EXAM WITH ROUTINE GYNECOLOGICAL EXAM: Primary | ICD-10-CM

## 2022-09-29 DIAGNOSIS — Z01.419 WELL WOMAN EXAM WITH ROUTINE GYNECOLOGICAL EXAM: ICD-10-CM

## 2022-09-29 LAB
ALBUMIN SERPL BCP-MCNC: 4 G/DL (ref 3.5–5.2)
ALP SERPL-CCNC: 78 U/L (ref 55–135)
ALT SERPL W/O P-5'-P-CCNC: 19 U/L (ref 10–44)
ANION GAP SERPL CALC-SCNC: 9 MMOL/L (ref 8–16)
AST SERPL-CCNC: 20 U/L (ref 10–40)
BASOPHILS # BLD AUTO: 0.06 K/UL (ref 0–0.2)
BASOPHILS NFR BLD: 0.6 % (ref 0–1.9)
BILIRUB SERPL-MCNC: 1.4 MG/DL (ref 0.1–1)
BUN SERPL-MCNC: 10 MG/DL (ref 6–20)
CALCIUM SERPL-MCNC: 9.5 MG/DL (ref 8.7–10.5)
CHLORIDE SERPL-SCNC: 105 MMOL/L (ref 95–110)
CHOLEST SERPL-MCNC: 136 MG/DL (ref 120–199)
CHOLEST/HDLC SERPL: 2 {RATIO} (ref 2–5)
CO2 SERPL-SCNC: 26 MMOL/L (ref 23–29)
CREAT SERPL-MCNC: 0.8 MG/DL (ref 0.5–1.4)
DIFFERENTIAL METHOD: NORMAL
EOSINOPHIL # BLD AUTO: 0.1 K/UL (ref 0–0.5)
EOSINOPHIL NFR BLD: 1.3 % (ref 0–8)
ERYTHROCYTE [DISTWIDTH] IN BLOOD BY AUTOMATED COUNT: 13.6 % (ref 11.5–14.5)
EST. GFR  (NO RACE VARIABLE): >60 ML/MIN/1.73 M^2
FSH SERPL-ACNC: 61.32 MIU/ML
GLUCOSE SERPL-MCNC: 87 MG/DL (ref 70–110)
HCT VFR BLD AUTO: 43.4 % (ref 37–48.5)
HDLC SERPL-MCNC: 67 MG/DL (ref 40–75)
HDLC SERPL: 49.3 % (ref 20–50)
HGB BLD-MCNC: 14.2 G/DL (ref 12–16)
IMM GRANULOCYTES # BLD AUTO: 0.03 K/UL (ref 0–0.04)
IMM GRANULOCYTES NFR BLD AUTO: 0.3 % (ref 0–0.5)
LDLC SERPL CALC-MCNC: 56.4 MG/DL (ref 63–159)
LYMPHOCYTES # BLD AUTO: 2.4 K/UL (ref 1–4.8)
LYMPHOCYTES NFR BLD: 24.9 % (ref 18–48)
MCH RBC QN AUTO: 29 PG (ref 27–31)
MCHC RBC AUTO-ENTMCNC: 32.7 G/DL (ref 32–36)
MCV RBC AUTO: 89 FL (ref 82–98)
MONOCYTES # BLD AUTO: 0.5 K/UL (ref 0.3–1)
MONOCYTES NFR BLD: 5.6 % (ref 4–15)
NEUTROPHILS # BLD AUTO: 6.5 K/UL (ref 1.8–7.7)
NEUTROPHILS NFR BLD: 67.3 % (ref 38–73)
NONHDLC SERPL-MCNC: 69 MG/DL
NRBC BLD-RTO: 0 /100 WBC
PLATELET # BLD AUTO: 375 K/UL (ref 150–450)
PMV BLD AUTO: 10.4 FL (ref 9.2–12.9)
POTASSIUM SERPL-SCNC: 3.8 MMOL/L (ref 3.5–5.1)
PROT SERPL-MCNC: 8.2 G/DL (ref 6–8.4)
RBC # BLD AUTO: 4.9 M/UL (ref 4–5.4)
SODIUM SERPL-SCNC: 140 MMOL/L (ref 136–145)
TRIGL SERPL-MCNC: 63 MG/DL (ref 30–150)
TSH SERPL DL<=0.005 MIU/L-ACNC: 1.87 UIU/ML (ref 0.4–4)
WBC # BLD AUTO: 9.7 K/UL (ref 3.9–12.7)

## 2022-09-29 PROCEDURE — 80061 LIPID PANEL: CPT | Performed by: OBSTETRICS & GYNECOLOGY

## 2022-09-29 PROCEDURE — 82672 ASSAY OF ESTROGEN: CPT | Performed by: OBSTETRICS & GYNECOLOGY

## 2022-09-29 PROCEDURE — 99999 PR PBB SHADOW E&M-EST. PATIENT-LVL III: CPT | Mod: PBBFAC,,, | Performed by: OBSTETRICS & GYNECOLOGY

## 2022-09-29 PROCEDURE — 85025 COMPLETE CBC W/AUTO DIFF WBC: CPT | Performed by: OBSTETRICS & GYNECOLOGY

## 2022-09-29 PROCEDURE — 99396 PR PREVENTIVE VISIT,EST,40-64: ICD-10-PCS | Mod: S$GLB,,, | Performed by: OBSTETRICS & GYNECOLOGY

## 2022-09-29 PROCEDURE — 3008F BODY MASS INDEX DOCD: CPT | Mod: CPTII,S$GLB,, | Performed by: OBSTETRICS & GYNECOLOGY

## 2022-09-29 PROCEDURE — 1159F MED LIST DOCD IN RCRD: CPT | Mod: CPTII,S$GLB,, | Performed by: OBSTETRICS & GYNECOLOGY

## 2022-09-29 PROCEDURE — 88175 CYTOPATH C/V AUTO FLUID REDO: CPT | Performed by: OBSTETRICS & GYNECOLOGY

## 2022-09-29 PROCEDURE — 3074F SYST BP LT 130 MM HG: CPT | Mod: CPTII,S$GLB,, | Performed by: OBSTETRICS & GYNECOLOGY

## 2022-09-29 PROCEDURE — 1159F PR MEDICATION LIST DOCUMENTED IN MEDICAL RECORD: ICD-10-PCS | Mod: CPTII,S$GLB,, | Performed by: OBSTETRICS & GYNECOLOGY

## 2022-09-29 PROCEDURE — 3078F PR MOST RECENT DIASTOLIC BLOOD PRESSURE < 80 MM HG: ICD-10-PCS | Mod: CPTII,S$GLB,, | Performed by: OBSTETRICS & GYNECOLOGY

## 2022-09-29 PROCEDURE — 3074F PR MOST RECENT SYSTOLIC BLOOD PRESSURE < 130 MM HG: ICD-10-PCS | Mod: CPTII,S$GLB,, | Performed by: OBSTETRICS & GYNECOLOGY

## 2022-09-29 PROCEDURE — 80053 COMPREHEN METABOLIC PANEL: CPT | Performed by: OBSTETRICS & GYNECOLOGY

## 2022-09-29 PROCEDURE — 84402 ASSAY OF FREE TESTOSTERONE: CPT | Performed by: OBSTETRICS & GYNECOLOGY

## 2022-09-29 PROCEDURE — 83001 ASSAY OF GONADOTROPIN (FSH): CPT | Performed by: OBSTETRICS & GYNECOLOGY

## 2022-09-29 PROCEDURE — 84443 ASSAY THYROID STIM HORMONE: CPT | Performed by: OBSTETRICS & GYNECOLOGY

## 2022-09-29 PROCEDURE — 3008F PR BODY MASS INDEX (BMI) DOCUMENTED: ICD-10-PCS | Mod: CPTII,S$GLB,, | Performed by: OBSTETRICS & GYNECOLOGY

## 2022-09-29 PROCEDURE — 99396 PREV VISIT EST AGE 40-64: CPT | Mod: S$GLB,,, | Performed by: OBSTETRICS & GYNECOLOGY

## 2022-09-29 PROCEDURE — 3078F DIAST BP <80 MM HG: CPT | Mod: CPTII,S$GLB,, | Performed by: OBSTETRICS & GYNECOLOGY

## 2022-09-29 PROCEDURE — 36415 COLL VENOUS BLD VENIPUNCTURE: CPT | Performed by: OBSTETRICS & GYNECOLOGY

## 2022-09-29 PROCEDURE — 99999 PR PBB SHADOW E&M-EST. PATIENT-LVL III: ICD-10-PCS | Mod: PBBFAC,,, | Performed by: OBSTETRICS & GYNECOLOGY

## 2022-09-29 RX ORDER — TIZANIDINE 4 MG/1
4 TABLET ORAL 2 TIMES DAILY
COMMUNITY
Start: 2022-09-21 | End: 2022-10-24

## 2022-09-29 NOTE — PROGRESS NOTES
HPI:   52 y.o.   OB History          1    Para   0    Term   0            AB   1    Living   0         SAB   1    IAB        Ectopic        Multiple        Live Births                  No LMP recorded (lmp unknown).    Patient is  here for her annual gynecologic exam.  She has no complaints.     ROS:  GENERAL: No fever, chills, fatigability or weight loss.  SKIN: No rashes, itching or changes in color or texture of skin.  HEAD: No headaches or recent head trauma.  EYES: Visual acuity fine. No photophobia, ocular pain or diplopia.  EARS: Denies ear pain, discharge or vertigo.  NOSE: No loss of smell, no epistaxis or postnasal drip.  MOUTH & THROAT: No hoarseness or change in voice. No excessive gum bleeding.  NODES: Denies swollen glands.  CHEST: Denies MENDEZ, cyanosis, wheezing, cough and sputum production.  CARDIOVASCULAR: Denies chest pain, PND, orthopnea or reduced exercise tolerance.  ABDOMEN: Appetite fine. No weight loss. Denies diarrhea, abdominal pain, hematemesis or blood in stool.  URINARY: No flank pain, dysuria or hematuria.  PERIPHERAL VASCULAR: No claudication or cyanosis.  MUSCULOSKELETAL: No joint stiffness or swelling. Denies back pain.  NEUROLOGIC: No history of seizures, paralysis, alteration of gait or coordination.    PE:   /76   Wt 77.7 kg (171 lb 6.5 oz)   LMP  (LMP Unknown)   BMI 28.52 kg/m²   APPEARANCE: Well nourished, well developed, in no acute distress.  NECK: Neck symmetric without masses or thyromegaly.  BREASTS: Symmetrical, no skin changes or visible lesions. No palpable masses, nipple discharge or adenopathy bilaterally.  ABDOMEN: Flat. Soft. No tenderness or masses. No hepatosplenomegaly. No hernias. No CVA tenderness.  VULVA: No lesions. Normal female genitalia.  URETHRAL MEATUS: Normal size and location, no lesions, no prolapse.  URETHRA: No masses, tenderness, prolapse or scarring.  VAGINA: Moist and well rugated, no discharge, no significant cystocele or  rectocele.  CERVIX: No lesions and discharge. PAP done.  UTERUS: Normal size, regular shape, mobile, non-tender, bladder base nontender.  ADNEXA: No masses, tenderness or CDS nodularity.  ANUS PERINEUM: Normal.    PROCEDURES:  Pap smear    Assessment:  Normal Gynecologic Exam    Plan:  Mammogram and Colonoscopy if indicated by current recommendations.  Return to clinic in one year or for any problems or complaints.  No cycle 10 months  Hot fls,cant sleep. Hair falling   Labs  Then ? Hrt

## 2022-09-30 ENCOUNTER — PATIENT MESSAGE (OUTPATIENT)
Dept: OBSTETRICS AND GYNECOLOGY | Facility: CLINIC | Age: 52
End: 2022-09-30
Payer: COMMERCIAL

## 2022-10-03 LAB
ESTROGEN SERPL-MCNC: 133 PG/ML
TESTOST FREE SERPL-MCNC: 0.5 PG/ML

## 2022-10-09 ENCOUNTER — PATIENT MESSAGE (OUTPATIENT)
Dept: OBSTETRICS AND GYNECOLOGY | Facility: CLINIC | Age: 52
End: 2022-10-09
Payer: COMMERCIAL

## 2022-10-24 ENCOUNTER — OFFICE VISIT (OUTPATIENT)
Dept: INTERNAL MEDICINE | Facility: CLINIC | Age: 52
End: 2022-10-24
Payer: COMMERCIAL

## 2022-10-24 VITALS
WEIGHT: 176.13 LBS | HEIGHT: 65 IN | DIASTOLIC BLOOD PRESSURE: 86 MMHG | HEART RATE: 66 BPM | BODY MASS INDEX: 29.34 KG/M2 | OXYGEN SATURATION: 99 % | SYSTOLIC BLOOD PRESSURE: 138 MMHG

## 2022-10-24 DIAGNOSIS — B00.9 HSV (HERPES SIMPLEX VIRUS) INFECTION: ICD-10-CM

## 2022-10-24 DIAGNOSIS — Z00.00 ANNUAL PHYSICAL EXAM: Primary | ICD-10-CM

## 2022-10-24 DIAGNOSIS — I73.9 PAD (PERIPHERAL ARTERY DISEASE): ICD-10-CM

## 2022-10-24 DIAGNOSIS — M54.81 OCCIPITAL NEURALGIA, UNSPECIFIED LATERALITY: ICD-10-CM

## 2022-10-24 PROBLEM — M22.42 CHONDROMALACIA PATELLAE OF LEFT KNEE: Status: RESOLVED | Noted: 2017-12-27 | Resolved: 2022-10-24

## 2022-10-24 PROBLEM — M62.81 QUADRICEPS WEAKNESS: Status: RESOLVED | Noted: 2017-12-27 | Resolved: 2022-10-24

## 2022-10-24 PROBLEM — R29.898 WEAKNESS OF RIGHT UPPER EXTREMITY: Status: RESOLVED | Noted: 2019-05-29 | Resolved: 2022-10-24

## 2022-10-24 PROBLEM — Z22.7 LATENT TUBERCULOSIS BY SKIN TEST: Status: RESOLVED | Noted: 2017-02-08 | Resolved: 2022-10-24

## 2022-10-24 PROBLEM — M71.21 BAKER'S CYST OF KNEE, RIGHT: Status: RESOLVED | Noted: 2017-12-18 | Resolved: 2022-10-24

## 2022-10-24 PROCEDURE — 3079F DIAST BP 80-89 MM HG: CPT | Mod: CPTII,S$GLB,, | Performed by: INTERNAL MEDICINE

## 2022-10-24 PROCEDURE — 1159F PR MEDICATION LIST DOCUMENTED IN MEDICAL RECORD: ICD-10-PCS | Mod: CPTII,S$GLB,, | Performed by: INTERNAL MEDICINE

## 2022-10-24 PROCEDURE — 1160F RVW MEDS BY RX/DR IN RCRD: CPT | Mod: CPTII,S$GLB,, | Performed by: INTERNAL MEDICINE

## 2022-10-24 PROCEDURE — 99999 PR PBB SHADOW E&M-EST. PATIENT-LVL IV: ICD-10-PCS | Mod: PBBFAC,,, | Performed by: INTERNAL MEDICINE

## 2022-10-24 PROCEDURE — 99999 PR PBB SHADOW E&M-EST. PATIENT-LVL IV: CPT | Mod: PBBFAC,,, | Performed by: INTERNAL MEDICINE

## 2022-10-24 PROCEDURE — 1160F PR REVIEW ALL MEDS BY PRESCRIBER/CLIN PHARMACIST DOCUMENTED: ICD-10-PCS | Mod: CPTII,S$GLB,, | Performed by: INTERNAL MEDICINE

## 2022-10-24 PROCEDURE — 99396 PR PREVENTIVE VISIT,EST,40-64: ICD-10-PCS | Mod: S$GLB,,, | Performed by: INTERNAL MEDICINE

## 2022-10-24 PROCEDURE — 3079F PR MOST RECENT DIASTOLIC BLOOD PRESSURE 80-89 MM HG: ICD-10-PCS | Mod: CPTII,S$GLB,, | Performed by: INTERNAL MEDICINE

## 2022-10-24 PROCEDURE — 99396 PREV VISIT EST AGE 40-64: CPT | Mod: S$GLB,,, | Performed by: INTERNAL MEDICINE

## 2022-10-24 PROCEDURE — 3075F PR MOST RECENT SYSTOLIC BLOOD PRESS GE 130-139MM HG: ICD-10-PCS | Mod: CPTII,S$GLB,, | Performed by: INTERNAL MEDICINE

## 2022-10-24 PROCEDURE — 3075F SYST BP GE 130 - 139MM HG: CPT | Mod: CPTII,S$GLB,, | Performed by: INTERNAL MEDICINE

## 2022-10-24 PROCEDURE — 1159F MED LIST DOCD IN RCRD: CPT | Mod: CPTII,S$GLB,, | Performed by: INTERNAL MEDICINE

## 2022-10-24 RX ORDER — ACYCLOVIR 200 MG/1
200 CAPSULE ORAL
Qty: 10 CAPSULE | Refills: 5 | Status: SHIPPED | OUTPATIENT
Start: 2022-10-24 | End: 2023-10-24

## 2022-10-24 NOTE — PROGRESS NOTES
"    CHIEF COMPLAINT     Chief Complaint   Patient presents with    [facility]       HPI     Carmen Morrow is a 52 y.o. female w/DDD s/p cervical spinal fusion, HTN, PAD, GERDhere today for Exam     Overall doing well     Follows with outside pain provider regarding chronic neck pain and cervicogenic headaches.  Previously followed by Neurology had good therapeutic response he amitriptyline cause weight gain so she discontinued.  Previously followed by Dr. Mateo shields.      Hypertension hyperlipidemia controlled with medication     Recently started on hormone replacement therapy does notice kind of sense of warmth.  She has been on medication for 2 weeks.  Has a check in with her gyn in 2 weeks.    Personally Reviewed Patient's Medical, surgical, family and social hx. Changes updated in [facility].  Care Team updated in Epic    Review of Systems:  Review of Systems   Gastrointestinal:  Positive for constipation.   Musculoskeletal:  Positive for neck pain.     Health Maintenance:   Reviewed with patient  Due for the following:      PHYSICAL EXAM     /86 (BP Location: Right arm, Patient Position: Sitting, BP Method: Medium (Manual))   Pulse 66   Ht 5' 5" (1.651 m)   Wt 79.9 kg (176 lb 2.4 oz)   LMP  (LMP Unknown)   SpO2 99%   BMI 29.31 kg/m²     Gen: Well Appearing, NAD  HEENT: PERR, EOMI  Neck: FROM, no thyromegaly, no cervical adenopathy  CVD: RRR, no M/R/G  Pulm: Normal work of breathing, CTAB, no wheezing  Abd:  Soft, NT, ND non TTP, no mass  MSK: no LE edema  Neuro: A&Ox3, gait normal, speech normal  Mood; Mood normal, behavior normal, thought process linear       LABS     Labs reviewed; Notable for  Lab Results   Component Value Date    WBC 9.70 09/29/2022    HGB 14.2 09/29/2022    HCT 43.4 09/29/2022    MCV 89 09/29/2022     09/29/2022         Chemistry        Component Value Date/Time     09/29/2022 1108    K 3.8 09/29/2022 1108     09/29/2022 1108    CO2 26 09/29/2022 " 1108    BUN 10 09/29/2022 1108    CREATININE 0.8 09/29/2022 1108    GLU 87 09/29/2022 1108        Component Value Date/Time    CALCIUM 9.5 09/29/2022 1108    ALKPHOS 78 09/29/2022 1108    AST 20 09/29/2022 1108    ALT 19 09/29/2022 1108    BILITOT 1.4 (H) 09/29/2022 1108    ESTGFRAFRICA >60.0 06/27/2022 1016    EGFRNONAA >60.0 06/27/2022 1016        Lab Results   Component Value Date    HGBA1C 5.2 08/10/2020     Lab Results   Component Value Date    LDLCALC 56.4 (L) 09/29/2022       ASSESSMENT     1. Annual physical exam        2. HSV (herpes simplex virus) infection  acyclovir (ZOVIRAX) 200 MG capsule      3. PAD (peripheral artery disease)        4. Occipital neuralgia, unspecified laterality                Vanessa Morrow is a 52 y.o. female with DDD s/p cervical spinal fusion, HTN, PAD, GERD  1. Annual physical exam    2. HSV (herpes simplex virus) infection  - acyclovir (ZOVIRAX) 200 MG capsule; Take 1 capsule (200 mg total) by mouth as needed (for oral lesions).  Dispense: 10 capsule; Refill: 5    3. PAD (peripheral artery disease)    4. Occipital neuralgia, unspecified laterality      Bryce Tavares MD

## 2022-11-12 ENCOUNTER — PATIENT MESSAGE (OUTPATIENT)
Dept: OBSTETRICS AND GYNECOLOGY | Facility: CLINIC | Age: 52
End: 2022-11-12
Payer: COMMERCIAL

## 2022-11-14 ENCOUNTER — TELEPHONE (OUTPATIENT)
Dept: OBSTETRICS AND GYNECOLOGY | Facility: CLINIC | Age: 52
End: 2022-11-14
Payer: COMMERCIAL

## 2023-01-24 ENCOUNTER — OFFICE VISIT (OUTPATIENT)
Dept: DERMATOLOGY | Facility: CLINIC | Age: 53
End: 2023-01-24
Payer: COMMERCIAL

## 2023-01-24 VITALS — WEIGHT: 176 LBS | BODY MASS INDEX: 29.29 KG/M2

## 2023-01-24 DIAGNOSIS — L81.4 LENTIGINES: ICD-10-CM

## 2023-01-24 DIAGNOSIS — I78.1 TELANGIECTASIAS: ICD-10-CM

## 2023-01-24 DIAGNOSIS — Z12.83 SKIN EXAM, SCREENING FOR CANCER: ICD-10-CM

## 2023-01-24 DIAGNOSIS — L82.1 SEBORRHEIC KERATOSES: Primary | ICD-10-CM

## 2023-01-24 DIAGNOSIS — D22.9 NEVUS OF MULTIPLE SITES: ICD-10-CM

## 2023-01-24 PROCEDURE — 1159F PR MEDICATION LIST DOCUMENTED IN MEDICAL RECORD: ICD-10-PCS | Mod: CPTII,S$GLB,, | Performed by: DERMATOLOGY

## 2023-01-24 PROCEDURE — 1159F MED LIST DOCD IN RCRD: CPT | Mod: CPTII,S$GLB,, | Performed by: DERMATOLOGY

## 2023-01-24 PROCEDURE — 99203 PR OFFICE/OUTPT VISIT, NEW, LEVL III, 30-44 MIN: ICD-10-PCS | Mod: 25,S$GLB,, | Performed by: DERMATOLOGY

## 2023-01-24 PROCEDURE — 1160F RVW MEDS BY RX/DR IN RCRD: CPT | Mod: CPTII,S$GLB,, | Performed by: DERMATOLOGY

## 2023-01-24 PROCEDURE — 3008F PR BODY MASS INDEX (BMI) DOCUMENTED: ICD-10-PCS | Mod: CPTII,S$GLB,, | Performed by: DERMATOLOGY

## 2023-01-24 PROCEDURE — 1160F PR REVIEW ALL MEDS BY PRESCRIBER/CLIN PHARMACIST DOCUMENTED: ICD-10-PCS | Mod: CPTII,S$GLB,, | Performed by: DERMATOLOGY

## 2023-01-24 PROCEDURE — 3008F BODY MASS INDEX DOCD: CPT | Mod: CPTII,S$GLB,, | Performed by: DERMATOLOGY

## 2023-01-24 PROCEDURE — 99999 PR PBB SHADOW E&M-EST. PATIENT-LVL III: ICD-10-PCS | Mod: PBBFAC,,, | Performed by: DERMATOLOGY

## 2023-01-24 PROCEDURE — 99999 PR PBB SHADOW E&M-EST. PATIENT-LVL III: CPT | Mod: PBBFAC,,, | Performed by: DERMATOLOGY

## 2023-01-24 PROCEDURE — 17110 PR DESTRUCTION BENIGN LESIONS UP TO 14: ICD-10-PCS | Mod: S$GLB,,, | Performed by: DERMATOLOGY

## 2023-01-24 PROCEDURE — 17110 DESTRUCTION B9 LES UP TO 14: CPT | Mod: S$GLB,,, | Performed by: DERMATOLOGY

## 2023-01-24 PROCEDURE — 99203 OFFICE O/P NEW LOW 30 MIN: CPT | Mod: 25,S$GLB,, | Performed by: DERMATOLOGY

## 2023-01-24 NOTE — PROGRESS NOTES
"HPI:   47 y.o.   OB History      Para Term  AB Living    1       1 0    SAB TAB Ectopic Multiple Live Births    1               Patient's last menstrual period was 2017 (exact date).    Patient is  here for her annual gynecologic exam.  She has no complaints.     ROS:  GENERAL: No fever, chills, fatigability or weight loss.  SKIN: No rashes, itching or changes in color or texture of skin.  HEAD: No headaches or recent head trauma.  EYES: Visual acuity fine. No photophobia, ocular pain or diplopia.  EARS: Denies ear pain, discharge or vertigo.  NOSE: No loss of smell, no epistaxis or postnasal drip.  MOUTH & THROAT: No hoarseness or change in voice. No excessive gum bleeding.  NODES: Denies swollen glands.  CHEST: Denies MENDEZ, cyanosis, wheezing, cough and sputum production.  CARDIOVASCULAR: Denies chest pain, PND, orthopnea or reduced exercise tolerance.  ABDOMEN: Appetite fine. No weight loss. Denies diarrhea, abdominal pain, hematemesis or blood in stool.  URINARY: No flank pain, dysuria or hematuria.  PERIPHERAL VASCULAR: No claudication or cyanosis.  MUSCULOSKELETAL: No joint stiffness or swelling. Denies back pain.  NEUROLOGIC: No history of seizures, paralysis, alteration of gait or coordination.    PE:   /82   Ht 5' 5" (1.651 m)   Wt 80.5 kg (177 lb 7.5 oz)   LMP 2017 (Exact Date)   BMI 29.53 kg/m²   APPEARANCE: Well nourished, well developed, in no acute distress.  NECK: Neck symmetric without masses or thyromegaly.  BREASTS: Symmetrical, no skin changes or visible lesions. No palpable masses, nipple discharge or adenopathy bilaterally.  ABDOMEN: Flat. Soft. No tenderness or masses. No hepatosplenomegaly. No hernias. No CVA tenderness.  VULVA: No lesions. Normal female genitalia.  URETHRAL MEATUS: Normal size and location, no lesions, no prolapse.  URETHRA: No masses, tenderness, prolapse or scarring.  VAGINA: Moist and well rugated, no discharge, no significant cystocele " or rectocele.  CERVIX: No lesions and discharge. PAP done.  UTERUS: Normal size, regular shape, mobile, non-tender, bladder base nontender.  ADNEXA: No masses, tenderness or CDS nodularity.  ANUS PERINEUM: Normal.    PROCEDURES:  Pap smear    Assessment:  Normal Gynecologic Exam    Plan:  Mammogram and Colonoscopy if indicated by current recommendations.  Return to clinic in one year or for any problems or complaints.  Cycles irreg, sometime skips  Labs for hot flashes, irreg cyles   Male

## 2023-01-24 NOTE — PROGRESS NOTES
Subjective:       Patient ID:  Carmen Morrow is a 52 y.o. female who presents for   Chief Complaint   Patient presents with    Spot     Right upper thigh, discolor, x 2 months    Skin Check     UBSE     Would like skin check, spot which peels on right thigh.     Spot - Initial  Affected locations: left arm, face, right arm, chest, torso, back and abdomen  Signs / symptoms: asymptomatic  Aggravated by: nothing  Relieving factors/Treatments tried: nothing    Review of Systems   Constitutional:  Negative for fever, chills, weight loss, weight gain, fatigue and malaise.   Skin:  Positive for daily sunscreen use and activity-related sunscreen use. Negative for wears hat.   Hematologic/Lymphatic: Does not bruise/bleed easily.      Objective:    Physical Exam   Constitutional: She appears well-developed and well-nourished. No distress.   Neurological: She is alert and oriented to person, place, and time. She is not disoriented.   Psychiatric: She has a normal mood and affect.   Skin:   Areas Examined (abnormalities noted in diagram):   Head / Face Inspection Performed  Neck Inspection Performed  Chest / Axilla Inspection Performed  Abdomen Inspection Performed  Back Inspection Performed  RUE Inspected  LUE Inspection Performed  RLE Inspected  LLE Inspection Performed  Nails and Digits Inspection Performed                 Diagram Legend     Erythematous scaling macule/papule c/w actinic keratosis       Vascular papule c/w angioma      Pigmented verrucoid papule/plaque c/w seborrheic keratosis      Yellow umbilicated papule c/w sebaceous hyperplasia      Irregularly shaped tan macule c/w lentigo     1-2 mm smooth white papules consistent with Milia      Movable subcutaneous cyst with punctum c/w epidermal inclusion cyst      Subcutaneous movable cyst c/w pilar cyst      Firm pink to brown papule c/w dermatofibroma      Pedunculated fleshy papule(s) c/w skin tag(s)      Evenly pigmented macule c/w junctional nevus     " Mildly variegated pigmented, slightly irregular-bordered macule c/w mildly atypical nevus      Flesh colored to evenly pigmented papule c/w intradermal nevus       Pink pearly papule/plaque c/w basal cell carcinoma      Erythematous hyperkeratotic cursted plaque c/w SCC      Surgical scar with no sign of skin cancer recurrence      Open and closed comedones      Inflammatory papules and pustules      Verrucoid papule consistent consistent with wart     Erythematous eczematous patches and plaques     Dystrophic onycholytic nail with subungual debris c/w onychomycosis     Umbilicated papule    Erythematous-base heme-crusted tan verrucoid plaque consistent with inflamed seborrheic keratosis     Erythematous Silvery Scaling Plaque c/w Psoriasis     See annotation      Assessment / Plan:        Seborrheic keratoses  Brochure provided    Cryosurgery procedure note:    Verbal consent from the patient is obtained including, but not limited to, risk of hypopigmentation/hyperpigmentation, scar, recurrence of lesion. Liquid nitrogen cryosurgery is applied to 4 lesions to produce a freeze injury is instructed to call if lesions do not completely resolve.      Lentigines  The "ABCD" rules to observe pigmented lesions were reviewed.      Telangiectasias  reassurance      Nevus of multiple sites  The "ABCD" rules to observe pigmented lesions were reviewed.  Brochure provided      Skin exam, screening for cancer  . No lesions suspicious for malignancy noted.    Recommend daily sun protection/avoidance and use of at least SPF 30, broad spectrum sunscreen (OTC drug).                Follow up in about 1 year (around 1/24/2024).  "

## 2023-03-13 ENCOUNTER — OFFICE VISIT (OUTPATIENT)
Dept: FAMILY MEDICINE | Facility: CLINIC | Age: 53
End: 2023-03-13
Payer: COMMERCIAL

## 2023-03-13 VITALS
WEIGHT: 173.38 LBS | DIASTOLIC BLOOD PRESSURE: 88 MMHG | HEIGHT: 65 IN | TEMPERATURE: 98 F | HEART RATE: 76 BPM | SYSTOLIC BLOOD PRESSURE: 132 MMHG | BODY MASS INDEX: 28.89 KG/M2 | OXYGEN SATURATION: 98 %

## 2023-03-13 DIAGNOSIS — J34.89 RHINORRHEA: ICD-10-CM

## 2023-03-13 DIAGNOSIS — R05.9 COUGH, UNSPECIFIED TYPE: ICD-10-CM

## 2023-03-13 DIAGNOSIS — R06.02 SHORTNESS OF BREATH: Primary | ICD-10-CM

## 2023-03-13 DIAGNOSIS — R61 NIGHT SWEATS: ICD-10-CM

## 2023-03-13 LAB
CTP QC/QA: YES
CTP QC/QA: YES
FLUAV AG NPH QL: NEGATIVE
FLUBV AG NPH QL: NEGATIVE
SARS-COV-2 RDRP RESP QL NAA+PROBE: NEGATIVE

## 2023-03-13 PROCEDURE — 1160F PR REVIEW ALL MEDS BY PRESCRIBER/CLIN PHARMACIST DOCUMENTED: ICD-10-PCS | Mod: CPTII,S$GLB,,

## 2023-03-13 PROCEDURE — 3079F DIAST BP 80-89 MM HG: CPT | Mod: CPTII,S$GLB,,

## 2023-03-13 PROCEDURE — 99214 OFFICE O/P EST MOD 30 MIN: CPT | Mod: S$GLB,,,

## 2023-03-13 PROCEDURE — 3008F PR BODY MASS INDEX (BMI) DOCUMENTED: ICD-10-PCS | Mod: CPTII,S$GLB,,

## 2023-03-13 PROCEDURE — 87804 INFLUENZA ASSAY W/OPTIC: CPT | Mod: QW,S$GLB,,

## 2023-03-13 PROCEDURE — 99214 PR OFFICE/OUTPT VISIT, EST, LEVL IV, 30-39 MIN: ICD-10-PCS | Mod: S$GLB,,,

## 2023-03-13 PROCEDURE — 3008F BODY MASS INDEX DOCD: CPT | Mod: CPTII,S$GLB,,

## 2023-03-13 PROCEDURE — 87635: ICD-10-PCS | Mod: QW,S$GLB,,

## 2023-03-13 PROCEDURE — 1159F MED LIST DOCD IN RCRD: CPT | Mod: CPTII,S$GLB,,

## 2023-03-13 PROCEDURE — 99999 PR PBB SHADOW E&M-EST. PATIENT-LVL V: CPT | Mod: PBBFAC,,,

## 2023-03-13 PROCEDURE — 3079F PR MOST RECENT DIASTOLIC BLOOD PRESSURE 80-89 MM HG: ICD-10-PCS | Mod: CPTII,S$GLB,,

## 2023-03-13 PROCEDURE — 1159F PR MEDICATION LIST DOCUMENTED IN MEDICAL RECORD: ICD-10-PCS | Mod: CPTII,S$GLB,,

## 2023-03-13 PROCEDURE — 3075F PR MOST RECENT SYSTOLIC BLOOD PRESS GE 130-139MM HG: ICD-10-PCS | Mod: CPTII,S$GLB,,

## 2023-03-13 PROCEDURE — 3075F SYST BP GE 130 - 139MM HG: CPT | Mod: CPTII,S$GLB,,

## 2023-03-13 PROCEDURE — 87804 POCT INFLUENZA A/B: ICD-10-PCS | Mod: QW,S$GLB,,

## 2023-03-13 PROCEDURE — 1160F RVW MEDS BY RX/DR IN RCRD: CPT | Mod: CPTII,S$GLB,,

## 2023-03-13 PROCEDURE — 87635 SARS-COV-2 COVID-19 AMP PRB: CPT | Mod: QW,S$GLB,,

## 2023-03-13 PROCEDURE — 99999 PR PBB SHADOW E&M-EST. PATIENT-LVL V: ICD-10-PCS | Mod: PBBFAC,,,

## 2023-03-13 RX ORDER — PROMETHAZINE HYDROCHLORIDE AND DEXTROMETHORPHAN HYDROBROMIDE 6.25; 15 MG/5ML; MG/5ML
5 SYRUP ORAL EVERY 6 HOURS PRN
Qty: 118 ML | Refills: 0 | Status: SHIPPED | OUTPATIENT
Start: 2023-03-13 | End: 2023-03-23

## 2023-03-13 RX ORDER — AZELASTINE 1 MG/ML
1 SPRAY, METERED NASAL 2 TIMES DAILY
Qty: 30 ML | Refills: 0 | Status: SHIPPED | OUTPATIENT
Start: 2023-03-13 | End: 2023-08-13 | Stop reason: SDUPTHER

## 2023-03-13 NOTE — PATIENT INSTRUCTIONS
-take plain Mucinex OTC as directed, take above listed medications as directed  -Take OTC Tylenol or Ibuprofen for fever/pain  -Rest   -Increase water/fluid intake  -drink hot or cold fluids, use throat lozenges for sore throat  -use nasal saline rinses prior to using nasal sprays     Will notify you of your x-rays results via my chart.     If your symptoms worsen or you develop new worsening symptoms, please go to ED.   Follow up with your PCP as needed.

## 2023-03-13 NOTE — PROGRESS NOTES
Subjective:         Chief Complaint: Cough, Night Sweats, and Shortness of Breath (Symptoms started 3/12)     Carmen Morrow is a 52 y.o. female, patient of Bryce Tavares MD with headaches, lumbar disc disease, PAD, hypertension, hyperlipidemia, HSV, GERD, unknown to me, presents today with complaints of Cough, Night Sweats, and Shortness of Breath (Symptoms started 3/12)    Patient reports night sweats x 4 days that last for about 5 minutes which occurs several times per hour;  feels this is different from her hot flashes that she would normally get;  nonproductive cough that started on yesterday, feels like she is not able to take a deep breath, like not enough air is coming through the right lung, not so much shortness of breath, this started on this morning.   Has not tried any OTC meds other than motrin.   History of numerous sinus surgeries in the past.   Denies sick contacts.     URI   This is a new problem. The current episode started in the past 7 days. The problem has been gradually worsening. There has been no fever. Associated symptoms include congestion, coughing and rhinorrhea. Pertinent negatives include no abdominal pain, chest pain, diarrhea, headaches, joint pain, joint swelling, nausea, plugged ear sensation, sinus pain, sneezing, sore throat or vomiting. She has tried nothing for the symptoms.     Past Medical History:   Diagnosis Date    Allergy     Brachial neuritis or radiculitis NOS 11/14/2012    Degeneration of cervical intervertebral disc 11/14/2012    Essential hypertension 8/10/2020    GERD (gastroesophageal reflux disease)     Latent tuberculosis by skin test 2/8/2017    Mixed hyperlipidemia 12/29/2021    SVT (supraventricular tachycardia)        Past Surgical History:   Procedure Laterality Date    anterior diskectomy fusion  05/20/2016    AUGMENTATION OF BREAST Bilateral     1994-95    CARPAL TUNNEL RELEASE Left 01/30/2018    CORRECTION OF HAMMER TOE Right 02/02/2021     Procedure: CORRECTION, HAMMER TOE;  Surgeon: Maday Thomas DPM;  Location: [facility] OR;  Service: Podiatry;  Laterality: Right;  4th toe      CSF leak  2016    Replace and repair fat graft from surgery on 2016    ESOPHAGOGASTRODUODENOSCOPY N/A 2020    Procedure: ESOPHAGOGASTRODUODENOSCOPY (EGD);  Surgeon: Alexi Macias MD;  Location: [facility] ENDO (4TH FLR);  Service: Endoscopy;  Laterality: N/A;  prep ins. emailed - COVID screening [place] urgent [facility]    KNEE ARTHROSCOPY      LAMINECTOMY      SPINAL FUSION      SURGICAL REMOVAL OF BUNION WITH OSTEOTOMY OF METATARSAL BONE Right 2021    Procedure: BUNIONECTOMY, WITH METATARSAL OSTEOTOMY;  Surgeon: Maday Thomas DPM;  Location: [facility] OR;  Service: Podiatry;  Laterality: Right;       Family History   Problem Relation Age of Onset    Heart disease Mother     Hypertension Mother     Asthma Mother     Heart disease Father     COPD Father         smoker    Diabetes Father     Coronary artery disease Sister         [id number],    Neuropathy Sister     Cirrhosis Brother         Hep C-    Ovarian cancer Neg Hx     Colon cancer Neg Hx     Breast cancer Neg Hx     Cancer Neg Hx        Social History     Socioeconomic History    Marital status:    Occupational History    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 [employer]     Employer: [employer]   Tobacco Use    Smoking status: Former     Types: Cigarettes     Quit date: 2000     Years since quittin.2    Smokeless tobacco: Never   Substance and Sexual Activity    Alcohol use: No    Drug use: No    Sexual activity: Yes     Partners: Male     Birth control/protection: None     Social Determinants of Health     Financial Resource Strain: Low Risk     Difficulty of Paying Living Expenses: Not hard at all   Food Insecurity: No Food Insecurity    Worried About Running Out of Food in the Last Year: Never true    Ran Out of Food in the Last Year: Never true   Transportation Needs: No Transportation  "Needs    Lack of Transportation (Medical): No    Lack of Transportation (Non-Medical): No   Physical Activity: Insufficiently Active    Days of Exercise per Week: 2 days    Minutes of Exercise per Session: 20 min   Stress: Stress Concern Present    Feeling of Stress : To some extent   Social Connections: Unknown    Frequency of Communication with Friends and Family: More than three times a week    Frequency of Social Gatherings with Friends and Family: More than three times a week    Active Member of Clubs or Organizations: No    Attends Club or Organization Meetings: Never    Marital Status:    Housing Stability: Low Risk     Unable to Pay for Housing in the Last Year: No    Number of Places Lived in the Last Year: 1    Unstable Housing in the Last Year: No       Review of Systems   Constitutional:  Positive for diaphoresis. Negative for appetite change, fatigue and fever.   HENT:  Positive for congestion and rhinorrhea. Negative for ear discharge, postnasal drip, sinus pressure, sinus pain, sneezing and sore throat.    Respiratory:  Positive for cough and shortness of breath.    Cardiovascular:  Negative for chest pain, palpitations and leg swelling.   Gastrointestinal:  Negative for abdominal pain, diarrhea, nausea and vomiting.   Genitourinary: Negative.    Musculoskeletal:  Negative for arthralgias, joint pain and myalgias.   Skin:  Negative for color change.   Neurological:  Negative for dizziness, weakness, light-headedness and headaches.   Psychiatric/Behavioral:  Negative for decreased concentration. The patient is not nervous/anxious.        Objective:     Vitals:    03/13/23 0813   BP: 132/88   Pulse: 76   Temp: 98.3 °F (36.8 °C)   SpO2: 98%   Weight: 78.7 kg (173 lb 6.4 oz)   Height: 5' 5" (1.651 m)          Physical Exam  Vitals reviewed.   Constitutional:       Appearance: Normal appearance. She is well-developed and well-groomed.   HENT:      Head: Normocephalic and atraumatic.      Right Ear: " A middle ear effusion is present.      Left Ear: Tympanic membrane normal.      Nose: Congestion present.      Right Turbinates: Enlarged.      Left Turbinates: Enlarged.      Right Sinus: No maxillary sinus tenderness or frontal sinus tenderness.      Left Sinus: No maxillary sinus tenderness or frontal sinus tenderness.      Mouth/Throat:      Mouth: Mucous membranes are moist.      Pharynx: Posterior oropharyngeal erythema present. No pharyngeal swelling or oropharyngeal exudate.   Cardiovascular:      Rate and Rhythm: Normal rate and regular rhythm.      Heart sounds: Normal heart sounds.   Pulmonary:      Effort: Pulmonary effort is normal.      Breath sounds: Normal breath sounds. No wheezing, rhonchi or rales.      Comments: Frequent cough   Skin:     General: Skin is warm and dry.      Capillary Refill: Capillary refill takes less than 2 seconds.   Neurological:      General: No focal deficit present.      Mental Status: She is alert and oriented to person, place, and time.   Psychiatric:         Attention and Perception: Attention normal.         Mood and Affect: Mood normal.         Speech: Speech normal.         Behavior: Behavior is cooperative.         Laboratory:  CBC:  Recent Labs   Lab Result Units 03/13/23  0926   WBC K/uL 10.66   RBC M/uL 4.73   Hemoglobin g/dL 13.8   Hematocrit % 41.9   Platelets K/uL 341   MCV fL 89   MCH pg 29.2   MCHC g/dL 32.9     CMP:  Recent Labs   Lab Result Units 03/13/23  0926   Glucose mg/dL 101   Calcium mg/dL 9.2   Albumin g/dL 4.7   Total Protein g/dL 8.4   Sodium mmol/L 144   Potassium mmol/L 4.4   CO2 mmol/L 31*   Chloride mmol/L 108   BUN mg/dL 11   Alkaline Phosphatase U/L 78   ALT U/L 32   AST U/L 29   Total Bilirubin mg/dL 0.6     X-Ray Chest PA And Lateral  Order: [id number]  Status: Final result     Visible to patient: Yes (not seen)     Dx: Shortness of breath     0 Result Notes      Details    Reading Physician Reading Date Result Priority   Caleb White,  MD  [phone]  [phone] 3/13/2023 Routine     Narrative & Impression  EXAMINATION:  XR CHEST PA AND LATERAL     CLINICAL HISTORY:  Shortness of breath     TECHNIQUE:  PA and lateral views of the chest were performed.     FINDINGS:  The lungs are clear.  There is no pneumothorax or pleural fluid.  The cardiac silhouette is not enlarged.  The osseous structures demonstrate degenerative change.     Impression:     As above.        Electronically signed by: Caleb White MD  Date:                                            03/13/2023  Time:                                           10:20       Assessment:         ICD-10-CM ICD-9-CM   1. Shortness of breath  R06.02 786.05   2. Cough, unspecified type  R05.9 786.2   3. Night sweats  R61 780.8   4. Rhinorrhea  J34.89 478.19       Plan:       Shortness of breath  -     X-Ray Chest PA And Lateral; Future; Expected date: 03/13/2023  -     CBC Auto Differential; Future; Expected date: 03/13/2023  -     Comprehensive Metabolic Panel; Future; Expected date: 03/13/2023  Chest x-ray does not show any pneumonia, The lungs are clear.  There is no pneumothorax or pleural fluid.  The cardiac silhouette is not enlarged.  The osseous structures demonstrate degenerative change.    Cough, unspecified type  -     promethazine-dextromethorphan (PROMETHAZINE-DM) 6.25-15 mg/5 mL Syrp; Take 5 mLs by mouth every 6 (six) hours as needed (cough).  Dispense: 118 mL; Refill: 0  -     CBC Auto Differential; Future; Expected date: 03/13/2023  -     Comprehensive Metabolic Panel; Future; Expected date: 03/13/2023  -     POCT COVID-19 Rapid Screening  -     POCT Influenza A/B  -Flu and COVID negative.   -take plain Mucinex OTC as directed, take above listed medications as directed  -Take OTC Tylenol or Ibuprofen for fever/pain  -Rest   -Increase water/fluid intake  -drink hot or cold fluids, use throat lozenges for sore throat  -use nasal saline rinses prior to using nasal sprays     Night  sweats  -     CBC Auto Differential; Future; Expected date: 03/13/2023  -     Comprehensive Metabolic Panel; Future; Expected date: 03/13/2023  No acute concerns noted on labs.     Rhinorrhea  -     azelastine (ASTELIN) 137 mcg (0.1 %) nasal spray; 1 spray (137 mcg total) by Nasal route 2 (two) times daily.  Dispense: 30 mL; Refill: 0  Instructed to continue Flonase and saline rinses and add astelin daily.       If your symptoms worsen or you develop new worsening symptoms, please go to ED.   If symptoms do not improve, return to clinic.   Keep appointments with all specialists.   Follow up with PCP as needed.     Patient verbalizes understanding and agrees with current treatment plan.      Follow up if symptoms worsen or fail to improve.     Patient's Medications   New Prescriptions    AZELASTINE (ASTELIN) 137 MCG (0.1 %) NASAL SPRAY    1 spray (137 mcg total) by Nasal route 2 (two) times daily.    PROMETHAZINE-DEXTROMETHORPHAN (PROMETHAZINE-DM) 6.25-15 MG/5 ML SYRP    Take 5 mLs by mouth every 6 (six) hours as needed (cough).   Previous Medications    ACYCLOVIR (ZOVIRAX) 200 MG CAPSULE    Take 1 capsule (200 mg total) by mouth as needed (for oral lesions).    AMLODIPINE (NORVASC) 5 MG TABLET    TAKE 1 TABLET BY MOUTH EVERY DAY    ATORVASTATIN (LIPITOR) 20 MG TABLET    Take 1 tablet (20 mg total) by mouth once daily.    CETIRIZINE (ZYRTEC) 10 MG TABLET    Take 10 mg by mouth as needed. 1 Tablet Oral Every day    ESOMEPRAZOLE (NEXIUM) 40 MG CAPSULE    TAKE 1 CAPSULE (40 MG TOTAL) BY MOUTH BEFORE BREAKFAST.    ESTRADIOL-NORETHINDRONE ACET 0.5-0.1 MG PER TABLET    TAKE 1 TABLET BY MOUTH EVERY DAY    FLUTICASONE PROPIONATE (FLONASE) 50 MCG/ACTUATION NASAL SPRAY    USE 2 SPRAYS IN EACH NOSTRIL EVERY EVENING    PLECANATIDE (TRULANCE) 3 MG TAB    Take 3 mg by mouth as needed (for severe pain associated with constipation). For severe constipation   Modified Medications    No medications on file   Discontinued Medications     No medications on file         Laila Vasquez NP

## 2023-03-14 ENCOUNTER — PATIENT MESSAGE (OUTPATIENT)
Dept: INTERNAL MEDICINE | Facility: CLINIC | Age: 53
End: 2023-03-14
Payer: COMMERCIAL

## 2023-03-14 ENCOUNTER — OFFICE VISIT (OUTPATIENT)
Dept: URGENT CARE | Facility: CLINIC | Age: 53
End: 2023-03-14
Payer: COMMERCIAL

## 2023-03-14 VITALS
OXYGEN SATURATION: 96 % | HEIGHT: 65 IN | HEART RATE: 101 BPM | BODY MASS INDEX: 28.82 KG/M2 | WEIGHT: 173 LBS | DIASTOLIC BLOOD PRESSURE: 86 MMHG | TEMPERATURE: 98 F | RESPIRATION RATE: 18 BRPM | SYSTOLIC BLOOD PRESSURE: 140 MMHG

## 2023-03-14 DIAGNOSIS — R05.9 COUGH, UNSPECIFIED TYPE: ICD-10-CM

## 2023-03-14 DIAGNOSIS — B34.9 ACUTE VIRAL SYNDROME: Primary | ICD-10-CM

## 2023-03-14 LAB
CTP QC/QA: YES
SARS-COV-2 AG RESP QL IA.RAPID: NEGATIVE

## 2023-03-14 PROCEDURE — 99213 PR OFFICE/OUTPT VISIT, EST, LEVL III, 20-29 MIN: ICD-10-PCS | Mod: S$GLB,,, | Performed by: PHYSICIAN ASSISTANT

## 2023-03-14 PROCEDURE — 87811 SARS-COV-2 COVID19 W/OPTIC: CPT | Mod: QW,S$GLB,, | Performed by: PHYSICIAN ASSISTANT

## 2023-03-14 PROCEDURE — 87811 SARS CORONAVIRUS 2 ANTIGEN POCT, MANUAL READ: ICD-10-PCS | Mod: QW,S$GLB,, | Performed by: PHYSICIAN ASSISTANT

## 2023-03-14 PROCEDURE — 99213 OFFICE O/P EST LOW 20 MIN: CPT | Mod: S$GLB,,, | Performed by: PHYSICIAN ASSISTANT

## 2023-03-14 NOTE — PROGRESS NOTES
"Subjective:       Patient ID: Carmen Morrow is a 52 y.o. female.    Vitals:  height is 5' 5" (1.651 m) and weight is 78.5 kg (173 lb). Her oral temperature is 98.4 °F (36.9 °C). Her blood pressure is 140/86 (abnormal) and her pulse is 101. Her respiration is 18 and oxygen saturation is 96%.     Chief Complaint: URI    Pt states that she went to [facility] urgent care on yesterday and was tested for COVID and Flu. She developed no taste and smell on today and was told to get retested. Pt states that she feels worst today.    Patient provider note starts here:  Patient presents to the clinic requesting a repeat COVID test today.  Patient presents with a 4 day history nasal congestion, cough, sinus pressure, and some shortness of breath.  She was evaluated by nurse practitioner yesterday and tested negative for COVID as well as influenza.  She also had a chest x-ray and labs taking.  Chest x-ray was unremarkable.  She was prescribed promethazine DM in addition to Astelin nasal spray.  She is also been taking Mucinex.  Reports that this morning she felt slightly worse and actually loss of since of taste and smell.  Also states that she is had a few episodes of watery diarrhea.  She messaged her PCP clinic and was advised to return to an urgent care for repeat COVID test to ensure that was not a false negative yesterday.  Patient denies chest pain or fevers.  Reports that her shortness of breath is improved from yesterday.    URI   This is a new problem. The current episode started in the past 7 days. The problem has been gradually worsening. There has been no fever. Associated symptoms include congestion, coughing, diarrhea and headaches. Pertinent negatives include no abdominal pain, chest pain, nausea, neck pain, sore throat, vomiting or wheezing. Treatments tried: tylenol, motrin, mucinex, OTC day time cough medinice.     Constitution: Positive for fatigue. Negative for fever.   HENT:  Positive for congestion " and postnasal drip. Negative for sore throat.    Neck: Negative for neck pain.   Cardiovascular:  Negative for chest pain, palpitations and sob on exertion.   Respiratory:  Positive for cough. Negative for chest tightness, sputum production, shortness of breath (none today) and wheezing.    Gastrointestinal:  Positive for diarrhea. Negative for abdominal pain, nausea and vomiting.   Skin:  Negative for color change and wound.   Neurological:  Positive for headaches. Negative for numbness and tingling.     Objective:      Physical Exam   Constitutional: She is oriented to person, place, and time. She appears well-developed. She is cooperative.  Non-toxic appearance. She appears ill (Patient appears to not feel well). No distress.   HENT:   Head: Normocephalic and atraumatic.   Ears:   Right Ear: Hearing and external ear normal.   Left Ear: Hearing and external ear normal.   Nose: Rhinorrhea and congestion present. No mucosal edema or nasal deformity. No epistaxis. Right sinus exhibits no maxillary sinus tenderness and no frontal sinus tenderness. Left sinus exhibits no maxillary sinus tenderness and no frontal sinus tenderness.      Comments: Significant nasal congestion noted on exam    Mouth/Throat: Uvula is midline, oropharynx is clear and moist and mucous membranes are normal. No trismus in the jaw. Normal dentition. No uvula swelling. No posterior oropharyngeal edema.   Eyes: Conjunctivae and lids are normal. No scleral icterus.   Neck: Trachea normal and phonation normal. Neck supple. No edema present. No erythema present. No neck rigidity present.   Cardiovascular: Normal rate, regular rhythm, normal heart sounds and normal pulses.   Pulmonary/Chest: Effort normal and breath sounds normal. No respiratory distress. She has no decreased breath sounds. She has no wheezes. She has no rhonchi.   Abdominal: Normal appearance.   Musculoskeletal: Normal range of motion.         General: No deformity. Normal range of  motion.   Neurological: She is alert and oriented to person, place, and time. She exhibits normal muscle tone. Coordination normal.   Skin: Skin is warm, dry, intact, not diaphoretic and not pale.   Psychiatric: Her speech is normal and behavior is normal. Judgment and thought content normal.   Nursing note and vitals reviewed.      Assessment:       1. Acute viral syndrome    2. Cough, unspecified type        Results for orders placed or performed in visit on 03/14/23   SARS Coronavirus 2 Antigen, POCT Manual Read   Result Value Ref Range    SARS Coronavirus 2 Antigen Negative Negative     Acceptable Yes        Plan:         Acute viral syndrome    Cough, unspecified type  -     SARS Coronavirus 2 Antigen, POCT Manual Read           Medical Decision Making:   History:   Old Medical Records: I decided to obtain old medical records.  Old Records Summarized: records from clinic visits.       <> Summary of Records: Evaluated yesterday and had negative flu and COVID test.  Also had labs obtained as well as a negative chest x-ray.  Labs were grossly unremarkable.  Differential Diagnosis:   Differential diagnosis includes but is not limited to: viral vs bacterial URI, pharyngitis, otitis, COVID 19, influenza, pneumonia.    Clinical Tests:   Lab Tests: Ordered and Reviewed       <> Summary of Lab: COVID negative  Urgent Care Management:  I have reviewed past medical records including labs and imaging to evaluate for trends and previous treatments for related symptoms.   Patient presents to the clinic with complaints of URI like symptoms with associated loss of taste and smell as well as diarrhea.  Symptoms have been present for the past 5 days.  On exam, she is afebrile and nontoxic in appearance but does appear to not feel well.  Significant nasal congestion noted.  Lungs are clear to auscultation bilaterally.  COVID test is negative once again.  Patient's symptoms and findings are most consistent with a  viral etiology.  I did offer a short burst of steroids however, patient did declined.  She was advised to continue the promethazine DM and Mucinex as needed.  ED precautions were discussed.  She verbalized understanding and agreed with plan.       Patient Instructions   You must understand that you've received an Urgent Care treatment only and that you may be released before all your medical problems are known or treated. You, the patient, will arrange for follow up care as instructed.      Follow up with your PCP or specialty clinic as instructed in the next 2-3 days if not improved or as needed. You can call [phone] to schedule an appointment with appropriate provider.      If you condition worsens, we recommend that you receive another evaluation at the emergency room immediately or contact your primary medical clinic's after hours call service to discuss your concerns.      Please return here or go to the Emergency Department for any concerns or worsening condition.      If you were prescribed a narcotic or controlled substance, do not drive or operate heavy equipment or machinery while taking these medications.

## 2023-03-28 ENCOUNTER — PATIENT MESSAGE (OUTPATIENT)
Dept: INTERNAL MEDICINE | Facility: CLINIC | Age: 53
End: 2023-03-28
Payer: COMMERCIAL

## 2023-05-30 ENCOUNTER — PATIENT MESSAGE (OUTPATIENT)
Dept: INTERNAL MEDICINE | Facility: CLINIC | Age: 53
End: 2023-05-30
Payer: COMMERCIAL

## 2023-05-30 DIAGNOSIS — Z12.39 ENCOUNTER FOR SCREENING FOR MALIGNANT NEOPLASM OF BREAST, UNSPECIFIED SCREENING MODALITY: Primary | ICD-10-CM

## 2023-06-02 ENCOUNTER — APPOINTMENT (OUTPATIENT)
Dept: RADIOLOGY | Facility: OTHER | Age: 53
End: 2023-06-02
Attending: INTERNAL MEDICINE
Payer: COMMERCIAL

## 2023-06-02 VITALS — BODY MASS INDEX: 28.83 KG/M2 | WEIGHT: 173.06 LBS | HEIGHT: 65 IN

## 2023-06-02 DIAGNOSIS — Z12.39 ENCOUNTER FOR SCREENING FOR MALIGNANT NEOPLASM OF BREAST, UNSPECIFIED SCREENING MODALITY: ICD-10-CM

## 2023-06-02 PROCEDURE — 77067 SCR MAMMO BI INCL CAD: CPT | Mod: 26,,, | Performed by: RADIOLOGY

## 2023-06-02 PROCEDURE — 77063 BREAST TOMOSYNTHESIS BI: CPT | Mod: 26,,, | Performed by: RADIOLOGY

## 2023-06-02 PROCEDURE — 77063 MAMMO DIGITAL SCREENING BILAT WITH TOMO: ICD-10-PCS | Mod: 26,,, | Performed by: RADIOLOGY

## 2023-06-02 PROCEDURE — 77067 MAMMO DIGITAL SCREENING BILAT WITH TOMO: ICD-10-PCS | Mod: 26,,, | Performed by: RADIOLOGY

## 2023-06-02 PROCEDURE — 77067 SCR MAMMO BI INCL CAD: CPT | Mod: TC,PN

## 2023-07-20 NOTE — PLAN OF CARE
Patient prefers to have Brayden Morrow (spouse) present for discharge teaching. Please contact them [phone].    severe

## 2023-08-13 ENCOUNTER — PATIENT MESSAGE (OUTPATIENT)
Dept: INTERNAL MEDICINE | Facility: CLINIC | Age: 53
End: 2023-08-13
Payer: COMMERCIAL

## 2023-08-13 DIAGNOSIS — J34.89 RHINORRHEA: ICD-10-CM

## 2023-08-13 DIAGNOSIS — K59.04 CHRONIC IDIOPATHIC CONSTIPATION: ICD-10-CM

## 2023-08-14 RX ORDER — AZELASTINE 1 MG/ML
1 SPRAY, METERED NASAL 2 TIMES DAILY
Qty: 30 ML | Refills: 3 | Status: SHIPPED | OUTPATIENT
Start: 2023-08-14 | End: 2023-11-08

## 2023-08-14 NOTE — TELEPHONE ENCOUNTER
Pt requesting med refill, med pended. Last prescribed by Dr Winter LEUNG with [facility], Bryce BRENNAN MD , 10/24/2022

## 2023-10-28 ENCOUNTER — OFFICE VISIT (OUTPATIENT)
Dept: URGENT CARE | Facility: CLINIC | Age: 53
End: 2023-10-28
Payer: COMMERCIAL

## 2023-10-28 VITALS
TEMPERATURE: 99 F | BODY MASS INDEX: 28.16 KG/M2 | SYSTOLIC BLOOD PRESSURE: 162 MMHG | OXYGEN SATURATION: 98 % | DIASTOLIC BLOOD PRESSURE: 98 MMHG | HEIGHT: 65 IN | HEART RATE: 64 BPM | WEIGHT: 169 LBS | RESPIRATION RATE: 18 BRPM

## 2023-10-28 DIAGNOSIS — I10 HYPERTENSION, UNSPECIFIED TYPE: ICD-10-CM

## 2023-10-28 DIAGNOSIS — K21.9 GASTROESOPHAGEAL REFLUX DISEASE, UNSPECIFIED WHETHER ESOPHAGITIS PRESENT: ICD-10-CM

## 2023-10-28 DIAGNOSIS — K59.04 CHRONIC IDIOPATHIC CONSTIPATION: ICD-10-CM

## 2023-10-28 DIAGNOSIS — R31.29 MICROSCOPIC HEMATURIA: ICD-10-CM

## 2023-10-28 DIAGNOSIS — R51.9 NONINTRACTABLE HEADACHE, UNSPECIFIED CHRONICITY PATTERN, UNSPECIFIED HEADACHE TYPE: Primary | ICD-10-CM

## 2023-10-28 DIAGNOSIS — M54.81 BILATERAL OCCIPITAL NEURALGIA: ICD-10-CM

## 2023-10-28 DIAGNOSIS — E78.5 HYPERLIPIDEMIA, UNSPECIFIED HYPERLIPIDEMIA TYPE: ICD-10-CM

## 2023-10-28 DIAGNOSIS — R09.81 NASAL CONGESTION: ICD-10-CM

## 2023-10-28 LAB
BILIRUB UR QL STRIP: NEGATIVE
CTP QC/QA: YES
CTP QC/QA: YES
GLUCOSE UR QL STRIP: NEGATIVE
KETONES UR QL STRIP: NEGATIVE
LEUKOCYTE ESTERASE UR QL STRIP: NEGATIVE
PH, POC UA: 6.5 (ref 5–8)
POC BLOOD, URINE: POSITIVE
POC MOLECULAR INFLUENZA A AGN: NEGATIVE
POC MOLECULAR INFLUENZA B AGN: NEGATIVE
POC NITRATES, URINE: NEGATIVE
PROT UR QL STRIP: NEGATIVE
SARS-COV-2 AG RESP QL IA.RAPID: NEGATIVE
SP GR UR STRIP: 1.02 (ref 1–1.03)
UROBILINOGEN UR STRIP-ACNC: ABNORMAL (ref 0.1–1.1)

## 2023-10-28 PROCEDURE — 96372 PR INJECTION,THERAP/PROPH/DIAG2ST, IM OR SUBCUT: ICD-10-PCS | Mod: S$GLB,,, | Performed by: PHYSICIAN ASSISTANT

## 2023-10-28 PROCEDURE — 87086 URINE CULTURE/COLONY COUNT: CPT | Performed by: PHYSICIAN ASSISTANT

## 2023-10-28 PROCEDURE — 99214 PR OFFICE/OUTPT VISIT, EST, LEVL IV, 30-39 MIN: ICD-10-PCS | Mod: 25,S$GLB,, | Performed by: PHYSICIAN ASSISTANT

## 2023-10-28 PROCEDURE — 81003 URINALYSIS AUTO W/O SCOPE: CPT | Mod: QW,S$GLB,, | Performed by: PHYSICIAN ASSISTANT

## 2023-10-28 PROCEDURE — 87502 POCT INFLUENZA A/B MOLECULAR: ICD-10-PCS | Mod: QW,S$GLB,, | Performed by: PHYSICIAN ASSISTANT

## 2023-10-28 PROCEDURE — 81003 POCT URINALYSIS, DIPSTICK, AUTOMATED, W/O SCOPE: ICD-10-PCS | Mod: QW,S$GLB,, | Performed by: PHYSICIAN ASSISTANT

## 2023-10-28 PROCEDURE — 87502 INFLUENZA DNA AMP PROBE: CPT | Mod: QW,S$GLB,, | Performed by: PHYSICIAN ASSISTANT

## 2023-10-28 PROCEDURE — 96372 THER/PROPH/DIAG INJ SC/IM: CPT | Mod: S$GLB,,, | Performed by: PHYSICIAN ASSISTANT

## 2023-10-28 PROCEDURE — 87811 SARS-COV-2 COVID19 W/OPTIC: CPT | Mod: QW,S$GLB,, | Performed by: PHYSICIAN ASSISTANT

## 2023-10-28 PROCEDURE — 87811 SARS CORONAVIRUS 2 ANTIGEN POCT, MANUAL READ: ICD-10-PCS | Mod: QW,S$GLB,, | Performed by: PHYSICIAN ASSISTANT

## 2023-10-28 PROCEDURE — 99214 OFFICE O/P EST MOD 30 MIN: CPT | Mod: 25,S$GLB,, | Performed by: PHYSICIAN ASSISTANT

## 2023-10-28 RX ORDER — KETOROLAC TROMETHAMINE 30 MG/ML
30 INJECTION, SOLUTION INTRAMUSCULAR; INTRAVENOUS
Status: COMPLETED | OUTPATIENT
Start: 2023-10-28 | End: 2023-10-28

## 2023-10-28 RX ORDER — BUTALBITAL, ACETAMINOPHEN AND CAFFEINE 50; 325; 40 MG/1; MG/1; MG/1
1 TABLET ORAL EVERY 4 HOURS PRN
Qty: 10 TABLET | Refills: 0 | Status: SHIPPED | OUTPATIENT
Start: 2023-10-28 | End: 2023-11-08

## 2023-10-28 RX ORDER — SODIUM CHLORIDE 9 MG/ML
INJECTION, SOLUTION INTRAVENOUS
Status: DISCONTINUED | OUTPATIENT
Start: 2023-10-28 | End: 2023-10-28

## 2023-10-28 RX ORDER — BUTALBITAL, ACETAMINOPHEN AND CAFFEINE 50; 325; 40 MG/1; MG/1; MG/1
1 TABLET ORAL EVERY 4 HOURS PRN
Qty: 10 TABLET | Refills: 0 | Status: SHIPPED | OUTPATIENT
Start: 2023-10-28 | End: 2023-10-28

## 2023-10-28 RX ADMIN — KETOROLAC TROMETHAMINE 30 MG: 30 INJECTION, SOLUTION INTRAMUSCULAR; INTRAVENOUS at 12:10

## 2023-10-28 NOTE — PROGRESS NOTES
"Subjective:      Patient ID: Carmen Morrow is a 53 y.o. female.    Vitals:  height is 5' 5" (1.651 m) and weight is 76.7 kg (169 lb). Her oral temperature is 98.6 °F (37 °C). Her blood pressure is 162/98 (abnormal) and her pulse is 64. Her respiration is 18 and oxygen saturation is 98%.     Chief Complaint: Sinus Problem    Pt states that about 2 days ago she started having symptoms of body aches, chills. Stated that she thought it was from not resting because she has been up with her mother who passed away on Sunday. 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 States that she took a covid test 2 days ago and it was negative.    Patient provider note starts here:  Patient presents with complaints of chills, generalized body aches and a headache.  Reports that these symptoms have been present for the past 2 days.  States she is been taking Motrin without significant relief of her headache.  She denies associated URI like symptoms, nasal congestion, focal weakness, slurred speech, changes in vision.  Reports mild nausea but denies any vomiting or fevers.  No cough.  States that she is been up for the past week with very little sleep due to the recent passing of her mother.  States that over the past few days however since her mother has passed away, she has gotten a significant amount of sleep.  Reports that she feels generally fatigued and ill.  States that about 2 weeks ago, a co-worker tested positive for COVID-19 however, numerous other coworkers have been ill but I have all tested negative.  Headache is generalized over the top of the head. Throbbing in nature. Denies photophobia, phonophobia.     Sinus Problem  This is a new problem. The current episode started in the past 7 days (2 days ago.). The problem is unchanged. There has been no fever. Her pain is at a severity of 5/10. The pain is moderate. Associated symptoms include chills and " headaches. Pertinent negatives include no congestion, coughing, neck pain or sinus pressure. Treatments tried: zyrtec, motrin. The treatment provided no relief.       Constitution: Positive for chills and fatigue. Negative for fever.   HENT:  Negative for congestion and sinus pressure.    Neck: Negative for neck pain.   Cardiovascular:  Negative for chest pain, palpitations and sob on exertion.   Respiratory:  Negative for chest tightness, cough and wheezing.    Gastrointestinal:  Positive for nausea. Negative for abdominal pain, vomiting and diarrhea.   Genitourinary:  Negative for dysuria, frequency and urgency.   Skin:  Negative for color change and wound.   Neurological:  Positive for headaches. Negative for numbness and tingling.      Objective:     Physical Exam   Constitutional: She is oriented to person, place, and time. She appears well-developed. She is cooperative.  Non-toxic appearance. She appears ill (Appears not to feel well. Infraorbital erythema and edema noted bilaterally.). No distress.   HENT:   Head: Normocephalic and atraumatic.   Ears:   Right Ear: Hearing and external ear normal.   Left Ear: Hearing and external ear normal.   Nose: Nose normal. No mucosal edema, rhinorrhea, nasal deformity or congestion. No epistaxis. Right sinus exhibits no maxillary sinus tenderness and no frontal sinus tenderness. Left sinus exhibits no maxillary sinus tenderness and no frontal sinus tenderness.   Mouth/Throat: Uvula is midline, oropharynx is clear and moist and mucous membranes are normal. No trismus in the jaw. Normal dentition. No uvula swelling. No oropharyngeal exudate, posterior oropharyngeal edema or posterior oropharyngeal erythema.   Eyes: Conjunctivae and lids are normal. No scleral icterus.   Neck: Trachea normal and phonation normal. Neck supple. No edema present. No erythema present. No neck rigidity present.   Cardiovascular: Normal rate, regular rhythm, normal heart sounds and normal pulses.    Pulmonary/Chest: Effort normal and breath sounds normal. No respiratory distress. She has no decreased breath sounds. She has no wheezes. She has no rhonchi.   Abdominal: Normal appearance. There is no left CVA tenderness and no right CVA tenderness.   Musculoskeletal: Normal range of motion.         General: No deformity. Normal range of motion.   Neurological: no focal deficit. She is alert and oriented to person, place, and time. She displays no weakness. She exhibits normal muscle tone. Coordination normal.   Skin: Skin is warm, dry, intact, not diaphoretic and not pale.   Psychiatric: Her speech is normal and behavior is normal. Mood, judgment and thought content normal.   Nursing note and vitals reviewed.      Assessment:     1. Nonintractable headache, unspecified chronicity pattern, unspecified headache type    2. Hyperlipidemia, unspecified hyperlipidemia type    3. Hypertension, unspecified type    4. Gastroesophageal reflux disease, unspecified whether esophagitis present    5. Nasal congestion    6. Bilateral occipital neuralgia    7. Microscopic hematuria      Results for orders placed or performed in visit on 10/28/23   SARS Coronavirus 2 Antigen, POCT Manual Read   Result Value Ref Range    SARS Coronavirus 2 Antigen Negative Negative     Acceptable Yes    POCT Influenza A/B MOLECULAR   Result Value Ref Range    POC Molecular Influenza A Ag Negative Negative, Not Reported    POC Molecular Influenza B Ag Negative Negative, Not Reported     Acceptable Yes    POCT Urinalysis, Dipstick, Automated, W/O Scope   Result Value Ref Range    POC Blood, Urine Positive (A) Negative    POC Bilirubin, Urine Negative Negative    POC Urobilinogen, Urine norm 0.1 - 1.1    POC Ketones, Urine Negative Negative    POC Protein, Urine Negative Negative    POC Nitrates, Urine Negative Negative    POC Glucose, Urine Negative Negative    pH, UA 6.5 5 - 8    POC Specific Gravity, Urine 1.025  1.003 - 1.029    POC Leukocytes, Urine Negative Negative       Plan:       Nonintractable headache, unspecified chronicity pattern, unspecified headache type  -     SARS Coronavirus 2 Antigen, POCT Manual Read  -     ketorolac injection 30 mg  -     POCT Urinalysis, Dipstick, Automated, W/O Scope  -     0.9%  NaCl infusion  -     Cancel: CBC W/ AUTO DIFFERENTIAL  -     Cancel: COMPREHENSIVE METABOLIC PANEL  -     Cancel: CK  -     butalbital-acetaminophen-caffeine -40 mg (FIORICET, ESGIC) -40 mg per tablet; Take 1 tablet by mouth every 4 (four) hours as needed for Headaches.  Dispense: 10 tablet; Refill: 0  -     CBC W/ AUTO DIFFERENTIAL; Future; Expected date: 10/28/2023  -     COMPREHENSIVE METABOLIC PANEL; Future; Expected date: 10/28/2023  -     CK; Future; Expected date: 10/28/2023    Hyperlipidemia, unspecified hyperlipidemia type    Hypertension, unspecified type    Gastroesophageal reflux disease, unspecified whether esophagitis present    Nasal congestion  -     POCT Influenza A/B MOLECULAR    Bilateral occipital neuralgia    Microscopic hematuria  -     0.9%  NaCl infusion  -     Cancel: CBC W/ AUTO DIFFERENTIAL  -     Cancel: COMPREHENSIVE METABOLIC PANEL  -     Cancel: CK  -     CULTURE, URINE  -     CBC W/ AUTO DIFFERENTIAL; Future; Expected date: 10/28/2023  -     COMPREHENSIVE METABOLIC PANEL; Future; Expected date: 10/28/2023  -     CK; Future; Expected date: 10/28/2023          Medical Decision Making:   History:   Old Medical Records: I decided to obtain old medical records.  Clinical Tests:   Lab Tests: Ordered and Reviewed  Urgent Care Management:  A. Problem List:   -Acute: Headache, hematuria   -Chronic: HLD, HTN, peripheral artery disease, GERD   B. Differential diagnosis: Migraine headache, cluster headache, tension headache eye strain, and infectious causes such as meningitis, pharyngitis and sinusitis, other dangerous causes such as subarachnoid hemorrhage, tumor, HTN urgency,  "HTN emergency, grief reaction, dehydration, rhabdomyolysis, UTI, pyelonephritis    C. Diagnostic Testing Ordered: UA, COVID, Flu  D. Diagnostic Testing Considered: None  E. Independent Historians: None  F. Urgent Care Midlevel Independent Results Interpretation: UA with blood without leukocytes or nitrates. Flu and COVID negative.   G. Radiology:  H. Review of Previous Medical Records: Patient with a history of headaches and there is documentation of "worsening headaches" in 2016.  Has a history of bilateral occipital neuralgia.  I. Home Medications Reviewed  J. Social Determinants of Health considered  K. Medical Decision Making and Disposition:  Patient presents with complaints of headache, chills and body aches.  On exam, she is afebrile and nontoxic in appearance.  Lungs are clear to auscultation bilaterally.  Of note, patient is hypertensive and does have a history of hypertension however, she has been off of antihypertensive medications for the past year now.  Reports that she did take amlodipine 5 mg in the past.  I have advised patient to monitor her blood pressure as this could be elevated due to her body aches and being in pain or degrees that she is been experiencing since the passing of her mother.  She was administered Toradol injection in clinic for headache and body aches.  COVID and flu were both negative.  Urinalysis shows blood without nitrates or leukocytes. I do have some concern for rhabdomyolysis vs dehydration vs UTI due to her symptoms. I attempted to hydrate with IVF in the clinic, however, was unable to obtain an IV x4. Also unable to obtain labs with [facility]. I discussed with patient ED precautions in addition to going to the outpatient lab on Monday to obtain the labs should her symptoms persist. I will also send for urine culture. Fioricet prescribed for her headache. She repots that she had good improvement in her headaches in the past (after having a CF leak from a cervical fusion). "   Of note, over 60 minutes was spent on this patient's encounter.   She was very appreciative of us attempting the best care for her although unable to obtain an IV. She will follow-up with PCP and proceed to the ED should symptoms worsen. She verbalized understanding and agreed with plan.      Patient Instructions   You must understand that you've received an Urgent Care treatment only and that you may be released before all your medical problems are known or treated. You, the patient, will arrange for follow up care as instructed.      Follow up with your PCP or specialty clinic as instructed in the next 2-3 days if not improved or as needed. You can call [phone] to schedule an appointment with appropriate provider.      If you condition worsens, we recommend that you receive another evaluation at the emergency room immediately or contact your primary medical clinic's after hours call service to discuss your concerns.      Please return here or go to the Emergency Department for any concerns or worsening condition.      If you were prescribed a narcotic or controlled substance, do not drive or operate heavy equipment or machinery while taking these medications.

## 2023-10-29 ENCOUNTER — TELEPHONE (OUTPATIENT)
Dept: URGENT CARE | Facility: CLINIC | Age: 53
End: 2023-10-29
Payer: COMMERCIAL

## 2023-10-29 NOTE — TELEPHONE ENCOUNTER
Called to check on patient from visit yesterday. Continues to have headache and some nausea. Zofran prescribed from the ED. [facility] did not receive E-prescribe for the Fioricet and patient would like to try it due to her continued headache. She does suffer with neck pain and can get headaches from that but they are usually occipital in nature and different that this headache. I called in a verbal order to [facility] for Fioricet. ED precautions discussed. She was very appreciative of my call.

## 2023-10-30 LAB
BACTERIA UR CULT: NORMAL
BACTERIA UR CULT: NORMAL

## 2023-11-01 ENCOUNTER — TELEPHONE (OUTPATIENT)
Dept: URGENT CARE | Facility: CLINIC | Age: 53
End: 2023-11-01
Payer: COMMERCIAL

## 2023-11-01 NOTE — TELEPHONE ENCOUNTER
Informed patient of urine culture results. She verbalized understanding. Feeling much improved with the Fioricet prescribed previously. Has follow-up with NP in PCP office next week.

## 2023-11-03 ENCOUNTER — PATIENT OUTREACH (OUTPATIENT)
Dept: ADMINISTRATIVE | Facility: HOSPITAL | Age: 53
End: 2023-11-03
Payer: COMMERCIAL

## 2023-11-03 NOTE — PROGRESS NOTES
Health Maintenance Due   Topic Date Due    Shingles Vaccine (1 of 2) Never done    Hemoglobin A1c (Diabetic Prevention Screening)  08/10/2023    Influenza Vaccine (1) 09/01/2023    COVID-19 Vaccine (4 - 2023-24 season) 09/01/2023        updated. Triggered LINKS and Care Everywhere. Chart reviewed.     Aurora Faith LPN   Clinical Care Coordinator  Primary Care and Wellness

## 2023-11-08 ENCOUNTER — OFFICE VISIT (OUTPATIENT)
Dept: INTERNAL MEDICINE | Facility: CLINIC | Age: 53
End: 2023-11-08
Payer: COMMERCIAL

## 2023-11-08 VITALS
SYSTOLIC BLOOD PRESSURE: 142 MMHG | HEIGHT: 65 IN | DIASTOLIC BLOOD PRESSURE: 92 MMHG | WEIGHT: 166.25 LBS | OXYGEN SATURATION: 96 % | HEART RATE: 64 BPM | BODY MASS INDEX: 27.7 KG/M2

## 2023-11-08 DIAGNOSIS — F51.02 ADJUSTMENT INSOMNIA: ICD-10-CM

## 2023-11-08 DIAGNOSIS — E66.3 OVERWEIGHT (BMI 25.0-29.9): ICD-10-CM

## 2023-11-08 DIAGNOSIS — K21.9 GASTROESOPHAGEAL REFLUX DISEASE WITHOUT ESOPHAGITIS: ICD-10-CM

## 2023-11-08 DIAGNOSIS — I10 ESSENTIAL HYPERTENSION: ICD-10-CM

## 2023-11-08 DIAGNOSIS — R00.2 INTERMITTENT PALPITATIONS: Primary | ICD-10-CM

## 2023-11-08 PROBLEM — B97.29 OTHER CORONAVIRUS AS THE CAUSE OF DISEASES CLASSIFIED ELSEWHERE: Status: ACTIVE | Noted: 2020-03-22

## 2023-11-08 PROBLEM — G43.909 MIGRAINE, UNSPECIFIED, NOT INTRACTABLE, WITHOUT STATUS MIGRAINOSUS: Status: ACTIVE | Noted: 2020-03-22

## 2023-11-08 PROCEDURE — 93010 EKG 12-LEAD: ICD-10-PCS | Mod: S$GLB,,, | Performed by: INTERNAL MEDICINE

## 2023-11-08 PROCEDURE — 99214 OFFICE O/P EST MOD 30 MIN: CPT | Mod: S$GLB,,, | Performed by: NURSE PRACTITIONER

## 2023-11-08 PROCEDURE — 93010 ELECTROCARDIOGRAM REPORT: CPT | Mod: S$GLB,,, | Performed by: INTERNAL MEDICINE

## 2023-11-08 PROCEDURE — 99999 PR PBB SHADOW E&M-EST. PATIENT-LVL IV: ICD-10-PCS | Mod: PBBFAC,,, | Performed by: NURSE PRACTITIONER

## 2023-11-08 PROCEDURE — 1160F RVW MEDS BY RX/DR IN RCRD: CPT | Mod: CPTII,S$GLB,, | Performed by: NURSE PRACTITIONER

## 2023-11-08 PROCEDURE — 3077F PR MOST RECENT SYSTOLIC BLOOD PRESSURE >= 140 MM HG: ICD-10-PCS | Mod: CPTII,S$GLB,, | Performed by: NURSE PRACTITIONER

## 2023-11-08 PROCEDURE — 93005 ELECTROCARDIOGRAM TRACING: CPT | Mod: S$GLB,,, | Performed by: NURSE PRACTITIONER

## 2023-11-08 PROCEDURE — 1160F PR REVIEW ALL MEDS BY PRESCRIBER/CLIN PHARMACIST DOCUMENTED: ICD-10-PCS | Mod: CPTII,S$GLB,, | Performed by: NURSE PRACTITIONER

## 2023-11-08 PROCEDURE — 3008F PR BODY MASS INDEX (BMI) DOCUMENTED: ICD-10-PCS | Mod: CPTII,S$GLB,, | Performed by: NURSE PRACTITIONER

## 2023-11-08 PROCEDURE — 99214 PR OFFICE/OUTPT VISIT, EST, LEVL IV, 30-39 MIN: ICD-10-PCS | Mod: S$GLB,,, | Performed by: NURSE PRACTITIONER

## 2023-11-08 PROCEDURE — 1159F PR MEDICATION LIST DOCUMENTED IN MEDICAL RECORD: ICD-10-PCS | Mod: CPTII,S$GLB,, | Performed by: NURSE PRACTITIONER

## 2023-11-08 PROCEDURE — 3080F DIAST BP >= 90 MM HG: CPT | Mod: CPTII,S$GLB,, | Performed by: NURSE PRACTITIONER

## 2023-11-08 PROCEDURE — 3008F BODY MASS INDEX DOCD: CPT | Mod: CPTII,S$GLB,, | Performed by: NURSE PRACTITIONER

## 2023-11-08 PROCEDURE — 3077F SYST BP >= 140 MM HG: CPT | Mod: CPTII,S$GLB,, | Performed by: NURSE PRACTITIONER

## 2023-11-08 PROCEDURE — 1159F MED LIST DOCD IN RCRD: CPT | Mod: CPTII,S$GLB,, | Performed by: NURSE PRACTITIONER

## 2023-11-08 PROCEDURE — 3080F PR MOST RECENT DIASTOLIC BLOOD PRESSURE >= 90 MM HG: ICD-10-PCS | Mod: CPTII,S$GLB,, | Performed by: NURSE PRACTITIONER

## 2023-11-08 PROCEDURE — 99999 PR PBB SHADOW E&M-EST. PATIENT-LVL IV: CPT | Mod: PBBFAC,,, | Performed by: NURSE PRACTITIONER

## 2023-11-08 PROCEDURE — 93005 EKG 12-LEAD: ICD-10-PCS | Mod: S$GLB,,, | Performed by: NURSE PRACTITIONER

## 2023-11-08 RX ORDER — ESOMEPRAZOLE MAGNESIUM 40 MG/1
40 CAPSULE, DELAYED RELEASE ORAL
Qty: 90 CAPSULE | Refills: 3 | Status: SHIPPED | OUTPATIENT
Start: 2023-11-08 | End: 2024-11-07

## 2023-11-08 RX ORDER — AMOXICILLIN AND CLAVULANATE POTASSIUM 875; 125 MG/1; MG/1
TABLET, FILM COATED ORAL
COMMUNITY
End: 2023-11-08

## 2023-11-08 RX ORDER — AMLODIPINE BESYLATE 5 MG/1
5 TABLET ORAL DAILY
Qty: 90 TABLET | Refills: 3 | Status: SHIPPED | OUTPATIENT
Start: 2023-11-08 | End: 2024-11-07

## 2023-11-08 RX ORDER — AMITRIPTYLINE HYDROCHLORIDE 50 MG/1
TABLET, FILM COATED ORAL
COMMUNITY
End: 2023-11-08

## 2023-11-08 RX ORDER — AMITRIPTYLINE HYDROCHLORIDE 25 MG/1
TABLET, FILM COATED ORAL
COMMUNITY
End: 2023-11-08

## 2023-11-08 RX ORDER — ALBUTEROL SULFATE 90 UG/1
AEROSOL, METERED RESPIRATORY (INHALATION)
COMMUNITY
End: 2023-11-08

## 2023-11-08 RX ORDER — MONTELUKAST SODIUM 10 MG/1
TABLET ORAL
COMMUNITY
End: 2023-11-08

## 2023-11-08 RX ORDER — NORTRIPTYLINE HYDROCHLORIDE 50 MG/1
CAPSULE ORAL
COMMUNITY
End: 2023-11-08

## 2023-11-08 RX ORDER — MEDROXYPROGESTERONE ACETATE 10 MG/1
TABLET ORAL
COMMUNITY
End: 2023-11-08

## 2023-11-08 RX ORDER — BENZPHETAMINE HYDROCHLORIDE 50 MG/1
TABLET ORAL
COMMUNITY
End: 2023-11-08

## 2023-11-08 RX ORDER — PHENTERMINE HYDROCHLORIDE 37.5 MG/1
TABLET ORAL
COMMUNITY
End: 2023-11-08

## 2023-11-08 RX ORDER — TRAZODONE HYDROCHLORIDE 50 MG/1
50 TABLET ORAL NIGHTLY PRN
Qty: 30 TABLET | Refills: 2 | Status: SHIPPED | OUTPATIENT
Start: 2023-11-08 | End: 2024-02-05

## 2023-11-08 RX ORDER — AZELAIC ACID 0.15 G/G
GEL TOPICAL
COMMUNITY
End: 2023-11-08

## 2023-11-08 NOTE — PATIENT INSTRUCTIONS
EKG normal sinus rhythm    48 hour Holter monitor    This could be stress relative to grief, anxiety, and lack of sleep    Trial of trazodone 50mg at night as needed for sleep/anxiety    Will message with Holter monitor results    Refilled Amlodipine 5mg daily for blood pressure    Refilled Nexium for GERD

## 2023-11-08 NOTE — PROGRESS NOTES
Subjective     Patient ID: Carmen Morrow is a 53 y.o. female.    Chief Complaint: Follow-up    Pt of Dr. Tavares, here for ER follow up.    She is feeling better in terms of the abdominal cramping, nausea, and headaches with meds prescribed. She completed the antibiotics for the UTI. She has been noticing fluttering of the heart, had in the past when she use to see Dr. Bailon, she had prescribed something then but thought it was anxiety. Not sure of what was prescribed. She did recently lose her mother 2 weeks ago. Works as an RN director of a Hospice. Admits drinking more caffeine than she should. Not getting sleep, tried something homeopathic which is not helping. Wondering if it is anxiety relative to grief. Had a Holter Monitor in the past which did not show anything. Denies CP or SOB.    Would like refill on Amlodipine she use to take for HTN and Nexium for her GERD  Reviewed ER vist from 10-28-23 and their plan of care below:      Final diagnoses:   Fever   Dehydration   Acute cystitis with hematuria       Referral for follow-up  Follow-up Information         Follow up With Specialties Details Why Contact Info     Bryce Tavares MD Internal Medicine   Call to schedule an appointment, For Follow-up and Recheck [address]  [place] [address]  [phone]                 Prescription management:  ED Prescriptions         Medication Sig Dispense Start Date End Date Auth. Provider     nitrofurantoin, macrocrystal-monohydrate, (MACROBID) 100 MG capsule Take 1 capsule (100 mg total) by mouth 2 (two) times daily. for 5 days 10 capsule 10/28/2023 11/2/2023 Susan Woodall MD     ondansetron (ZOFRAN) 4 MG tablet Take 1 tablet (4 mg total) by mouth every 6 (six) hours as needed for Nausea. 15 tablet 10/28/2023 11/2/2023 Susan Woodall MD Micelle J Haydel      This note was created using dictation software.  This program may occasionally mistype words and phrases.         Jose C  Susan LUA MD  10/28/23 2507    Review of Systems   Constitutional:  Negative for activity change, appetite change, chills, diaphoresis, fatigue, fever and unexpected weight change.   Respiratory:  Negative for chest tightness and shortness of breath.    Cardiovascular:  Positive for palpitations. Negative for chest pain, leg swelling and claudication.        As documented in HPI     Gastrointestinal:  Negative for abdominal pain, anal bleeding, blood in stool, constipation, diarrhea, nausea and vomiting.   Endocrine: Negative for cold intolerance, heat intolerance, polydipsia, polyphagia and polyuria.   Genitourinary:  Negative for dysuria, hematuria, hot flashes and vaginal bleeding.   Musculoskeletal:  Negative for arthralgias, back pain and myalgias.   Allergic/Immunologic: Negative for environmental allergies, food allergies and immunocompromised state.   Neurological:  Negative for dizziness, syncope, weakness, light-headedness, numbness, headaches and coordination difficulties.   Hematological:  Negative for adenopathy. Does not bruise/bleed easily.   Psychiatric/Behavioral:  Positive for dysphoric mood and sleep disturbance. Negative for suicidal ideas. The patient is nervous/anxious.         As documented in HPI              Review of patient's allergies indicates:   Allergen Reactions    Oxycodone Itching     Can take hydrcodone         Current Outpatient Medications:     cetirizine (ZYRTEC) 10 MG tablet, Take 10 mg by mouth as needed. 1 Tablet Oral Every day, Disp: , Rfl:     fluticasone propionate (FLONASE) 50 mcg/actuation nasal spray, USE 2 SPRAYS IN EACH NOSTRIL EVERY EVENING, Disp: 48 mL, Rfl: 1    plecanatide (TRULANCE) 3 mg Tab, Take 3 mg by mouth as needed (for severe pain associated with constipation). For severe constipation, Disp: 30 tablet, Rfl: 3    acyclovir (ZOVIRAX) 200 MG capsule, Take 1 capsule (200 mg total) by mouth as needed (for oral lesions)., Disp: 10 capsule, Rfl: 5    amLODIPine  (NORVASC) 5 MG tablet, Take 1 tablet (5 mg total) by mouth once daily., Disp: 90 tablet, Rfl: 3    esomeprazole (NEXIUM) 40 MG capsule, Take 1 capsule (40 mg total) by mouth before breakfast., Disp: 90 capsule, Rfl: 3    Patient Active Problem List   Diagnosis    Cervicalgia    CN (constipation)    Chronic midline low back pain    Worsening headaches    Gastroesophageal reflux disease without esophagitis    Bilateral occipital neuralgia    Acute pain of right shoulder    Left leg pain    Varicose veins of both lower extremities with pain    Lumbar disc disease    PAD (peripheral artery disease)    Essential hypertension    Nausea    S/P cervical spinal fusion    Hav (hallux abducto valgus), right    HSV (herpes simplex virus) infection    Mixed hyperlipidemia    Migraine, unspecified, not intractable, without status migrainosus    Other coronavirus as the cause of diseases classified elsewhere       Past Medical History:   Diagnosis Date    Allergy     Brachial neuritis or radiculitis NOS 11/14/2012    Degeneration of cervical intervertebral disc 11/14/2012    Essential hypertension 8/10/2020    GERD (gastroesophageal reflux disease)     Latent tuberculosis by skin test 2/8/2017    Mixed hyperlipidemia 12/29/2021    SVT (supraventricular tachycardia)        Past Surgical History:   Procedure Laterality Date    anterior diskectomy fusion  05/20/2016    AUGMENTATION OF BREAST Bilateral     1994    CARPAL TUNNEL RELEASE Left 01/30/2018    CORRECTION OF HAMMER TOE Right 02/02/2021    Procedure: CORRECTION, HAMMER TOE;  Surgeon: Maday Thomas DPM;  Location: [facility] OR;  Service: Podiatry;  Laterality: Right;  4th toe      CSF leak  05/22/2016    Replace and repair fat graft from surgery on 5/20/2016    ESOPHAGOGASTRODUODENOSCOPY N/A 09/01/2020    Procedure: ESOPHAGOGASTRODUODENOSCOPY (EGD);  Surgeon: Alexi Macias MD;  Location: [facility] ENDO (4TH FLR);  Service: Endoscopy;  Laterality: N/A;  prep ins. 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 [employer]     Employer: [employer]   Tobacco Use    Smoking status: Former     Current packs/day: 0.00     Types: Cigarettes     Quit date: 2000     Years since quittin.8    Smokeless tobacco: Never   Substance and Sexual Activity    Alcohol use: No    Drug use: No    Sexual activity: Yes     Partners: Male     Birth control/protection: None     Social Determinants of Health     Financial Resource Strain: Low Risk  (3/13/2023)    Overall Financial Resource Strain (CARDIA)     Difficulty of Paying Living Expenses: Not hard at all   Food Insecurity: No Food Insecurity (3/13/2023)    Hunger Vital Sign     Worried About Running Out of Food in the Last Year: Never true     Ran Out of Food in the Last Year: Never true   Transportation Needs: No Transportation Needs (3/13/2023)    PRAPARE - Transportation     Lack of Transportation (Medical): No     Lack of Transportation (Non-Medical): No   Physical Activity: Insufficiently Active (3/13/2023)    Exercise Vital Sign     Days of Exercise per Week: 2 days     Minutes of Exercise per Session: 20 min   Stress: Stress Concern Present (3/13/2023)    [employer] - Occupational Stress Questionnaire     Feeling of Stress : To some extent   Social Connections: Unknown (3/13/2023)    Social Connection and Isolation Panel [NHANES]     Frequency of Communication with Friends and Family: More than three times a week     Frequency of Social Gatherings with Friends and Family: More than three times a  "week     Active Member of Clubs or Organizations: No     Attends Club or Organization Meetings: Never     Marital Status:    Housing Stability: Low Risk  (3/13/2023)    Housing Stability Vital Sign     Unable to Pay for Housing in the Last Year: No     Number of Places Lived in the Last Year: 1     Unstable Housing in the Last Year: No       Family History   Problem Relation Age of Onset    Heart disease Mother     Hypertension Mother     Asthma Mother     Heart disease Father     COPD Father         smoker    Diabetes Father     Coronary artery disease Sister         MI-[id number],    Neuropathy Sister     Cirrhosis Brother         Hep C-    Ovarian cancer Neg Hx     Colon cancer Neg Hx     Breast cancer Neg Hx     Cancer Neg Hx        Objective     Vitals:    11/08/23 1410   BP: (!) 142/92   Pulse: 64   SpO2: 96%   Weight: 75.4 kg (166 lb 3.6 oz)   Height: 5' 5" (1.651 m)   PainSc:   4       Body mass index is 27.66 kg/m².    Physical Exam  Vitals and nursing note reviewed.   Constitutional:       Appearance: Normal appearance.   HENT:      Head: Normocephalic.      Nose: Nose normal.      Mouth/Throat:      Mouth: Mucous membranes are moist.   Eyes:      Conjunctiva/sclera: Conjunctivae normal.   Cardiovascular:      Rate and Rhythm: Normal rate and regular rhythm.      Pulses: Normal pulses.      Heart sounds: Normal heart sounds. No murmur heard.     No gallop.   Pulmonary:      Effort: Pulmonary effort is normal.      Breath sounds: Normal breath sounds.   Musculoskeletal:         General: Normal range of motion.   Skin:     General: Skin is warm and dry.      Capillary Refill: Capillary refill takes less than 2 seconds.      Findings: No bruising or erythema.   Neurological:      General: No focal deficit present.      Mental Status: She is alert and oriented to person, place, and time.   Psychiatric:         Mood and Affect: Mood normal.         Behavior: Behavior normal.         Thought Content: Thought " content normal.         Judgment: Judgment normal.       EKG today: NSR       Assessment and Plan     1. Intermittent palpitations  -     EKG 12-lead    2. Adjustment insomnia  -     traZODone (DESYREL) 50 MG tablet; Take 1 tablet (50 mg total) by mouth nightly as needed for Insomnia (sleep).  Dispense: 30 tablet; Refill: 2    3. BMI 27.0-27.9,adult  BMI reviewed    4. Overweight (BMI 25.0-29.9)  BMI reviewed.    Diet and exercise to lose weight.    5. Gastroesophageal reflux disease without esophagitis  -     esomeprazole (NEXIUM) 40 MG capsule; Take 1 capsule (40 mg total) by mouth before breakfast.  Dispense: 90 capsule; Refill: 3    6. Essential hypertension  -     amLODIPine (NORVASC) 5 MG tablet; Take 1 tablet (5 mg total) by mouth once daily.  Dispense: 90 tablet; Refill: 3    EKG normal sinus rhythm    48 hour Holter monitor    This could be stress relative to grief, anxiety, and lack of sleep    Trial of trazodone 50mg at night as needed for sleep/anxiety    Will message with Holter monitor results    Refilled Amlodipine 5mg daily for blood pressure    Refilled Nexium for GERD         Follow up if symptoms worsen or fail to improve.

## 2023-12-18 ENCOUNTER — PATIENT OUTREACH (OUTPATIENT)
Dept: ADMINISTRATIVE | Facility: HOSPITAL | Age: 53
End: 2023-12-18
Payer: COMMERCIAL

## 2023-12-18 NOTE — PROGRESS NOTES

## 2023-12-21 ENCOUNTER — PATIENT OUTREACH (OUTPATIENT)
Dept: ADMINISTRATIVE | Facility: HOSPITAL | Age: 53
End: 2023-12-21
Payer: COMMERCIAL

## 2023-12-21 NOTE — PROGRESS NOTES

## 2024-01-04 ENCOUNTER — LAB VISIT (OUTPATIENT)
Dept: LAB | Facility: HOSPITAL | Age: 54
End: 2024-01-04
Attending: OBSTETRICS & GYNECOLOGY
Payer: COMMERCIAL

## 2024-01-04 ENCOUNTER — OFFICE VISIT (OUTPATIENT)
Dept: OBSTETRICS AND GYNECOLOGY | Facility: CLINIC | Age: 54
End: 2024-01-04
Payer: COMMERCIAL

## 2024-01-04 VITALS — SYSTOLIC BLOOD PRESSURE: 116 MMHG | DIASTOLIC BLOOD PRESSURE: 72 MMHG | HEIGHT: 65 IN | BODY MASS INDEX: 27.66 KG/M2

## 2024-01-04 DIAGNOSIS — Z01.419 WELL WOMAN EXAM WITH ROUTINE GYNECOLOGICAL EXAM: ICD-10-CM

## 2024-01-04 DIAGNOSIS — R31.29 MICROSCOPIC HEMATURIA: ICD-10-CM

## 2024-01-04 DIAGNOSIS — R51.9 NONINTRACTABLE HEADACHE, UNSPECIFIED CHRONICITY PATTERN, UNSPECIFIED HEADACHE TYPE: ICD-10-CM

## 2024-01-04 DIAGNOSIS — Z01.419 WELL WOMAN EXAM WITH ROUTINE GYNECOLOGICAL EXAM: Primary | ICD-10-CM

## 2024-01-04 LAB
ALBUMIN SERPL BCP-MCNC: 4.2 G/DL (ref 3.5–5.2)
ALBUMIN SERPL BCP-MCNC: 4.2 G/DL (ref 3.5–5.2)
ALP SERPL-CCNC: 73 U/L (ref 55–135)
ALP SERPL-CCNC: 73 U/L (ref 55–135)
ALT SERPL W/O P-5'-P-CCNC: 20 U/L (ref 10–44)
ALT SERPL W/O P-5'-P-CCNC: 20 U/L (ref 10–44)
ANION GAP SERPL CALC-SCNC: 14 MMOL/L (ref 8–16)
ANION GAP SERPL CALC-SCNC: 14 MMOL/L (ref 8–16)
AST SERPL-CCNC: 24 U/L (ref 10–40)
AST SERPL-CCNC: 24 U/L (ref 10–40)
BASOPHILS # BLD AUTO: 0.04 K/UL (ref 0–0.2)
BASOPHILS # BLD AUTO: 0.04 K/UL (ref 0–0.2)
BASOPHILS NFR BLD: 0.5 % (ref 0–1.9)
BASOPHILS NFR BLD: 0.5 % (ref 0–1.9)
BILIRUB SERPL-MCNC: 0.8 MG/DL (ref 0.1–1)
BILIRUB SERPL-MCNC: 0.8 MG/DL (ref 0.1–1)
BUN SERPL-MCNC: 8 MG/DL (ref 6–20)
BUN SERPL-MCNC: 8 MG/DL (ref 6–20)
CALCIUM SERPL-MCNC: 9.9 MG/DL (ref 8.7–10.5)
CALCIUM SERPL-MCNC: 9.9 MG/DL (ref 8.7–10.5)
CHLORIDE SERPL-SCNC: 106 MMOL/L (ref 95–110)
CHLORIDE SERPL-SCNC: 106 MMOL/L (ref 95–110)
CHOLEST SERPL-MCNC: 178 MG/DL (ref 120–199)
CHOLEST/HDLC SERPL: 2.6 {RATIO} (ref 2–5)
CK SERPL-CCNC: 111 U/L (ref 20–180)
CO2 SERPL-SCNC: 24 MMOL/L (ref 23–29)
CO2 SERPL-SCNC: 24 MMOL/L (ref 23–29)
CREAT SERPL-MCNC: 0.8 MG/DL (ref 0.5–1.4)
CREAT SERPL-MCNC: 0.8 MG/DL (ref 0.5–1.4)
DIFFERENTIAL METHOD BLD: NORMAL
DIFFERENTIAL METHOD BLD: NORMAL
EOSINOPHIL # BLD AUTO: 0.1 K/UL (ref 0–0.5)
EOSINOPHIL # BLD AUTO: 0.1 K/UL (ref 0–0.5)
EOSINOPHIL NFR BLD: 1.3 % (ref 0–8)
EOSINOPHIL NFR BLD: 1.3 % (ref 0–8)
ERYTHROCYTE [DISTWIDTH] IN BLOOD BY AUTOMATED COUNT: 13.8 % (ref 11.5–14.5)
ERYTHROCYTE [DISTWIDTH] IN BLOOD BY AUTOMATED COUNT: 13.8 % (ref 11.5–14.5)
EST. GFR  (NO RACE VARIABLE): >60 ML/MIN/1.73 M^2
EST. GFR  (NO RACE VARIABLE): >60 ML/MIN/1.73 M^2
GLUCOSE SERPL-MCNC: 88 MG/DL (ref 70–110)
GLUCOSE SERPL-MCNC: 88 MG/DL (ref 70–110)
HCT VFR BLD AUTO: 43.3 % (ref 37–48.5)
HCT VFR BLD AUTO: 43.3 % (ref 37–48.5)
HDLC SERPL-MCNC: 68 MG/DL (ref 40–75)
HDLC SERPL: 38.2 % (ref 20–50)
HGB BLD-MCNC: 14 G/DL (ref 12–16)
HGB BLD-MCNC: 14 G/DL (ref 12–16)
IMM GRANULOCYTES # BLD AUTO: 0.03 K/UL (ref 0–0.04)
IMM GRANULOCYTES # BLD AUTO: 0.03 K/UL (ref 0–0.04)
IMM GRANULOCYTES NFR BLD AUTO: 0.4 % (ref 0–0.5)
IMM GRANULOCYTES NFR BLD AUTO: 0.4 % (ref 0–0.5)
LDLC SERPL CALC-MCNC: 91 MG/DL (ref 63–159)
LYMPHOCYTES # BLD AUTO: 2.8 K/UL (ref 1–4.8)
LYMPHOCYTES # BLD AUTO: 2.8 K/UL (ref 1–4.8)
LYMPHOCYTES NFR BLD: 33 % (ref 18–48)
LYMPHOCYTES NFR BLD: 33 % (ref 18–48)
MCH RBC QN AUTO: 28.3 PG (ref 27–31)
MCH RBC QN AUTO: 28.3 PG (ref 27–31)
MCHC RBC AUTO-ENTMCNC: 32.3 G/DL (ref 32–36)
MCHC RBC AUTO-ENTMCNC: 32.3 G/DL (ref 32–36)
MCV RBC AUTO: 88 FL (ref 82–98)
MCV RBC AUTO: 88 FL (ref 82–98)
MONOCYTES # BLD AUTO: 0.5 K/UL (ref 0.3–1)
MONOCYTES # BLD AUTO: 0.5 K/UL (ref 0.3–1)
MONOCYTES NFR BLD: 5.5 % (ref 4–15)
MONOCYTES NFR BLD: 5.5 % (ref 4–15)
NEUTROPHILS # BLD AUTO: 5 K/UL (ref 1.8–7.7)
NEUTROPHILS # BLD AUTO: 5 K/UL (ref 1.8–7.7)
NEUTROPHILS NFR BLD: 59.3 % (ref 38–73)
NEUTROPHILS NFR BLD: 59.3 % (ref 38–73)
NONHDLC SERPL-MCNC: 110 MG/DL
NRBC BLD-RTO: 0 /100 WBC
NRBC BLD-RTO: 0 /100 WBC
PLATELET # BLD AUTO: 427 K/UL (ref 150–450)
PLATELET # BLD AUTO: 427 K/UL (ref 150–450)
PMV BLD AUTO: 10.1 FL (ref 9.2–12.9)
PMV BLD AUTO: 10.1 FL (ref 9.2–12.9)
POTASSIUM SERPL-SCNC: 4 MMOL/L (ref 3.5–5.1)
POTASSIUM SERPL-SCNC: 4 MMOL/L (ref 3.5–5.1)
PROT SERPL-MCNC: 8.3 G/DL (ref 6–8.4)
PROT SERPL-MCNC: 8.3 G/DL (ref 6–8.4)
RBC # BLD AUTO: 4.95 M/UL (ref 4–5.4)
RBC # BLD AUTO: 4.95 M/UL (ref 4–5.4)
SODIUM SERPL-SCNC: 144 MMOL/L (ref 136–145)
SODIUM SERPL-SCNC: 144 MMOL/L (ref 136–145)
TRIGL SERPL-MCNC: 95 MG/DL (ref 30–150)
TSH SERPL DL<=0.005 MIU/L-ACNC: 0.87 UIU/ML (ref 0.4–4)
WBC # BLD AUTO: 8.48 K/UL (ref 3.9–12.7)
WBC # BLD AUTO: 8.48 K/UL (ref 3.9–12.7)

## 2024-01-04 PROCEDURE — 80061 LIPID PANEL: CPT | Performed by: OBSTETRICS & GYNECOLOGY

## 2024-01-04 PROCEDURE — 99396 PREV VISIT EST AGE 40-64: CPT | Mod: S$GLB,,, | Performed by: OBSTETRICS & GYNECOLOGY

## 2024-01-04 PROCEDURE — 3078F DIAST BP <80 MM HG: CPT | Mod: CPTII,S$GLB,, | Performed by: OBSTETRICS & GYNECOLOGY

## 2024-01-04 PROCEDURE — 88175 CYTOPATH C/V AUTO FLUID REDO: CPT | Performed by: OBSTETRICS & GYNECOLOGY

## 2024-01-04 PROCEDURE — 3008F BODY MASS INDEX DOCD: CPT | Mod: CPTII,S$GLB,, | Performed by: OBSTETRICS & GYNECOLOGY

## 2024-01-04 PROCEDURE — 82550 ASSAY OF CK (CPK): CPT | Performed by: PHYSICIAN ASSISTANT

## 2024-01-04 PROCEDURE — 85025 COMPLETE CBC W/AUTO DIFF WBC: CPT | Performed by: PHYSICIAN ASSISTANT

## 2024-01-04 PROCEDURE — 3074F SYST BP LT 130 MM HG: CPT | Mod: CPTII,S$GLB,, | Performed by: OBSTETRICS & GYNECOLOGY

## 2024-01-04 PROCEDURE — 84443 ASSAY THYROID STIM HORMONE: CPT | Performed by: OBSTETRICS & GYNECOLOGY

## 2024-01-04 PROCEDURE — 36415 COLL VENOUS BLD VENIPUNCTURE: CPT | Performed by: OBSTETRICS & GYNECOLOGY

## 2024-01-04 PROCEDURE — 99999 PR PBB SHADOW E&M-EST. PATIENT-LVL II: CPT | Mod: PBBFAC,,, | Performed by: OBSTETRICS & GYNECOLOGY

## 2024-01-04 PROCEDURE — 80053 COMPREHEN METABOLIC PANEL: CPT | Performed by: PHYSICIAN ASSISTANT

## 2024-01-04 NOTE — PROGRESS NOTES
"HPI:   53 y.o.   OB History          1    Para   0    Term   0            AB   1    Living   0         SAB   1    IAB        Ectopic        Multiple        Live Births                  No LMP recorded (lmp unknown). Patient is postmenopausal.    Patient is  here for her annual gynecologic exam.  She has no complaints.     ROS:  GENERAL: No fever, chills, fatigability or weight loss.  SKIN: No rashes, itching or changes in color or texture of skin.  HEAD: No headaches or recent head trauma.  EYES: Visual acuity fine. No photophobia, ocular pain or diplopia.  EARS: Denies ear pain, discharge or vertigo.  NOSE: No loss of smell, no epistaxis or postnasal drip.  MOUTH & THROAT: No hoarseness or change in voice. No excessive gum bleeding.  NODES: Denies swollen glands.  CHEST: Denies MENDEZ, cyanosis, wheezing, cough and sputum production.  CARDIOVASCULAR: Denies chest pain, PND, orthopnea or reduced exercise tolerance.  ABDOMEN: Appetite fine. No weight loss. Denies diarrhea, abdominal pain, hematemesis or blood in stool.  URINARY: No flank pain, dysuria or hematuria.  PERIPHERAL VASCULAR: No claudication or cyanosis.  MUSCULOSKELETAL: No joint stiffness or swelling. Denies back pain.  NEUROLOGIC: No history of seizures, paralysis, alteration of gait or coordination.    PE:   /72   Ht 5' 5" (1.651 m)   LMP  (LMP Unknown)   BMI 27.66 kg/m²   APPEARANCE: Well nourished, well developed, in no acute distress.  NECK: Neck symmetric without masses or thyromegaly.  BREASTS: Symmetrical, no skin changes or visible lesions. No palpable masses, nipple discharge or adenopathy bilaterally.  ABDOMEN: Flat. Soft. No tenderness or masses. No hepatosplenomegaly. No hernias. No CVA tenderness.  VULVA: No lesions. Normal female genitalia.  URETHRAL MEATUS: Normal size and location, no lesions, no prolapse.  URETHRA: No masses, tenderness, prolapse or scarring.  VAGINA: Moist and well rugated, no discharge, no " significant cystocele or rectocele.  CERVIX: No lesions and discharge. PAP done.  UTERUS: Normal size, regular shape, mobile, non-tender, bladder base nontender.  ADNEXA: No masses, tenderness or CDS nodularity.  ANUS PERINEUM: Normal.    PROCEDURES:  Pap smear    Assessment:  Normal Gynecologic Exam    Plan:  Mammogram and Colonoscopy if indicated by current recommendations.  Return to clinic in one year or for any problems or complaints.  Try irving cohosh for hot flashes

## 2024-01-06 ENCOUNTER — TELEPHONE (OUTPATIENT)
Dept: OBSTETRICS AND GYNECOLOGY | Facility: CLINIC | Age: 54
End: 2024-01-06
Payer: COMMERCIAL

## 2024-01-06 ENCOUNTER — PATIENT MESSAGE (OUTPATIENT)
Dept: OBSTETRICS AND GYNECOLOGY | Facility: CLINIC | Age: 54
End: 2024-01-06
Payer: COMMERCIAL

## 2024-02-03 DIAGNOSIS — F51.02 ADJUSTMENT INSOMNIA: ICD-10-CM

## 2024-02-05 RX ORDER — TRAZODONE HYDROCHLORIDE 50 MG/1
50 TABLET ORAL NIGHTLY PRN
Qty: 90 TABLET | Refills: 3 | Status: SHIPPED | OUTPATIENT
Start: 2024-02-05

## 2024-02-10 DIAGNOSIS — J34.89 RHINORRHEA: ICD-10-CM

## 2024-02-10 NOTE — TELEPHONE ENCOUNTER
Care Due:                  Date            Visit Type   Department     Provider  --------------------------------------------------------------------------------                                NP -                              PRIMARY      [facility]  Last Visit: 10-      CARE ([facility])   MEDICINE       ADELITA [facility]WOOD  Next Visit: None Scheduled  None         None Found                                                            Last  Test          Frequency    Reason                     Performed    Due Date  --------------------------------------------------------------------------------    Office Visit  15 months..  acyclovir, traZODone.....  10-   01-    [facility] Embedded Care Due Messages. Reference number: [id number].   2/10/2024 10:28:26 AM CST

## 2024-02-12 RX ORDER — AZELASTINE 1 MG/ML
1 SPRAY, METERED NASAL 2 TIMES DAILY
Qty: 30 ML | Refills: 11 | Status: SHIPPED | OUTPATIENT
Start: 2024-02-12

## 2024-02-15 ENCOUNTER — OFFICE VISIT (OUTPATIENT)
Dept: URGENT CARE | Facility: CLINIC | Age: 54
End: 2024-02-15
Payer: COMMERCIAL

## 2024-02-15 VITALS
TEMPERATURE: 98 F | HEIGHT: 65 IN | OXYGEN SATURATION: 98 % | SYSTOLIC BLOOD PRESSURE: 145 MMHG | RESPIRATION RATE: 18 BRPM | HEART RATE: 72 BPM | DIASTOLIC BLOOD PRESSURE: 83 MMHG | WEIGHT: 166 LBS | BODY MASS INDEX: 27.66 KG/M2

## 2024-02-15 DIAGNOSIS — Z20.822 ENCOUNTER FOR LABORATORY TESTING FOR COVID-19 VIRUS: ICD-10-CM

## 2024-02-15 DIAGNOSIS — R09.81 SINUS CONGESTION: ICD-10-CM

## 2024-02-15 DIAGNOSIS — J32.9 BACTERIAL SINUSITIS: Primary | ICD-10-CM

## 2024-02-15 DIAGNOSIS — R05.9 COUGH, UNSPECIFIED TYPE: ICD-10-CM

## 2024-02-15 DIAGNOSIS — B96.89 BACTERIAL SINUSITIS: Primary | ICD-10-CM

## 2024-02-15 LAB
CTP QC/QA: YES
SARS-COV-2 AG RESP QL IA.RAPID: NEGATIVE

## 2024-02-15 PROCEDURE — 99213 OFFICE O/P EST LOW 20 MIN: CPT | Mod: S$GLB,,, | Performed by: PHYSICIAN ASSISTANT

## 2024-02-15 PROCEDURE — 87811 SARS-COV-2 COVID19 W/OPTIC: CPT | Mod: QW,S$GLB,, | Performed by: PHYSICIAN ASSISTANT

## 2024-02-15 RX ORDER — GUAIFENESIN AND DEXTROMETHORPHAN HYDROBROMIDE 1200; 60 MG/1; MG/1
1 TABLET, EXTENDED RELEASE ORAL EVERY 12 HOURS
Qty: 20 TABLET | Refills: 0 | Status: SHIPPED | OUTPATIENT
Start: 2024-02-15 | End: 2024-02-25

## 2024-02-15 RX ORDER — VITAMIN A 3000 MCG
2 CAPSULE ORAL
Qty: 60 ML | Refills: 12 | Status: SHIPPED | OUTPATIENT
Start: 2024-02-15

## 2024-02-15 RX ORDER — FLUTICASONE PROPIONATE 50 MCG
1 SPRAY, SUSPENSION (ML) NASAL DAILY
Qty: 16 G | Refills: 3 | Status: SHIPPED | OUTPATIENT
Start: 2024-02-15

## 2024-02-15 RX ORDER — AMOXICILLIN AND CLAVULANATE POTASSIUM 875; 125 MG/1; MG/1
1 TABLET, FILM COATED ORAL EVERY 12 HOURS
Qty: 20 TABLET | Refills: 0 | Status: SHIPPED | OUTPATIENT
Start: 2024-02-15

## 2024-02-15 RX ORDER — AZELASTINE 1 MG/ML
1 SPRAY, METERED NASAL 2 TIMES DAILY
Qty: 30 ML | Refills: 0 | Status: SHIPPED | OUTPATIENT
Start: 2024-02-15 | End: 2025-02-14

## 2024-02-15 NOTE — LETTER
February 15, 2024      [facility]  [address] [address], [address]  [place] [address]  Phone: [phone]  Fax: [phone]       Patient: Carmen Morrow   YOB: 1970  Date of Visit: 02/15/2024    To Whom It May Concern:    Christie Morrow  was at [facility] on 02/15/2024. The patient may return to work/school on FEBRUARY 15, 2024 WITH NO restrictions. If you have any questions or concerns, or if I can be of further assistance, please do not hesitate to contact me.    Sincerely,    Gerardo Lowry PA

## 2024-02-15 NOTE — LETTER
February 15, 2024      [facility]  [address], [address]  [place] [address]  Phone: [phone]  Fax: [phone]       Patient: Carmen Morrow   YOB: 1970  Date of Visit: 02/15/2024    To Whom It May Concern:    Christie Morrow  was at [facility] on 02/15/2024. The patient may return to work/school on February 16, 2024 with no restrictions. If you have any questions or concerns, or if I can be of further assistance, please do not hesitate to contact me.    Sincerely,    Gerardo Lowry PA

## 2024-02-15 NOTE — PROGRESS NOTES
"Subjective:      Patient ID: Carmen Morrow is a 53 y.o. female.    Vitals:  height is 5' 5" (1.651 m) and weight is 75.3 kg (166 lb). Her oral temperature is 98.3 °F (36.8 °C). Her blood pressure is 145/83 (abnormal) and her pulse is 72. Her respiration is 18 and oxygen saturation is 98%.     Chief Complaint: Sinus Problem    53 year old female presenting with sinus congestion and pressure, fatigue and headache x 5 days.  PATIENT STATES SHE STARTED BLOWING DISCHARGE OUT OF THE NOSE THIS WEEKEND AND NOW SHE IS BLOWING OUT GREENISH STUFF WITH BLED TENTED.  SHE ALSO COMPLAINS OF SINUS PRESSURE AND PAIN    Sinus Problem  This is a new problem. The current episode started in the past 7 days (5 days ago). The problem has been gradually worsening since onset. There has been no fever. Her pain is at a severity of 5/10. The pain is moderate. Associated symptoms include congestion, coughing, headaches and sinus pressure. Pertinent negatives include no chills, ear pain, sneezing or sore throat. Treatments tried: sudafed. The treatment provided no relief.       Constitution: Negative for chills.   HENT:  Positive for congestion, sinus pain and sinus pressure. Negative for ear pain and sore throat.    Respiratory:  Positive for cough and sputum production.    Allergic/Immunologic: Negative for sneezing.   Neurological:  Positive for headaches.      Objective:     Physical Exam   Constitutional: She is oriented to person, place, and time. She appears well-developed. She is cooperative.  Non-toxic appearance. She does not appear ill. No distress.   HENT:   Head: Normocephalic and atraumatic.   Ears:   Right Ear: Hearing, tympanic membrane, external ear and ear canal normal.   Left Ear: Hearing, tympanic membrane, external ear and ear canal normal.   Nose: Congestion present. No mucosal edema, rhinorrhea or nasal deformity. No epistaxis. Right sinus exhibits no maxillary sinus tenderness and no frontal sinus tenderness. Left " sinus exhibits no maxillary sinus tenderness and no frontal sinus tenderness.   Mouth/Throat: Uvula is midline and mucous membranes are normal. No trismus in the jaw. Normal dentition. No uvula swelling. Posterior oropharyngeal erythema present. No oropharyngeal exudate or posterior oropharyngeal edema.   Eyes: Conjunctivae and lids are normal. No scleral icterus.   Neck: Trachea normal and phonation normal. Neck supple. No edema present. No erythema present. No neck rigidity present.   Cardiovascular: Normal rate, regular rhythm, normal heart sounds and normal pulses.   Pulmonary/Chest: Effort normal and breath sounds normal. No stridor. No respiratory distress. She has no decreased breath sounds. She has no wheezes. She has no rhonchi.   Abdominal: Normal appearance.   Musculoskeletal: Normal range of motion.         General: No deformity. Normal range of motion.   Neurological: She is alert and oriented to person, place, and time. She exhibits normal muscle tone. Coordination normal.   Skin: Skin is warm, dry, intact, not diaphoretic and not pale.   Psychiatric: Her speech is normal and behavior is normal. Judgment and thought content normal.   Nursing note and vitals reviewed.    Results for orders placed or performed in visit on 02/15/24   SARS Coronavirus 2 Antigen, POCT Manual Read   Result Value Ref Range    SARS Coronavirus 2 Antigen Negative Negative     Acceptable Yes     No results found.   Assessment:     1. Bacterial sinusitis    2. Encounter for laboratory testing for COVID-19 virus    3. Cough, unspecified type    4. Sinus congestion        Plan:       Bacterial sinusitis  -     sodium chloride (SALINE NASAL) 0.65 % nasal spray; 2 sprays by Nasal route as needed for Congestion.  Dispense: 60 mL; Refill: 12  -     amoxicillin-clavulanate 875-125mg (AUGMENTIN) 875-125 mg per tablet; Take 1 tablet by mouth every 12 (twelve) hours.  Dispense: 20 tablet; Refill: 0    Encounter for  laboratory testing for COVID-19 virus  -     SARS Coronavirus 2 Antigen, POCT Manual Read    Cough, unspecified type  -     dextromethorphan-guaiFENesin (MUCINEX DM) 60-1,200 mg per 12 hr tablet; Take 1 tablet by mouth every 12 (twelve) hours. for 10 days  Dispense: 20 tablet; Refill: 0    Sinus congestion  -     fluticasone propionate (FLONASE) 50 mcg/actuation nasal spray; 1 spray (50 mcg total) by Each Nostril route once daily.  Dispense: 16 g; Refill: 3  -     azelastine (ASTELIN) 137 mcg (0.1 %) nasal spray; 1 spray (137 mcg total) by Nasal route 2 (two) times daily.  Dispense: 30 mL; Refill: 0  -     dextromethorphan-guaiFENesin (MUCINEX DM) 60-1,200 mg per 12 hr tablet; Take 1 tablet by mouth every 12 (twelve) hours. for 10 days  Dispense: 20 tablet; Refill: 0    Follow up if symptoms worsen or fail to improve, for F/U with PCP or ED.   Patient Instructions   Patient Education       Upper Respiratory Infection ED   General Information   You came to the Emergency Department (ED) for an upper respiratory infection or URI. A URI can affect your nose, throat, ears, and sinuses. A virus is the cause of almost all URIs and antibiotics will not help you feel better more quickly. The common cold is an example of a viral URI.  URIs are easy to spread from person to person, most often through coughing or sneezing. A URI will almost always get better in a week or two without any treatment.  What care is needed at home?   Call your regular doctor to let them know you were in the ED. Make a follow-up appointment if you were told to.  If you smoke, try to quit. Your doctor or nurse can help.  Drink lots of fluids like water, juice, or broth. This will help replace any fluids lost if you have a runny nose or fever. Warm tea or soup can help soothe a sore throat.  If the air in your home feels dry, use a cool mist humidifier. This can help a stuffy nose and make it easier to breathe.  You can also use saline nose drops to  relieve stuffiness.  If you decide to take over-the-counter cough or cold medicines, follow the directions on the label carefully. Be sure you do not take more than 1 medicine that contains acetaminophen. Also, if you have a heart problem or high blood pressure, check with your doctor before you take any of these medicines.  Wash your hands often. Cough or sneeze into a tissue or your elbow instead of your hands. This will help keep others healthy.  When do I need to get emergency help?   Return to the ED if:   You have trouble breathing when talking or sitting still.  When do I need to call the doctor?   You have a fever of 100.4°F (38°C) or higher for several days, chills, a very bad sore throat, or ear or sinus pain.  You develop a new fever after several days of feeling the same or improving.  You develop chest pain when you cough.  You have a cough that lasts more than 10 days.  You cough up blood, or the color of the mucus you cough up changes.  You have new or worsening symptoms.  Last Reviewed Date   2020-09-25  Consumer Information Use and Disclaimer   This information is not specific medical advice and does not replace information you receive from your health care provider. This is only a brief summary of general information. It does NOT include all information about conditions, illnesses, injuries, tests, procedures, treatments, therapies, discharge instructions or life-style choices that may apply to you. You must talk with your health care provider for complete information about your health and treatment options. This information should not be used to decide whether or not to accept your health care providers advice, instructions or recommendations. Only your health care provider has the knowledge and training to provide advice that is right for you.  Copyright   Copyright © 2021 [employer] and its affiliates and/or licensors. All rights reserved.

## 2024-06-28 ENCOUNTER — HOSPITAL ENCOUNTER (OUTPATIENT)
Dept: RADIOLOGY | Facility: HOSPITAL | Age: 54
Discharge: HOME | End: 2024-06-28
Attending: INTERNAL MEDICINE
Payer: COMMERCIAL

## 2024-06-28 ENCOUNTER — OFFICE VISIT (OUTPATIENT)
Dept: INTERNAL MEDICINE | Facility: CLINIC | Age: 54
End: 2024-06-28
Payer: COMMERCIAL

## 2024-06-28 VITALS
DIASTOLIC BLOOD PRESSURE: 70 MMHG | HEART RATE: 67 BPM | BODY MASS INDEX: 23.98 KG/M2 | OXYGEN SATURATION: 97 % | WEIGHT: 143.94 LBS | HEIGHT: 65 IN | SYSTOLIC BLOOD PRESSURE: 119 MMHG

## 2024-06-28 DIAGNOSIS — B00.9 HSV (HERPES SIMPLEX VIRUS) INFECTION: ICD-10-CM

## 2024-06-28 DIAGNOSIS — Z00.00 ANNUAL PHYSICAL EXAM: Primary | ICD-10-CM

## 2024-06-28 DIAGNOSIS — M51.9 LUMBAR DISC DISEASE: ICD-10-CM

## 2024-06-28 DIAGNOSIS — I10 ESSENTIAL HYPERTENSION: ICD-10-CM

## 2024-06-28 DIAGNOSIS — K59.04 CHRONIC IDIOPATHIC CONSTIPATION: ICD-10-CM

## 2024-06-28 PROCEDURE — 72100 X-RAY EXAM L-S SPINE 2/3 VWS: CPT | Mod: TC

## 2024-06-28 PROCEDURE — 72100 X-RAY EXAM L-S SPINE 2/3 VWS: CPT | Mod: 26,,, | Performed by: INTERNAL MEDICINE

## 2024-06-28 PROCEDURE — 99999 PR PBB SHADOW E&M-EST. PATIENT-LVL III: CPT | Mod: PBBFAC,,, | Performed by: INTERNAL MEDICINE

## 2024-06-28 RX ORDER — ACYCLOVIR 200 MG/1
200 CAPSULE ORAL
Qty: 10 CAPSULE | Refills: 5 | Status: SHIPPED | OUTPATIENT
Start: 2024-06-28 | End: 2025-06-28

## 2024-06-28 NOTE — PROGRESS NOTES
"    CHIEF COMPLAINT     Chief Complaint   Patient presents with    Annual Exam     Request MRI on lower back        HPI     Carmen Morrow is a 53 y.o. female w/DDD s/p cervical spinal fusion, HTN, PAD, GERD here today for Exam     Having worsening low back pain. Had mechanical fall holding dog.  Landed flat on side onto tile.  Having anterior thigh discomfort. No weakness, numbness.  She is planning on following up with her outside specialist    HTN  Amlodipine 5mg daily. BP at goal    Behind in health care because she switched jobs and insurance change.          Personally Reviewed Patient's Medical, surgical, family and social hx. Changes updated in [facility].  Care Team updated in Epic    Review of Systems:  Review of Systems   Musculoskeletal:  Positive for back pain and gait problem.       Health Maintenance:   Reviewed with patient  Due for the following:      PHYSICAL EXAM     /70 (BP Location: Right arm, Patient Position: Sitting)   Pulse 67   Ht 5' 5" (1.651 m)   Wt 65.3 kg (143 lb 15.4 oz)   LMP  (LMP Unknown)   SpO2 97%   BMI 23.96 kg/m²     Gen: Well Appearing, NAD  HEENT: PERR, EOMI  Neck: FROM, no thyromegaly, no cervical adenopathy  CVD: RRR, no M/R/G  Pulm: Normal work of breathing, CTAB, no wheezing  Abd:  Soft, NT, ND non TTP, no mass  MSK: no LE edema  Tender palpation lumbar spine  Neuro: A&Ox3, gait normal, speech normal  Mood; Mood normal, behavior normal, thought process linear       LABS     Labs reviewed; ordered  ASSESSMENT     1. Annual physical exam        2. Lumbar disc disease  X-Ray Lumbar Spine AP And Lateral      3. Essential hypertension        4. HSV (herpes simplex virus) infection  acyclovir (ZOVIRAX) 200 MG capsule      5. Chronic idiopathic constipation  plecanatide (TRULANCE) 3 mg Tab              Plan     Carmen Morrow is a 53 y.o. female with w/DDD s/p cervical spinal fusion, HTN, PAD, GERDhere today for Exam   1. Annual physical exam  Updated problem " list, medical history, care team and discussed HM.   Needs shingrix (she thinks she had first dose but cannot find)    2. Lumbar disc disease  Will get updated xray after fall  Needs to f/u outside pain provider  - X-Ray Lumbar Spine AP And Lateral; Future    3. Essential hypertension  At goal continue amlodipine 5mg    4. HSV (herpes simplex virus) infection  Will treat cold sores episodically  - acyclovir (ZOVIRAX) 200 MG capsule; Take 1 capsule (200 mg total) by mouth as needed (for oral lesions).  Dispense: 10 capsule; Refill: 5    5. Chronic idiopathic constipation  Refilled medication  - plecanatide (TRULANCE) 3 mg Tab; Take 3 mg by mouth as needed (for severe pain associated with constipation). For severe constipation  Dispense: 90 tablet; Refill: 3      Bryce Tavares MD

## 2024-10-03 PROBLEM — R29.898 DECREASED STRENGTH OF LOWER EXTREMITY: Status: ACTIVE | Noted: 2024-10-03

## 2024-10-03 PROBLEM — M53.86 DECREASED RANGE OF MOTION OF LUMBAR SPINE: Status: ACTIVE | Noted: 2024-10-03

## 2024-10-03 PROBLEM — R29.898 DECREASED STRENGTH OF LOWER EXTREMITY: Status: RESOLVED | Noted: 2024-10-03 | Resolved: 2024-10-03

## 2024-10-03 PROBLEM — Z74.09 IMPAIRED FUNCTIONAL MOBILITY AND ACTIVITY TOLERANCE: Status: ACTIVE | Noted: 2024-10-03

## 2024-10-15 ENCOUNTER — TELEPHONE (OUTPATIENT)
Dept: INTERNAL MEDICINE | Facility: CLINIC | Age: 54
End: 2024-10-15
Payer: COMMERCIAL

## 2024-10-15 ENCOUNTER — PATIENT MESSAGE (OUTPATIENT)
Dept: INTERNAL MEDICINE | Facility: CLINIC | Age: 54
End: 2024-10-15
Payer: COMMERCIAL

## 2024-10-15 NOTE — TELEPHONE ENCOUNTER
----- Message from Orlin sent at 10/15/2024 12:20 PM CDT -----  Contact: Pt  [phone]  Pt called in regards to a clearance form that was faxed over on 10/09/24 for back surgery she was checking the status if was filled out please fax back to fax # [phone] please advise

## 2024-10-16 ENCOUNTER — OFFICE VISIT (OUTPATIENT)
Dept: INTERNAL MEDICINE | Facility: CLINIC | Age: 54
End: 2024-10-16
Payer: COMMERCIAL

## 2024-10-16 VITALS
SYSTOLIC BLOOD PRESSURE: 136 MMHG | DIASTOLIC BLOOD PRESSURE: 86 MMHG | BODY MASS INDEX: 23.07 KG/M2 | HEART RATE: 75 BPM | OXYGEN SATURATION: 98 % | HEIGHT: 65 IN | WEIGHT: 138.44 LBS

## 2024-10-16 DIAGNOSIS — Z01.818 PRE-OPERATIVE EXAMINATION FOR INTERNAL MEDICINE: Primary | ICD-10-CM

## 2024-10-16 DIAGNOSIS — M54.16 LUMBAR RADICULOPATHY: ICD-10-CM

## 2024-10-16 PROBLEM — B97.29 OTHER CORONAVIRUS AS THE CAUSE OF DISEASES CLASSIFIED ELSEWHERE: Status: RESOLVED | Noted: 2020-03-22 | Resolved: 2024-10-16

## 2024-10-16 PROCEDURE — 99999 PR PBB SHADOW E&M-EST. PATIENT-LVL IV: CPT | Mod: PBBFAC,,, | Performed by: INTERNAL MEDICINE

## 2024-10-16 PROCEDURE — 3079F DIAST BP 80-89 MM HG: CPT | Mod: CPTII,S$GLB,, | Performed by: INTERNAL MEDICINE

## 2024-10-16 PROCEDURE — 99214 OFFICE O/P EST MOD 30 MIN: CPT | Mod: S$GLB,,, | Performed by: INTERNAL MEDICINE

## 2024-10-16 PROCEDURE — 3008F BODY MASS INDEX DOCD: CPT | Mod: CPTII,S$GLB,, | Performed by: INTERNAL MEDICINE

## 2024-10-16 PROCEDURE — 3075F SYST BP GE 130 - 139MM HG: CPT | Mod: CPTII,S$GLB,, | Performed by: INTERNAL MEDICINE

## 2024-10-16 PROCEDURE — 1159F MED LIST DOCD IN RCRD: CPT | Mod: CPTII,S$GLB,, | Performed by: INTERNAL MEDICINE

## 2024-10-16 PROCEDURE — 3044F HG A1C LEVEL LT 7.0%: CPT | Mod: CPTII,S$GLB,, | Performed by: INTERNAL MEDICINE

## 2024-10-16 NOTE — LETTER
October 16, 2024        Zuhair Castlelanos MD  [address]  [place] [address]             [facility]  [address]  [place] [address]  Phone: [phone]  Fax: [phone]   Patient: Carmen Morrow   MR Number: [id number]   YOB: 1970   Date of Visit: 10/16/2024     Dear Dr. Castellanos,     Saw our mutual Patient for Preop Evaluation for elective lumbar laminotomy scheduled for 10/21/24.     Cardiovascular Risk: [place] HAS2 score of 0 and good exercise tolerance puts her at low risk for major cardiovascular complication  Recommendations: no further optimization required prior to surgery    Pulmonary Risk: Ariscat risk puts her in low risk group for pulmonary complications.  Recommendations: early ambulation and use of incentive spirometry in the perioperative period. No further optimization needed prior to surgery    Bleeding/VTE risk: no increased risk for bleeding or clot. No previous bleeding issues. Patient is at avg risk for these issues and no further testing or risk stratification needed prior to surgery.  Recs: Will defer post op dvt ppx to performing surgeon.    Please let me know if you have any specific questions regarding perioperative management of chronic conditions.    Sincerely,        PlostBryce MD            CC  No Recipients    Enclosure

## 2024-10-16 NOTE — PROGRESS NOTES
"      CHIEF COMPLAINT     Chief Complaint   Patient presents with    Pre-op Exam       HPI     Carmen Morrow is a 54 y.o. female  w/DDD s/p cervical spinal fusion, HTN, PAD, GERD here today for preop Exam     Scheduled for 10/21/24  Elective: Lumbar Laminotmy  Surgeon: Emanuel  at [facility]  [place] has2  Known coronary artery disease:  No  Symptoms of coronary artery disease:  No  Anemia with hemoglobin less than 12: No  Age greater than 75: No  Vascular surgery:  No  Emergency surgery: No    > 4 mets     No recent URI in last 30 days.    No hx of bleeding or clotting with prior surgery.    Personally Reviewed Patient's Medical, surgical, family and social hx. Changes updated in [facility].  Care Team updated in Epic    Review of Systems:  Review of Systems   Constitutional:  Positive for activity change. Negative for unexpected weight change.   HENT:  Negative for hearing loss, rhinorrhea and trouble swallowing.    Eyes:  Negative for discharge and visual disturbance.   Respiratory:  Negative for chest tightness and wheezing.    Cardiovascular:  Negative for chest pain and palpitations.   Gastrointestinal:  Negative for blood in stool, constipation, diarrhea and vomiting.   Endocrine: Negative for polydipsia and polyuria.   Genitourinary:  Negative for difficulty urinating, dysuria, hematuria and menstrual problem.   Musculoskeletal:  Negative for arthralgias, joint swelling and neck pain.   Neurological:  Negative for weakness and headaches.   Psychiatric/Behavioral:  Negative for confusion and dysphoric mood.        Health Maintenance:   Reviewed with patient  Due for the following:      PHYSICAL EXAM     /86 (BP Location: Right arm, Patient Position: Sitting)   Pulse 75   Ht 5' 5" (1.651 m)   Wt 62.8 kg (138 lb 7.2 oz)   LMP  (LMP Unknown)   SpO2 98%   BMI 23.04 kg/m²     Gen: Well Appearing, NAD  HEENT: PERR, EOMI  Neck: FROM, no thyromegaly, no cervical adenopathy  CVD: RRR, no M/R/G  Pulm: " Normal work of breathing, CTAB, no wheezing  Abd:  Soft, NT, ND non TTP, no mass  MSK: no LE edema  Neuro: A&Ox3, gait normal, speech normal  Mood; Mood normal, behavior normal, thought process linear       LABS     Labs reviewed; Notable for  Lab Results   Component Value Date    WBC 6.82 07/01/2024    HGB 13.2 07/01/2024    HCT 40.0 07/01/2024    MCV 87 07/01/2024     07/01/2024         Chemistry        Component Value Date/Time     07/01/2024 0616    K 4.9 07/01/2024 0616     07/01/2024 0616    CO2 26 07/01/2024 0616    BUN 12 07/01/2024 0616    CREATININE 0.8 07/01/2024 0616    GLU 89 07/01/2024 0616        Component Value Date/Time    CALCIUM 9.6 07/01/2024 0616    ALKPHOS 66 07/01/2024 0616    AST 17 07/01/2024 0616    ALT 14 07/01/2024 0616    BILITOT 0.6 07/01/2024 0616    ESTGFRAFRICA >60.0 06/27/2022 1016    EGFRNONAA >60.0 06/27/2022 1016          ASSESSMENT     1. Pre-operative examination for internal medicine        2. Lumbar radiculopathy                Plan     Carmen Morrow is a 54 y.o. female with DDD s/p cervical spinal fusion, HTN, PAD, GERD here today for preop Exam   1. Pre-operative examination for internal medicine  Cardiovascular Risk: [place] HAS2 score of 0 and good exercise tolerance puts her at low risk for major cardiovascular complication  Recommendations: no further optimization required prior to surgery    Pulmonary Risk: Ariscat risk puts her in low risk group for pulmonary complications.  Recommendations: early ambulation and use of incentive spirometry in the perioperative period. No further optimization needed prior to surgery    Bleeding/VTE risk: no increased risk for bleeding or clot. No previous bleeding issues. Patient is at avg risk for these issues and no further testing or risk stratification needed prior to surgery.  Recs: Will defer post op dvt ppx to performing surgeon.    2. Lumbar radiculopathy  Scheduled for elective surgery 10/21    Bryce  AUNG Tavares MD

## 2024-11-08 ENCOUNTER — OFFICE VISIT (OUTPATIENT)
Dept: URGENT CARE | Facility: CLINIC | Age: 54
End: 2024-11-08
Payer: COMMERCIAL

## 2024-11-08 VITALS
TEMPERATURE: 100 F | WEIGHT: 139.31 LBS | RESPIRATION RATE: 18 BRPM | BODY MASS INDEX: 23.21 KG/M2 | HEIGHT: 65 IN | DIASTOLIC BLOOD PRESSURE: 80 MMHG | HEART RATE: 92 BPM | OXYGEN SATURATION: 97 % | SYSTOLIC BLOOD PRESSURE: 118 MMHG

## 2024-11-08 DIAGNOSIS — J10.1 INFLUENZA A: Primary | ICD-10-CM

## 2024-11-08 DIAGNOSIS — R50.9 FEVER, UNSPECIFIED FEVER CAUSE: ICD-10-CM

## 2024-11-08 DIAGNOSIS — R05.1 ACUTE COUGH: ICD-10-CM

## 2024-11-08 LAB
CTP QC/QA: YES
POC MOLECULAR INFLUENZA A AGN: POSITIVE
POC MOLECULAR INFLUENZA B AGN: NEGATIVE

## 2024-11-08 RX ORDER — BENZONATATE 200 MG/1
200 CAPSULE ORAL 3 TIMES DAILY PRN
Qty: 30 CAPSULE | Refills: 0 | Status: SHIPPED | OUTPATIENT
Start: 2024-11-08 | End: 2024-11-18

## 2024-11-08 RX ORDER — PROMETHAZINE HYDROCHLORIDE AND DEXTROMETHORPHAN HYDROBROMIDE 6.25; 15 MG/5ML; MG/5ML
5 SYRUP ORAL NIGHTLY PRN
Qty: 118 ML | Refills: 0 | Status: SHIPPED | OUTPATIENT
Start: 2024-11-08 | End: 2024-11-18

## 2024-11-08 RX ORDER — OSELTAMIVIR PHOSPHATE 75 MG/1
75 CAPSULE ORAL 2 TIMES DAILY
Qty: 10 CAPSULE | Refills: 0 | Status: SHIPPED | OUTPATIENT
Start: 2024-11-08 | End: 2024-11-13

## 2024-11-08 NOTE — LETTER
November 8, 2024      [facility]  [address] [address], [address]  [place] [address]  Phone: [phone]  Fax: [phone]       Patient: Carmen Morrow   YOB: 1970  Date of Visit: 11/08/2024    To Whom It May Concern:    Christie Morrow  was at [facility] on 11/08/2024. The patient may return to work/school when they are without fever for 24 hours (without the use of fever-reducing medication) and have improvement in symptoms.    If you have any questions or concerns, or if I can be of further assistance, please do not hesitate to contact me.    Sincerely,    Rey Neves PA-C

## 2024-11-08 NOTE — PROGRESS NOTES
"Subjective:      Patient ID: Carmen Morrow is a 54 y.o. female.    Vitals:  height is 5' 5" (1.651 m) and weight is 63.2 kg (139 lb 5.3 oz). Her oral temperature is 100.1 °F (37.8 °C). Her blood pressure is 118/80 and her pulse is 92. Her respiration is 18 and oxygen saturation is 97%.     Chief Complaint: Fever    Patient presents with complaint of fever, chills, cough, fatigue, sore throat, headaches, body aches that began yesterday.  Of note her  had had URI with similar symptoms for about the past 4 days but it feeling better but without fever.   Also of note she had back surgery 10/21/24 and denies any complication or new or worsening symptoms since then.     Fever   This is a new problem. The current episode started 1 day ago. The problem has been gradually worsening. She has not experienced a heat injury.The maximum temperature noted was 101 to 101.9 F. Associated symptoms include coughing, headaches and muscle aches. Pertinent negatives include no abdominal pain, chest pain, congestion, diarrhea, ear pain, nausea, rash, sore throat or vomiting. She has tried acetaminophen (excedrin) for the symptoms. The treatment provided no relief.       Constitution: Positive for chills, fatigue and fever. Negative for sweating.   HENT:  Negative for ear pain, foreign body in ear, congestion and sore throat.    Neck: Negative for neck pain and neck stiffness.   Cardiovascular:  Negative for chest pain, leg swelling and palpitations.   Eyes:  Negative for eye itching, eye pain and eye redness.   Respiratory:  Positive for cough and sputum production. Negative for chest tightness and shortness of breath.    Gastrointestinal:  Negative for abdominal pain, nausea, vomiting and diarrhea.   Genitourinary:  Negative for dysuria, frequency and urgency.   Musculoskeletal:  Positive for muscle ache. Negative for pain and joint swelling.   Skin:  Negative for color change and rash.   Neurological:  Positive for " headaches. Negative for dizziness, light-headedness, facial drooping, speech difficulty, disorientation, altered mental status, numbness and tingling.   Psychiatric/Behavioral:  Negative for altered mental status and disorientation.       Objective:     Physical Exam   Constitutional: She is oriented to person, place, and time. She appears well-developed. She is cooperative.  Non-toxic appearance. She does not appear ill. No distress.   HENT:   Head: Normocephalic and atraumatic.   Ears:   Right Ear: Hearing, tympanic membrane, external ear and ear canal normal.   Left Ear: Hearing, tympanic membrane, external ear and ear canal normal.   Nose: Nose normal. No mucosal edema, rhinorrhea or nasal deformity. No epistaxis. Right sinus exhibits no maxillary sinus tenderness and no frontal sinus tenderness. Left sinus exhibits no maxillary sinus tenderness and no frontal sinus tenderness.   Mouth/Throat: Uvula is midline, oropharynx is clear and moist and mucous membranes are normal. No trismus in the jaw. Normal dentition. No uvula swelling. No oropharyngeal exudate, posterior oropharyngeal edema or posterior oropharyngeal erythema.   Eyes: Conjunctivae and lids are normal. No scleral icterus.   Neck: Trachea normal and phonation normal. Neck supple. No edema present. No erythema present. No neck rigidity present.   Cardiovascular: Normal rate, regular rhythm, normal heart sounds and normal pulses.   Pulmonary/Chest: Effort normal and breath sounds normal. No accessory muscle usage or stridor. No tachypnea and no bradypnea. No respiratory distress. She has no decreased breath sounds. She has no wheezes. She has no rhonchi. She has no rales.   Lungs ctab.          Comments: Lungs ctab.     Abdominal: Normal appearance.   Musculoskeletal: Normal range of motion.         General: No deformity. Normal range of motion.      Comments: Back brace in place from surgery 10/21/24   Lymphadenopathy:     She has no cervical  adenopathy.   Neurological: She is alert and oriented to person, place, and time. She exhibits normal muscle tone. Coordination normal.   Skin: Skin is warm, dry, intact, not diaphoretic and not pale.   Psychiatric: Her speech is normal and behavior is normal. Judgment and thought content normal.   Nursing note and vitals reviewed.    Results for orders placed or performed in visit on 11/08/24   POCT Influenza A/B MOLECULAR    Collection Time: 11/08/24  8:26 AM   Result Value Ref Range    POC Molecular Influenza A Ag Positive (A) Negative    POC Molecular Influenza B Ag Negative Negative     Acceptable Yes          Assessment:     1. Influenza A    2. Fever, unspecified fever cause    3. Acute cough        Plan:       Influenza A  -     oseltamivir (TAMIFLU) 75 MG capsule; Take 1 capsule (75 mg total) by mouth 2 (two) times daily. for 5 days  Dispense: 10 capsule; Refill: 0  -     benzonatate (TESSALON) 200 MG capsule; Take 1 capsule (200 mg total) by mouth 3 (three) times daily as needed for Cough.  Dispense: 30 capsule; Refill: 0  -     promethazine-dextromethorphan (PROMETHAZINE-DM) 6.25-15 mg/5 mL Syrp; Take 5 mLs by mouth nightly as needed (cough).  Dispense: 118 mL; Refill: 0    Fever, unspecified fever cause  -     POCT Influenza A/B MOLECULAR    Acute cough      Discussed dx, home care, tx options. Pt wishes to proceed with tamiflu tx.  I have reviewed the patient's chart to view previous visits, labs, and imaging to assess PMH and look for any trends or previous treatments.

## 2024-11-08 NOTE — PATIENT INSTRUCTIONS
You have been diagnosed with Influenza.   You are contagious for 24 hours after you start the Tamilfu or 24 hours after your last fever, whichever happens last.  Please drink plenty of fluids.  Please get plenty of rest.    Tamiflu prescription has been discussed and if prescribed, please take to completion unless you cannot tolerate the side effects.     - You have been given an antiviral today for treatment of your condition.    - Please complete the antiviral as directed.  - If the antiviral is Tamiflu: It can cause nausea and/or vomiting due to irritation of the GI tract in some people.  Please take tamiflu with food.       - Rest.    - Drink plenty of fluids.  - Viral upper respiratory infections typically run their course in 10-14 days.     - Tylenol (acetaminophen) or Ibuprofen as directed as needed for fever/pain. Avoid tylenol if you have a history of liver disease. Do not take ibuprofen if you have a history of GI bleeding, kidney disease, gastric surgery, or if you take blood thinners.     - You can take over-the-counter claritin, zyrtec, allegra, or xyzal as directed. These are antihistamines that can help with runny nose, nasal congestion, sneezing, and helps to dry up post-nasal drip, which usually causes sore throat and cough.   - If you do NOT have high blood pressure, you may use a decongestant form (D)  of this medication (ie. Claritin- D, zyrtec-D, allegra-D) or if you do not take the D form, you can take sudafed (pseudoephedrine) over the counter, which is a decongestant. Do NOT take two decongestant (D) medications at the same time (such as mucinex-D and claritin-D or plain sudafed and claritin D)    - You can use Flonase (fluticasone) nasal spray as directed for sinus congestion and postnasal drip. This is a steroid nasal spray that works locally over time to decrease the inflammation in your nose/sinuses and help with allergic symptoms. This is not an quick- relief spray like afrin, but it  works well if used daily.  Discontinue if you develop nose bleed  - use OTC nasal saline prior to Flonase.  - you can use OTC nasal saline such as Ocean Spray Nasal Saline 1-3 puffs each nostril every 2-3 hours then blow out onto tissue. This is to irrigate the nasal passage way to clear the sinus openings. Use until sinus problem resolved.    - you can take plain OTC Mucinex (guaifenesin) 1200 mg twice a day to help loosen mucous.     -warm salt water gargles can help with sore throat    - warm tea with honey can help with cough. Honey is a natural cough suppressant.    - Take the tessalon (benzonatate) as needed as prescribed for cough   - Only take the promethazine- DM as directed, as needed at night for cough. This medication may cause drowsiness.      - Follow up with your PCP or specialty clinic as directed in the next 1-2 weeks if not improved or as needed.  You can call [phone] to schedule an appointment with the appropriate provider.      - Go to the ER if you develop new or worsening symptoms.     - You must understand that you have received an Urgent Care treatment only and that you may be released before all of your medical problems are known or treated.   - You, the patient, will arrange for follow up care as instructed.   - If your condition worsens or fails to improve we recommend that you receive another evaluation at the ER immediately or contact your PCP to discuss your concerns or return here.

## 2024-12-04 ENCOUNTER — PATIENT MESSAGE (OUTPATIENT)
Dept: ADMINISTRATIVE | Facility: HOSPITAL | Age: 54
End: 2024-12-04
Payer: COMMERCIAL

## 2024-12-04 PROBLEM — Z74.09 IMPAIRED FUNCTIONAL MOBILITY AND ACTIVITY TOLERANCE: Status: RESOLVED | Noted: 2024-10-03 | Resolved: 2024-12-04

## 2024-12-04 PROBLEM — R29.898 DECREASED STRENGTH OF LOWER EXTREMITY: Status: ACTIVE | Noted: 2024-12-04

## 2024-12-04 PROBLEM — R29.898 DECREASED STRENGTH OF LOWER EXTREMITY: Status: RESOLVED | Noted: 2024-10-03 | Resolved: 2024-12-04

## 2024-12-04 PROBLEM — M25.69 DECREASED RANGE OF MOTION OF TRUNK AND BACK: Status: ACTIVE | Noted: 2024-12-04

## 2024-12-04 PROBLEM — R29.898 DECREASED STRENGTH OF TRUNK AND BACK: Status: ACTIVE | Noted: 2024-12-04

## 2024-12-10 ENCOUNTER — PATIENT OUTREACH (OUTPATIENT)
Dept: ADMINISTRATIVE | Facility: HOSPITAL | Age: 54
End: 2024-12-10
Payer: COMMERCIAL

## 2024-12-10 ENCOUNTER — OFFICE VISIT (OUTPATIENT)
Dept: CARDIOLOGY | Facility: CLINIC | Age: 54
End: 2024-12-10
Payer: COMMERCIAL

## 2024-12-10 VITALS
SYSTOLIC BLOOD PRESSURE: 137 MMHG | OXYGEN SATURATION: 95 % | DIASTOLIC BLOOD PRESSURE: 90 MMHG | HEART RATE: 69 BPM | BODY MASS INDEX: 23.21 KG/M2 | HEIGHT: 65 IN | WEIGHT: 139.31 LBS

## 2024-12-10 DIAGNOSIS — R07.9 CHEST PAIN, UNSPECIFIED TYPE: Primary | ICD-10-CM

## 2024-12-10 DIAGNOSIS — I83.813 VARICOSE VEINS OF BOTH LOWER EXTREMITIES WITH PAIN: ICD-10-CM

## 2024-12-10 DIAGNOSIS — E78.2 MIXED HYPERLIPIDEMIA: ICD-10-CM

## 2024-12-10 DIAGNOSIS — R55 SYNCOPE AND COLLAPSE: ICD-10-CM

## 2024-12-10 DIAGNOSIS — I10 ESSENTIAL HYPERTENSION: ICD-10-CM

## 2024-12-10 DIAGNOSIS — I73.9 PAD (PERIPHERAL ARTERY DISEASE): ICD-10-CM

## 2024-12-10 DIAGNOSIS — Z12.11 COLON CANCER SCREENING: Primary | ICD-10-CM

## 2024-12-10 PROCEDURE — 3008F BODY MASS INDEX DOCD: CPT | Mod: CPTII,S$GLB,, | Performed by: INTERNAL MEDICINE

## 2024-12-10 PROCEDURE — 99204 OFFICE O/P NEW MOD 45 MIN: CPT | Mod: S$GLB,,, | Performed by: INTERNAL MEDICINE

## 2024-12-10 PROCEDURE — 3075F SYST BP GE 130 - 139MM HG: CPT | Mod: CPTII,S$GLB,, | Performed by: INTERNAL MEDICINE

## 2024-12-10 PROCEDURE — 93000 ELECTROCARDIOGRAM COMPLETE: CPT | Mod: S$GLB,,, | Performed by: INTERNAL MEDICINE

## 2024-12-10 PROCEDURE — 1159F MED LIST DOCD IN RCRD: CPT | Mod: CPTII,S$GLB,, | Performed by: INTERNAL MEDICINE

## 2024-12-10 PROCEDURE — 3079F DIAST BP 80-89 MM HG: CPT | Mod: CPTII,S$GLB,, | Performed by: INTERNAL MEDICINE

## 2024-12-10 PROCEDURE — 3044F HG A1C LEVEL LT 7.0%: CPT | Mod: CPTII,S$GLB,, | Performed by: INTERNAL MEDICINE

## 2024-12-10 PROCEDURE — 99999 PR PBB SHADOW E&M-EST. PATIENT-LVL IV: CPT | Mod: PBBFAC,,, | Performed by: INTERNAL MEDICINE

## 2024-12-10 RX ORDER — METOPROLOL TARTRATE 50 MG/1
TABLET ORAL
Qty: 2 TABLET | Refills: 0 | Status: SHIPPED | OUTPATIENT
Start: 2024-12-10

## 2024-12-10 RX ORDER — METOPROLOL TARTRATE 100 MG/1
TABLET ORAL
Qty: 2 TABLET | Refills: 0 | Status: SHIPPED | OUTPATIENT
Start: 2024-12-10

## 2024-12-10 NOTE — PROGRESS NOTES
Chart reviewed and updated. Reconciled immunizations.  Placed referral to colonoscopy.    Opal ARAYA, Medical Assistant  Panel Care Coordinator  [facility]

## 2024-12-10 NOTE — PROGRESS NOTES
Cardiology Clinic Visit    History of Present Illness:       Carmen Morrow is a pleasant 54 y.o. female with medical issues noted below in assessment/plan referred for evaluation of chest pain, loss of consciousness, irregular heartbeat.    States several years ago she had a history of having irregular heart beat, never had dx.  Was started on some medication (unable to recall at this time) however symptoms seem to self resolve.    Recently had back surgery 4 weeks ago.  Following surgery while she was at home she developed CP with pain radiating to jaw and down left arm while laying down lasting 15 minutes.   The following day when she ambulated to kitchen she felt off and had LOC w/o prodromal sx.  Awoke with injury to her lip and tooth.      No exertional sx the last week. 2 days ago however did notice having some fluttering in her chest.    Mom: Leaky valve never needed fix   Dad: Multiple MI in 60s  Never smoked      History obtained by patient interview and supplemented by nursing documentation and chart review.   Objective   PMHx:  has a past medical history of Allergy, Brachial neuritis or radiculitis NOS (11/14/2012), Degeneration of cervical intervertebral disc (11/14/2012), Essential hypertension (8/10/2020), GERD (gastroesophageal reflux disease), Latent tuberculosis by skin test (2/8/2017), Mixed hyperlipidemia (12/29/2021), and SVT (supraventricular tachycardia).   SurgHx:  has a past surgical history that includes Knee arthroscopy; Laminectomy; Spinal fusion; anterior diskectomy fusion (05/20/2016); CSF leak (05/22/2016); Carpal tunnel release (Left, 01/30/2018); Augmentation of breast (Bilateral); Esophagogastroduodenoscopy (N/A, 09/01/2020); Surgical removal of bunion with osteotomy of metatarsal bone (Right, 02/02/2021); and Correction of hammer toe (Right, 02/02/2021).   FamHx: family history includes Asthma in her mother; COPD in her father; Cirrhosis in her brother; Coronary artery  "disease in her sister; Diabetes in her father; Heart disease in her father and mother; Hypertension in her mother; Neuropathy in her sister.   SocialHx:  reports that she quit smoking about 24 years ago. Her smoking use included cigarettes. She has been exposed to tobacco smoke. She has never used smokeless tobacco. She reports that she does not drink alcohol and does not use drugs.  Home Meds:  Current Outpatient Medications   Medication Instructions    acyclovir (ZOVIRAX) 200 mg, Oral, As needed (PRN)    amLODIPine (NORVASC) 5 mg, Oral, Daily    esomeprazole (NEXIUM) 40 mg, Oral, Before breakfast    fluticasone propionate (FLONASE) 50 mcg, Each Nostril, Daily    plecanatide (TRULANCE) 3 mg, Oral, As needed (PRN), For severe constipation    traZODone (DESYREL) 50 mg, Oral, Nightly PRN    ZyrTEC 10 mg, As needed (PRN)     Objective/Exam:   LMP  (LMP Unknown)    Wt Readings from Last 4 Encounters:   11/08/24 63.2 kg (139 lb 5.3 oz)   10/16/24 62.8 kg (138 lb 7.2 oz)   06/28/24 65.3 kg (143 lb 15.4 oz)   02/15/24 75.3 kg (166 lb)      Constitutional: NAD, Atraumatic, Conversant   HEENT: MMM, Sclera anicteric, No JVD   Cardiovascular: RRR, no murmurs noted, no chest tenderness to palpation, 2+ radial pulses b/l  Pulmonary: normal respiratory effort, CTAB, no crackles, wheezes  Abdominal: S/NT/ND  Musculoskeletal: No lower extremity edema noted b/l  Neurological: No gross neurological deficits  Skin: W/D/I  Psych: Appropriate affect, normal mood  Labs/Imaging/Procedures   Personally reviewed  Lab Results   Component Value Date     07/01/2024    K 4.9 07/01/2024     07/01/2024    CO2 26 07/01/2024    BUN 12 07/01/2024    CREATININE 0.8 07/01/2024    ANIONGAP 8 07/01/2024     Lab Results   Component Value Date    HGBA1C 4.8 07/01/2024     No results found for: "BNP", "BNPTRIAGEBLO"  Peripheral  07/13/2020 CV ultrasounds  Normal resting SHANT's.  Normal TBI's bilaterally.  Reduced left LE exercise SHANT consistent " "with mild left LE PAD. Lab Results   Component Value Date    WBC 6.82 07/01/2024    HGB 13.2 07/01/2024    HCT 40.0 07/01/2024     07/01/2024    GRAN 3.4 07/01/2024    GRAN 49.1 07/01/2024     Lab Results   Component Value Date    CHOL 159 07/01/2024    HDL 61 07/01/2024    LDLCALC 87.8 07/01/2024    LDLCALC 91.0 01/04/2024    LDLCALC 56.4 (L) 09/29/2022    TRIG 51 07/01/2024     Lab Results   Component Value Date    TSH 0.872 01/04/2024     No results found for: "APOLIPOPROTE"  No results found for: "LIPOA"       TTE:  No results found for this or any previous visit.    Stress Testing:   No results found for this or any previous visit.     Coronary Angiogram:  No results found for this or any previous visit.      Personal: Works as RN as practice manager for [employer]     47 minutes were spent on this visit including time personally:  -Reviewing Medical records & lab results  -Independently reviewing and interpreting (if not documented by myself) EKGs, echocardiograms, catherizations   -Obtaining a history, performing a relevant exam, counseling/educating the patient/family  -Documenting clinical information in the EMR including ordering of tests  -Care coordination and communicating with other health care providers       Problem List:     1. Varicose veins of both lower extremities with pain    2. Mixed hyperlipidemia    3. PAD (peripheral artery disease)    4. Essential hypertension      Assessment/Plan:   Carmen Morrow is a 54 y.o. female with HTN, HLD, mild PAD presenting for evaluation of CP, LOC, irregular heartbeats as per above.  Based on symptoms, CP seems to be most concerning especially in light of family history and risk factors.  Irregular heartbeats may just be PACs as documented on previous Holter.  VSS, exam WNL, ECG NSR, no ischemic changes.    -CTA   -TTE   -Holter  -LDL 87, at goal for now  -BP at goal, continue amlodipine 5 mg    Thank you for the opportunity to care for this " patient. Please don't hesitate to reach out with any questions/concerns.   Speech recognition software used for parts of this note. Please excuse grammar, out of context phrases, and/or syntax issues.        Gary Lieberman MD  Interventional Cardiology  [facility]

## 2024-12-11 LAB
OHS QRS DURATION: 88 MS
OHS QTC CALCULATION: 415 MS

## 2024-12-17 ENCOUNTER — TELEPHONE (OUTPATIENT)
Dept: ENDOSCOPY | Facility: HOSPITAL | Age: 54
End: 2024-12-17

## 2024-12-17 ENCOUNTER — CLINICAL SUPPORT (OUTPATIENT)
Dept: ENDOSCOPY | Facility: HOSPITAL | Age: 54
End: 2024-12-17
Attending: INTERNAL MEDICINE
Payer: COMMERCIAL

## 2024-12-17 VITALS — BODY MASS INDEX: 23.16 KG/M2 | HEIGHT: 65 IN | WEIGHT: 139 LBS

## 2024-12-17 DIAGNOSIS — Z12.11 COLON CANCER SCREENING: ICD-10-CM

## 2024-12-17 DIAGNOSIS — Z12.11 SPECIAL SCREENING FOR MALIGNANT NEOPLASMS, COLON: Primary | ICD-10-CM

## 2024-12-17 NOTE — TELEPHONE ENCOUNTER
Referral for procedure from PAT appointment      Spoke to pt to schedule procedure(s) Colonoscopy       Physician to perform procedure(s) Dr. HERVE Heredia  Date of Procedure (s) 02/04/25  Arrival Time 9:15 AM  Time of Procedure(s) 10:15 AM   Location of Procedure(s) [place] [address] Floor  Type of Rx Prep sent to patient: PEG  Instructions provided to patient via [facility]    Patient was informed on the following information and verbalized understanding. Screening questionnaire reviewed with patient and complete. If procedure requires anesthesia, a responsible adult needs to be present to accompany the patient home, patient cannot drive after receiving anesthesia. Appointment details are tentative, especially check-in time. Patient will receive a prep-op call 7 days prior to confirm check-in time for procedure. If applicable the patient should contact their pharmacy to verify Rx for procedure prep is ready for pick-up. Patient was advised to call the scheduling department at [phone] if pharmacy states no Rx is available. Patient was advised to call the endoscopy scheduling department if any questions or concerns arise.      [facility] Scheduling Department

## 2024-12-23 ENCOUNTER — TELEPHONE (OUTPATIENT)
Dept: CARDIOLOGY | Facility: HOSPITAL | Age: 54
End: 2024-12-23
Payer: COMMERCIAL

## 2024-12-23 ENCOUNTER — PATIENT MESSAGE (OUTPATIENT)
Dept: CARDIOLOGY | Facility: HOSPITAL | Age: 54
End: 2024-12-23
Payer: COMMERCIAL

## 2024-12-23 DIAGNOSIS — R00.2 PALPITATIONS: ICD-10-CM

## 2024-12-23 DIAGNOSIS — I73.9 PAD (PERIPHERAL ARTERY DISEASE): ICD-10-CM

## 2024-12-23 DIAGNOSIS — R07.9 CHEST PAIN, UNSPECIFIED TYPE: Primary | ICD-10-CM

## 2024-12-23 DIAGNOSIS — I10 ESSENTIAL HYPERTENSION: ICD-10-CM

## 2024-12-23 DIAGNOSIS — Z82.49 FAMILY HISTORY OF EARLY CAD: ICD-10-CM

## 2024-12-23 DIAGNOSIS — E78.2 MIXED HYPERLIPIDEMIA: ICD-10-CM

## 2024-12-23 NOTE — TELEPHONE ENCOUNTER
I had the pleasure of discussing your upcoming Cardiac CTA scheduled for 12/27/2024 at 9:00. To review, we discussed showing up 15-20 minutes before your appointment time. Your test is located at [facility] on the corner of [address] and [address], address [address], [place] [address], next door to [employer].     I have reviewed your current medications that you take and I've discussed the Cardiac CTA prep for heart rate management with Dr. Matias, the interpreting MD. He is ordering for you to take 50mg of Metoprolol tartrate (Lopressor) 2-hours prior to the CTA; please bring any extra tablets in the event more is needed. Again, the goal for this is to maintain a desired heart rate of ~55-60 beats/minute for the duration of the test--about 40 minutes. This helps for the overall study quality and clarity.    Please ensure to HOLD and BRING your daily dose of Amlodipine (Norvasc) with you to the CTA in the event this is needed upon completion of the CTA.     We did discuss needing blood work drawn prior to the test. This is to ensure your renal function is stable enough to receive the IV contrast needed for imaging. I have messaged Dr. Lieberman's office to have this order placed. This can be completed as a walk-in with no appointment necessary once this order is placed.    Reminder for a 4-hour fasting time, no caffeine the AM of, and you can have water, anywhere from 16-32 ounces before and after completion of the test. It is advisable to have someone transport you to and from the test as a safety precaution. Thank you again for your time today. For any questions or concerns: I am available M-F from 7:30-4, please call [phone].

## 2024-12-27 ENCOUNTER — HOSPITAL ENCOUNTER (OUTPATIENT)
Dept: RADIOLOGY | Facility: HOSPITAL | Age: 54
Discharge: HOME | End: 2024-12-27
Attending: INTERNAL MEDICINE
Payer: COMMERCIAL

## 2024-12-27 VITALS
DIASTOLIC BLOOD PRESSURE: 77 MMHG | OXYGEN SATURATION: 100 % | HEART RATE: 71 BPM | SYSTOLIC BLOOD PRESSURE: 120 MMHG | RESPIRATION RATE: 16 BRPM

## 2024-12-27 DIAGNOSIS — R07.9 CHEST PAIN, UNSPECIFIED TYPE: ICD-10-CM

## 2024-12-27 PROCEDURE — 75574 CT ANGIO HRT W/3D IMAGE: CPT | Mod: TC

## 2024-12-27 PROCEDURE — 25000003 PHARM REV CODE 250: Performed by: INTERNAL MEDICINE

## 2024-12-27 PROCEDURE — 25500020 PHARM REV CODE 255: Performed by: INTERNAL MEDICINE

## 2024-12-27 PROCEDURE — 25000242 PHARM REV CODE 250 ALT 637 W/ HCPCS: Performed by: INTERNAL MEDICINE

## 2024-12-27 PROCEDURE — 75574 CT ANGIO HRT W/3D IMAGE: CPT | Mod: 26,,, | Performed by: STUDENT IN AN ORGANIZED HEALTH CARE EDUCATION/TRAINING PROGRAM

## 2024-12-27 RX ORDER — NITROGLYCERIN 0.4 MG/1
0.4 TABLET SUBLINGUAL ONCE
Status: COMPLETED | OUTPATIENT
Start: 2024-12-27 | End: 2024-12-27

## 2024-12-27 RX ORDER — METOPROLOL TARTRATE 1 MG/ML
5 INJECTION, SOLUTION INTRAVENOUS EVERY 5 MIN PRN
Status: DISCONTINUED | OUTPATIENT
Start: 2024-12-27 | End: 2024-12-28 | Stop reason: HOSPADM

## 2024-12-27 RX ADMIN — METOPROLOL TARTRATE 5 MG: 5 INJECTION, SOLUTION INTRAVENOUS at 09:12

## 2024-12-27 RX ADMIN — IOHEXOL 100 ML: 350 INJECTION, SOLUTION INTRAVENOUS at 09:12

## 2024-12-27 RX ADMIN — NITROGLYCERIN 0.4 MG: 0.4 TABLET SUBLINGUAL at 09:12

## 2024-12-27 NOTE — NURSING
Pt VSS post CTA study. Pt exhibiting no adverse effects from medication. Pt able to adequately ambulate with no dizziness or SOB. IV removed, site dressed with guaze and coban. [facility]. Pt educated on post study instructions. Verbalized understanding.

## 2024-12-27 NOTE — NURSING
Pt arrived to [facility] for cardiac CTA.  Pt AAOx4, ambulatory, no distress noted. Pt informed of procedure, need for PIV and side effects of contrast and nitroglycerin.  Pt connected for continuous vital sign monitoring. VS assessed and put in flowsheets.  IV in place.  Pt verbalizes understanding.

## 2025-01-01 ENCOUNTER — PATIENT MESSAGE (OUTPATIENT)
Dept: CARDIOLOGY | Facility: CLINIC | Age: 55
End: 2025-01-01
Payer: COMMERCIAL

## 2025-01-13 ENCOUNTER — HOSPITAL ENCOUNTER (OUTPATIENT)
Dept: CARDIOLOGY | Facility: HOSPITAL | Age: 55
Discharge: HOME | End: 2025-01-13
Attending: INTERNAL MEDICINE
Payer: COMMERCIAL

## 2025-01-13 VITALS — BODY MASS INDEX: 23.16 KG/M2 | HEIGHT: 65 IN | WEIGHT: 139 LBS

## 2025-01-13 DIAGNOSIS — R55 SYNCOPE AND COLLAPSE: ICD-10-CM

## 2025-01-13 DIAGNOSIS — R07.9 CHEST PAIN, UNSPECIFIED TYPE: ICD-10-CM

## 2025-01-13 PROCEDURE — 93306 TTE W/DOPPLER COMPLETE: CPT

## 2025-01-13 PROCEDURE — 93227 XTRNL ECG REC<48 HR R&I: CPT | Mod: ,,, | Performed by: INTERNAL MEDICINE

## 2025-01-13 PROCEDURE — 93306 TTE W/DOPPLER COMPLETE: CPT | Mod: 26,,, | Performed by: INTERNAL MEDICINE

## 2025-01-13 PROCEDURE — 93225 XTRNL ECG REC<48 HRS REC: CPT

## 2025-01-14 LAB
APICAL FOUR CHAMBER EJECTION FRACTION: 81 %
APICAL TWO CHAMBER EJECTION FRACTION: 70 %
ASCENDING AORTA: 3.08 CM
AV INDEX (PROSTH): 0.74
AV MEAN GRADIENT: 4.3 MMHG
AV PEAK GRADIENT: 9 MMHG
AV VALVE AREA BY VELOCITY RATIO: 2.3 CM²
AV VALVE AREA: 2.3 CM²
AV VELOCITY RATIO: 0.73
BSA FOR ECHO PROCEDURE: 1.7 M2
CV ECHO LV RWT: 0.38 CM
DOP CALC AO PEAK VEL: 1.5 M/S
DOP CALC AO VTI: 28.2 CM
DOP CALC LVOT AREA: 3.1 CM2
DOP CALC LVOT DIAMETER: 2 CM
DOP CALC LVOT PEAK VEL: 1.1 M/S
DOP CALC LVOT STROKE VOLUME: 65.9 CM3
DOP CALC MV VTI: 22.6 CM
DOP CALCLVOT PEAK VEL VTI: 21 CM
ECHO LV POSTERIOR WALL: 0.9 CM (ref 0.6–1.1)
FRACTIONAL SHORTENING: 27.7 % (ref 28–44)
INTERVENTRICULAR SEPTUM: 0.7 CM (ref 0.6–1.1)
IVC DIAMETER: 1.31 CM
LEFT ATRIUM AREA SYSTOLIC (APICAL 2 CHAMBER): 10.28 CM2
LEFT ATRIUM AREA SYSTOLIC (APICAL 4 CHAMBER): 10.95 CM2
LEFT ATRIUM VOLUME INDEX MOD: 12.8 ML/M2
LEFT ATRIUM VOLUME MOD: 21.59 ML
LEFT INTERNAL DIMENSION IN SYSTOLE: 3.4 CM (ref 2.1–4)
LEFT VENTRICLE DIASTOLIC VOLUME INDEX: 61.3 ML/M2
LEFT VENTRICLE DIASTOLIC VOLUME: 103.6 ML
LEFT VENTRICLE END DIASTOLIC VOLUME APICAL 2 CHAMBER: 50.68 ML
LEFT VENTRICLE END DIASTOLIC VOLUME APICAL 4 CHAMBER: 60.32 ML
LEFT VENTRICLE END SYSTOLIC VOLUME APICAL 2 CHAMBER: 20.55 ML
LEFT VENTRICLE END SYSTOLIC VOLUME APICAL 4 CHAMBER: 21.04 ML
LEFT VENTRICLE MASS INDEX: 72.3 G/M2
LEFT VENTRICLE SYSTOLIC VOLUME INDEX: 28.8 ML/M2
LEFT VENTRICLE SYSTOLIC VOLUME: 48.68 ML
LEFT VENTRICULAR INTERNAL DIMENSION IN DIASTOLE: 4.7 CM (ref 3.5–6)
LEFT VENTRICULAR MASS: 122.3 G
LVED V (TEICH): 103.6 ML
LVES V (TEICH): 48.68 ML
LVOT MG: 2.66 MMHG
LVOT MV: 0.78 CM/S
MV MEAN GRADIENT: 1 MMHG
MV PEAK GRADIENT: 3 MMHG
MV STENOSIS PRESSURE HALF TIME: 80.7 MS
MV VALVE AREA BY CONTINUITY EQUATION: 2.92 CM2
MV VALVE AREA P 1/2 METHOD: 2.73 CM2
OHS CV RV/LV RATIO: 0.68 CM
OHS LV EJECTION FRACTION SIMPSONS BIPLANE MOD: 75 %
PISA TR MAX VEL: 2.15 M/S
PULM VEIN S/D RATIO: 1.32
PV PEAK D VEL: 0.41 M/S
PV PEAK S VEL: 0.54 M/S
RA VOL SYS: 24.11 ML
RIGHT ATRIAL AREA: 11.3 CM2
RIGHT ATRIUM VOLUME AREA LENGTH APICAL 4 CHAMBER: 23.68 ML
RIGHT VENTRICLE DIASTOLIC BASEL DIMENSION: 3.2 CM
RV TISSUE DOPPLER FREE WALL SYSTOLIC VELOCITY 1 (APICAL 4 CHAMBER VIEW): 13.62 CM/S
SINUS: 3.23 CM
STJ: 2.85 CM
TDI LATERAL: 0.13 M/S
TDI SEPTAL: 0.1 M/S
TDI: 0.12 M/S
TR MAX PG: 18 MMHG
TRICUSPID ANNULAR PLANE SYSTOLIC EXCURSION: 2.04 CM
Z-SCORE OF LEFT VENTRICULAR DIMENSION IN END DIASTOLE: -0.01
Z-SCORE OF LEFT VENTRICULAR DIMENSION IN END SYSTOLE: 1.22

## 2025-01-26 LAB
OHS CV EVENT MONITOR DAY: 0
OHS CV HOLTER LENGTH DECIMAL HOURS: 47.98
OHS CV HOLTER LENGTH HOURS: 47
OHS CV HOLTER LENGTH MINUTES: 59
OHS CV HOLTER SINUS AVERAGE HR: 83
OHS CV HOLTER SINUS MAX HR: 141
OHS CV HOLTER SINUS MIN HR: 62

## 2025-01-27 ENCOUNTER — PATIENT MESSAGE (OUTPATIENT)
Dept: CARDIOLOGY | Facility: CLINIC | Age: 55
End: 2025-01-27
Payer: COMMERCIAL

## 2025-01-28 NOTE — PROGRESS NOTES
Spoke to patient for pre-call to confirm scheduled Colonoscopy. Patient verbalized understanding of all instructions, including:  Patient is taking Ozempic (Semaglutide), instructed to take last dose on/before 1/27/25. Patient states last dose was 1/13/25 and verbalized understanding not to take again prior to procedure.

## 2025-01-29 ENCOUNTER — ANESTHESIA EVENT (OUTPATIENT)
Dept: ENDOSCOPY | Facility: HOSPITAL | Age: 55
End: 2025-01-29
Payer: COMMERCIAL

## 2025-01-29 NOTE — ANESTHESIA PREPROCEDURE EVALUATION
01/29/2025  Carmen Morrow is a 54 y.o., female.    Procedure: COLONOSCOPY, SCREENING, LOW RISK PATIENT (N/A)         Patient Active Problem List   Diagnosis    Cervicalgia    CN (constipation)    Chronic midline low back pain    Worsening headaches    Gastroesophageal reflux disease without esophagitis    Bilateral occipital neuralgia    Acute pain of right shoulder    Left leg pain    Varicose veins of both lower extremities with pain    Lumbar disc disease    PAD (peripheral artery disease)    Essential hypertension    Nausea    S/P cervical spinal fusion    Hav (hallux abducto valgus), right    HSV (herpes simplex virus) infection    Mixed hyperlipidemia    Migraine, unspecified, not intractable, without status migrainosus    Decreased strength of lower extremity    Decreased range of motion of trunk and back    Decreased strength of trunk and back       Past Medical History:   Diagnosis Date    Allergy     Brachial neuritis or radiculitis NOS 11/14/2012    Degeneration of cervical intervertebral disc 11/14/2012    Essential hypertension 8/10/2020    GERD (gastroesophageal reflux disease)     Latent tuberculosis by skin test 2/8/2017    Mixed hyperlipidemia 12/29/2021    SVT (supraventricular tachycardia)        ECHO: Results for orders placed during the hospital encounter of 01/13/25    Echo    Interpretation Summary    Left Ventricle: Normal wall motion. There is normal systolic function with a visually estimated ejection fraction of 65 - 70%. There is normal diastolic function.    Right Ventricle: Normal right ventricular cavity size. Systolic function is normal.    Left Atrium: Normal left atrial size.    Right Atrium: Normal right atrial size.    Aortic Valve: The aortic valve is a trileaflet valve. Mildly calcified cusps. Aortic valve peak velocity is 1.5 m/s.  Mean gradient is 4.3 mmHg. There is no significant regurgitation.    Mitral Valve: The mean pressure gradient across the mitral valve is 1 mmHg at a heart rate of  bpm.    Tricuspid Valve: There is trace regurgitation.    Aorta: Aortic root is normal in size measuring 3.23 cm.    Pulmonary Artery: No pulmonary hypertension.    Pericardium: There is no pericardial effusion.      There is no height or weight on file to calculate BMI.    Tobacco Use: Medium Risk (12/10/2024)    Patient History     Smoking Tobacco Use: Former     Smokeless Tobacco Use: Never     Passive Exposure: Past       Social History     Substance and Sexual Activity   Drug Use No        Alcohol Use: Not At Risk (10/16/2024)    AUDIT-C     Frequency of Alcohol Consumption: Never     Average Number of Drinks: Patient does not drink     Frequency of Binge Drinking: Never       Review of patient's allergies indicates:   Allergen Reactions    Oxycodone Itching     Can take hydrcodone         Airway:  No value filed.    Pre-op Assessment    I have reviewed the Patient Summary Reports.     I have reviewed the Nursing Notes. I have reviewed the NPO Status.   I have reviewed the Medications.     Review of Systems  Anesthesia Hx:             Denies Family Hx of Anesthesia complications.    Denies Personal Hx of Anesthesia complications.                    Social:  No Alcohol Use, Former Smoker       Hematology/Oncology:  Hematology Normal   Oncology Normal                                   EENT/Dental:  EENT/Dental Normal           Cardiovascular:     Hypertension                                          Pulmonary:  Pulmonary Normal                       Renal/:  Renal/ Normal                 Hepatic/GI:     GERD                Musculoskeletal:  Arthritis               Neurological:    Neuromuscular Disease,  Headaches                                 Endocrine:  Endocrine Normal            Dermatological:  Skin Normal    Psych:  Psychiatric  Normal                     Anesthesia Plan  Type of Anesthesia, risks & benefits discussed:    Anesthesia Type: Gen Natural Airway  Intra-op Monitoring Plan: Standard ASA Monitors  Post Op Pain Control Plan: multimodal analgesia  Induction:  IV  Informed Consent: Informed consent signed with the Patient and all parties understand the risks and agree with anesthesia plan.  All questions answered.   ASA Score: 3  Day of Surgery Review of History & Physical: H&P Update referred to the surgeon/provider.    Ready For Surgery From Anesthesia Perspective.     .

## 2025-02-04 ENCOUNTER — ANESTHESIA (OUTPATIENT)
Dept: ENDOSCOPY | Facility: HOSPITAL | Age: 55
End: 2025-02-04
Payer: COMMERCIAL

## 2025-02-04 ENCOUNTER — HOSPITAL ENCOUNTER (OUTPATIENT)
Facility: HOSPITAL | Age: 55
Discharge: HOME | End: 2025-02-04
Attending: INTERNAL MEDICINE | Admitting: INTERNAL MEDICINE
Payer: COMMERCIAL

## 2025-02-04 VITALS
OXYGEN SATURATION: 98 % | SYSTOLIC BLOOD PRESSURE: 129 MMHG | HEART RATE: 66 BPM | DIASTOLIC BLOOD PRESSURE: 83 MMHG | RESPIRATION RATE: 20 BRPM | TEMPERATURE: 98 F

## 2025-02-04 DIAGNOSIS — Z12.11 COLON CANCER SCREENING: Primary | ICD-10-CM

## 2025-02-04 PROCEDURE — G0121 COLON CA SCRN NOT HI RSK IND: HCPCS | Mod: ,,, | Performed by: STUDENT IN AN ORGANIZED HEALTH CARE EDUCATION/TRAINING PROGRAM

## 2025-02-04 PROCEDURE — 94760 N-INVAS EAR/PLS OXIMETRY 1: CPT

## 2025-02-04 PROCEDURE — 63600175 PHARM REV CODE 636 W HCPCS: Performed by: ANESTHESIOLOGY

## 2025-02-04 PROCEDURE — G0121 COLON CA SCRN NOT HI RSK IND: HCPCS | Performed by: STUDENT IN AN ORGANIZED HEALTH CARE EDUCATION/TRAINING PROGRAM

## 2025-02-04 PROCEDURE — D9220A PRA ANESTHESIA: Mod: ,,, | Performed by: NURSE ANESTHETIST, CERTIFIED REGISTERED

## 2025-02-04 PROCEDURE — 37000009 HC ANESTHESIA EA ADD 15 MINS: Performed by: STUDENT IN AN ORGANIZED HEALTH CARE EDUCATION/TRAINING PROGRAM

## 2025-02-04 PROCEDURE — 37000008 HC ANESTHESIA 1ST 15 MINUTES: Performed by: STUDENT IN AN ORGANIZED HEALTH CARE EDUCATION/TRAINING PROGRAM

## 2025-02-04 PROCEDURE — 99900035 HC TECH TIME PER 15 MIN (STAT)

## 2025-02-04 RX ORDER — PROPOFOL 10 MG/ML
VIAL (ML) INTRAVENOUS
Status: DISCONTINUED | OUTPATIENT
Start: 2025-02-04 | End: 2025-02-04

## 2025-02-04 RX ORDER — PROPOFOL 10 MG/ML
VIAL (ML) INTRAVENOUS CONTINUOUS PRN
Status: DISCONTINUED | OUTPATIENT
Start: 2025-02-04 | End: 2025-02-04

## 2025-02-04 RX ORDER — SODIUM CHLORIDE 9 MG/ML
INJECTION, SOLUTION INTRAVENOUS CONTINUOUS
Status: DISCONTINUED | OUTPATIENT
Start: 2025-02-04 | End: 2025-02-04 | Stop reason: HOSPADM

## 2025-02-04 RX ORDER — LIDOCAINE HYDROCHLORIDE 20 MG/ML
INJECTION INTRAVENOUS
Status: DISCONTINUED | OUTPATIENT
Start: 2025-02-04 | End: 2025-02-04

## 2025-02-04 RX ADMIN — PROPOFOL 150 MCG/KG/MIN: 10 INJECTION, EMULSION INTRAVENOUS at 10:02

## 2025-02-04 RX ADMIN — LIDOCAINE HYDROCHLORIDE 100 MG: 20 INJECTION INTRAVENOUS at 10:02

## 2025-02-04 RX ADMIN — PROPOFOL 100 MG: 10 INJECTION, EMULSION INTRAVENOUS at 10:02

## 2025-02-04 NOTE — PLAN OF CARE
Discharge instructions reviewed with patient and family member with verbalization of understanding.  PIV removed with catheter intact.  VSS.  No acute complaints of pain or discomfort.  Patient ready for discharge

## 2025-02-04 NOTE — TRANSFER OF CARE
Anesthesia Transfer of Care Note    Patient: Carmen Morrow    Procedure(s) Performed: Procedure(s) (LRB):  COLONOSCOPY, SCREENING, LOW RISK PATIENT (N/A)    Patient location: PACU    Anesthesia Type: general    Transport from OR: Transported from OR on room air with adequate spontaneous ventilation    Post pain: adequate analgesia    Post assessment: no apparent anesthetic complications    Post vital signs: stable    Level of consciousness: sedated    Nausea/Vomiting: no nausea/vomiting    Complications: none    Transfer of care protocol was followed    Last vitals: Visit Vitals  /79 (BP Location: Left arm, Patient Position: Lying)   Pulse 80   Temp 37 °C (98.6 °F) (Temporal)   Resp 16   LMP  (LMP Unknown)   SpO2 99%   Breastfeeding No

## 2025-02-04 NOTE — ANESTHESIA POSTPROCEDURE EVALUATION
Anesthesia Post Evaluation    Patient: Carmen Morrow    Procedure(s) Performed: Procedure(s) (LRB):  COLONOSCOPY, SCREENING, LOW RISK PATIENT (N/A)    Final Anesthesia Type: general      Patient location during evaluation: PACU  Patient participation: Yes- Able to Participate  Level of consciousness: awake and alert  Post-procedure vital signs: reviewed and stable  Pain management: adequate  Airway patency: patent    PONV status at discharge: No PONV  Anesthetic complications: no      Cardiovascular status: stable  Respiratory status: spontaneous ventilation  Hydration status: euvolemic  Follow-up not needed.          Vitals Value Taken Time   /61 02/04/25 1123   Temp 36.7 °C (98.1 °F) 02/04/25 1052   Pulse 95 02/04/25 1124   Resp 10 02/04/25 1124   SpO2 95 % 02/04/25 1124   Vitals shown include unfiled device data.      Event Time   Out of Recovery 10:52:00         Pain/Janey Score: Janey Score: 10 (2/4/2025 11:07 AM)

## 2025-02-04 NOTE — PROVATION PATIENT INSTRUCTIONS
Discharge Summary/Instructions after an Endoscopic Procedure  Patient Name: Carmen Morrow  Patient MRN: [id number]  Patient YOB: 1970 Tuesday, February 4, 2025  Orville Rodriguez MD  Dear patient,  As a result of recent federal legislation (The Federal Cures Act), you may   receive lab or pathology results from your procedure in your [facility]   account before your physician is able to contact you. Your physician or   their representative will relay the results to you with their   recommendations at their soonest availability.  Thank you,  RESTRICTIONS:  During your procedure today, you received medications for sedation.  These   medications may affect your judgment, balance and coordination.  Therefore,   for 24 hours, you have the following restrictions:   - DO NOT drive a car, operate machinery, make legal/financial decisions,   sign important papers or drink alcohol.    ACTIVITY:  Today: no heavy lifting, straining or running due to procedural   sedation/anesthesia.  The following day: return to full activity including work.  DIET:  Eat and drink normally unless instructed otherwise.     TREATMENT FOR COMMON SIDE EFFECTS:  - Mild abdominal pain, nausea, belching, bloating or excessive gas:  rest,   eat lightly and use a heating pad.  - Sore Throat: treat with throat lozenges and/or gargle with warm salt   water.  - Because air was used during the procedure, expelling large amounts of air   from your rectum or belching is normal.  - If a bowel prep was taken, you may not have a bowel movement for 1-3 days.    This is normal.  SYMPTOMS TO WATCH FOR AND REPORT TO YOUR PHYSICIAN:  1. Abdominal pain or bloating, other than gas cramps.  2. Chest pain.  3. Back pain.  4. Signs of infection such as: chills or fever occurring within 24 hours   after the procedure.  5. Rectal bleeding, which would show as bright red, maroon, or black stools.   (A tablespoon of blood from the rectum is not serious, especially  if   hemorrhoids are present.)  6. Vomiting.  7. Weakness or dizziness.  GO DIRECTLY TO THE NEAREST EMERGENCY ROOM IF YOU HAVE ANY OF THE FOLLOWING:      Difficulty breathing              Chills and/or fever over 101 F   Persistent vomiting and/or vomiting blood   Severe abdominal pain   Severe chest pain   Black, tarry stools   Bleeding- more than one tablespoon   Any other symptom or condition that you feel may need urgent attention  Your doctor recommends these additional instructions:  If any biopsies were taken, your doctors clinic will contact you in 1 to 2   weeks with any results.  - Discharge patient to home (ambulatory).   - Patient has a contact number available for emergencies.  The signs and   symptoms of potential delayed complications were discussed with the   patient.  Return to normal activities tomorrow.  Written discharge   instructions were provided to the patient.   - Resume previous diet.   - Continue present medications.   - Return to primary care physician as previously scheduled.   - Repeat colonoscopy in 10 years for screening purposes.  For questions, problems or results please call your physician - Orville Rodriguez MD at Work:  [phone].  [facility] [place], EMERGENCY ROOM PHONE NUMBER: [phone]  IF A COMPLICATION OR EMERGENCY SITUATION ARISES AND YOU ARE UNABLE TO REACH   YOUR PHYSICIAN - GO DIRECTLY TO THE EMERGENCY ROOM.  Orville Rodriguez MD  2/4/2025 10:50:56 AM  This report has been verified and signed electronically.  Dear patient,  As a result of recent federal legislation (The Federal Cures Act), you may   receive lab or pathology results from your procedure in your [facility]   account before your physician is able to contact you. Your physician or   their representative will relay the results to you with their   recommendations at their soonest availability.  Thank you,  PROVATION

## 2025-02-04 NOTE — H&P
Short Stay Endoscopy History and Physical    PCP - Bryce Tavares MD  Referring Physician - Bryce Tavares MD  [address] [place] [address]    Procedure - Colonoscopy  ASA - per anesthesia  Mallampati - per anesthesia  History of Anesthesia problems - no  Family history Anesthesia problems -  no   Plan of anesthesia - General    HPI  54 y.o. female  Reason for procedure:   Colon cancer screening [Z12.11]        ROS:  Constitutional: No fevers, chills, No weight loss  CV: No chest pain  Pulm: No cough, No shortness of breath  GI: see HPI    Medical History:  has a past medical history of Allergy, Brachial neuritis or radiculitis NOS (11/14/2012), Degeneration of cervical intervertebral disc (11/14/2012), Essential hypertension (8/10/2020), GERD (gastroesophageal reflux disease), Latent tuberculosis by skin test (2/8/2017), Mixed hyperlipidemia (12/29/2021), and SVT (supraventricular tachycardia).    Surgical History:  has a past surgical history that includes Knee arthroscopy; Laminectomy; Spinal fusion; anterior diskectomy fusion (05/20/2016); CSF leak (05/22/2016); Carpal tunnel release (Left, 01/30/2018); Augmentation of breast (Bilateral); Esophagogastroduodenoscopy (N/A, 09/01/2020); Surgical removal of bunion with osteotomy of metatarsal bone (Right, 02/02/2021); and Correction of hammer toe (Right, 02/02/2021).    Family History: family history includes Asthma in her mother; COPD in her father; Cirrhosis in her brother; Coronary artery disease in her sister; Diabetes in her father; Heart disease in her father and mother; Hypertension in her mother; Neuropathy in her sister..    Social History:  reports that she quit smoking about 25 years ago. Her smoking use included cigarettes. She has been exposed to tobacco smoke. She has never used smokeless tobacco. She reports that she does not drink alcohol and does not use drugs.    Review of patient's allergies indicates:   Allergen Reactions     Oxycodone Itching     Can take hydrcodone       Medications:   Medications Prior to Admission   Medication Sig Dispense Refill Last Dose/Taking    amLODIPine (NORVASC) 5 MG tablet Take 1 tablet (5 mg total) by mouth once daily. 90 tablet 3 2/3/2025 at  4:00 PM    acyclovir (ZOVIRAX) 200 MG capsule Take 1 capsule (200 mg total) by mouth as needed (for oral lesions). 10 capsule 5 More than a month    cetirizine (ZYRTEC) 10 MG tablet Take 10 mg by mouth as needed. 1 Tablet Oral Every day   More than a month    esomeprazole (NEXIUM) 40 MG capsule Take 1 capsule (40 mg total) by mouth before breakfast. 90 capsule 3 More than a month    fluticasone propionate (FLONASE) 50 mcg/actuation nasal spray 1 spray (50 mcg total) by Each Nostril route once daily. 16 g 3     metoprolol tartrate (LOPRESSOR) 100 MG tablet If heart rate >75 bpm take metoprolol OR ivabradine: take 100 mg tablet 1 hour prior to CTA.  Bring second tab with you to the procedure. 2 tablet 0     metoprolol tartrate (LOPRESSOR) 50 MG tablet If heart rate >60 bpm:  take 50 mg tablet 1 hour prior to CTA.  Bring second tab with you to the procedure. 2 tablet 0     plecanatide (TRULANCE) 3 mg Tab Take 3 mg by mouth as needed (for severe pain associated with constipation). For severe constipation 90 tablet 3 2/2/2025    traZODone (DESYREL) 50 MG tablet TAKE 1 TABLET (50 MG TOTAL) BY MOUTH NIGHTLY AS NEEDED FOR INSOMNIA (SLEEP). 90 tablet 3        Physical Exam:    Vital Signs:   Vitals:    02/04/25 0952   BP: 112/79   Pulse: 80   Resp: 16   Temp: 98.6 °F (37 °C)       General Appearance: Well appearing in no acute distress  Abdomen: Soft, non tender, non distended with normal bowel sounds, no masses    Labs:  Lab Results   Component Value Date    WBC 6.82 07/01/2024    HGB 13.2 07/01/2024    HCT 40.0 07/01/2024     07/01/2024    CHOL 159 07/01/2024    TRIG 51 07/01/2024    HDL 61 07/01/2024    ALT 14 07/01/2024    AST 17 07/01/2024     07/01/2024     K 4.9 07/01/2024     07/01/2024    CREATININE 0.7 12/24/2024    BUN 12 07/01/2024    CO2 26 07/01/2024    TSH 0.872 01/04/2024    INR 0.9 08/12/2019    HGBA1C 4.8 07/01/2024       I have explained the risks and benefits of this endoscopic procedure to the patient including but not limited to bleeding, inflammation, infection, perforation, and death.      Orville Rodriguez MD

## 2025-02-04 NOTE — PLAN OF CARE
Pt in preop bay 3, VSS and IV inserted. Pt denies any open wounds on body or the use of any weight loss injections. Pt needs an updated H&P, an admit order and anesthesia consents, otherwise ready to roll.

## 2025-02-18 ENCOUNTER — HOSPITAL ENCOUNTER (OUTPATIENT)
Dept: RADIOLOGY | Facility: HOSPITAL | Age: 55
Discharge: HOME | End: 2025-02-18
Attending: ORTHOPAEDIC SURGERY
Payer: COMMERCIAL

## 2025-02-18 ENCOUNTER — TELEPHONE (OUTPATIENT)
Dept: ORTHOPEDICS | Facility: CLINIC | Age: 55
End: 2025-02-18
Payer: COMMERCIAL

## 2025-02-18 ENCOUNTER — OFFICE VISIT (OUTPATIENT)
Dept: ORTHOPEDICS | Facility: CLINIC | Age: 55
End: 2025-02-18
Payer: COMMERCIAL

## 2025-02-18 VITALS — HEIGHT: 65 IN | WEIGHT: 138.88 LBS | BODY MASS INDEX: 23.14 KG/M2

## 2025-02-18 DIAGNOSIS — M79.641 PAIN OF RIGHT HAND: Primary | ICD-10-CM

## 2025-02-18 DIAGNOSIS — M79.641 PAIN OF RIGHT HAND: ICD-10-CM

## 2025-02-18 DIAGNOSIS — R20.0 RIGHT UPPER EXTREMITY NUMBNESS: Primary | ICD-10-CM

## 2025-02-18 PROCEDURE — 73130 X-RAY EXAM OF HAND: CPT | Mod: 26,RT,, | Performed by: RADIOLOGY

## 2025-02-18 PROCEDURE — 73130 X-RAY EXAM OF HAND: CPT | Mod: TC,FY,RT

## 2025-02-18 PROCEDURE — 99999 PR PBB SHADOW E&M-EST. PATIENT-LVL III: CPT | Mod: PBBFAC,,, | Performed by: ORTHOPAEDIC SURGERY

## 2025-02-21 ENCOUNTER — PATIENT MESSAGE (OUTPATIENT)
Dept: ORTHOPEDICS | Facility: CLINIC | Age: 55
End: 2025-02-21
Payer: COMMERCIAL

## 2025-03-06 ENCOUNTER — PATIENT MESSAGE (OUTPATIENT)
Dept: ORTHOPEDICS | Facility: CLINIC | Age: 55
End: 2025-03-06
Payer: COMMERCIAL

## 2025-03-06 DIAGNOSIS — R20.0 RIGHT UPPER EXTREMITY NUMBNESS: Primary | ICD-10-CM

## 2025-03-12 ENCOUNTER — TELEPHONE (OUTPATIENT)
Facility: CLINIC | Age: 55
End: 2025-03-12
Payer: COMMERCIAL

## 2025-03-12 NOTE — TELEPHONE ENCOUNTER
"----- Message from Sarah sent at 2/27/2025  3:26 PM CST -----  Regarding: pt advice  Contact: [phone]  .Name Of Caller: Self Contact Preference?: [phone] What is the nature of the call?: needing to schdule EMG from Orders in epic, pls call Additional Notes:"Thank you for all that you do for our patients"  "

## 2025-03-17 ENCOUNTER — PATIENT OUTREACH (OUTPATIENT)
Dept: INTERNAL MEDICINE | Facility: CLINIC | Age: 55
End: 2025-03-17
Payer: COMMERCIAL

## 2025-03-17 VITALS — DIASTOLIC BLOOD PRESSURE: 83 MMHG | SYSTOLIC BLOOD PRESSURE: 129 MMHG

## 2025-04-08 ENCOUNTER — PATIENT MESSAGE (OUTPATIENT)
Dept: ORTHOPEDICS | Facility: CLINIC | Age: 55
End: 2025-04-08
Payer: COMMERCIAL

## 2025-04-09 ENCOUNTER — TELEPHONE (OUTPATIENT)
Dept: ORTHOPEDICS | Facility: CLINIC | Age: 55
End: 2025-04-09
Payer: COMMERCIAL

## 2025-04-09 NOTE — TELEPHONE ENCOUNTER
I spoke to the patient via the telephone.  She recently underwent electrodiagnostic testing at the [facility].  The results of the study confirmed that she has right carpal tunnel syndrome.  It is reported to be mild.  The patient wishes to proceed with a carpal tunnel injection.  She will come in on Friday April 11th at 8:30 a.m. for the injection.

## 2025-04-11 ENCOUNTER — OFFICE VISIT (OUTPATIENT)
Dept: ORTHOPEDICS | Facility: CLINIC | Age: 55
End: 2025-04-11
Payer: COMMERCIAL

## 2025-04-11 VITALS — BODY MASS INDEX: 23.11 KG/M2 | WEIGHT: 138.88 LBS

## 2025-04-11 DIAGNOSIS — G56.01 RIGHT CARPAL TUNNEL SYNDROME: Primary | ICD-10-CM

## 2025-04-11 PROCEDURE — 99499 UNLISTED E&M SERVICE: CPT | Mod: S$GLB,,, | Performed by: ORTHOPAEDIC SURGERY

## 2025-04-11 PROCEDURE — 99999 PR PBB SHADOW E&M-EST. PATIENT-LVL III: CPT | Mod: PBBFAC,,, | Performed by: ORTHOPAEDIC SURGERY

## 2025-04-11 PROCEDURE — 20526 THER INJECTION CARP TUNNEL: CPT | Mod: RT,S$GLB,, | Performed by: ORTHOPAEDIC SURGERY

## 2025-04-11 RX ORDER — TRIAMCINOLONE ACETONIDE 40 MG/ML
40 INJECTION, SUSPENSION INTRA-ARTICULAR; INTRAMUSCULAR
Status: DISCONTINUED | OUTPATIENT
Start: 2025-04-11 | End: 2025-04-11 | Stop reason: HOSPADM

## 2025-04-11 RX ADMIN — TRIAMCINOLONE ACETONIDE 40 MG: 40 INJECTION, SUSPENSION INTRA-ARTICULAR; INTRAMUSCULAR at 08:04

## 2025-04-11 NOTE — PROCEDURES
Carpal Tunnel: R carpal tunnel    Date/Time: 4/11/2025 8:30 AM    Performed by: Berlin Cortez MD  Authorized by: Berlin Cortez MD    Consent Done?:  Yes (Verbal)  Indications:  Pain  Site marked: the procedure site was marked    Timeout: prior to procedure the correct patient, procedure, and site was verified    Prep: patient was prepped and draped in usual sterile fashion      Local anesthesia used?: Yes    Local anesthetic:  Lidocaine 1% without epinephrine  Anesthetic total (ml):  2    Location:  Wrist  Site:  R carpal tunnel  Ultrasonic Guidance for Needle Placement?: No    Needle size:  22 G  Approach:  Volar  Medications:  40 mg triamcinolone acetonide 40 mg/mL  Patient tolerance:  Patient tolerated the procedure well with no immediate complications

## 2025-04-24 ENCOUNTER — PATIENT MESSAGE (OUTPATIENT)
Dept: ORTHOPEDICS | Facility: CLINIC | Age: 55
End: 2025-04-24
Payer: COMMERCIAL

## 2025-04-30 ENCOUNTER — OFFICE VISIT (OUTPATIENT)
Dept: ORTHOPEDICS | Facility: CLINIC | Age: 55
End: 2025-04-30
Payer: COMMERCIAL

## 2025-04-30 DIAGNOSIS — G56.01 RIGHT CARPAL TUNNEL SYNDROME: Primary | ICD-10-CM

## 2025-04-30 PROCEDURE — 99214 OFFICE O/P EST MOD 30 MIN: CPT | Mod: S$GLB,,, | Performed by: ORTHOPAEDIC SURGERY

## 2025-04-30 PROCEDURE — 1160F RVW MEDS BY RX/DR IN RCRD: CPT | Mod: CPTII,S$GLB,, | Performed by: ORTHOPAEDIC SURGERY

## 2025-04-30 PROCEDURE — 99999 PR PBB SHADOW E&M-EST. PATIENT-LVL III: CPT | Mod: PBBFAC,,, | Performed by: ORTHOPAEDIC SURGERY

## 2025-04-30 PROCEDURE — 1159F MED LIST DOCD IN RCRD: CPT | Mod: CPTII,S$GLB,, | Performed by: ORTHOPAEDIC SURGERY

## 2025-04-30 RX ORDER — SODIUM CHLORIDE 9 MG/ML
INJECTION, SOLUTION INTRAVENOUS CONTINUOUS
Status: CANCELLED | OUTPATIENT
Start: 2025-04-30

## 2025-04-30 RX ORDER — CEFAZOLIN SODIUM 2 G/50ML
2 SOLUTION INTRAVENOUS
Status: CANCELLED | OUTPATIENT
Start: 2025-04-30

## 2025-04-30 NOTE — PROGRESS NOTES
Patient ID:   Carmen Morrow is a 54 y.o. female.    Chief Complaint:   Follow up evaluation for right carpal tunnel syndrome    HPI:   The patient is a 54-year-old female with a known history of right carpal tunnel syndrome.  She has a history of left carpal tunnel syndrome status post release.  She has done well with the left side.  She recently underwent a carpal tunnel injection.  She had temporary relief with the pain has now returned.  She is interested in consideration of right carpal tunnel release.    Medications:  Current Medications[1]    Allergies:  Review of patient's allergies indicates:   Allergen Reactions    Oxycodone Itching     Can take hydrcodone       Past Medical History:  Past Medical History:   Diagnosis Date    Allergy     Brachial neuritis or radiculitis NOS 11/14/2012    Degeneration of cervical intervertebral disc 11/14/2012    Essential hypertension 8/10/2020    GERD (gastroesophageal reflux disease)     Latent tuberculosis by skin test 2/8/2017    Mixed hyperlipidemia 12/29/2021    SVT (supraventricular tachycardia)         Past Surgical History:  Past Surgical History:   Procedure Laterality Date    anterior diskectomy fusion  05/20/2016    AUGMENTATION OF BREAST Bilateral     1994    CARPAL TUNNEL RELEASE Left 01/30/2018    COLONOSCOPY, SCREENING, LOW RISK PATIENT N/A 2/4/2025    Procedure: COLONOSCOPY, SCREENING, LOW RISK PATIENT;  Surgeon: Orville Rodriguez MD;  Location: [facility] ENDOSCOPY;  Service: Endoscopy;  Laterality: N/A;  ref by Bryce Tavares MD, peg, portal, GLP-1 hold 8 days-ae  1/28/25- holding GLP-1 as directed, pc complete. DBM    CORRECTION OF HAMMER TOE Right 02/02/2021    Procedure: CORRECTION, HAMMER TOE;  Surgeon: Maday Thomas DPM;  Location: [facility] OR;  Service: Podiatry;  Laterality: Right;  4th toe      CSF leak  05/22/2016    Replace and repair fat graft from surgery on 5/20/2016    ESOPHAGOGASTRODUODENOSCOPY N/A 09/01/2020    Procedure:  ESOPHAGOGASTRODUODENOSCOPY (EGD);  Surgeon: Alexi Macias MD;  [facility]  Service: Endoscopy;  Laterality: N/A;  prep ins. 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 [employer]     Employer: [employer]   Tobacco Use    Smoking status: Former     Current packs/day: 0.00     Types: Cigarettes     Quit date: 2000     Years since quittin.3     Passive exposure: Past    Smokeless tobacco: Never   Substance and Sexual Activity    Alcohol use: No    Drug use: No    Sexual activity: Yes     Partners: Male     Birth control/protection: None       Family History:  Family History   Problem Relation Name Age of Onset    Heart disease Mother      Hypertension Mother      Asthma Mother      Heart disease Father      COPD Father          smoker    Diabetes Father      Coronary artery disease Sister          MI-59,    Neuropathy Sister      Cirrhosis Brother          Hep C-    Ovarian cancer Neg Hx      Colon cancer Neg Hx      Breast cancer Neg Hx      Cancer Neg Hx          ROS:  Review of Systems   Musculoskeletal:  Positive for joint pain and myalgias.   Neurological:  Positive for numbness and paresthesias.   All other systems reviewed and are negative.      Vitals:  LMP  (LMP Unknown)     Physical Examination:  Comprehensive Orthopaedic Musculoskeletal Exam    General      Constitutional: appears stated age, well-developed and well-nourished    Scleral icterus: no    Labored breathing: no    Psychiatric: normal mood and affect and no acute distress    Neurological: alert and oriented x3    Skin:  intact    Lymphadenopathy: none     Ortho Exam   Right wrist exam:  No thenar atrophy.  No thenar weakness.  Positive Phalen's.  Positive carpal tunnel compression test.  Decreased light touch in the carpal tunnel distribution.    Assessment:  1. Right carpal tunnel syndrome      Plan:  The patient wishes to proceed with right open carpal tunnel release.  Risks versus benefits were discussed.  She would like to proceed on May 8, 2025.  Orders Placed This Encounter    Diet NPO    Cleanse with Chlorhexidine (CHG)    Place STEPHY hose    Reason for no VTE Prophylaxis    Case Request Operating Room: RELEASE, CARPAL TUNNEL     No follow-ups on file.              [1]   Current Outpatient Medications:     acyclovir (ZOVIRAX) 200 MG capsule, Take 1 capsule (200 mg total) by mouth as needed (for oral lesions)., Disp: 10 capsule, Rfl: 5    cetirizine (ZYRTEC) 10 MG tablet, Take 10 mg by mouth as needed. 1 Tablet Oral Every day, Disp: , Rfl:     fluticasone propionate (FLONASE) 50 mcg/actuation nasal spray, 1 spray (50 mcg total) by Each Nostril route once daily., Disp: 16 g, Rfl: 3    plecanatide (TRULANCE) 3 mg Tab, Take 3 mg by mouth as needed (for severe pain associated with constipation). For severe constipation, Disp: 90 tablet, Rfl: 3    amLODIPine (NORVASC) 5 MG tablet, Take 1 tablet (5 mg total) by mouth once daily., Disp: 90 tablet, Rfl: 3    esomeprazole (NEXIUM) 40 MG capsule, Take 1 capsule (40 mg total) by mouth before breakfast., Disp: 90 capsule, Rfl: 3

## 2025-05-07 ENCOUNTER — ANESTHESIA EVENT (OUTPATIENT)
Dept: SURGERY | Facility: HOSPITAL | Age: 55
End: 2025-05-07
Payer: COMMERCIAL

## 2025-05-08 ENCOUNTER — ANESTHESIA (OUTPATIENT)
Dept: SURGERY | Facility: HOSPITAL | Age: 55
End: 2025-05-08
Payer: COMMERCIAL

## 2025-05-08 ENCOUNTER — HOSPITAL ENCOUNTER (OUTPATIENT)
Facility: HOSPITAL | Age: 55
Discharge: HOME | End: 2025-05-08
Attending: ORTHOPAEDIC SURGERY | Admitting: ORTHOPAEDIC SURGERY
Payer: COMMERCIAL

## 2025-05-08 DIAGNOSIS — G56.01 RIGHT CARPAL TUNNEL SYNDROME: ICD-10-CM

## 2025-05-08 PROCEDURE — 71000016 HC POSTOP RECOV ADDL HR: Performed by: ORTHOPAEDIC SURGERY

## 2025-05-08 PROCEDURE — 63600175 PHARM REV CODE 636 W HCPCS

## 2025-05-08 PROCEDURE — 36000706: Performed by: ORTHOPAEDIC SURGERY

## 2025-05-08 PROCEDURE — 37000008 HC ANESTHESIA 1ST 15 MINUTES: Performed by: ORTHOPAEDIC SURGERY

## 2025-05-08 PROCEDURE — 71000015 HC POSTOP RECOV 1ST HR: Performed by: ORTHOPAEDIC SURGERY

## 2025-05-08 PROCEDURE — 63600175 PHARM REV CODE 636 W HCPCS: Performed by: ORTHOPAEDIC SURGERY

## 2025-05-08 PROCEDURE — 36000707: Performed by: ORTHOPAEDIC SURGERY

## 2025-05-08 PROCEDURE — 64721 CARPAL TUNNEL SURGERY: CPT | Mod: RT,,, | Performed by: ORTHOPAEDIC SURGERY

## 2025-05-08 PROCEDURE — 37000009 HC ANESTHESIA EA ADD 15 MINS: Performed by: ORTHOPAEDIC SURGERY

## 2025-05-08 RX ORDER — SODIUM CHLORIDE 9 MG/ML
INJECTION, SOLUTION INTRAVENOUS CONTINUOUS
Status: DISCONTINUED | OUTPATIENT
Start: 2025-05-08 | End: 2025-05-08 | Stop reason: HOSPADM

## 2025-05-08 RX ORDER — HYDROCODONE BITARTRATE AND ACETAMINOPHEN 5; 325 MG/1; MG/1
1 TABLET ORAL EVERY 6 HOURS PRN
Qty: 20 TABLET | Refills: 0 | Status: SHIPPED | OUTPATIENT
Start: 2025-05-08

## 2025-05-08 RX ORDER — KETOROLAC TROMETHAMINE 30 MG/ML
30 INJECTION, SOLUTION INTRAMUSCULAR; INTRAVENOUS ONCE
Status: DISCONTINUED | OUTPATIENT
Start: 2025-05-08 | End: 2025-05-08 | Stop reason: HOSPADM

## 2025-05-08 RX ORDER — LIDOCAINE HYDROCHLORIDE 20 MG/ML
INJECTION INTRAVENOUS
Status: DISCONTINUED | OUTPATIENT
Start: 2025-05-08 | End: 2025-05-08

## 2025-05-08 RX ORDER — PROPOFOL 10 MG/ML
VIAL (ML) INTRAVENOUS
Status: DISCONTINUED | OUTPATIENT
Start: 2025-05-08 | End: 2025-05-08

## 2025-05-08 RX ORDER — BUPIVACAINE HYDROCHLORIDE 2.5 MG/ML
INJECTION, SOLUTION EPIDURAL; INFILTRATION; INTRACAUDAL; PERINEURAL
Status: DISCONTINUED | OUTPATIENT
Start: 2025-05-08 | End: 2025-05-08 | Stop reason: HOSPADM

## 2025-05-08 RX ORDER — PROPOFOL 10 MG/ML
VIAL (ML) INTRAVENOUS CONTINUOUS PRN
Status: DISCONTINUED | OUTPATIENT
Start: 2025-05-08 | End: 2025-05-08

## 2025-05-08 RX ORDER — CEFAZOLIN 2 G/1
2 INJECTION, POWDER, FOR SOLUTION INTRAMUSCULAR; INTRAVENOUS
Status: DISCONTINUED | OUTPATIENT
Start: 2025-05-08 | End: 2025-05-08 | Stop reason: HOSPADM

## 2025-05-08 RX ORDER — LIDOCAINE HYDROCHLORIDE 10 MG/ML
INJECTION, SOLUTION EPIDURAL; INFILTRATION; INTRACAUDAL; PERINEURAL
Status: DISCONTINUED | OUTPATIENT
Start: 2025-05-08 | End: 2025-05-08 | Stop reason: HOSPADM

## 2025-05-08 RX ORDER — CEFAZOLIN SODIUM 1 G/3ML
INJECTION, POWDER, FOR SOLUTION INTRAMUSCULAR; INTRAVENOUS
Status: DISCONTINUED | OUTPATIENT
Start: 2025-05-08 | End: 2025-05-08

## 2025-05-08 RX ADMIN — PROPOFOL 40 MG: 10 INJECTION, EMULSION INTRAVENOUS at 07:05

## 2025-05-08 RX ADMIN — LIDOCAINE HYDROCHLORIDE 60 MG: 20 INJECTION, SOLUTION INTRAVENOUS at 07:05

## 2025-05-08 RX ADMIN — PROPOFOL 150 MCG/KG/MIN: 10 INJECTION, EMULSION INTRAVENOUS at 07:05

## 2025-05-08 RX ADMIN — SODIUM CHLORIDE, SODIUM LACTATE, POTASSIUM CHLORIDE, AND CALCIUM CHLORIDE: .6; .31; .03; .02 INJECTION, SOLUTION INTRAVENOUS at 07:05

## 2025-05-08 RX ADMIN — CEFAZOLIN 2 G: 330 INJECTION, POWDER, FOR SOLUTION INTRAMUSCULAR; INTRAVENOUS at 07:05

## 2025-05-08 NOTE — OP NOTE
Facility:  [facility]    Date of Surgery:  May 8, 2025    Surgeon:  Berlin Cortez MD    First Assistant:  Caleb Woodall MD    Second Assistant:  Jose Hollins MD    Anesthesia:  Local + MAC    Pre-operative Diagnosis:  Right carpal tunnel syndrome    Indication:  To improve pain    Post-operative Diagnosis:  Right carpal tunnel syndrome    Procedure:  Right open carpal tunnel release    The patient was identified in the pre-operative holding area. The correct extremity was marked and written informed consent was verified. The patient was transferred to the OR. The patient was placed on the OR table. MAC anesthesia was administered. The operative extremity was prepped and draped in the usual sterile fashion. A surgical timeout was performed to verify the correct extremely and administration of IV antibiotics withing 30 minutes of surgery start time.     The planned surgical incision was marked out.  The incision site was injected with 10 cc of local anesthesia consisting of 1% lidocaine and 0.5% bupivacaine.  A 3 cm incision was made from Tapia's cardinal line extending proximally.  Sharp dissection was carried down to the level of the palmar fascia.  The palmar fascia was divided and reflected revealing the transverse carpal ligament.  The transverse carpal ligament was then sharply divided.  Scissor dissection was performed proximally and Metzenbaum scissors were used to release the proximal transverse carpal ligament and antebrachial fascia.  A [place] elevator was placed to make sure that a complete release was performed.  The wound was irrigated.  The skin was closed with 4-0 Prolene suture in a horizontal mattress fashion.  A sterile soft compressive dressing was applied.  The patient was awakened and transferred to the postanesthesia care unit in stable condition.    EBL:  Less than 50 cc    Drains:  None    Complications:  None known

## 2025-05-08 NOTE — TRANSFER OF CARE
"Anesthesia Transfer of Care Note    Patient: Carmen Morrow    Procedure(s) Performed: Procedure(s) (LRB):  RELEASE, CARPAL TUNNEL (Right)    Patient location: [facility]    Anesthesia Type: MAC    Transport from OR: Transported from OR on 6-10 L/min O2 by face mask with adequate spontaneous ventilation    Post pain: adequate analgesia    Post assessment: no apparent anesthetic complications    Post vital signs: stable    Level of consciousness: awake and alert    Nausea/Vomiting: no nausea/vomiting    Complications: none    Transfer of care protocol was followed      Last vitals: Visit Vitals  BP (!) 150/98   Pulse 83   Temp 37 °C (98.6 °F) (Temporal)   Resp 16   Ht 5' 5" (1.651 m)   Wt 62.6 kg (138 lb)   LMP  (LMP Unknown)   SpO2 98%   Breastfeeding No   BMI 22.96 kg/m²     "

## 2025-05-08 NOTE — DISCHARGE INSTRUCTIONS
1. Wound care:  Please leave the surgical bandages in place until Sunday morning.  You may change the bandages and then rewrap with the Ace wrap.  You may shower but please keep the incision clean and dry.    2. Activity:  You are encouraged to move your wrist and digits to prevent stiffness.    3. Medications: A prescription for Norco 5/325 mg has been provided.  You may take 1 of these tablets every 6 hours as needed for pain.      4. Follow up arrangements:  You will need to follow up with Dr. Cortez in a period of 2 weeks after surgery.  His office will make this appointment and contact you with that information.    After Hand Surgery  After surgery, the better you take care of yourself--especially your hand--the sooner it will heal. Follow your surgeons instructions. Try not to bump your hand, and dont move or lift anything while youre still wearing bandages, a splint, or a cast.  Care for your hand    Keep your hand elevated above heart level as much as possible for the first several days after surgery. This helps reduce swelling and pain.  To help prevent infection and speed healing, take care not to get your cast or bandages wet.  Relieve pain as directed  Your surgeon may prescribe pain medicine or suggest you take an anti-inflammatory medicine. You might also be instructed to apply ice (or another cold source) to your hand. If you use ice cubes, put them in a plastic bag and rest it on top of your bandages. Leave the cold source on your hand for as long as its comfortable. Do this several times a day for the first few days after surgery. It may take several minutes before you can feel the cold through the cast or bandages.    Follow up with your surgeon  During a follow-up visit after surgery, your surgeon will check your progress. The stitches, bandages, splint, or cast may be removed. A new cast or splint may be placed. If your hand has healed enough, your surgeon may prescribe exercises.    Call  your surgeon if you have...  A fever higher than 100.4°F (38.0°C) taken by mouth  Side effects from your medicine, such as prolonged nausea  A wet or loose dressing, or a dressing that is too tight  Excessive bleeding  Increased, ongoing pain or numbness  Signs of infection (such as drainage, warmth, or redness) at the incision site     ANESTHESIA  -For the first 24 hours after surgery:  Do not drive, use heavy equipment, make important decisions, or drink alcohol  -It is normal to feel sleepy for several hours.  Rest until you are more awake.  -Have someone stay with you, if needed.  They can watch for problems and help keep you safe.  -Some possible post anesthesia side effects include: nausea and vomiting, sore throat and hoarseness, sleepiness, and dizziness.    PAIN  -If you have pain after surgery, pain medicine will help you feel better.  Take it as directed, before pain becomes severe.  Most pain relievers taken by mouth need at least 20-30 minutes to start working.  -Do not drive or drink alcohol while taking pain medicine.  -Pain medication can upset your stomach.  Taking them with a little food may help.  -Other ways to help control pain: elevation, ice, and relaxation  -Call your surgeon if still having unmanageable pain an hour after taking pain medicine.  -Pain medicine can cause constipation.  Taking an over-the counter stool softener while on prescription pain medicine and drinking plenty of fluids can prevent this side effect.  -Call your surgeon if you have severe side effects like: breathing problems, trouble waking up, dizziness, confusion, or severe constipation.    NAUSEA  -Some people have nausea after surgery.  This is often because of anesthesia, pain, pain medicine, or the stress of surgery.  -Do not push yourself to eat.  Start off with clear liquids and soup.  Slowly move to solid foods.  Don't eat fatty, rich, spicy foods at first.  Eat smaller amounts.  -If you develop persistent nausea  and vomiting please notify your surgeon immediately.    BLEEDING  -Different types of surgery require different types of care and dressing changes.  It is important to follow all instructions and advice from your surgeon.  Change dressing as directed.  Call your surgeon for any concerns regarding postop bleeding.    SIGNS OF INFECTION  -Signs of infection include: fever, swelling, drainage, and redness  -Notify your surgeon if you have a fever of 100.4 F (38.0 C) or higher.  -Notify your surgeon if you notice redness, swelling, increased pain, pus, or a foul smell at the incision site.    Notify Dr. Cortez for any questions or concerns.

## 2025-05-08 NOTE — ANESTHESIA POSTPROCEDURE EVALUATION
Anesthesia Post Evaluation    Patient: Carmen Morrow    Procedure(s) Performed: Procedure(s) (LRB):  RELEASE, CARPAL TUNNEL (Right)    Final Anesthesia Type: general      Patient location during evaluation: PACU  Patient participation: Yes- Able to Participate  Level of consciousness: awake and alert  Post-procedure vital signs: reviewed and stable  Pain management: adequate  Airway patency: patent    PONV status at discharge: No PONV  Anesthetic complications: no      Cardiovascular status: blood pressure returned to baseline and hemodynamically stable  Respiratory status: unassisted  Hydration status: euvolemic  Follow-up not needed.              Vitals Value Taken Time   /77 05/08/25 08:45   Temp 36.7 °C (98 °F) 05/08/25 08:45   Pulse 78 05/08/25 08:45   Resp 16 05/08/25 08:45   SpO2 100 % 05/08/25 08:45         No case tracking events are documented in the log.      Pain/Janey Score: Janey Score: 10 (5/8/2025  8:45 AM)

## 2025-05-08 NOTE — H&P
Patient ID:   Carmen Morrow is a 54 y.o. female.     Chief Complaint:   Follow up evaluation for right carpal tunnel syndrome     HPI:   The patient is a 54-year-old female with a known history of right carpal tunnel syndrome.  She has a history of left carpal tunnel syndrome status post release.  She has done well with the left side.  She recently underwent a carpal tunnel injection.  She had temporary relief with the pain has now returned.  She is interested in consideration of right carpal tunnel release.     Medications:  [Current Medications]    [Current Medications]     Current Outpatient Medications:     acyclovir (ZOVIRAX) 200 MG capsule, Take 1 capsule (200 mg total) by mouth as needed (for oral lesions)., Disp: 10 capsule, Rfl: 5    cetirizine (ZYRTEC) 10 MG tablet, Take 10 mg by mouth as needed. 1 Tablet Oral Every day, Disp: , Rfl:     fluticasone propionate (FLONASE) 50 mcg/actuation nasal spray, 1 spray (50 mcg total) by Each Nostril route once daily., Disp: 16 g, Rfl: 3    plecanatide (TRULANCE) 3 mg Tab, Take 3 mg by mouth as needed (for severe pain associated with constipation). For severe constipation, Disp: 90 tablet, Rfl: 3    amLODIPine (NORVASC) 5 MG tablet, Take 1 tablet (5 mg total) by mouth once daily., Disp: 90 tablet, Rfl: 3    esomeprazole (NEXIUM) 40 MG capsule, Take 1 capsule (40 mg total) by mouth before breakfast., Disp: 90 capsule, Rfl: 3     Allergies:        Review of patient's allergies indicates:   Allergen Reactions    Oxycodone Itching       Can take hydrcodone         Past Medical History:       Past Medical History:   Diagnosis Date    Allergy      Brachial neuritis or radiculitis NOS 11/14/2012    Degeneration of cervical intervertebral disc 11/14/2012    Essential hypertension 8/10/2020    GERD (gastroesophageal reflux disease)      Latent tuberculosis by skin test 2/8/2017    Mixed hyperlipidemia 12/29/2021    SVT (supraventricular tachycardia)           Past  Surgical History:        Past Surgical History:   Procedure Laterality Date    anterior diskectomy fusion   2016    AUGMENTATION OF BREAST Bilateral       1994    CARPAL TUNNEL RELEASE Left 2018    COLONOSCOPY, SCREENING, LOW RISK PATIENT N/A 2025     Procedure: COLONOSCOPY, SCREENING, LOW RISK PATIENT;  Surgeon: Orville Rodriguez MD;  Location: [facility] ENDOSCOPY;  Service: Endoscopy;  Laterality: N/A;  ref by Bryce Tavares MD, peg, portal, GLP-1 hold 8 days-ae  25- holding GLP-1 as directed, pc complete. DBM    CORRECTION OF HAMMER TOE Right 2021     Procedure: CORRECTION, HAMMER TOE;  Surgeon: Maday Thomas DPM;  Location: [facility] OR;  Service: Podiatry;  Laterality: Right;  4th toe       CSF leak   2016     Replace and repair fat graft from surgery on 2016    ESOPHAGOGASTRODUODENOSCOPY N/A 2020     Procedure: ESOPHAGOGASTRODUODENOSCOPY (EGD);  Surgeon: Alexi Macias MD;  Location: [facility] ENDO (4TH FLR);  Service: Endoscopy;  Laterality: N/A;  prep ins. emailed - COVID screening [facility] - ER    KNEE ARTHROSCOPY        LAMINECTOMY        SPINAL FUSION        SURGICAL REMOVAL OF BUNION WITH OSTEOTOMY OF METATARSAL BONE Right 2021     Procedure: BUNIONECTOMY, WITH METATARSAL OSTEOTOMY;  Surgeon: Maday Thomas DPM;  Location: [facility] OR;  Service: Podiatry;  Laterality: Right;         Social History:  Social History            Occupational History    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 [employer]       Employer: [employer]   Tobacco Use    Smoking status: Former       Current packs/day: 0.00       Types: Cigarettes       Quit date: 2000       Years since quittin.3       Passive exposure: Past    Smokeless tobacco: Never   Substance and Sexual Activity    Alcohol use: No    Drug use: No    Sexual activity: Yes       Partners: Male       Birth control/protection: None         Family History:         Family History   Problem Relation Name Age of  Onset    Heart disease Mother        Hypertension Mother        Asthma Mother        Heart disease Father        COPD Father             smoker    Diabetes Father        Coronary artery disease Sister             MI-59,    Neuropathy Sister        Cirrhosis Brother             Hep C-    Ovarian cancer Neg Hx        Colon cancer Neg Hx        Breast cancer Neg Hx        Cancer Neg Hx             ROS:  Review of Systems   Musculoskeletal:  Positive for joint pain and myalgias.   Neurological:  Positive for numbness and paresthesias.   All other systems reviewed and are negative.        Vitals:  LMP  (LMP Unknown)      Physical Examination:  Comprehensive Orthopaedic Musculoskeletal Exam     General      Constitutional: appears stated age, well-developed and well-nourished    Scleral icterus: no    Labored breathing: no    Psychiatric: normal mood and affect and no acute distress    Neurological: alert and oriented x3    Skin: intact    Lymphadenopathy: none     Ortho Exam   Right wrist exam:  No thenar atrophy.  No thenar weakness.  Positive Phalen's.  Positive carpal tunnel compression test.  Decreased light touch in the carpal tunnel distribution.     Assessment:  1. Right carpal tunnel syndrome       Plan:  The patient wishes to proceed with right open carpal tunnel release.  Risks versus benefits were discussed.  She would like to proceed on May 8, 2025.    I have reviewed the H&P. There are no interval changes to report.

## 2025-05-08 NOTE — BRIEF OP NOTE
[facility]iop [facility]  Brief Operative Note    Surgery Date:  5/8/2025    Surgeon(s) and Role:     Berlin Cortez - Primary    Assisting Surgeon: MD Caleb Beasley MD    Pre-op Diagnosis:  Right carpal tunnel syndrome     Post-op Diagnosis:    Right carpal tunnel syndrome     Procedure:  Right open carpal tunnel release    Anesthesia: Choice    Operative Findings: see op note    Estimated Blood Loss: see full op note          Specimens:   Specimen (24h ago, onward)      None              Discharge Note    OUTCOME: Patient tolerated treatment/procedure well without complication and is now ready for discharge.    DISPOSITION: Home or Self Care    FINAL DIAGNOSIS:  see op note    FOLLOWUP: In clinic    I have reviewed the notes, assessments, and/or procedures performed by Dr. Woodall, I concur with her/his documentation of Carmen Morrow.

## 2025-05-08 NOTE — ANESTHESIA PREPROCEDURE EVALUATION
[facility]  Anesthesia Pre-Operative Evaluation         Patient Name: Carmen Morrow  YOB: 1970  MRN: [id number]    SUBJECTIVE:     Pre-operative evaluation for Procedure(s) (LRB):  RELEASE, CARPAL TUNNEL (Right)     05/07/2025    Carmen Morrow is a 54 y.o. female w/ a significant PMHx of GERD, HTN, PAD, s/p cervical spinal fusion presents for carpal tunnel syndrome with above plan.     Patient now presents for the above procedure(s).      LDA: None documented.    Prev airway: None documented.    Drips: None documented.      Problem List[1]    Review of patient's allergies indicates:   Allergen Reactions    Oxycodone Itching     Can take hydrcodone       Current Inpatient Medications:      Medications Ordered Prior to Encounter[2]    Past Surgical History:   Procedure Laterality Date    anterior diskectomy fusion  05/20/2016    AUGMENTATION OF BREAST Bilateral     1994    CARPAL TUNNEL RELEASE Left 01/30/2018    COLONOSCOPY, SCREENING, LOW RISK PATIENT N/A 2/4/2025    Procedure: COLONOSCOPY, SCREENING, LOW RISK PATIENT;  Surgeon: Orville Rodriguez MD;  Location: [facility] ENDOSCOPY;  Service: Endoscopy;  Laterality: N/A;  ref by Bryce Tavares MD, peg, portal, GLP-1 hold 8 days-ae  1/28/25- holding GLP-1 as directed, pc complete. DBM    CORRECTION OF HAMMER TOE Right 02/02/2021    Procedure: CORRECTION, HAMMER TOE;  Surgeon: Maday Thomas DPM;  Location: [facility] OR;  Service: Podiatry;  Laterality: Right;  4th toe      CSF leak  05/22/2016    Replace and repair fat graft from surgery on 5/20/2016    ESOPHAGOGASTRODUODENOSCOPY N/A 09/01/2020    Procedure: ESOPHAGOGASTRODUODENOSCOPY (EGD);  Surgeon: Alexi Macias MD;  [facility]  Service: Endoscopy;  Laterality: N/A;  prep ins. emailed - COVID screening [facility] care - ERW    KNEE ARTHROSCOPY      LAMINECTOMY      SPINAL FUSION      SURGICAL REMOVAL OF BUNION WITH OSTEOTOMY OF METATARSAL BONE  Right 02/02/2021    Procedure: BUNIONECTOMY, WITH METATARSAL OSTEOTOMY;  Surgeon: Maday Thomas DPM;  Location: [facility];  Service: Podiatry;  Laterality: Right;       Social History:  Tobacco Use: Medium Risk (5/7/2025)    Patient History     Smoking Tobacco Use: Former     Smokeless Tobacco Use: Never     Passive Exposure: Past      Alcohol Use: Not At Risk (10/16/2024)    AUDIT-C     Frequency of Alcohol Consumption: Never     Average Number of Drinks: Patient does not drink     Frequency of Binge Drinking: Never        OBJECTIVE:     Vital Signs Range (Last 24H):         Significant Labs:  Lab Results   Component Value Date    WBC 6.82 07/01/2024    HGB 13.2 07/01/2024    HCT 40.0 07/01/2024     07/01/2024    CHOL 159 07/01/2024    TRIG 51 07/01/2024    HDL 61 07/01/2024    ALT 14 07/01/2024    AST 17 07/01/2024     07/01/2024    K 4.9 07/01/2024     07/01/2024    CREATININE 0.7 12/24/2024    BUN 12 07/01/2024    CO2 26 07/01/2024    TSH 0.872 01/04/2024    INR 0.9 08/12/2019    HGBA1C 4.8 07/01/2024       Diagnostic Studies: No relevant studies.    EKG:   Results for orders placed or performed in visit on 12/10/24   IN OFFICE EKG 12-LEAD (to [place])    Collection Time: 12/10/24  1:45 PM   Result Value Ref Range    QRS Duration 88 ms    [facility] QTC Calculation 415 ms    Narrative    Test Reason : I83.813,E78.2,I73.9,I10,R07.9,R55,    Vent. Rate :  63 BPM     Atrial Rate :  63 BPM     P-R Int : 168 ms          QRS Dur :  88 ms      QT Int : 406 ms       P-R-T Axes :  52  63  69 degrees    QTcB Int : 415 ms    Normal sinus rhythm  Normal ECG  When compared with ECG of 08-Nov-2023 15:38,  No significant change was found  Confirmed by Richard Flores ([id number]) on 12/11/2024 7:51:40 PM    Referred By: KEITH LARA           Confirmed By: Richard Flores       2D ECHO:  TTE:  Results for orders placed or performed during the hospital encounter of 01/13/25   Echo   Result Value Ref Range    LA Vol (MOD) 21.59  mL    LV ESV A2C 20.55 mL    LV EDV A4C 60.32 mL    LA area A4C 10.95 cm2    LA area A2C 10.28 cm2    LV ESV A4C 21.04 mL    LV EDV A2C 50.052241129888587 mL    RA Vol 24.11 mL    LV Diastolic Volume 103.60 mL    LV Systolic Volume 48.68 mL    PV Peak D Manjinder 0.41 m/s    PV Peak S Manjinder 0.54 m/s    MV stenosis pressure 1/2 time 80.70 ms    MV VTI 22.6 cm    TR Max Manjinder 2.15 m/s    MV peak gradient 3 mmHg    Ao VTI 28.2 cm    Ao peak manjinder 1.5 m/s    LVOT peak VTI 21.0 cm    LVOT peak manjinder 1.1 m/s    LVOT diameter 2.0 cm    MV mean gradient 1 mmHg    AV mean gradient 4.3 mmHg    RA area length vol 23.68 mL    RV- chamorro basal diam 3.2 cm    A4C EF 81 %    A2C EF 70 %    RA Area 11.3 cm2    RV S' 13.62 cm/s    TAPSE 2.04 cm    Ascending aorta 3.08 cm    STJ 2.85 cm    Sinus 3.23 cm    LVIDs 3.4 2.1 - 4.0 cm    PW 0.9 0.6 - 1.1 cm    IVS 0.7 0.6 - 1.1 cm    LVIDd 4.7 3.5 - 6.0 cm    TDI LATERAL 0.13 m/s    Left Ventricular Outflow Tract Mean Gradient 2.66 mmHg    Left Ventricular Outflow Tract Mean Velocity 0.78 cm/s    Left Ventricular End Systolic Volume by Teichholz Method 48.68 mL    Left Ventricular End Diastolic Volume by Teichholz Method 103.60 mL    Hernandez's Biplane MOD Ejection Fraction 75 %    IVC diameter 1.31 cm    TDI SEPTAL 0.10 m/s    RV/LV Ratio 0.68 cm    FS 27.7 (A) 28 - 44 %    LV mass 122.3 g    Left Ventricle Relative Wall Thickness 0.38 cm    AV valve area 2.3 cm²    AV Velocity Ratio 0.73     AV index (prosthetic) 0.74     MV valve area p 1/2 method 2.73 cm2    MV valve area by continuity eq 2.92 cm2    Mean e' 0.12 m/s    Pulm vein S/D ratio 1.32     LVOT area 3.1 cm2    LVOT stroke volume 65.9 cm3    AV peak gradient 9.0 mmHg    Triscuspid Valve Regurgitation Peak Gradient 18 mmHg    ANNABELLE by Velocity Ratio 2.3 cm²    BSA 1.7 m2    LV Systolic Volume Index 28.8 mL/m2    LV Diastolic Volume Index 61.30 mL/m2    LV Mass Index 72.3 g/m2    CATHERINE (MOD) 12.8 mL/m2    ZLVIDS 1.22     ZLVIDD -0.01     Narrative       Left Ventricle: Normal wall motion. There is normal systolic function   with a visually estimated ejection fraction of 65 - 70%. There is normal   diastolic function.    Right Ventricle: Normal right ventricular cavity size. Systolic   function is normal.    Left Atrium: Normal left atrial size.    Right Atrium: Normal right atrial size.    Aortic Valve: The aortic valve is a trileaflet valve. Mildly calcified   cusps. Aortic valve peak velocity is 1.5 m/s. Mean gradient is 4.3 mmHg.   There is no significant regurgitation.    Mitral Valve: The mean pressure gradient across the mitral valve is 1   mmHg at a heart rate of  bpm.    Tricuspid Valve: There is trace regurgitation.    Aorta: Aortic root is normal in size measuring 3.23 cm.    Pulmonary Artery: No pulmonary hypertension.    Pericardium: There is no pericardial effusion.         YOLIS:  No results found for this or any previous visit.    ASSESSMENT/PLAN:         Pre-op Assessment    I have reviewed the Patient Summary Reports.     I have reviewed the Nursing Notes. I have reviewed the NPO Status.   I have reviewed the Medications.     Review of Systems  Anesthesia Hx:  No problems with previous Anesthesia               Denies Personal Hx of Anesthesia complications.                    Cardiovascular:     Hypertension                                          Pulmonary:  Pulmonary Normal                       Renal/:  Renal/ Normal                 Hepatic/GI:     GERD                Neurological:      Headaches                                     Physical Exam  General: Well nourished, Cooperative, Alert and Oriented    Airway:  Mallampati: III / II  Mouth Opening: Normal  Tongue: Normal  Neck ROM: Normal ROM    Dental:  Intact    Chest/Lungs:  Normal Respiratory Rate    Heart:  Rate: Normal        Anesthesia Plan  Type of Anesthesia, risks & benefits discussed:    Anesthesia Type: Gen ETT  Intra-op Monitoring Plan: Standard ASA Monitors  Post Op Pain  Control Plan: multimodal analgesia and IV/PO Opioids PRN  Induction:  IV  Airway Plan: Direct, Post-Induction  Informed Consent: Informed consent signed with the Patient and all parties understand the risks and agree with anesthesia plan.  All questions answered.   ASA Score: 3  Day of Surgery Review of History & Physical: H&P Update referred to the surgeon/provider.    Ready For Surgery From Anesthesia Perspective.     .           [1]   Patient Active Problem List  Diagnosis    Cervicalgia    CN (constipation)    Chronic midline low back pain    Worsening headaches    Gastroesophageal reflux disease without esophagitis    Bilateral occipital neuralgia    Acute pain of right shoulder    Left leg pain    Varicose veins of both lower extremities with pain    Lumbar disc disease    PAD (peripheral artery disease)    Essential hypertension    Nausea    S/P cervical spinal fusion    Hav (hallux abducto valgus), right    HSV (herpes simplex virus) infection    Mixed hyperlipidemia    Migraine, unspecified, not intractable, without status migrainosus    Decreased strength of lower extremity    Decreased range of motion of trunk and back    Decreased strength of trunk and back   [2]   No current facility-administered medications on file prior to encounter.     Current Outpatient Medications on File Prior to Encounter   Medication Sig Dispense Refill    acyclovir (ZOVIRAX) 200 MG capsule Take 1 capsule (200 mg total) by mouth as needed (for oral lesions). 10 capsule 5    amLODIPine (NORVASC) 5 MG tablet Take 1 tablet (5 mg total) by mouth once daily. 90 tablet 3    cetirizine (ZYRTEC) 10 MG tablet Take 10 mg by mouth as needed. 1 Tablet Oral Every day      esomeprazole (NEXIUM) 40 MG capsule Take 1 capsule (40 mg total) by mouth before breakfast. 90 capsule 3    fluticasone propionate (FLONASE) 50 mcg/actuation nasal spray 1 spray (50 mcg total) by Each Nostril route once daily. 16 g 3    plecanatide (TRULANCE) 3 mg Tab Take  3 mg by mouth as needed (for severe pain associated with constipation). For severe constipation 90 tablet 3

## 2025-05-09 VITALS
HEIGHT: 65 IN | OXYGEN SATURATION: 100 % | DIASTOLIC BLOOD PRESSURE: 77 MMHG | RESPIRATION RATE: 16 BRPM | SYSTOLIC BLOOD PRESSURE: 135 MMHG | TEMPERATURE: 98 F | WEIGHT: 138 LBS | BODY MASS INDEX: 22.99 KG/M2 | HEART RATE: 78 BPM

## 2025-05-16 ENCOUNTER — TELEPHONE (OUTPATIENT)
Dept: ORTHOPEDICS | Facility: CLINIC | Age: 55
End: 2025-05-16
Payer: COMMERCIAL

## 2025-05-20 ENCOUNTER — OFFICE VISIT (OUTPATIENT)
Dept: ORTHOPEDICS | Facility: CLINIC | Age: 55
End: 2025-05-20
Payer: COMMERCIAL

## 2025-05-20 DIAGNOSIS — G56.01 RIGHT CARPAL TUNNEL SYNDROME: Primary | ICD-10-CM

## 2025-05-20 PROCEDURE — 99024 POSTOP FOLLOW-UP VISIT: CPT | Mod: S$GLB,,, | Performed by: ORTHOPAEDIC SURGERY

## 2025-05-20 PROCEDURE — 1160F RVW MEDS BY RX/DR IN RCRD: CPT | Mod: CPTII,S$GLB,, | Performed by: ORTHOPAEDIC SURGERY

## 2025-05-20 PROCEDURE — 99999 PR PBB SHADOW E&M-EST. PATIENT-LVL III: CPT | Mod: PBBFAC,,, | Performed by: ORTHOPAEDIC SURGERY

## 2025-05-20 PROCEDURE — 1159F MED LIST DOCD IN RCRD: CPT | Mod: CPTII,S$GLB,, | Performed by: ORTHOPAEDIC SURGERY

## 2025-05-20 NOTE — PROGRESS NOTES
Patient ID:   Carmen Morrow is a 54 y.o. female.    Chief Complaint:   Surgical aftercare status post right open carpal tunnel release    HPI:   The patient is returning today for suture removal.  She is doing well.  She denies any pain.  She denies any numbness or paresthesias.    Medications:  Current Medications[1]    Allergies:  Review of patient's allergies indicates:   Allergen Reactions    Oxycodone Itching     Can take hydrcodone       Vitals:  LMP  (LMP Unknown)     Physical Examination:  Ortho Exam   Skin incision is clean, dry, intact    Assessment:  1. Right carpal tunnel syndrome      Plan:  The patient will undergo suture removal.  She may progress her activity as tolerated.  She will follow up as needed.       No follow-ups on file.               [1]   Current Outpatient Medications:     acyclovir (ZOVIRAX) 200 MG capsule, Take 1 capsule (200 mg total) by mouth as needed (for oral lesions)., Disp: 10 capsule, Rfl: 5    cetirizine (ZYRTEC) 10 MG tablet, Take 10 mg by mouth as needed. 1 Tablet Oral Every day, Disp: , Rfl:     fluticasone propionate (FLONASE) 50 mcg/actuation nasal spray, 1 spray (50 mcg total) by Each Nostril route once daily., Disp: 16 g, Rfl: 3    HYDROcodone-acetaminophen (NORCO) 5-325 mg per tablet, Take 1 tablet by mouth every 6 (six) hours as needed for Pain., Disp: 20 tablet, Rfl: 0    plecanatide (TRULANCE) 3 mg Tab, Take 3 mg by mouth as needed (for severe pain associated with constipation). For severe constipation, Disp: 90 tablet, Rfl: 3    amLODIPine (NORVASC) 5 MG tablet, Take 1 tablet (5 mg total) by mouth once daily., Disp: 90 tablet, Rfl: 3    esomeprazole (NEXIUM) 40 MG capsule, Take 1 capsule (40 mg total) by mouth before breakfast., Disp: 90 capsule, Rfl: 3

## 2025-06-11 PROBLEM — M51.369 DEGENERATION OF LUMBAR INTERVERTEBRAL DISC: Status: ACTIVE | Noted: 2020-07-21

## 2025-06-17 ENCOUNTER — PATIENT MESSAGE (OUTPATIENT)
Dept: ORTHOPEDICS | Facility: CLINIC | Age: 55
End: 2025-06-17
Payer: COMMERCIAL

## 2025-06-18 ENCOUNTER — OFFICE VISIT (OUTPATIENT)
Dept: ORTHOPEDICS | Facility: CLINIC | Age: 55
End: 2025-06-18
Payer: COMMERCIAL

## 2025-06-18 VITALS — WEIGHT: 138.88 LBS | BODY MASS INDEX: 23.14 KG/M2 | HEIGHT: 65 IN

## 2025-06-18 DIAGNOSIS — Z98.890 S/P CARPAL TUNNEL RELEASE: Primary | ICD-10-CM

## 2025-06-18 DIAGNOSIS — M79.641 PAIN OF RIGHT HAND: ICD-10-CM

## 2025-06-18 PROCEDURE — 99024 POSTOP FOLLOW-UP VISIT: CPT | Mod: S$GLB,,, | Performed by: PHYSICIAN ASSISTANT

## 2025-06-18 PROCEDURE — 99999 PR PBB SHADOW E&M-EST. PATIENT-LVL III: CPT | Mod: PBBFAC,,, | Performed by: PHYSICIAN ASSISTANT

## 2025-06-18 PROCEDURE — 1159F MED LIST DOCD IN RCRD: CPT | Mod: CPTII,S$GLB,, | Performed by: PHYSICIAN ASSISTANT

## 2025-06-18 NOTE — PROGRESS NOTES
"Subjective:      Chief Complaint: Pain of the Right Hand and Wound Check    Patient ID: Carmen Morrow is a 54 y.o. female.  55yo F 6 week PO right hand CTR. Recently noted a "lump" along the incision. Tender to touch but denies redness, drainage, warmth. Not interfering with motion. No paresthesias.         Past Medical History:   Diagnosis Date    Allergy     Brachial neuritis or radiculitis NOS 11/14/2012    Degeneration of cervical intervertebral disc 11/14/2012    Essential hypertension 8/10/2020    GERD (gastroesophageal reflux disease)     Latent tuberculosis by skin test 2/8/2017    Mixed hyperlipidemia 12/29/2021    SVT (supraventricular tachycardia)         Past Surgical History:   Procedure Laterality Date    anterior diskectomy fusion  05/20/2016    AUGMENTATION OF BREAST Bilateral     1994    CARPAL TUNNEL RELEASE Left 01/30/2018    CARPAL TUNNEL RELEASE Right 5/8/2025    Procedure: RELEASE, CARPAL TUNNEL;  Surgeon: Berlin Cortez MD;  Location: [facility] OR;  Service: Orthopedics;  Laterality: Right;    COLONOSCOPY, SCREENING, LOW RISK PATIENT N/A 2/4/2025    Procedure: COLONOSCOPY, SCREENING, LOW RISK PATIENT;  Surgeon: Orville Rodriguez MD;  Location: [facility] ENDOSCOPY;  Service: Endoscopy;  Laterality: N/A;  ref by Bryce Tavares MD, peg, portal, GLP-1 hold 8 days-ae  1/28/25- holding GLP-1 as directed, pc complete. DBM    CORRECTION OF HAMMER TOE Right 02/02/2021    Procedure: CORRECTION, HAMMER TOE;  Surgeon: Maday Thomas DPM;  Location: [facility] OR;  Service: Podiatry;  Laterality: Right;  4th toe      CSF leak  05/22/2016    Replace and repair fat graft from surgery on 5/20/2016    ESOPHAGOGASTRODUODENOSCOPY N/A 09/01/2020    Procedure: ESOPHAGOGASTRODUODENOSCOPY (EGD);  Surgeon: Alexi Macias MD;  Location: [facility] ENDO (4TH FLR);  Service: Endoscopy;  Laterality: N/A;  prep ins. emailed - COVID screening [facility] - [facility]    KNEE ARTHROSCOPY      LAMINECTOMY      SPINAL FUSION  " "    SURGICAL REMOVAL OF BUNION WITH OSTEOTOMY OF METATARSAL BONE Right 02/02/2021    Procedure: BUNIONECTOMY, WITH METATARSAL OSTEOTOMY;  Surgeon: Maday Thomas DPM;  Location: [facility];  Service: Podiatry;  Laterality: Right;        Current Outpatient Medications   Medication Instructions    acyclovir (ZOVIRAX) 200 mg, Oral, As needed (PRN)    amLODIPine (NORVASC) 5 mg, Oral, Daily    esomeprazole (NEXIUM) 40 mg, Oral, Before breakfast    fluticasone propionate (FLONASE) 50 mcg, Each Nostril, Daily    HYDROcodone-acetaminophen (NORCO) 5-325 mg per tablet 1 tablet, Oral, Every 6 hours PRN    plecanatide (TRULANCE) 3 mg, Oral, As needed (PRN), For severe constipation    ZyrTEC 10 mg, As needed (PRN)        Review of patient's allergies indicates:   Allergen Reactions    Oxycodone Itching     Can take hydrcodone       Review of Systems   Constitutional: Negative for fever and malaise/fatigue.   Eyes:  Negative for blurred vision.   Cardiovascular:  Negative for chest pain.   Respiratory:  Negative for shortness of breath.    Skin:  Negative for poor wound healing.   Musculoskeletal:  Positive for myalgias. Negative for joint pain.   Genitourinary:  Negative for bladder incontinence.   Neurological:  Negative for dizziness, numbness and paresthesias.   Psychiatric/Behavioral:  Negative for altered mental status.        The patient's relevant past medical, surgical, and social history was reviewed in Epic.       Objective:      VITAL SIGNS: Ht 5' 5" (1.651 m)   Wt 63 kg (138 lb 14.2 oz)   LMP  (LMP Unknown)   BMI 23.11 kg/m²     General    Nursing note and vitals reviewed.  Constitutional: She is oriented to person, place, and time. She appears well-developed and well-nourished.   Neurological: She is alert and oriented to person, place, and time.             Right Hand/Wrist Exam     Inspection   Scars:  Hand -  present    Tenderness   The patient is tender to palpation of the schumacher area.    Other     Neuorologic " Exam    Median Distribution: normal  Ulnar Distribution: normal  Radial Distribution: normal    Comments:  .5cm raised lesion over one of the suture incisions. Callus looking. Tender to palpation but not fluctuance, redness, or warmth. Unable to express drainage.              Imaging          Assessment:       Carmen Morrow is a 54 y.o. female seen in the office today for   1. S/P carpal tunnel release    2. Pain of right hand     Could be a reaction to the suture. No evidence of infection. Recommend warm,wet compresses to soften it or maybe form a head if it is a suture abscess. Will see how that does. She will f/u if she notes any evidence of infection.       Plan:       Carmen was seen today for pain and wound check.    Diagnoses and all orders for this visit:    S/P carpal tunnel release    Pain of right hand          Diagnoses and plan discussed with the patient, as well as the expected course and duration of his symptoms.  All questions and concerns were addressed prior to the end of the visit.   Instructed patient to call office if they have any future questions/concerns or to schedule apt. Patient will return to see me if symptoms worsen or fail to improve    Note dictated with voice recognition software, please excuse any grammatical errors.        Mari Nunez PA-C      Department of Orthopedic Surgery  [facility]  Office: [phone]  06/18/2025

## (undated) DEVICE — CLOSURE SKIN STERI STRIP 1/2X4

## (undated) DEVICE — GOWN POLY REINF X-LONG 2XL

## (undated) DEVICE — DRESSING TELFA N ADH 3X8

## (undated) DEVICE — SEE MEDLINE ITEM 152622

## (undated) DEVICE — DRESSING XEROFORM NONADH 1X8IN

## (undated) DEVICE — SEE MEDLINE ITEM 157110

## (undated) DEVICE — TRACKER PATIENT NON INVASIVE

## (undated) DEVICE — BLADE SURG #15 CARBON STEEL

## (undated) DEVICE — ALCOHOL 70% ISOP W/GREEN 16OZ

## (undated) DEVICE — DRESSING N ADH OIL EMUL 3X8

## (undated) DEVICE — SPLINT NASAL AIRWAY SEPTAL SIL

## (undated) DEVICE — GLOVE BIOGEL PI MICRO INDIC 8

## (undated) DEVICE — BLADE INFERIOR TURBINATE 2MM

## (undated) DEVICE — UNDERGLOVES BIOGEL PI SZ 7 LF

## (undated) DEVICE — APPLICATOR CHLORAPREP ORN 26ML

## (undated) DEVICE — TRAY DRY SKIN SCRUB PREP

## (undated) DEVICE — SOL 9P NACL IRR PIC IL

## (undated) DEVICE — GAUZE SPONGE 4X4 12PLY

## (undated) DEVICE — PACK SURGERY START

## (undated) DEVICE — GLOVE BIOGEL SKINSENSE PI 6.5

## (undated) DEVICE — SUT SILK 2-0 BLK BR KS 30 I

## (undated) DEVICE — TRACKER ENT INSTRUMENT

## (undated) DEVICE — DRAPE STERI U-SHAPED 47X51IN

## (undated) DEVICE — CONTAINER SPECIMEN STRL 4OZ

## (undated) DEVICE — SOL NACL 0.9% INJ 500ML BG

## (undated) DEVICE — SPLINT REUTER BIVALVE 0.5MM

## (undated) DEVICE — GLOVE BIOGEL SKINSENSE PI 8.0

## (undated) DEVICE — PACK ECLIPSE BASIC III SURG

## (undated) DEVICE — BANDAGE ESMARK ELASTIC ST 4X9

## (undated) DEVICE — SUT SILK 2-0 BLK BR FSL 18

## (undated) DEVICE — SPONGE COTTON TRAY 4X4IN

## (undated) DEVICE — PADDING CAST 4IN SPECIALIST

## (undated) DEVICE — SEE MEDLINE ITEM 153151

## (undated) DEVICE — SUT PROLENE 4-0 FS-2 BL MON

## (undated) DEVICE — SHEATH CLN ENDOSCRB 4MM

## (undated) DEVICE — SUT MONOCRYL 3-0 PS-2 UND

## (undated) DEVICE — DRAPE THREE-QTR REINF 53X77IN

## (undated) DEVICE — UNDERGLOVE BIOGEL PI SZ 6.5 LF

## (undated) DEVICE — GLOVE BIOGEL SKINSENSE PI 7.0

## (undated) DEVICE — BLADE QUADCUT STRAIGHT 4.3MM

## (undated) DEVICE — SUT PLN GUT 4-0 SC-1SC-1 1

## (undated) DEVICE — PAD PREP CUFFED NS 24X48IN

## (undated) DEVICE — GLOVE BIOGEL SKINSENSE PI 7.5

## (undated) DEVICE — TOWEL OR XRAY BLUE 17X26IN

## (undated) DEVICE — DRESSING NASAL INJECT MEROGEL

## (undated) DEVICE — BNDG COFLEX FOAM LF2 ST 4X5YD

## (undated) DEVICE — TUBING XPS IRRIG TO STRAIGHTSH

## (undated) DEVICE — PACKING NASAL 4CM X 4CM ST

## (undated) DEVICE — SEE MEDLINE ITEM 156934

## (undated) DEVICE — DRAPE HAND STERILE

## (undated) DEVICE — GOWN POLY REINF BRTH SLV XL

## (undated) DEVICE — ELECTRODE REM PLYHSV RETURN 9

## (undated) DEVICE — GLOVE BIOGEL ORTHOPEDIC 8

## (undated) DEVICE — SUT 4/0 18IN CHROMIC GUT PS

## (undated) DEVICE — SKIN MARKER DEVON 160

## (undated) DEVICE — COVER OVERHEAD SURG LT BLUE

## (undated) DEVICE — CORD BIPOLAR 12 FOOT

## (undated) DEVICE — BANDAGE MATRIX HK LOOP 4IN 5YD

## (undated) DEVICE — TOURNIQUET SB QC DP 18X4IN

## (undated) DEVICE — POSITIONER HEAD DONUT 9IN FOAM

## (undated) DEVICE — NASAL AIRWAY SPLINT

## (undated) DEVICE — GOWN POLY REINF BRTH SLV LG